# Patient Record
Sex: FEMALE | Race: WHITE | NOT HISPANIC OR LATINO | Employment: FULL TIME | URBAN - METROPOLITAN AREA
[De-identification: names, ages, dates, MRNs, and addresses within clinical notes are randomized per-mention and may not be internally consistent; named-entity substitution may affect disease eponyms.]

---

## 2017-01-04 ENCOUNTER — OFFICE VISIT (OUTPATIENT)
Dept: ENDOCRINOLOGY | Facility: CLINIC | Age: 32
End: 2017-01-04

## 2017-01-04 VITALS
BODY MASS INDEX: 27.74 KG/M2 | SYSTOLIC BLOOD PRESSURE: 114 MMHG | HEART RATE: 78 BPM | HEIGHT: 68 IN | DIASTOLIC BLOOD PRESSURE: 71 MMHG | WEIGHT: 183 LBS

## 2017-01-04 DIAGNOSIS — R79.89 ELEVATED SERUM CREATININE: Primary | ICD-10-CM

## 2017-01-04 DIAGNOSIS — E27.9 HYPOTHALAMIC-ADRENAL DYSFUNCTION (H): ICD-10-CM

## 2017-01-04 DIAGNOSIS — E24.0 PITUITARY CUSHING'S SYNDROME (H): ICD-10-CM

## 2017-01-04 DIAGNOSIS — E23.3 HYPOTHALAMIC-ADRENAL DYSFUNCTION (H): ICD-10-CM

## 2017-01-04 ASSESSMENT — PAIN SCALES - GENERAL: PAINLEVEL: NO PAIN (0)

## 2017-01-04 NOTE — PATIENT INSTRUCTIONS
Labs in 1 month and then prior to next visit  MRI in about 5-6 months.   Continue taking current medications, no changes in dose were made today.

## 2017-01-04 NOTE — NURSING NOTE
Chief Complaint   Patient presents with     RECHECK     F/U DIABETES INSIPIDUS     Aline Block, [facility]  Endocrinology & Diabetes 3G

## 2017-01-04 NOTE — Clinical Note
1/4/2017       RE: Suyapa Hodge  [address] [place] [address]     Dear Colleague,    Thank you for referring your patient, Suyapa Hodge, to the [facility] ENDOCRINOLOGY at [facility]. Please see a copy of my visit note below.    Endocrinology Progress Note    Suyapa Hodge is a 30 year old woman last seen about 5 mo ago who is in clinic for follow-up s/p pituitary surgery x 3 and bilateral adrenalectomy (July 2014).   She was diagnosed with a pituitary microadenoma several yrs ago which was the suspected cause of her Cushing's disease. She had initial operation several yrs ago and labs normalized and symptoms resolved for about 2.5-3 yrs but then recurred and she was seen in spring of 2013.  She then underwent surgery summer 2013 and again in January 2014. She developed a PE after that surgery, which was trated and has now resolved. Her Cushing symptoms/elevated urine cortisol exc still persisted after her pituitary surgeries; hence, she finally underwent bilateral adrenalectomy on July 14, 2014 (after a couple of months of temporizing therapy with Signifor).    She is currently taking hydrocortisone BID (10 mg a, 10mg p), L-thyroxine 75 mcg tablets full tablet 6 days and desmopressin 0.1 mg b.i.d.  She is wt stable now though she would have a goal of losing more if possible (current BMI is around 27--still with centralized fat distribution , BP and edema are better (wt is down about 50# from the max, stable compared to last visit).     She is now on HRT--she has been followed by heme-onc on this ad her coag profile has actually improved some at last check compared to earlier (before starting the HRT).  Has the history of PE christiano-op with last neuro sx--She was started on daily prometrium and vivelle patch and had the labs done in the interim which did not show wrosening profile again on treatment.  Pt is otherwise tolerating this regimen.  In summer  2015 AVN was an issue again after many hours of standing and she was seen by ortho.  She had PT and that improved.  Has never had a DXA study.    ROS--gen/HEENT/MSK/endo reviewed with her    Current Outpatient Prescriptions   Medication     desmopressin (DDAVP) 0.1 MG tablet     hydrocortisone (CORTEF) 10 MG tablet     fludrocortisone (FLORINEF) 0.1 MG tablet     progesterone (PROMETRIUM) 100 MG capsule     estradiol (VIVELLE-DOT) 0.05 MG/24HR patch     levothyroxine (SYNTHROID, LEVOTHROID) 75 MCG tablet     Multiple Vitamin (MULTI-VITAMIN DAILY PO)     No current facility-administered medications for this visit.       She does not have heat or cold intol. She has good energy level--discussed most recent TFTs    She does not have polyuria or polydipsia.  She is not having issues with increased freqnency/polyuria during the day, or increased thirst or edema.  She continues to take desmopressin 0.1 mg b.i.d.    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    Past Medical History   Past Medical History   Diagnosis Date     Colon polyp      Adrenal disorder (H)      Medulloadrenal hyperfunction (H)      Cushing syndrome (H)      pituitary microadenoma     Diabetes insipidus (H)      Avascular necrosis of femur head, 2010     Hypercalcaemia      Pulmonary emboli (H) 01/2014     Hypertension      Thyroid disease      Pulmonary embolism (H) 2014       Past Surgical History   Past Surgical History   Procedure Laterality Date     Removal of pituitary microademona       [place] teeth extration       Optical tracking system endoscopic resection tumor cranial  7/1/2013     Procedure: OPTICAL TRACKING SYSTEM ENDOSCOPIC RESECTION TUMOR CRANIAL;  Stealth Assisted Endoscopic Hypophysectomy ;  Surgeon: Soham Aquino MD;  Location:  OR     Optical tracking system endoscopic resection tumor cranial  1/20/2014     Procedure: OPTICAL TRACKING SYSTEM ENDOSCOPIC RESECTION TUMOR  "CRANIAL;  Stealth Assisted Endoscopic Transnasal Excision Of Pituitary Adenoma , Placement Of Lumbar Drain with c-arm;  Surgeon: Soham Aquino MD;  Location: [facility] OR     Insert drain lumbar  1/20/2014     Procedure: INSERT DRAIN LUMBAR;;  Surgeon: Soham Aquino MD;  Location: [facility] OR     Colonoscopy       Laparoscopic adrenalectomy  7/14/2014     Procedure: LAPAROSCOPIC ADRENALECTOMY;  Surgeon: Roni Nunn MD;  Location: [facility] OR     Social History  History     Social History     Marital Status: Single     Spouse Name: N/A     Number of Children: N/A     Years of Education: N/A     Social History Main Topics     Smoking status: Former Smoker -- 3 years     Types: Cigarettes     Quit date: 02/05/2008     Smokeless tobacco: None     Alcohol Use: Yes      Comment: 2/week     Drug Use: No     Sexual Activity: No     Review of Systems   See HPI for pertinents--gen/HEENT/endo/heme/neuro discussed with pertinents as above      Physical Exam  Vital signs:   /71 mmHg  Pulse 78  Ht 1.727 m (5' 8\")  Wt 83.008 kg (183 lb)  BMI 27.83 kg/m2  General Appearance: she is well developed, well nourished and in no distress     Eyes:  conjutivae and extra-ocular motions are normal.                                    no lid lag, no stare    HEENT:   NCAT, eomi  Gastrointestinal:  abdomen nondistended  Musculoskeletal:  normal tone and bulk throughout  Psychological:           affect and judgment normal  Skin:  no lesions on exposed skin  Neurological:  nl gait, no resting tremor, nonfocal otherwise, fully A and O    Lab Results  Orders Only on 07/22/2016   Component Date Value Ref Range Status     T4 Free 07/22/2016 1.11  0.76 - 1.46 ng/dL Final     Sodium 07/22/2016 138  133 - 144 mmol/L Final     Potassium 07/22/2016 3.8  3.4 - 5.3 mmol/L Final     Chloride 07/22/2016 107  94 - 109 mmol/L Final     Carbon Dioxide 07/22/2016 25  20 - 32 mmol/L Final     Anion Gap 07/22/2016 6  3 - 14 mmol/L Final     Glucose " 07/22/2016 75  70 - 99 mg/dL Final     Urea Nitrogen 07/22/2016 22  7 - 30 mg/dL Final     Creatinine 07/22/2016 1.08* 0.52 - 1.04 mg/dL Final     GFR Estimate 07/22/2016 59* >60 mL/min/1.7m2 Final    Non  GFR Calc     GFR Estimate If Black 07/22/2016 71  >60 mL/min/1.7m2 Final    African American GFR Calc     Calcium 07/22/2016 8.9  8.5 - 10.1 mg/dL Final     Prolactin 07/22/2016 6  3 - 27 ug/L Final    Reference ranges apply to non-pregnant females only.     ENDO THYROID LABS-[facility] Latest Ref Rng 12/30/2016 7/22/2016 5/3/2016   TSH 0.40 - 4.00 mU/L      T4 FREE 0.76 - 1.46 ng/dL 0.91 1.11 0.96   TRIIODOTHYRONINE(T3) 60 - 181 ng/dL        Sodium 139, potassium 3.8, insulin like GF 1 70, glucose 76, prolactin seven  Assessment   Ms Suyapa Hodge is a 30 year old woman with a history of Cushing's disease S/P pituitary surgery x 3, primary adrenal insufficiency due to bilateral adrenalectomy for recurrent pituitary Cushing's--microadenoma.  Now with hypopituitaritarism  1. Panhypopit  2. Low bone density  3. Elevated Creatinine level : Retest in 1 month   Plan  We can continue with current doses of hormone replacement--  continue Vivelle dot patch for estrogen replacement along with nightly prometrium  continue hydrocortisone dose to 10 mg AM and 10 mg PM; we can reduce the l-thyroxine 75 mcg for 6 days out of 7.  Return in about 6 months with labs shortly before return    We discussed calcium and vitamin D--  For vitamin D3 she can aim for getting about 800-2000 IU daily (total of what is in supplements)  For elemental calcium can aim to get around 1200 mg to 1500 mg daily (total of diet plus supplements)  Rule of thumb--1 dairy serving = about 1 c milk or yogurt or 1.5 oz hard cheese is about 300-350 mg elemental calcium    Re-test BMD in May-June  Labs 6 month  MRI in May-June       Celestine Medina MD  [id number]  Endocrinology Service

## 2017-01-04 NOTE — PROGRESS NOTES
Endocrinology Progress Note    Suyapa Hodge is a 30 year old woman last seen about 5 mo ago who is in clinic for follow-up s/p pituitary surgery x 3 and bilateral adrenalectomy (July 2014).   She was diagnosed with a pituitary microadenoma several yrs ago which was the suspected cause of her Cushing's disease. She had initial operation several yrs ago and labs normalized and symptoms resolved for about 2.5-3 yrs but then recurred and she was seen in spring of 2013.  She then underwent surgery summer 2013 and again in January 2014. She developed a PE after that surgery, which was trated and has now resolved. Her Cushing symptoms/elevated urine cortisol exc still persisted after her pituitary surgeries; hence, she finally underwent bilateral adrenalectomy on July 14, 2014 (after a couple of months of temporizing therapy with Signifor).    She is currently taking hydrocortisone BID (10 mg a, 10mg p), L-thyroxine 75 mcg tablets full tablet 6 days and desmopressin 0.1 mg b.i.d.  She is wt stable now though she would have a goal of losing more if possible (current BMI is around 27--still with centralized fat distribution , BP and edema are better (wt is down about 50# from the max, stable compared to last visit).     She is now on HRT--she has been followed by heme-onc on this ad her coag profile has actually improved some at last check compared to earlier (before starting the HRT).  Has the history of PE christiano-op with last neuro sx--She was started on daily prometrium and vivelle patch and had the labs done in the interim which did not show wrosening profile again on treatment.  Pt is otherwise tolerating this regimen.  In summer 2015 AVN was an issue again after many hours of standing and she was seen by ortho.  She had PT and that improved.  Has never had a DXA study.    ROS--gen/HEENT/MSK/endo reviewed with her    Current Outpatient Prescriptions   Medication     desmopressin (DDAVP) 0.1 MG tablet      hydrocortisone (CORTEF) 10 MG tablet     fludrocortisone (FLORINEF) 0.1 MG tablet     progesterone (PROMETRIUM) 100 MG capsule     estradiol (VIVELLE-DOT) 0.05 MG/24HR patch     levothyroxine (SYNTHROID, LEVOTHROID) 75 MCG tablet     Multiple Vitamin (MULTI-VITAMIN DAILY PO)     No current facility-administered medications for this visit.       She does not have heat or cold intol. She has good energy level--discussed most recent TFTs    She does not have polyuria or polydipsia.  She is not having issues with increased freqnency/polyuria during the day, or increased thirst or edema.  She continues to take desmopressin 0.1 mg b.i.d.    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    Past Medical History   Past Medical History   Diagnosis Date     Colon polyp      Adrenal disorder (H)      Medulloadrenal hyperfunction (H)      Cushing syndrome (H)      pituitary microadenoma     Diabetes insipidus (H)      Avascular necrosis of femur head, 2010     Hypercalcaemia      Pulmonary emboli (H) 01/2014     Hypertension      Thyroid disease      Pulmonary embolism (H) 2014       Past Surgical History   Past Surgical History   Procedure Laterality Date     Removal of pituitary microademona       [place] teeth extration       Optical tracking system endoscopic resection tumor cranial  7/1/2013     Procedure: OPTICAL TRACKING SYSTEM ENDOSCOPIC RESECTION TUMOR CRANIAL;  Stealth Assisted Endoscopic Hypophysectomy ;  Surgeon: Soham Aquino MD;  Location: [facility] OR     Optical tracking system endoscopic resection tumor cranial  1/20/2014     Procedure: OPTICAL TRACKING SYSTEM ENDOSCOPIC RESECTION TUMOR CRANIAL;  Stealth Assisted Endoscopic Transnasal Excision Of Pituitary Adenoma , Placement Of Lumbar Drain with c-arm;  Surgeon: Soham Aquino MD;  Location: [facility] OR     Insert drain lumbar  1/20/2014     Procedure: INSERT DRAIN LUMBAR;;  Surgeon: Soham Aquino MD;   "Location: [facility] OR     Colonoscopy       Laparoscopic adrenalectomy  7/14/2014     Procedure: LAPAROSCOPIC ADRENALECTOMY;  Surgeon: Roni Nunn MD;  Location: [facility] OR     Social History  History     Social History     Marital Status: Single     Spouse Name: N/A     Number of Children: N/A     Years of Education: N/A     Social History Main Topics     Smoking status: Former Smoker -- 3 years     Types: Cigarettes     Quit date: 02/05/2008     Smokeless tobacco: None     Alcohol Use: Yes      Comment: 2/week     Drug Use: No     Sexual Activity: No     Review of Systems   See HPI for pertinents--gen/HEENT/endo/heme/neuro discussed with pertinents as above      Physical Exam  Vital signs:   /71 mmHg  Pulse 78  Ht 1.727 m (5' 8\")  Wt 83.008 kg (183 lb)  BMI 27.83 kg/m2  General Appearance: she is well developed, well nourished and in no distress     Eyes:  conjutivae and extra-ocular motions are normal.                                    no lid lag, no stare    HEENT:   NCAT, eomi  Gastrointestinal:  abdomen nondistended  Musculoskeletal:  normal tone and bulk throughout  Psychological:           affect and judgment normal  Skin:  no lesions on exposed skin  Neurological:  nl gait, no resting tremor, nonfocal otherwise, fully A and O    Lab Results  Orders Only on 07/22/2016   Component Date Value Ref Range Status     T4 Free 07/22/2016 1.11  0.76 - 1.46 ng/dL Final     Sodium 07/22/2016 138  133 - 144 mmol/L Final     Potassium 07/22/2016 3.8  3.4 - 5.3 mmol/L Final     Chloride 07/22/2016 107  94 - 109 mmol/L Final     Carbon Dioxide 07/22/2016 25  20 - 32 mmol/L Final     Anion Gap 07/22/2016 6  3 - 14 mmol/L Final     Glucose 07/22/2016 75  70 - 99 mg/dL Final     Urea Nitrogen 07/22/2016 22  7 - 30 mg/dL Final     Creatinine 07/22/2016 1.08* 0.52 - 1.04 mg/dL Final     GFR Estimate 07/22/2016 59* >60 mL/min/1.7m2 Final    Non  GFR Calc     GFR Estimate If Black 07/22/2016 71  >60 " mL/min/1.7m2 Final     GFR Calc     Calcium 07/22/2016 8.9  8.5 - 10.1 mg/dL Final     Prolactin 07/22/2016 6  3 - 27 ug/L Final    Reference ranges apply to non-pregnant females only.     ENDO THYROID LABS-[facility] Latest Ref Rng 12/30/2016 7/22/2016 5/3/2016   TSH 0.40 - 4.00 mU/L      T4 FREE 0.76 - 1.46 ng/dL 0.91 1.11 0.96   TRIIODOTHYRONINE(T3) 60 - 181 ng/dL        Sodium 139, potassium 3.8, insulin like GF 1 70, glucose 76, prolactin seven  Assessment   Ms Suyapa Hodge is a 30 year old woman with a history of Cushing's disease S/P pituitary surgery x 3, primary adrenal insufficiency due to bilateral adrenalectomy for recurrent pituitary Cushing's--microadenoma.  Now with hypopituitaritarism  1. Panhypopit  2. Low bone density  3. Elevated Creatinine level : Retest in 1 month   Plan  We can continue with current doses of hormone replacement--  continue Vivelle dot patch for estrogen replacement along with nightly prometrium  continue hydrocortisone dose to 10 mg AM and 10 mg PM; we can reduce the l-thyroxine 75 mcg for 6 days out of 7.  Return in about 6 months with labs shortly before return    We discussed calcium and vitamin D--  For vitamin D3 she can aim for getting about 800-2000 IU daily (total of what is in supplements)  For elemental calcium can aim to get around 1200 mg to 1500 mg daily (total of diet plus supplements)  Rule of thumb--1 dairy serving = about 1 c milk or yogurt or 1.5 oz hard cheese is about 300-350 mg elemental calcium    Re-test BMD in May-June  Labs 6 month  MRI in May-June       Celestine Medina MD  [id number]  Endocrinology Service              Answers for HPI/ROS submitted by the patient on 12/21/2016   General Symptoms: No  Skin Symptoms: No  HENT Symptoms: No  EYE SYMPTOMS: No  HEART SYMPTOMS: No  LUNG SYMPTOMS: No  INTESTINAL SYMPTOMS: No  URINARY SYMPTOMS: No  GYNECOLOGIC SYMPTOMS: No  BREAST SYMPTOMS: No  SKELETAL SYMPTOMS: No  BLOOD SYMPTOMS: No  NERVOUS SYSTEM  SYMPTOMS: No  MENTAL HEALTH SYMPTOMS: No

## 2017-02-06 DIAGNOSIS — R79.89 ELEVATED SERUM CREATININE: ICD-10-CM

## 2017-02-06 LAB — POTASSIUM SERPL-SCNC: 4.2 MMOL/L (ref 3.4–5.3)

## 2017-02-15 NOTE — PROGRESS NOTES
Dear Suyapa,     The potassium levels are normal . Continue Margaritainef.     Celestine Medina MD  [id number]  Endocrinology Service

## 2017-03-17 DIAGNOSIS — E27.40 ADRENAL INSUFFICIENCY (H): ICD-10-CM

## 2017-03-17 RX ORDER — HYDROCORTISONE 10 MG/1
TABLET ORAL
Qty: 200 TABLET | Refills: 1 | Status: SHIPPED | OUTPATIENT
Start: 2017-03-17 | End: 2017-12-06

## 2017-05-11 DIAGNOSIS — E03.8 OTHER SPECIFIED HYPOTHYROIDISM: ICD-10-CM

## 2017-05-11 NOTE — TELEPHONE ENCOUNTER
Pharmacy calling requesting refill for Levothyroxine 75 mcg 1 tab 6 days per week and none on the 7th day.  Patient seen by Dr. Medina on 01/04/17.  Anamika Jonas RN, BSN

## 2017-05-12 RX ORDER — LEVOTHYROXINE SODIUM 75 UG/1
TABLET ORAL
Qty: 90 TABLET | Refills: 3 | Status: SHIPPED | OUTPATIENT
Start: 2017-05-12 | End: 2017-05-13

## 2017-05-13 DIAGNOSIS — E03.8 OTHER SPECIFIED HYPOTHYROIDISM: ICD-10-CM

## 2017-05-13 RX ORDER — LEVOTHYROXINE SODIUM 75 UG/1
TABLET ORAL
Qty: 90 TABLET | Refills: 3 | Status: SHIPPED | OUTPATIENT
Start: 2017-05-13 | End: 2018-07-16

## 2017-07-07 DIAGNOSIS — E27.9 HYPOTHALAMIC-ADRENAL DYSFUNCTION (H): ICD-10-CM

## 2017-07-07 DIAGNOSIS — E23.3 HYPOTHALAMIC-ADRENAL DYSFUNCTION (H): ICD-10-CM

## 2017-07-07 DIAGNOSIS — E24.0 PITUITARY CUSHING'S SYNDROME (H): ICD-10-CM

## 2017-07-07 DIAGNOSIS — R79.89 ELEVATED SERUM CREATININE: ICD-10-CM

## 2017-07-07 LAB
ALT SERPL W P-5'-P-CCNC: 22 U/L (ref 0–50)
ANION GAP SERPL CALCULATED.3IONS-SCNC: 6 MMOL/L (ref 3–14)
BUN SERPL-MCNC: 15 MG/DL (ref 7–30)
CALCIUM SERPL-MCNC: 8.7 MG/DL (ref 8.5–10.1)
CHLORIDE SERPL-SCNC: 102 MMOL/L (ref 94–109)
CO2 SERPL-SCNC: 29 MMOL/L (ref 20–32)
CREAT SERPL-MCNC: 0.85 MG/DL (ref 0.52–1.04)
GFR SERPL CREATININE-BSD FRML MDRD: 77 ML/MIN/1.7M2
GLUCOSE SERPL-MCNC: 77 MG/DL (ref 70–99)
POTASSIUM SERPL-SCNC: 4.1 MMOL/L (ref 3.4–5.3)
SODIUM SERPL-SCNC: 138 MMOL/L (ref 133–144)
T4 FREE SERPL-MCNC: 0.98 NG/DL (ref 0.76–1.46)

## 2017-07-10 LAB — ACTH PLAS-MCNC: 271 PG/ML

## 2017-07-12 ENCOUNTER — OFFICE VISIT (OUTPATIENT)
Dept: ENDOCRINOLOGY | Facility: CLINIC | Age: 32
End: 2017-07-12

## 2017-07-12 VITALS — HEIGHT: 68 IN | WEIGHT: 195.1 LBS | BODY MASS INDEX: 29.57 KG/M2

## 2017-07-12 DIAGNOSIS — E89.3 HYPOPITUITARISM AFTER ADENOMA RESECTION (H): Primary | ICD-10-CM

## 2017-07-12 ASSESSMENT — PAIN SCALES - GENERAL: PAINLEVEL: NO PAIN (0)

## 2017-07-12 NOTE — LETTER
7/12/2017      RE: Suyapa Hodge  [id number] [facility] [id number]       Endocrinology Progress Note    Suyapa Hodge is a 30 year old woman last seen about 5 mo ago who is in clinic for follow-up s/p pituitary surgery x 3 and bilateral adrenalectomy (July 2014).   She was diagnosed with a pituitary microadenoma several yrs ago which was the suspected cause of her Cushing's disease. She had pituitary surgery X3 in 2013-14 and finally underwent bilateral adrenalectomy on July 14, 2014 (after a couple of months of temporizing therapy with Signifor).    She is currently taking hydrocortisone BID (10 mg a, 10mg p), L-thyroxine 75 mcg tablets full tablet 6 days and desmopressin 0.1 mg b.i.d.   She lost about 50 pounds after surgery and has felt well since.     She is now on HRT-- on daily prometrium and vivelle patch.  Pt is otherwise tolerating this regimen.  In summer 2015 AVN was an issue again after many hours of standing and she was seen by ortho.  She had PT and that improved.  Has never had a DXA study.    ROS--gen/HEENT/MSK/endo reviewed with her  she has good energy levels, no changes with change in hydrocortisone dose  No heat or cold intolerance  Normal bowel movements  No new symptoms of headache or vision changes  She has chronic right-sided frontal headaches    Current Outpatient Prescriptions   Medication     levothyroxine (SYNTHROID/LEVOTHROID) 75 MCG tablet     hydrocortisone (CORTEF) 10 MG tablet     desmopressin (DDAVP) 0.1 MG tablet     fludrocortisone (FLORINEF) 0.1 MG tablet     progesterone (PROMETRIUM) 100 MG capsule     estradiol (VIVELLE-DOT) 0.05 MG/24HR patch     Multiple Vitamin (MULTI-VITAMIN DAILY PO)     No current facility-administered medications for this visit.        She does not have heat or cold intol. She has good energy level--discussed most recent TFTs    She does not have polyuria or polydipsia.  She is not having issues with increased freqnency/polyuria during the  day, or increased thirst or edema.  She continues to take desmopressin 0.1 mg b.i.d.    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    Past Medical History   Past Medical History:   Diagnosis Date     Adrenal disorder (H)      Avascular necrosis of femur head, 2010     Colon polyp      Cushing syndrome (H)     pituitary microadenoma     Diabetes insipidus (H)      Hypercalcaemia      Hypertension      Medulloadrenal hyperfunction (H)      Pulmonary emboli (H) 01/2014     Pulmonary embolism (H) 2014     Thyroid disease        Past Surgical History   Past Surgical History:   Procedure Laterality Date     COLONOSCOPY       INSERT DRAIN LUMBAR  1/20/2014    Procedure: INSERT DRAIN LUMBAR;;  Surgeon: Soham Aquino MD;  Location: [facility] OR     LAPAROSCOPIC ADRENALECTOMY  7/14/2014    Procedure: LAPAROSCOPIC ADRENALECTOMY;  Surgeon: Roni Nunn MD;  Location: [facility] OR     OPTICAL TRACKING SYSTEM ENDOSCOPIC RESECTION TUMOR CRANIAL  7/1/2013    Procedure: OPTICAL TRACKING SYSTEM ENDOSCOPIC RESECTION TUMOR CRANIAL;  Stealth Assisted Endoscopic Hypophysectomy ;  Surgeon: Soham Aquino MD;  Location: [facility] OR     OPTICAL TRACKING SYSTEM ENDOSCOPIC RESECTION TUMOR CRANIAL  1/20/2014    Procedure: OPTICAL TRACKING SYSTEM ENDOSCOPIC RESECTION TUMOR CRANIAL;  Stealth Assisted Endoscopic Transnasal Excision Of Pituitary Adenoma , Placement Of Lumbar Drain with c-arm;  Surgeon: Soham Aquino MD;  Location: [facility] OR     removal of pituitary microademona       wisdom teeth extration       Social History  History     Social History     Marital Status: Single     Spouse Name: N/A     Number of Children: N/A     Years of Education: N/A     Social History Main Topics     Smoking status: Former Smoker -- 3 years     Types: Cigarettes     Quit date: 02/05/2008     Smokeless tobacco: None     Alcohol Use: Yes      Comment: 2/week     Drug Use: No     Sexual Activity: No     Review of  "Systems   See HPI for pertinents--gen/HEENT/endo/heme/neuro discussed with pertinents as above      Physical Exam  Vital signs:   BP (P) 120/81  Pulse (P) 75  Ht 1.727 m (5' 8\")  Wt 88.5 kg (195 lb 1.6 oz)  BMI 29.66 kg/m2  General Appearance: she is well developed, well nourished and in no distress     Eyes:  conjutivae and extra-ocular motions are normal.                                    no lid lag, no stare    HEENT:   NCAT, eomi  Gastrointestinal:  abdomen nondistended  Musculoskeletal:  normal tone and bulk throughout  Psychological:           affect and judgment normal  Skin:  no lesions on exposed skin  Neurological:  nl gait, no resting tremor, nonfocal otherwise, fully A and O    Lab Results  Orders Only on 07/22/2016   Component Date Value Ref Range Status     T4 Free 07/22/2016 1.11  0.76 - 1.46 ng/dL Final     Sodium 07/22/2016 138  133 - 144 mmol/L Final     Potassium 07/22/2016 3.8  3.4 - 5.3 mmol/L Final     Chloride 07/22/2016 107  94 - 109 mmol/L Final     Carbon Dioxide 07/22/2016 25  20 - 32 mmol/L Final     Anion Gap 07/22/2016 6  3 - 14 mmol/L Final     Glucose 07/22/2016 75  70 - 99 mg/dL Final     Urea Nitrogen 07/22/2016 22  7 - 30 mg/dL Final     Creatinine 07/22/2016 1.08* 0.52 - 1.04 mg/dL Final     GFR Estimate 07/22/2016 59* >60 mL/min/1.7m2 Final    Non  GFR Calc     GFR Estimate If Black 07/22/2016 71  >60 mL/min/1.7m2 Final    African American GFR Calc     Calcium 07/22/2016 8.9  8.5 - 10.1 mg/dL Final     Prolactin 07/22/2016 6  3 - 27 ug/L Final    Reference ranges apply to non-pregnant females only.     ENDO THYROID LABS-[facility] Latest Ref Rng 12/30/2016 7/22/2016 5/3/2016   TSH 0.40 - 4.00 mU/L      T4 FREE 0.76 - 1.46 ng/dL 0.91 1.11 0.96   TRIIODOTHYRONINE(T3) 60 - 181 ng/dL        MR BRAIN W/O CONTRAST 7/7/2017 1:10 PM     Provided History: Other specified abnormal findings of blood  chemistry, Disorder of adrenal gland, unspecified, " Pituitary-dependent  Cushing's disease     Comparison:  6/3/2015, 4/23/2014      Technique: Multiplanar multisequence imaging of the brain without  intravenous contrast. Contrast enhanced portions were aborted due to  inability to obtain a creatinine level.     Findings: Postoperative changes from transsphenoidal pituitary  microadenoma resection. No evidence of recurrent disease on these  noncontrast images. These images reveal no intracranial mass lesion,  mass effect, midline shift or abnormal extraaxial fluid collection.  The ventricles and sulci are normal for age. Diffusion-weighted images  demonstrate no restricted diffusion.  Normal intravascular flow voids  are identified. Scattered paranasal sinus mucosal thickening.         Impression: No evidence of residual mass within the sella on these  noncontrast images. Contrast portions of the examination were aborted  due to inability to obtain a creatinine level and given patient's  chronic kidney disease.     Assessment   Ms Suyapa Hodge is a 30 year old woman with a history of Cushing's disease S/P pituitary surgery x 3, primary adrenal insufficiency due to bilateral adrenalectomy for recurrent pituitary Cushing's--microadenoma.  Now with hypopituitaritarism and rising ACTH levels.   1. Panhypopit  2. Low bone density for age.   3. Elevated Creatinine level : now normalized.   4. REcent weight gain  5. High ACTH levels.   Plan  REcheck ACTH in 3 months.   We can continue with current doses of hormone replacement--  continue Vivelle dot patch for estrogen replacement along with nightly prometrium. She denies having withdrawal bleed on this.     continue hydrocortisone dose to 10 mg AM and 10 mg PM (can reduce to 10 mg and 5 mg) ; we can reduce the l-thyroxine 75 mcg for 6 days out of 7.  Return in about 6 months with labs shortly before return    We discussed calcium and vitamin D--  For vitamin D3 she can aim for getting about 800-2000 IU daily (total of what  is in supplements)  For elemental calcium can aim to get around 1200 mg to 1500 mg daily (total of diet plus supplements)  Rule of thumb--1 dairy serving = about 1 c milk or yogurt or 1.5 oz hard cheese is about 300-350 mg elemental calcium    Re-test BMD in 2018 after Ca and Vit D supplementation. If not improved now that Cushings is treated, plan for treatment.   Labs 6 month       Celestine Medina MD  [id number]  Endocrinology Service          Celestine Medina MD

## 2017-07-12 NOTE — PROGRESS NOTES
Endocrinology Progress Note    Suyapa Hodge is a 30 year old woman last seen about 5 mo ago who is in clinic for follow-up s/p pituitary surgery x 3 and bilateral adrenalectomy (July 2014).   She was diagnosed with a pituitary microadenoma several yrs ago which was the suspected cause of her Cushing's disease. She had pituitary surgery X3 in 2013-14 and finally underwent bilateral adrenalectomy on July 14, 2014 (after a couple of months of temporizing therapy with Signifor).    She is currently taking hydrocortisone BID (10 mg a, 10mg p), L-thyroxine 75 mcg tablets full tablet 6 days and desmopressin 0.1 mg b.i.d.   She lost about 50 pounds after surgery and has felt well since.     She is now on HRT-- on daily prometrium and vivelle patch.  Pt is otherwise tolerating this regimen.  In summer 2015 AVN was an issue again after many hours of standing and she was seen by ortho.  She had PT and that improved.  Has never had a DXA study.    ROS--gen/HEENT/MSK/endo reviewed with her  she has good energy levels, no changes with change in hydrocortisone dose  No heat or cold intolerance  Normal bowel movements  No new symptoms of headache or vision changes  She has chronic right-sided frontal headaches    Current Outpatient Prescriptions   Medication     levothyroxine (SYNTHROID/LEVOTHROID) 75 MCG tablet     hydrocortisone (CORTEF) 10 MG tablet     desmopressin (DDAVP) 0.1 MG tablet     fludrocortisone (FLORINEF) 0.1 MG tablet     progesterone (PROMETRIUM) 100 MG capsule     estradiol (VIVELLE-DOT) 0.05 MG/24HR patch     Multiple Vitamin (MULTI-VITAMIN DAILY PO)     No current facility-administered medications for this visit.        She does not have heat or cold intol. She has good energy level--discussed most recent TFTs    She does not have polyuria or polydipsia.  She is not having issues with increased freqnency/polyuria during the day, or increased thirst or edema.  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    Past Medical History   Past Medical History:   Diagnosis Date     Adrenal disorder (H)      Avascular necrosis of femur head, 2010     Colon polyp      Cushing syndrome (H)     pituitary microadenoma     Diabetes insipidus (H)      Hypercalcaemia      Hypertension      Medulloadrenal hyperfunction (H)      Pulmonary emboli (H) 01/2014     Pulmonary embolism (H) 2014     Thyroid disease        Past Surgical History   Past Surgical History:   Procedure Laterality Date     COLONOSCOPY       INSERT DRAIN LUMBAR  1/20/2014    Procedure: INSERT DRAIN LUMBAR;;  Surgeon: Soham Aquino MD;  Location: [facility] OR     LAPAROSCOPIC ADRENALECTOMY  7/14/2014    Procedure: LAPAROSCOPIC ADRENALECTOMY;  Surgeon: Roni Nunn MD;  Location: [facility] OR     OPTICAL TRACKING SYSTEM ENDOSCOPIC RESECTION TUMOR CRANIAL  7/1/2013    Procedure: OPTICAL TRACKING SYSTEM ENDOSCOPIC RESECTION TUMOR CRANIAL;  Stealth Assisted Endoscopic Hypophysectomy ;  Surgeon: Soham Aquino MD;  Location: [facility] OR     OPTICAL TRACKING SYSTEM ENDOSCOPIC RESECTION TUMOR CRANIAL  1/20/2014    Procedure: OPTICAL TRACKING SYSTEM ENDOSCOPIC RESECTION TUMOR CRANIAL;  Stealth Assisted Endoscopic Transnasal Excision Of Pituitary Adenoma , Placement Of Lumbar Drain with c-arm;  Surgeon: Soham Aquino MD;  Location: [facility] OR     removal of pituitary microademona       wisdom teeth extration       Social History  History     Social History     Marital Status: Single     Spouse Name: N/A     Number of Children: N/A     Years of Education: N/A     Social History Main Topics     Smoking status: Former Smoker -- 3 years     Types: Cigarettes     Quit date: 02/05/2008     Smokeless tobacco: None     Alcohol Use: Yes      Comment: 2/week     Drug Use: No     Sexual Activity: No     Review of Systems   See HPI for pertinents--gen/HEENT/endo/heme/neuro discussed with  "pertinents as above      Physical Exam  Vital signs:   BP (P) 120/81  Pulse (P) 75  Ht 1.727 m (5' 8\")  Wt 88.5 kg (195 lb 1.6 oz)  BMI 29.66 kg/m2  General Appearance: she is well developed, well nourished and in no distress     Eyes:  conjutivae and extra-ocular motions are normal.                                    no lid lag, no stare    HEENT:   NCAT, eomi  Gastrointestinal:  abdomen nondistended  Musculoskeletal:  normal tone and bulk throughout  Psychological:           affect and judgment normal  Skin:  no lesions on exposed skin  Neurological:  nl gait, no resting tremor, nonfocal otherwise, fully A and O    Lab Results  Orders Only on 07/22/2016   Component Date Value Ref Range Status     T4 Free 07/22/2016 1.11  0.76 - 1.46 ng/dL Final     Sodium 07/22/2016 138  133 - 144 mmol/L Final     Potassium 07/22/2016 3.8  3.4 - 5.3 mmol/L Final     Chloride 07/22/2016 107  94 - 109 mmol/L Final     Carbon Dioxide 07/22/2016 25  20 - 32 mmol/L Final     Anion Gap 07/22/2016 6  3 - 14 mmol/L Final     Glucose 07/22/2016 75  70 - 99 mg/dL Final     Urea Nitrogen 07/22/2016 22  7 - 30 mg/dL Final     Creatinine 07/22/2016 1.08* 0.52 - 1.04 mg/dL Final     GFR Estimate 07/22/2016 59* >60 mL/min/1.7m2 Final    Non  GFR Calc     GFR Estimate If Black 07/22/2016 71  >60 mL/min/1.7m2 Final    African American GFR Calc     Calcium 07/22/2016 8.9  8.5 - 10.1 mg/dL Final     Prolactin 07/22/2016 6  3 - 27 ug/L Final    Reference ranges apply to non-pregnant females only.     ENDO THYROID LABS-[facility] Latest Ref Rng 12/30/2016 7/22/2016 5/3/2016   TSH 0.40 - 4.00 mU/L      T4 FREE 0.76 - 1.46 ng/dL 0.91 1.11 0.96   TRIIODOTHYRONINE(T3) 60 - 181 ng/dL        MR BRAIN W/O CONTRAST 7/7/2017 1:10 PM     Provided History: Other specified abnormal findings of blood  chemistry, Disorder of adrenal gland, unspecified, Pituitary-dependent  Cushing's disease     Comparison:  6/3/2015, 4/23/2014      Technique: " Multiplanar multisequence imaging of the brain without  intravenous contrast. Contrast enhanced portions were aborted due to  inability to obtain a creatinine level.     Findings: Postoperative changes from transsphenoidal pituitary  microadenoma resection. No evidence of recurrent disease on these  noncontrast images. These images reveal no intracranial mass lesion,  mass effect, midline shift or abnormal extraaxial fluid collection.  The ventricles and sulci are normal for age. Diffusion-weighted images  demonstrate no restricted diffusion.  Normal intravascular flow voids  are identified. Scattered paranasal sinus mucosal thickening.         Impression: No evidence of residual mass within the sella on these  noncontrast images. Contrast portions of the examination were aborted  due to inability to obtain a creatinine level and given patient's  chronic kidney disease.     Assessment   Ms Suyapa Hodge is a 30 year old woman with a history of Cushing's disease S/P pituitary surgery x 3, primary adrenal insufficiency due to bilateral adrenalectomy for recurrent pituitary Cushing's--microadenoma.  Now with hypopituitaritarism and rising ACTH levels.   1. Panhypopit  2. Low bone density for age.   3. Elevated Creatinine level : now normalized.   4. REcent weight gain  5. High ACTH levels.   Plan  REcheck ACTH in 3 months.   We can continue with current doses of hormone replacement--  continue Vivelle dot patch for estrogen replacement along with nightly prometrium. She denies having withdrawal bleed on this.     continue hydrocortisone dose to 10 mg AM and 10 mg PM (can reduce to 10 mg and 5 mg) ; we can reduce the l-thyroxine 75 mcg for 6 days out of 7.  Return in about 6 months with labs shortly before return    We discussed calcium and vitamin D--  For vitamin D3 she can aim for getting about 800-2000 IU daily (total of what is in supplements)  For elemental calcium can aim to get around 1200 mg to 1500 mg daily  (total of diet plus supplements)  Rule of thumb--1 dairy serving = about 1 c milk or yogurt or 1.5 oz hard cheese is about 300-350 mg elemental calcium    Re-test BMD in 2018 after Ca and Vit D supplementation. If not improved now that Cushings is treated, plan for treatment.   Labs 6 month       Celestine Medina MD  [id number]  Endocrinology Service

## 2017-07-12 NOTE — PATIENT INSTRUCTIONS
Reduce Hydrocortisone to 10 mg in the morning and 5 mg in the evening.     Labs in about 3 months.

## 2017-07-12 NOTE — LETTER
7/12/2017       RE: Suyapa Hodge  [id number]  [facility] [address]     Dear Colleague,    Thank you for referring your patient, Suyapa Hodge, to the [facility] ENDOCRINOLOGY at [facility]. Please see a copy of my visit note below.    Endocrinology Progress Note    Suyapa Hodge is a 30 year old woman last seen about 5 mo ago who is in clinic for follow-up s/p pituitary surgery x 3 and bilateral adrenalectomy (July 2014).   She was diagnosed with a pituitary microadenoma several yrs ago which was the suspected cause of her Cushing's disease. She had pituitary surgery X3 in 2013-14 and finally underwent bilateral adrenalectomy on July 14, 2014 (after a couple of months of temporizing therapy with Signifor).    She is currently taking hydrocortisone BID (10 mg a, 10mg p), L-thyroxine 75 mcg tablets full tablet 6 days and desmopressin 0.1 mg b.i.d.   She lost about 50 pounds after surgery and has felt well since.     She is now on HRT-- on daily prometrium and vivelle patch.  Pt is otherwise tolerating this regimen.  In summer 2015 AVN was an issue again after many hours of standing and she was seen by ortho.  She had PT and that improved.  Has never had a DXA study.    ROS--gen/HEENT/MSK/endo reviewed with her  she has good energy levels, no changes with change in hydrocortisone dose  No heat or cold intolerance  Normal bowel movements  No new symptoms of headache or vision changes  She has chronic right-sided frontal headaches    Current Outpatient Prescriptions   Medication     levothyroxine (SYNTHROID/LEVOTHROID) 75 MCG tablet     hydrocortisone (CORTEF) 10 MG tablet     desmopressin (DDAVP) 0.1 MG tablet     fludrocortisone (FLORINEF) 0.1 MG tablet     progesterone (PROMETRIUM) 100 MG capsule     estradiol (VIVELLE-DOT) 0.05 MG/24HR patch     Multiple Vitamin (MULTI-VITAMIN DAILY PO)     No current facility-administered medications for this visit.        She  does not have heat or cold intol. She has good energy level--discussed most recent TFTs    She does not have polyuria or polydipsia.  She is not having issues with increased freqnency/polyuria during the day, or increased thirst or edema.  She continues to take desmopressin 0.1 mg b.i.d.    RN at 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    Past Medical History   Past Medical History:   Diagnosis Date     Adrenal disorder (H)      Avascular necrosis of femur head, 2010     Colon polyp      Cushing syndrome (H)     pituitary microadenoma     Diabetes insipidus (H)      Hypercalcaemia      Hypertension      Medulloadrenal hyperfunction (H)      Pulmonary emboli (H) 01/2014     Pulmonary embolism (H) 2014     Thyroid disease        Past Surgical History   Past Surgical History:   Procedure Laterality Date     COLONOSCOPY       INSERT DRAIN LUMBAR  1/20/2014    Procedure: INSERT DRAIN LUMBAR;;  Surgeon: Soham Aquino MD;  Location: [facility] OR     LAPAROSCOPIC ADRENALECTOMY  7/14/2014    Procedure: LAPAROSCOPIC ADRENALECTOMY;  Surgeon: Roni Nunn MD;  Location: [facility] OR     OPTICAL TRACKING SYSTEM ENDOSCOPIC RESECTION TUMOR CRANIAL  7/1/2013    Procedure: OPTICAL TRACKING SYSTEM ENDOSCOPIC RESECTION TUMOR CRANIAL;  Stealth Assisted Endoscopic Hypophysectomy ;  Surgeon: Soham Aquino MD;  Location: [facility] OR     OPTICAL TRACKING SYSTEM ENDOSCOPIC RESECTION TUMOR CRANIAL  1/20/2014    Procedure: OPTICAL TRACKING SYSTEM ENDOSCOPIC RESECTION TUMOR CRANIAL;  Stealth Assisted Endoscopic Transnasal Excision Of Pituitary Adenoma , Placement Of Lumbar Drain with c-arm;  Surgeon: Soham Aquino MD;  Location: [facility] OR     removal of pituitary microademona       wisdom teeth extration       Social History  History     Social History     Marital Status: Single     Spouse Name: N/A     Number of Children: N/A     Years of Education: N/A     Social History Main Topics     Smoking  "status: Former Smoker -- 3 years     Types: Cigarettes     Quit date: 02/05/2008     Smokeless tobacco: None     Alcohol Use: Yes      Comment: 2/week     Drug Use: No     Sexual Activity: No     Review of Systems   See HPI for pertinents--gen/HEENT/endo/heme/neuro discussed with pertinents as above      Physical Exam  Vital signs:   BP (P) 120/81  Pulse (P) 75  Ht 1.727 m (5' 8\")  Wt 88.5 kg (195 lb 1.6 oz)  BMI 29.66 kg/m2  General Appearance: she is well developed, well nourished and in no distress     Eyes:  conjutivae and extra-ocular motions are normal.                                    no lid lag, no stare    HEENT:   NCAT, eomi  Gastrointestinal:  abdomen nondistended  Musculoskeletal:  normal tone and bulk throughout  Psychological:           affect and judgment normal  Skin:  no lesions on exposed skin  Neurological:  nl gait, no resting tremor, nonfocal otherwise, fully A and O    Lab Results  Orders Only on 07/22/2016   Component Date Value Ref Range Status     T4 Free 07/22/2016 1.11  0.76 - 1.46 ng/dL Final     Sodium 07/22/2016 138  133 - 144 mmol/L Final     Potassium 07/22/2016 3.8  3.4 - 5.3 mmol/L Final     Chloride 07/22/2016 107  94 - 109 mmol/L Final     Carbon Dioxide 07/22/2016 25  20 - 32 mmol/L Final     Anion Gap 07/22/2016 6  3 - 14 mmol/L Final     Glucose 07/22/2016 75  70 - 99 mg/dL Final     Urea Nitrogen 07/22/2016 22  7 - 30 mg/dL Final     Creatinine 07/22/2016 1.08* 0.52 - 1.04 mg/dL Final     GFR Estimate 07/22/2016 59* >60 mL/min/1.7m2 Final    Non  GFR Calc     GFR Estimate If Black 07/22/2016 71  >60 mL/min/1.7m2 Final    African American GFR Calc     Calcium 07/22/2016 8.9  8.5 - 10.1 mg/dL Final     Prolactin 07/22/2016 6  3 - 27 ug/L Final    Reference ranges apply to non-pregnant females only.     ENDO THYROID LABS-UM Latest Ref Rng 12/30/2016 7/22/2016 5/3/2016   TSH 0.40 - 4.00 mU/L      T4 FREE 0.76 - 1.46 ng/dL 0.91 1.11 0.96 "   TRIIODOTHYRONINE(T3) 60 - 181 ng/dL        MR BRAIN W/O CONTRAST 7/7/2017 1:10 PM     Provided History: Other specified abnormal findings of blood  chemistry, Disorder of adrenal gland, unspecified, Pituitary-dependent  Cushing's disease     Comparison:  6/3/2015, 4/23/2014      Technique: Multiplanar multisequence imaging of the brain without  intravenous contrast. Contrast enhanced portions were aborted due to  inability to obtain a creatinine level.     Findings: Postoperative changes from transsphenoidal pituitary  microadenoma resection. No evidence of recurrent disease on these  noncontrast images. These images reveal no intracranial mass lesion,  mass effect, midline shift or abnormal extraaxial fluid collection.  The ventricles and sulci are normal for age. Diffusion-weighted images  demonstrate no restricted diffusion.  Normal intravascular flow voids  are identified. Scattered paranasal sinus mucosal thickening.         Impression: No evidence of residual mass within the sella on these  noncontrast images. Contrast portions of the examination were aborted  due to inability to obtain a creatinine level and given patient's  chronic kidney disease.     Assessment   Ms Suyapa Hodge is a 30 year old woman with a history of Cushing's disease S/P pituitary surgery x 3, primary adrenal insufficiency due to bilateral adrenalectomy for recurrent pituitary Cushing's--microadenoma.  Now with hypopituitaritarism and rising ACTH levels.   1. Panhypopit  2. Low bone density for age.   3. Elevated Creatinine level : now normalized.   4. REcent weight gain  5. High ACTH levels.   Plan  REcheck ACTH in 3 months.   We can continue with current doses of hormone replacement--  continue Vivelle dot patch for estrogen replacement along with nightly prometrium. She denies having withdrawal bleed on this.     continue hydrocortisone dose to 10 mg AM and 10 mg PM (can reduce to 10 mg and 5 mg) ; we can reduce the l-thyroxine 75  mcg for 6 days out of 7.  Return in about 6 months with labs shortly before return    We discussed calcium and vitamin D--  For vitamin D3 she can aim for getting about 800-2000 IU daily (total of what is in supplements)  For elemental calcium can aim to get around 1200 mg to 1500 mg daily (total of diet plus supplements)  Rule of thumb--1 dairy serving = about 1 c milk or yogurt or 1.5 oz hard cheese is about 300-350 mg elemental calcium    Re-test BMD in 2018 after Ca and Vit D supplementation. If not improved now that Cushings is treated, plan for treatment.   Labs 6 month       Cleestine Medina MD  [id number]  Endocrinology Service          Again, thank you for allowing me to participate in the care of your patient.      Sincerely,    Celestine Medina MD

## 2017-07-12 NOTE — MR AVS SNAPSHOT
After Visit Summary   7/12/2017    Suyapa Hodge    MRN: [id number]           Patient Information     Date Of Birth          1985        Visit Information        Provider Department      7/12/2017 10:45 AM Celestine Medina MD [facility] Endocrinology        Today's Diagnoses     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)    -  1      Care Instructions    Reduce Hydrocortisone to 10 mg in the morning and 5 mg in the evening.     Labs in about 3 months.           Follow-ups after your visit        Follow-up notes from your care team     Return in about 6 months (around 1/12/2018).      Your next 10 appointments already scheduled     Oct 02, 2017  6:00 PM CDT   LAB with  LAB   [facility] ([facility])    [address] [place] [id number] [phone]           Patient must bring picture ID.  Patient should be prepared to give a urine specimen  Please do not eat 10-12 hours before your appointment if you are coming in fasting for labs on lipids, cholesterol, or glucose (sugar).  Pregnant women should follow their Care Team instructions. Water with medications is okay. Do not drink coffee or other fluids.   If you have concerns about taking  your medications, please ask at office or if scheduling via Twilio, send a message by clicking on Secure Messaging, Message Your Care Team.            Jan 17, 2018  3:30 PM CST   (Arrive by 3:15 PM)   RETURN ENDOCRINE with Celestine Medina MD   [facility] Endocrinology ([facility])    [address] [place] [id number] [phone]              Future tests that were ordered for you today     Open Future Orders        Priority Expected Expires Ordered    Adrenal corticotropin Routine 9/12/2017 7/12/2018 7/12/2017            Who to contact     Please call your clinic at [phone] to:    Ask questions about your health    Make or  "cancel appointments    Discuss your medicines    Learn about your test results    Speak to your doctor   If you have compliments or concerns about an experience at your clinic, or if you wish to file a complaint, please contact [facility] Patient Relations at [phone] or email us at [email or web address]         Additional Information About Your Visit        [employer] Information     [employer] gives you secure access to your electronic health record. If you see a primary care provider, you can also send messages to your care team and make appointments. If you have questions, please call your primary care clinic.  If you do not have a primary care provider, please call [phone] and they will assist you.      [employer] is an electronic gateway that provides easy, online access to your medical records. With [employer], you can request a clinic appointment, read your test results, renew a prescription or communicate with your care team.     To access your existing account, please contact your [facility] or call [phone] for assistance.        Care EveryWhere ID     This is your Care EveryWhere ID. This could be used by other organizations to access your [place] medical records  [id number]        Your Vitals Were     Height BMI (Body Mass Index)                1.727 m (5' 8\") 29.66 kg/m2           Blood Pressure from Last 3 Encounters:   07/12/17 (P) 120/81   01/04/17 114/71   07/27/16 118/80    Weight from Last 3 Encounters:   07/12/17 88.5 kg (195 lb 1.6 oz)   01/04/17 83 kg (183 lb)   07/27/16 83.2 kg (183 lb 6.4 oz)               Primary Care Provider Office Phone # Fax #    [facility] [place] [phone] [address] [facility] [address]        Equal Access to Services     [place] : Yo Morrison, utlio carmona, qacrys kaalmaverenice molina. So waalli " [phone].    ATENCIÓN: Si eladia seymour, tiene a ortiz disposición servicios gratuitos de asistencia lingüística. Kp mojica [phone].    We comply with applicable federal civil rights laws and [place] laws. We do not discriminate on the basis of race, color, national origin, age, disability sex, sexual orientation or gender identity.            Thank you!     Thank you for choosing [facility] ENDOCRINOLOGY  for your care. Our goal is always to provide you with excellent care. Hearing back from our patients is one way we can continue to improve our services. Please take a few minutes to complete the written survey that you may receive in the mail after your visit with us. Thank you!             Your Updated Medication List - Protect others around you: Learn how to safely use, store and throw away your medicines at www.disposemymeds.org.          This list is accurate as of: 7/12/17 11:10 AM.  Always use your most recent med list.                   Brand Name Dispense Instructions for use Diagnosis    desmopressin 0.1 MG tablet    DDAVP    180 tablet    Take 1 tablet (100 mcg) by mouth 2 times daily Take one tablet twice daily--in the morning and at bedtime    Pituitary adenoma (H)       estradiol 0.05 MG/24HR BIW patch    VIVELLE-DOT    27 patch    Place 1 patch onto the skin twice a week    Estrogen deficiency       fludrocortisone 0.1 MG tablet    FLORINEF    45 tablet    Take 0.5 tablets (0.05 mg) by mouth daily    Adrenal insufficiency, primary, iatrogenic (H)       hydrocortisone 10 MG tablet    CORTEF    200 tablet    Take 1 tab AM and 1 tab PM plus sick day supply as directed    Adrenal insufficiency (H)       levothyroxine 75 MCG tablet    SYNTHROID/LEVOTHROID    90 tablet    One tablet day 6 days per wk and none on the 7th day each wk    Other specified hypothyroidism       MULTI-VITAMIN DAILY PO           progesterone 100 MG capsule    PROMETRIUM    90 capsule    Take 1 tablet daily    Estrogen  deficiency, Premature menopause on hormone replacement therapy

## 2017-07-12 NOTE — NURSING NOTE
Chief Complaint   Patient presents with     RECHECK     F/U DIABETES INDIPIDUS     Aline Block, [facility]  Endocrinology & Diabetes 3G

## 2017-08-03 NOTE — PROGRESS NOTES
Labs discussed with patient and also discussed with other providers (Darlyn Wilkinson and Nathanael) Plan to repeat the labs in about 3 months and if increasing ACTH levels, consider radiation. Also reassess if there is any possibility of ACTH secreting peripheral tumors.     Plan to follow up in October (prepone Appt a week after labs)     Celestine Medina MD  [id number]  Endocrinology Service

## 2017-09-14 DIAGNOSIS — E28.39 ESTROGEN DEFICIENCY: ICD-10-CM

## 2017-09-14 RX ORDER — ESTRADIOL 0.05 MG/D
1 PATCH, EXTENDED RELEASE TRANSDERMAL
Qty: 27 PATCH | Refills: 3 | Status: SHIPPED | OUTPATIENT
Start: 2017-09-14 | End: 2018-08-21

## 2017-09-14 NOTE — TELEPHONE ENCOUNTER
Kevin melendrez  Last Written Prescription Date:  9/15/16  Last Fill Quantity: 27,   # refills: 3  Last Office Visit : 7/12/17  Future Office visit:  10/11/17    Routing refill request to provider for review/approval because:  Last refilled by Dr. Howard

## 2017-10-04 DIAGNOSIS — E89.3 HYPOPITUITARISM AFTER ADENOMA RESECTION (H): ICD-10-CM

## 2017-10-04 LAB
ALBUMIN SERPL-MCNC: 3.7 G/DL (ref 3.4–5)
ANION GAP SERPL CALCULATED.3IONS-SCNC: 8 MMOL/L (ref 3–14)
BUN SERPL-MCNC: 12 MG/DL (ref 7–30)
CALCIUM SERPL-MCNC: 8.8 MG/DL (ref 8.5–10.1)
CHLORIDE SERPL-SCNC: 104 MMOL/L (ref 94–109)
CO2 SERPL-SCNC: 24 MMOL/L (ref 20–32)
CREAT SERPL-MCNC: 0.93 MG/DL (ref 0.52–1.04)
GFR SERPL CREATININE-BSD FRML MDRD: 70 ML/MIN/1.7M2
GLUCOSE SERPL-MCNC: 87 MG/DL (ref 70–99)
PHOSPHATE SERPL-MCNC: 3 MG/DL (ref 2.5–4.5)
POTASSIUM SERPL-SCNC: 3.9 MMOL/L (ref 3.4–5.3)
SODIUM SERPL-SCNC: 136 MMOL/L (ref 133–144)
T4 FREE SERPL-MCNC: 0.9 NG/DL (ref 0.76–1.46)

## 2017-10-05 LAB
ACTH PLAS-MCNC: 267 PG/ML
CORTIS SERPL-MCNC: 0.8 UG/DL (ref 4–22)
PROLACTIN SERPL-MCNC: 6 UG/L (ref 3–27)

## 2017-10-08 NOTE — PROGRESS NOTES
ACTH levels have remained stable. This is reassuring but we will continue to follow this serially to make sure this does not rise up - I will discuss about further options during our up coming visit.     Celestine Medina MD  0020  Endocrinology Service

## 2017-10-11 ENCOUNTER — OFFICE VISIT (OUTPATIENT)
Dept: ENDOCRINOLOGY | Facility: CLINIC | Age: 32
End: 2017-10-11

## 2017-10-11 VITALS
WEIGHT: 185 LBS | HEART RATE: 90 BPM | SYSTOLIC BLOOD PRESSURE: 102 MMHG | DIASTOLIC BLOOD PRESSURE: 73 MMHG | BODY MASS INDEX: 28.04 KG/M2 | HEIGHT: 68 IN

## 2017-10-11 DIAGNOSIS — E89.3 HYPOPITUITARISM AFTER ADENOMA RESECTION (H): ICD-10-CM

## 2017-10-11 DIAGNOSIS — E27.40 ADRENAL INSUFFICIENCY (H): Primary | ICD-10-CM

## 2017-10-11 ASSESSMENT — PAIN SCALES - GENERAL: PAINLEVEL: NO PAIN (0)

## 2017-10-11 NOTE — LETTER
10/11/2017       RE: Suyapa Hodge  [address] [facility] [address]     Dear Colleague,    Thank you for referring your patient, Suyapa Hodge, to the [facility] ENDOCRINOLOGY at [facility]. Please see a copy of my visit note below.    Endocrinology Progress Note    Suyapa Hodge is a 32 year old woman last seen about 3 mo ago who is in clinic for follow-up s/p pituitary surgery x 3 and bilateral adrenalectomy (July 2014).   She was diagnosed with a pituitary microadenoma several yrs ago which was the suspected cause of her Cushing's disease. She had pituitary surgery X3 in 2013-14 and finally underwent bilateral adrenalectomy on July 14, 2014 (after a couple of months of temporizing therapy with Signifor).    She is currently taking hydrocortisone BID (10 mg a, 5mg p), L-thyroxine 75 mcg tablets full tablet 6 days and desmopressin 0.1 mg b.i.d.   She lost about 50 pounds after surgery and has felt well since.     She is now on HRT-- on daily prometrium and vivelle patch.  Pt is otherwise tolerating this regimen.  In summer 2015 AVN was an issue again after many hours of standing and she was seen by ortho.  She had PT and that improved. DXA showed low BMD for age.     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   No heat or cold intolerance  Normal bowel movements  No new symptoms of headache or vision changes  She has chronic right-sided frontal headaches    Current Outpatient Prescriptions   Medication     estradiol (VIVELLE-DOT) 0.05 MG/24HR BIW patch     levothyroxine (SYNTHROID/LEVOTHROID) 75 MCG tablet     hydrocortisone (CORTEF) 10 MG tablet     desmopressin (DDAVP) 0.1 MG tablet     fludrocortisone (FLORINEF) 0.1 MG tablet     progesterone (PROMETRIUM) 100 MG capsule     Multiple Vitamin (MULTI-VITAMIN DAILY PO)     No current facility-administered  medications for this visit.        She does not have heat or cold intol. She has good energy level--discussed most recent TFTs    She does not have polyuria or polydipsia.  She is not having issues with increased freqnency/polyuria during the day, or increased thirst or edema.  She continues to take desmopressin 0.1 mg b.i.d.    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    Past Medical History   Past Medical History:   Diagnosis Date     Adrenal disorder (H)      Avascular necrosis of femur head, 2010     Colon polyp      Cushing syndrome (H)     pituitary microadenoma     Diabetes insipidus (H)      Hypercalcaemia      Hypertension      Medulloadrenal hyperfunction (H)      Pulmonary emboli (H) 01/2014     Pulmonary embolism (H) 2014     Thyroid disease        Past Surgical History   Past Surgical History:   Procedure Laterality Date     COLONOSCOPY       INSERT DRAIN LUMBAR  1/20/2014    Procedure: INSERT DRAIN LUMBAR;;  Surgeon: Soham Aquino MD;  Location: [facility] OR     LAPAROSCOPIC ADRENALECTOMY  7/14/2014    Procedure: LAPAROSCOPIC ADRENALECTOMY;  Surgeon: Roni Nunn MD;  Location: [facility] OR     OPTICAL TRACKING SYSTEM ENDOSCOPIC RESECTION TUMOR CRANIAL  7/1/2013    Procedure: OPTICAL TRACKING SYSTEM ENDOSCOPIC RESECTION TUMOR CRANIAL;  Stealth Assisted Endoscopic Hypophysectomy ;  Surgeon: Soham Aquino MD;  Location: [facility] OR     OPTICAL TRACKING SYSTEM ENDOSCOPIC RESECTION TUMOR CRANIAL  1/20/2014    Procedure: OPTICAL TRACKING SYSTEM ENDOSCOPIC RESECTION TUMOR CRANIAL;  Stealth Assisted Endoscopic Transnasal Excision Of Pituitary Adenoma , Placement Of Lumbar Drain with c-arm;  Surgeon: Soham Aquino MD;  Location: [facility] OR     removal of pituitary microademona       wisdom teeth extration       Social History  History     Social History     Marital Status: Single     Spouse Name: N/A     Number of Children: N/A     Years of Education: N/A  "    Social History Main Topics     Smoking status: Former Smoker -- 3 years     Types: Cigarettes     Quit date: 02/05/2008     Smokeless tobacco: None     Alcohol Use: Yes      Comment: 2/week     Drug Use: No     Sexual Activity: No     Review of Systems   See HPI for pertinents--gen/HEENT/endo/heme/neuro discussed with pertinents as above      Physical Exam  Vital signs:   /73  Pulse 90  Ht 1.727 m (5' 8\")  Wt 83.9 kg (185 lb)  BMI 28.13 kg/m2  General Appearance: she is well developed, well nourished and in no distress     Eyes:  conjutivae and extra-ocular motions are normal.                                    no lid lag, no stare    HEENT:   NCAT, eomi  Gastrointestinal:  abdomen nondistended  Musculoskeletal:  normal tone and bulk throughout  Psychological:           affect and judgment normal  Skin:  no lesions on exposed skin  Neurological:  nl gait, no resting tremor, nonfocal otherwise, fully A and O    Lab Results  Orders Only on 07/22/2016   Component Date Value Ref Range Status     T4 Free 07/22/2016 1.11  0.76 - 1.46 ng/dL Final     Sodium 07/22/2016 138  133 - 144 mmol/L Final     Potassium 07/22/2016 3.8  3.4 - 5.3 mmol/L Final     Chloride 07/22/2016 107  94 - 109 mmol/L Final     Carbon Dioxide 07/22/2016 25  20 - 32 mmol/L Final     Anion Gap 07/22/2016 6  3 - 14 mmol/L Final     Glucose 07/22/2016 75  70 - 99 mg/dL Final     Urea Nitrogen 07/22/2016 22  7 - 30 mg/dL Final     Creatinine 07/22/2016 1.08* 0.52 - 1.04 mg/dL Final     GFR Estimate 07/22/2016 59* >60 mL/min/1.7m2 Final    Non  GFR Calc     GFR Estimate If Black 07/22/2016 71  >60 mL/min/1.7m2 Final    African American GFR Calc     Calcium 07/22/2016 8.9  8.5 - 10.1 mg/dL Final     Prolactin 07/22/2016 6  3 - 27 ug/L Final    Reference ranges apply to non-pregnant females only.     ENDO THYROID LABS-[facility] Latest Ref Rng 12/30/2016 7/22/2016 5/3/2016   TSH 0.40 - 4.00 mU/L      T4 FREE 0.76 - 1.46 ng/dL 0.91 " 1.11 0.96   TRIIODOTHYRONINE(T3) 60 - 181 ng/dL        ENDO PITUITARY LABS-[facility] Latest Ref Rng & Units 10/4/2017 7/7/2017   CORTISOL, SERUM 4 - 22 ug/dL 0.8 (L)    ADRENAL CORTICOTROPIN <47 pg/mL 267 (H) 271 (H)     MR BRAIN W/O CONTRAST 7/7/2017 1:10 PM     Provided History: Other specified abnormal findings of blood  chemistry, Disorder of adrenal gland, unspecified, Pituitary-dependent  Cushing's disease     Comparison:  6/3/2015, 4/23/2014      Technique: Multiplanar multisequence imaging of the brain without  intravenous contrast. Contrast enhanced portions were aborted due to  inability to obtain a creatinine level.     Findings: Postoperative changes from transsphenoidal pituitary  microadenoma resection. No evidence of recurrent disease on these  noncontrast images. These images reveal no intracranial mass lesion,  mass effect, midline shift or abnormal extraaxial fluid collection.  The ventricles and sulci are normal for age. Diffusion-weighted images  demonstrate no restricted diffusion.  Normal intravascular flow voids  are identified. Scattered paranasal sinus mucosal thickening.         Impression: No evidence of residual mass within the sella on these  noncontrast images. Contrast portions of the examination were aborted  due to inability to obtain a creatinine level and given patient's  chronic kidney disease.     Assessment   Ms Suyapa Hodge is a 30 year old woman with a history of Cushing's disease S/P pituitary surgery x 3, primary adrenal insufficiency due to bilateral adrenalectomy for recurrent pituitary Cushing's--microadenoma.  Now with hypopituitaritarism and rising ACTH levels - remained stable in past 3 months.   1. Panhypopit  2. Low bone density for age.   3. Elevated Creatinine level : now normalized.   4. Recent weight gain  5. High ACTH levels.   Plan  REcheck ACTH in 3 months. I have discussed this with various members of our group. ACTH serial measurement is the best option. I  discussed case with Dr Huffman who suggested radiation oncology follow up for possible radiation treatment if levels rise.   If rising levels are detected plan for reMRI ( no contrast last time it was done in 7/17)   We can continue with current doses of hormone replacement--  continue Vivelle dot patch for estrogen replacement along with nightly prometrium. She denies having withdrawal bleed on this.     continue hydrocortisone dose to 10 mg AM and 5 mg PM. No changes noted on this reduced dose.    thyroxine 75 mcg for 6 days out of 7.  Return in about 3 months with labs shortly before return    Low bone density for age  Re-test BMD in 2018 after Ca and Vit D supplementation. If not improved now that Cushings is treated, plan for treatment.   Labs 3 month     Celestine Medina MD  [id number]  Endocrinology Service    Patient Instructions   Labs prior to next visit in about 3 months.     Orders Placed This Encounter   Procedures     TSH     T4 free     Follicle stimulating hormone     Prolactin     Adrenal corticotropin     Cortisol     Renal panel       Again, thank you for allowing me to participate in the care of your patient.      Sincerely,    Celestine Medina MD

## 2017-10-11 NOTE — MR AVS SNAPSHOT
After Visit Summary   10/11/2017    Suyapa Hodge    MRN: [id number]           Patient Information     Date Of Birth          1985        Visit Information        Provider Department      10/11/2017 3:30 PM Celestine Medina MD [facility] Endocr[facility]        Today's Diagnoses     Adrenal insufficiency (H)    -  1    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)          Care Instructions    Labs prior to next visit in about 3 months.           Follow-ups after your visit        Follow-up notes from your care team     Return in about 4 months (around 2/11/2018).      Your next 10 appointments already scheduled     Jan 17, 2018  3:30 PM CST   (Arrive by 3:15 PM)   RETURN ENDOCRINE with Celestine Medina MD   [facility] Endocrinology ([facility])    [address] [place] [id number] [phone]              Future tests that were ordered for you today     Open Future Orders        Priority Expected Expires Ordered    TSH Routine 1/9/2018 10/11/2018 10/11/2017    T4 free Routine  10/6/2018 10/11/2017    Follicle stimulating hormone Routine  10/6/2018 10/11/2017    Prolactin Routine  10/6/2018 10/11/2017    Adrenal corticotropin Routine  10/11/2018 10/11/2017    Cortisol Routine  10/11/2018 10/11/2017    Renal panel Routine  10/6/2018 10/11/2017            Who to contact     Please call your clinic at [phone] to:    Ask questions about your health    Make or cancel appointments    Discuss your medicines    Learn about your test results    Speak to your doctor   If you have compliments or concerns about an experience at your clinic, or if you wish to file a complaint, please contact [facility] Patient Relations at [phone] or email us at [email or web address]         Additional Information About Your Visit        MyChart Information     [employer] gives you secure access to your  "electronic health record. If you see a primary care provider, you can also send messages to your care team and make appointments. If you have questions, please call your primary care clinic.  If you do not have a primary care provider, please call [phone] and they will assist you.      [employer] is an electronic gateway that provides easy, online access to your medical records. With [employer], you can request a clinic appointment, read your test results, renew a prescription or communicate with your care team.     To access your existing account, please contact your [facility] or call [phone] for assistance.        Care EveryWhere ID     This is your Care EveryWhere ID. This could be used by other organizations to access your [facility] medical records  [id number]        Your Vitals Were     Pulse Height BMI (Body Mass Index)             90 1.727 m (5' 8\") 28.13 kg/m2          Blood Pressure from Last 3 Encounters:   10/11/17 102/73   07/12/17 (P) 120/81   01/04/17 114/71    Weight from Last 3 Encounters:   10/11/17 83.9 kg (185 lb)   07/12/17 88.5 kg (195 lb 1.6 oz)   01/04/17 83 kg (183 lb)                 Today's Medication Changes          These changes are accurate as of: 10/11/17  7:24 PM.  If you have any questions, ask your nurse or doctor.               These medicines have changed or have updated prescriptions.        Dose/Directions    hydrocortisone 10 MG tablet   Commonly known as:  CORTEF   This may have changed:  additional instructions   Used for:  Adrenal insufficiency (H)        Take 1 tab AM and 1 tab PM plus sick day supply as directed   Quantity:  200 tablet   Refills:  1                Primary Care Provider Office Phone # Fax #    [facility] [phone] [address] [address]        Equal Access to Services     [place] AH: tulio Stokes, verenice reid " nirmal morrismakayla small'aan ah. So [facility] [phone].    ATENCIÓN: Si eladia seymour, tiene a ortiz disposición servicios gratuitos de asistencia lingüística. Kp al [phone].    We comply with applicable federal civil rights laws and [place] laws. We do not discriminate on the basis of race, color, national origin, age, disability, sex, sexual orientation, or gender identity.            Thank you!     Thank you for choosing [facility] ENDOCRINOLOGY  for your care. Our goal is always to provide you with excellent care. Hearing back from our patients is one way we can continue to improve our services. Please take a few minutes to complete the written survey that you may receive in the mail after your visit with us. Thank you!             Your Updated Medication List - Protect others around you: Learn how to safely use, store and throw away your medicines at www.disposemymeds.org.          This list is accurate as of: 10/11/17  7:24 PM.  Always use your most recent med list.                   Brand Name Dispense Instructions for use Diagnosis    desmopressin 0.1 MG tablet    DDAVP    180 tablet    Take 1 tablet (100 mcg) by mouth 2 times daily Take one tablet twice daily--in the morning and at bedtime    Pituitary adenoma (H)       estradiol 0.05 MG/24HR BIW patch    VIVELLE-DOT    27 patch    Place 1 patch onto the skin twice a week    Estrogen deficiency       fludrocortisone 0.1 MG tablet    FLORINEF    45 tablet    Take 0.5 tablets (0.05 mg) by mouth daily    Adrenal insufficiency, primary, iatrogenic (H)       hydrocortisone 10 MG tablet    CORTEF    200 tablet    Take 1 tab AM and 1 tab PM plus sick day supply as directed    Adrenal insufficiency (H)       levothyroxine 75 MCG tablet    SYNTHROID/LEVOTHROID    90 tablet    One tablet day 6 days per wk and none on the 7th day each wk    Other specified hypothyroidism       MULTI-VITAMIN DAILY PO           progesterone 100 MG capsule    PROMETRIUM    90  capsule    Take 1 tablet daily    Estrogen deficiency, Premature menopause on hormone replacement therapy

## 2017-10-11 NOTE — NURSING NOTE
Chief Complaint   Patient presents with     RECHECK     F/U DIABETES INSIPIDUS     Aline Block, [facility]  Endocrinology & Diabetes 3G

## 2017-10-12 NOTE — PROGRESS NOTES
Endocrinology Progress Note    Suyapa Hodge is a 32 year old woman last seen about 3 mo ago who is in clinic for follow-up s/p pituitary surgery x 3 and bilateral adrenalectomy (July 2014).   She was diagnosed with a pituitary microadenoma several yrs ago which was the suspected cause of her Cushing's disease. She had pituitary surgery X3 in 2013-14 and finally underwent bilateral adrenalectomy on July 14, 2014 (after a couple of months of temporizing therapy with Signifor).    She is currently taking hydrocortisone BID (10 mg a, 5mg p), L-thyroxine 75 mcg tablets full tablet 6 days and desmopressin 0.1 mg b.i.d.   She lost about 50 pounds after surgery and has felt well since.     She is now on HRT-- on daily prometrium and vivelle patch.  Pt is otherwise tolerating this regimen.  In summer 2015 AVN was an issue again after many hours of standing and she was seen by ortho.  She had PT and that improved. DXA showed low BMD for age.     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   No heat or cold intolerance  Normal bowel movements  No new symptoms of headache or vision changes  She has chronic right-sided frontal headaches    Current Outpatient Prescriptions   Medication     estradiol (VIVELLE-DOT) 0.05 MG/24HR BIW patch     levothyroxine (SYNTHROID/LEVOTHROID) 75 MCG tablet     hydrocortisone (CORTEF) 10 MG tablet     desmopressin (DDAVP) 0.1 MG tablet     fludrocortisone (FLORINEF) 0.1 MG tablet     progesterone (PROMETRIUM) 100 MG capsule     Multiple Vitamin (MULTI-VITAMIN DAILY PO)     No current facility-administered medications for this visit.        She does not have heat or cold intol. She has good energy level--discussed most recent TFTs    She does not have polyuria or polydipsia.  She is not having issues with increased freqnency/polyuria during the day, or increased thirst or edema.  She  continues to take desmopressin 0.1 mg b.i.d.    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    Past Medical History   Past Medical History:   Diagnosis Date     Adrenal disorder (H)      Avascular necrosis of femur head, 2010     Colon polyp      Cushing syndrome (H)     pituitary microadenoma     Diabetes insipidus (H)      Hypercalcaemia      Hypertension      Medulloadrenal hyperfunction (H)      Pulmonary emboli (H) 01/2014     Pulmonary embolism (H) 2014     Thyroid disease        Past Surgical History   Past Surgical History:   Procedure Laterality Date     COLONOSCOPY       INSERT DRAIN LUMBAR  1/20/2014    Procedure: INSERT DRAIN LUMBAR;;  Surgeon: Soham Aquino MD;  Location: [facility] OR     LAPAROSCOPIC ADRENALECTOMY  7/14/2014    Procedure: LAPAROSCOPIC ADRENALECTOMY;  Surgeon: Roni Nunn MD;  Location: [facility] OR     OPTICAL TRACKING SYSTEM ENDOSCOPIC RESECTION TUMOR CRANIAL  7/1/2013    Procedure: OPTICAL TRACKING SYSTEM ENDOSCOPIC RESECTION TUMOR CRANIAL;  Stealth Assisted Endoscopic Hypophysectomy ;  Surgeon: Soham Aquino MD;  Location: [facility] OR     OPTICAL TRACKING SYSTEM ENDOSCOPIC RESECTION TUMOR CRANIAL  1/20/2014    Procedure: OPTICAL TRACKING SYSTEM ENDOSCOPIC RESECTION TUMOR CRANIAL;  Stealth Assisted Endoscopic Transnasal Excision Of Pituitary Adenoma , Placement Of Lumbar Drain with c-arm;  Surgeon: Soham Aquino MD;  Location: [facility] OR     removal of pituitary microademona       wisdom teeth extration       Social History  History     Social History     Marital Status: Single     Spouse Name: N/A     Number of Children: N/A     Years of Education: N/A     Social History Main Topics     Smoking status: Former Smoker -- 3 years     Types: Cigarettes     Quit date: 02/05/2008     Smokeless tobacco: None     Alcohol Use: Yes      Comment: 2/week     Drug Use: No     Sexual Activity: No     Review of Systems   See HPI for  "pertinents--gen/HEENT/endo/heme/neuro discussed with pertinents as above      Physical Exam  Vital signs:   /73  Pulse 90  Ht 1.727 m (5' 8\")  Wt 83.9 kg (185 lb)  BMI 28.13 kg/m2  General Appearance: she is well developed, well nourished and in no distress     Eyes:  conjutivae and extra-ocular motions are normal.                                    no lid lag, no stare    HEENT:   NCAT, eomi  Gastrointestinal:  abdomen nondistended  Musculoskeletal:  normal tone and bulk throughout  Psychological:           affect and judgment normal  Skin:  no lesions on exposed skin  Neurological:  nl gait, no resting tremor, nonfocal otherwise, fully A and O    Lab Results  Orders Only on 07/22/2016   Component Date Value Ref Range Status     T4 Free 07/22/2016 1.11  0.76 - 1.46 ng/dL Final     Sodium 07/22/2016 138  133 - 144 mmol/L Final     Potassium 07/22/2016 3.8  3.4 - 5.3 mmol/L Final     Chloride 07/22/2016 107  94 - 109 mmol/L Final     Carbon Dioxide 07/22/2016 25  20 - 32 mmol/L Final     Anion Gap 07/22/2016 6  3 - 14 mmol/L Final     Glucose 07/22/2016 75  70 - 99 mg/dL Final     Urea Nitrogen 07/22/2016 22  7 - 30 mg/dL Final     Creatinine 07/22/2016 1.08* 0.52 - 1.04 mg/dL Final     GFR Estimate 07/22/2016 59* >60 mL/min/1.7m2 Final    Non  GFR Calc     GFR Estimate If Black 07/22/2016 71  >60 mL/min/1.7m2 Final    African American GFR Calc     Calcium 07/22/2016 8.9  8.5 - 10.1 mg/dL Final     Prolactin 07/22/2016 6  3 - 27 ug/L Final    Reference ranges apply to non-pregnant females only.     ENDO THYROID LABS-[facility] Latest Ref Rng 12/30/2016 7/22/2016 5/3/2016   TSH 0.40 - 4.00 mU/L      T4 FREE 0.76 - 1.46 ng/dL 0.91 1.11 0.96   TRIIODOTHYRONINE(T3) 60 - 181 ng/dL        ENDO PITUITARY LABS-[facility] Latest Ref Rng & Units 10/4/2017 7/7/2017   CORTISOL, SERUM 4 - 22 ug/dL 0.8 (L)    ADRENAL CORTICOTROPIN <47 pg/mL 267 (H) 271 (H)     MR BRAIN W/O CONTRAST 7/7/2017 1:10 PM     Provided " History: Other specified abnormal findings of blood  chemistry, Disorder of adrenal gland, unspecified, Pituitary-dependent  Cushing's disease     Comparison:  6/3/2015, 4/23/2014      Technique: Multiplanar multisequence imaging of the brain without  intravenous contrast. Contrast enhanced portions were aborted due to  inability to obtain a creatinine level.     Findings: Postoperative changes from transsphenoidal pituitary  microadenoma resection. No evidence of recurrent disease on these  noncontrast images. These images reveal no intracranial mass lesion,  mass effect, midline shift or abnormal extraaxial fluid collection.  The ventricles and sulci are normal for age. Diffusion-weighted images  demonstrate no restricted diffusion.  Normal intravascular flow voids  are identified. Scattered paranasal sinus mucosal thickening.         Impression: No evidence of residual mass within the sella on these  noncontrast images. Contrast portions of the examination were aborted  due to inability to obtain a creatinine level and given patient's  chronic kidney disease.     Assessment   Ms Suyapa Hodge is a 30 year old woman with a history of Cushing's disease S/P pituitary surgery x 3, primary adrenal insufficiency due to bilateral adrenalectomy for recurrent pituitary Cushing's--microadenoma.  Now with hypopituitaritarism and rising ACTH levels - remained stable in past 3 months.   1. Panhypopit  2. Low bone density for age.   3. Elevated Creatinine level : now normalized.   4. Recent weight gain  5. High ACTH levels.   Plan  REcheck ACTH in 3 months. I have discussed this with various members of our group. ACTH serial measurement is the best option. I discussed case with Dr Huffman who suggested radiation oncology follow up for possible radiation treatment if levels rise.   If rising levels are detected plan for reMRI ( no contrast last time it was done in 7/17)   We can continue with current doses of hormone  replacement--  continue Vivelle dot patch for estrogen replacement along with nightly prometrium. She denies having withdrawal bleed on this.     continue hydrocortisone dose to 10 mg AM and 5 mg PM. No changes noted on this reduced dose.    thyroxine 75 mcg for 6 days out of 7.  Return in about 3 months with labs shortly before return    Low bone density for age  Re-test BMD in 2018 after Ca and Vit D supplementation. If not improved now that Cushings is treated, plan for treatment.   Labs 3 month     Celestine Medina MD  [id number]  Endocrinology Service    Patient Instructions   Labs prior to next visit in about 3 months.     Orders Placed This Encounter   Procedures     TSH     T4 free     Follicle stimulating hormone     Prolactin     Adrenal corticotropin     Cortisol     Renal panel

## 2017-10-17 DIAGNOSIS — E28.319 PREMATURE MENOPAUSE ON HORMONE REPLACEMENT THERAPY: ICD-10-CM

## 2017-10-17 DIAGNOSIS — Z79.890 PREMATURE MENOPAUSE ON HORMONE REPLACEMENT THERAPY: ICD-10-CM

## 2017-10-17 DIAGNOSIS — E28.39 ESTROGEN DEFICIENCY: ICD-10-CM

## 2017-10-17 RX ORDER — FLUDROCORTISONE ACETATE 0.1 MG/1
0.05 TABLET ORAL DAILY
Qty: 45 TABLET | Refills: 3 | Status: SHIPPED | OUTPATIENT
Start: 2017-10-17 | End: 2018-10-08

## 2017-10-22 ENCOUNTER — HEALTH MAINTENANCE LETTER (OUTPATIENT)
Age: 32
End: 2017-10-22

## 2017-11-24 DIAGNOSIS — D35.2 PITUITARY ADENOMA (H): ICD-10-CM

## 2017-11-28 NOTE — TELEPHONE ENCOUNTER
Last Written Prescription Date:  12/4/16  Last Fill Quantity: 180,   # refills: 3  Last Office Visit : 10/11/17  Future Office visit:  1/17/18  Routing refill request to provider for review/approval because:  Drug not on refill protocol

## 2017-11-29 RX ORDER — DESMOPRESSIN ACETATE 0.1 MG/1
0.1 TABLET ORAL 2 TIMES DAILY
Qty: 180 TABLET | Refills: 3 | Status: SHIPPED | OUTPATIENT
Start: 2017-11-29 | End: 2018-11-28

## 2017-12-06 DIAGNOSIS — E27.40 ADRENAL INSUFFICIENCY (H): ICD-10-CM

## 2017-12-06 RX ORDER — HYDROCORTISONE 10 MG/1
TABLET ORAL
Qty: 150 TABLET | Refills: 3 | Status: SHIPPED | OUTPATIENT
Start: 2017-12-06 | End: 2018-11-28

## 2017-12-06 NOTE — TELEPHONE ENCOUNTER
cortef      Last Written Prescription Date:  3/17/17  Last Fill Quantity: 200,   # refills: 1  Last Office Visit : 10/11/17  Future Office visit:  1/17/18    Routing refill request to provider for review/approval because:  Drug not on the [facility], [facility] or [facility] refill protocol or controlled substance

## 2017-12-15 ENCOUNTER — OFFICE VISIT (OUTPATIENT)
Dept: FAMILY MEDICINE | Facility: CLINIC | Age: 32
End: 2017-12-15
Payer: COMMERCIAL

## 2017-12-15 VITALS
HEIGHT: 68 IN | HEART RATE: 94 BPM | OXYGEN SATURATION: 98 % | WEIGHT: 182 LBS | TEMPERATURE: 97.5 F | SYSTOLIC BLOOD PRESSURE: 98 MMHG | DIASTOLIC BLOOD PRESSURE: 60 MMHG | RESPIRATION RATE: 16 BRPM | BODY MASS INDEX: 27.58 KG/M2

## 2017-12-15 DIAGNOSIS — M87.059 AVASCULAR NECROSIS OF FEMORAL HEAD, UNSPECIFIED LATERALITY (H): ICD-10-CM

## 2017-12-15 DIAGNOSIS — Z86.0100 HISTORY OF COLONIC POLYPS: ICD-10-CM

## 2017-12-15 DIAGNOSIS — Z80.0 FAMILY HISTORY OF COLON CANCER: ICD-10-CM

## 2017-12-15 DIAGNOSIS — Z00.00 ROUTINE GENERAL MEDICAL EXAMINATION AT A HEALTH CARE FACILITY: Primary | ICD-10-CM

## 2017-12-15 DIAGNOSIS — Z13.6 CARDIOVASCULAR SCREENING; LDL GOAL LESS THAN 160: ICD-10-CM

## 2017-12-15 DIAGNOSIS — N62 BREAST HYPERTROPHY IN FEMALE: ICD-10-CM

## 2017-12-15 PROCEDURE — 99385 PREV VISIT NEW AGE 18-39: CPT | Performed by: NURSE PRACTITIONER

## 2017-12-15 ASSESSMENT — PAIN SCALES - GENERAL: PAINLEVEL: NO PAIN (0)

## 2017-12-15 NOTE — NURSING NOTE
"Chief Complaint   Patient presents with     Physical       Initial BP 98/60  Pulse 94  Temp 97.5  F (36.4  C) (Oral)  Resp 16  Ht 5' 8\" (1.727 m)  Wt 182 lb (82.6 kg)  SpO2 98%  Breastfeeding? No  BMI 27.67 kg/m2 Estimated body mass index is 27.67 kg/(m^2) as calculated from the following:    Height as of this encounter: 5' 8\" (1.727 m).    Weight as of this encounter: 182 lb (82.6 kg).  Medication Reconciliation: complete   Suyapa Matute CMA (AAMA)      "

## 2017-12-15 NOTE — Clinical Note
Please abstract the following data from this visit with this patient into the appropriate field in Epic:  Pap smear done on this date: May 2016 (approximately), by this group: [facility], results were Normal.

## 2017-12-15 NOTE — MR AVS SNAPSHOT
After Visit Summary   12/15/2017    Suyapa Hodge    MRN: [id number]           Patient Information     Date Of Birth          1985        Visit Information        Provider Department      12/15/2017 8:00 AM Clarisa Palafox APRN CNP [facility]        Today's Diagnoses     Routine general medical examination at a health care facility    -  1    Screening for malignant neoplasm of cervix        Family history of colon cancer        CARDIOVASCULAR SCREENING; LDL GOAL LESS THAN 160          Care Instructions    [facility]    If you have any questions regarding to your visit please contact your care team:       Team Red:   Clinic Hours Telephone Number   Dr. Vilma Palafox, NP   7am-7pm  Monday - Thursday   7am-5pm  Fridays  [phone]  (Appointment scheduling available 24/7)    Questions about your visit?   Team Line  [phone]   Urgent Care - [place] - 11am-9pm Monday-Friday Saturday-Sunday- 9am-5pm   [place] - 5pm-9pm Monday-Friday Saturday-Sunday- 9am-5pm  [phone] - [facility]  [phone] - [place]       What options do I have for visits at the clinic other than the traditional office visit?  To expand how we care for you, many of our providers are utilizing electronic visits (e-visits) and telephone visits, when medically appropriate, for interactions with their patients rather than a visit in the clinic.   We also offer nurse visits for many medical concerns. Just like any other service, we will bill your insurance company for this type of visit based on time spent on the phone with your provider. Not all insurance companies cover these visits. Please check with your medical insurance if this type of visit is covered. You will be responsible for any charges that are not paid by your insurance.      E-visits via hovelstay:  generally incur a $35.00 fee.  Telephone  visits:  Time spent on the phone: *charged based on time that is spent on the phone in increments of 10 minutes. Estimated cost:   5-10 mins $30.00   11-20 mins. $59.00   21-30 mins. $85.00     Use DCMobilityt (secure email communication and access to your chart) to send your primary care provider a message or make an appointment. Ask someone on your Team how to sign up for Composeright.  For a Price Quote for your services, please call our Shoebox Line at [phone].      As always, Thank you for trusting us with your health care needs!  Discharged by GOVIND Montes    Preventive Health Recommendations  Female Ages 26 - 39  Yearly exam:   See your health care provider every year in order to    Review health changes.     Discuss preventive care.      Review your medicines if you your doctor has prescribed any.    Until age 30: Get a Pap test every three years (more often if you have had an abnormal result).    After age 30: Talk to your doctor about whether you should have a Pap test every 3 years or have a Pap test with HPV screening every 5 years.   You do not need a Pap test if your uterus was removed (hysterectomy) and you have not had cancer.  You should be tested each year for STDs (sexually transmitted diseases), if you're at risk.   Talk to your provider about how often to have your cholesterol checked.  If you are at risk for diabetes, you should have a diabetes test (fasting glucose).  Shots: Get a flu shot each year. Get a tetanus shot every 10 years.   Nutrition:     Eat at least 5 servings of fruits and vegetables each day.    Eat whole-grain bread, whole-wheat pasta and brown rice instead of white grains and rice.    Talk to your provider about Calcium and Vitamin D.     Lifestyle    Exercise at least 150 minutes a week (30 minutes a day, 5 days of the week). This will help you control your weight and prevent disease.    Limit alcohol to one drink per day.    No smoking.     Wear sunscreen to prevent skin  cancer.    See your dentist every six months for an exam and cleaning.            Follow-ups after your visit        Additional Services     GASTROENTEROLOGY ADULT REF PROCEDURE ONLY       Last Lab Result: Creatinine (mg/dL)       Date                     Value                 10/04/2017               0.93             ----------  Body mass index is 27.67 kg/(m^2).      Patient will be contacted to schedule procedure.     Please be aware that coverage of these services is subject to the terms and limitations of your health insurance plan.  Call member services at your health plan with any benefit or coverage questions.  Any procedures must be performed at a [facility] facility OR coordinated by your clinic's referral office.    Please bring the following with you to your appointment:    (1) Any X-Rays, CTs or MRIs which have been performed.  Contact the facility where they were done to arrange for  prior to your scheduled appointment.    (2) List of current medications   (3) This referral request   (4) Any documents/labs given to you for this referral                  Your next 10 appointments already scheduled     Jan 17, 2018  3:30 PM CST   (Arrive by 3:15 PM)   RETURN ENDOCRINE with Celestine Medina MD   [facility] ([facility])    [address] [place] [id number] [phone]              Future tests that were ordered for you today     Open Future Orders        Priority Expected Expires Ordered    Lipid panel reflex to direct LDL Fasting Routine 12/22/2017 12/15/2018 12/15/2017            Who to contact     If you have questions or need follow up information about today's clinic visit or your schedule please contact [facility] [place] directly at [phone].  Normal or non-critical lab and imaging results will be communicated to you by MyChart, letter or phone within 4 business days after the clinic has received the results.  "If you do not hear from us within 7 days, please contact the clinic through Doppelganger or phone. If you have a critical or abnormal lab result, we will notify you by phone as soon as possible.  Submit refill requests through Doppelganger or call your pharmacy and they will forward the refill request to us. Please allow 3 business days for your refill to be completed.          Additional Information About Your Visit        [employer] Information     Doppelganger gives you secure access to your electronic health record. If you see a primary care provider, you can also send messages to your care team and make appointments. If you have questions, please call your primary care clinic.  If you do not have a primary care provider, please call [phone] and they will assist you.        Care EveryWhere ID     This is your Care EveryWhere ID. This could be used by other organizations to access your [facility] medical records  [id number]        Your Vitals Were     Pulse Temperature Respirations Height Pulse Oximetry Breastfeeding?    94 97.5  F (36.4  C) (Oral) 16 5' 8\" (1.727 m) 98% No    BMI (Body Mass Index)                   27.67 kg/m2            Blood Pressure from Last 3 Encounters:   12/15/17 98/60   10/11/17 102/73   07/12/17 (P) 120/81    Weight from Last 3 Encounters:   12/15/17 182 lb (82.6 kg)   10/11/17 185 lb (83.9 kg)   07/12/17 195 lb 1.6 oz (88.5 kg)              We Performed the Following     GASTROENTEROLOGY ADULT REF PROCEDURE ONLY        Primary Care Provider Office Phone # Fax #    Clarisavic Palafox, SOHEILA [facility] [phone]       [address]  [place] [address]        Equal Access to Services     [facility] HospitalFERNA[facility]O : Hadii joann munson Sopreet, waaxda luqadaha, qaybta kaalmada ademakaylayaananda, verenice back . So [facility] [phone].    ATENCIÓN: Si habla español, tiene a ortiz disposición servicios gratuitos de asistencia lingüística. Llame al [phone].    We comply with " applicable federal civil rights laws and [place] laws. We do not discriminate on the basis of race, color, national origin, age, disability, sex, sexual orientation, or gender identity.            Thank you!     Thank you for choosing [facility]  for your care. Our goal is always to provide you with excellent care. Hearing back from our patients is one way we can continue to improve our services. Please take a few minutes to complete the written survey that you may receive in the mail after your visit with us. Thank you!             Your Updated Medication List - Protect others around you: Learn how to safely use, store and throw away your medicines at www.disposemymeds.org.          This list is accurate as of: 12/15/17  8:40 AM.  Always use your most recent med list.                   Brand Name Dispense Instructions for use Diagnosis    Calcium carb-Vitamin D 500 mg [place]-200 units 500-200 MG-UNIT per tablet    OSCAL with D;Oyster Shell Calcium     Take 1 tablet by mouth 2 times daily (with meals)        desmopressin 0.1 MG tablet    DDAVP    180 tablet    Take 1 tablet (100 mcg) by mouth 2 times daily (1 TAB) MORNING AND AT BEDTIME    Pituitary adenoma (H)       estradiol 0.05 MG/24HR BIW patch    VIVELLE-DOT    27 patch    Place 1 patch onto the skin twice a week    Estrogen deficiency       fludrocortisone 0.1 MG tablet    FLORINEF    45 tablet    Take 0.5 tablets (0.05 mg) by mouth daily    Adrenal insufficiency, primary, iatrogenic (H)       hydrocortisone 10 MG tablet    CORTEF    150 tablet    1 tab (10 mg) am 1/2 tab (5 mg) pm plus sick day supply as directed    Adrenal insufficiency (H)       levothyroxine 75 MCG tablet    SYNTHROID/LEVOTHROID    90 tablet    One tablet day 6 days per wk and none on the 7th day each wk    Other specified hypothyroidism       MULTI-VITAMIN DAILY PO           progesterone 100 MG capsule    PROMETRIUM    90 capsule    Take 1 tablet daily    Estrogen  deficiency, Premature menopause on hormone replacement therapy

## 2017-12-15 NOTE — PATIENT INSTRUCTIONS
[facility]    If you have any questions regarding to your visit please contact your care team:       Team Red:   Clinic Hours Telephone Number   Dr. Vilma Palafox, NP   7am-7pm  Monday - Thursday   7am-5pm  Fridays  [phone]  (Appointment scheduling available 24/7)    Questions about your visit?   Team Line  [phone]   [facility] [place] [facility]pm Monday-Friday Saturday-Sunday- 9am-5pm   [place] - 5pm-9pm Monday-Friday Saturday-Sunday- 9am-5pm  [phone] - [place]   [phone] - [place]       What options do I have for visits at the clinic other than the traditional office visit?  To expand how we care for you, many of our providers are utilizing electronic visits (e-visits) and telephone visits, when medically appropriate, for interactions with their patients rather than a visit in the clinic.   We also offer nurse visits for many medical concerns. Just like any other service, we will bill your insurance company for this type of visit based on time spent on the phone with your provider. Not all insurance companies cover these visits. Please check with your medical insurance if this type of visit is covered. You will be responsible for any charges that are not paid by your insurance.      E-visits via Immure Records:  generally incur a $35.00 fee.  Telephone visits:  Time spent on the phone: *charged based on time that is spent on the phone in increments of 10 minutes. Estimated cost:   5-10 mins $30.00   11-20 mins. $59.00   21-30 mins. $85.00     Use Tr[employer] Inct (secure email communication and access to your chart) to send your primary care provider a message or make an appointment. Ask someone on your Team how to sign up for Immure Records.  For a Price Quote for your services, please call our Consumer Price Line at [phone].      As always, Thank you for trusting us with your health care needs!  Discharged  by GOVIND Montes    Preventive Health Recommendations  Female Ages 26 - 39  Yearly exam:   See your health care provider every year in order to    Review health changes.     Discuss preventive care.      Review your medicines if you your doctor has prescribed any.    Until age 30: Get a Pap test every three years (more often if you have had an abnormal result).    After age 30: Talk to your doctor about whether you should have a Pap test every 3 years or have a Pap test with HPV screening every 5 years.   You do not need a Pap test if your uterus was removed (hysterectomy) and you have not had cancer.  You should be tested each year for STDs (sexually transmitted diseases), if you're at risk.   Talk to your provider about how often to have your cholesterol checked.  If you are at risk for diabetes, you should have a diabetes test (fasting glucose).  Shots: Get a flu shot each year. Get a tetanus shot every 10 years.   Nutrition:     Eat at least 5 servings of fruits and vegetables each day.    Eat whole-grain bread, whole-wheat pasta and brown rice instead of white grains and rice.    Talk to your provider about Calcium and Vitamin D.     Lifestyle    Exercise at least 150 minutes a week (30 minutes a day, 5 days of the week). This will help you control your weight and prevent disease.    Limit alcohol to one drink per day.    No smoking.     Wear sunscreen to prevent skin cancer.    See your dentist every six months for an exam and cleaning.

## 2017-12-15 NOTE — PROGRESS NOTES
SUBJECTIVE:   CC: Suyapa Hodge is an 32 year old woman who presents for preventive health visit.     Physical   Annual:     Getting at least 3 servings of Calcium per day::  NO    Bi-annual eye exam::  Yes    Dental care twice a year::  Yes    Sleep apnea or symptoms of sleep apnea::  Daytime drowsiness    Diet::  Regular (no restrictions)    Frequency of exercise::  4-5 days/week    Duration of exercise::  45-60 minutes    Taking medications regularly::  Yes    Medication side effects::  Not applicable    Additional concerns today::  YES            Patient with history of pituitary adenoma s/p pituitary surgery x3 and bilateral adrenalectomy presents to establish care. She does follows with Endo every 3 months. Also notes a strong family history of colon cancer and personal history of colonic polyps and is due for a colonoscopy. Also notes hip pain related to avascular necrosis of femoral head, diagnosed in 2011. Does have breast hypertrophy and abdominal pannus for which she has seen plastic surgery.    Today's PHQ-2 Score: PHQ-2 ( 1999 Pfizer) 12/15/2017   Q1: Little interest or pleasure in doing things 1   Q2: Feeling down, depressed or hopeless 0   PHQ-2 Score 1   Q1: Little interest or pleasure in doing things Several days   Q2: Feeling down, depressed or hopeless Not at all   PHQ-2 Score 1       Abuse: Current or Past(Physical, Sexual or Emotional)- No  Do you feel safe in your environment - Yes    Social History   Substance Use Topics     Smoking status: Former Smoker     Years: 3.00     Types: Cigarettes     Quit date: 2/5/2008     Smokeless tobacco: Not on file     Alcohol use Yes      Comment: 2/week     Alcohol Use 12/15/2017   If you drink alcohol, do you typically have greater than 3 drinks per day OR greater than 7 drinks per week?   No   No flowsheet data found.      Reviewed orders with patient.  Reviewed health maintenance and updated orders accordingly - Yes  Labs reviewed in  EPIC    Mammogram not appropriate for this patient based on age.    Pertinent mammograms are reviewed under the imaging tab.  History of abnormal Pap smear: YES - updated in Problem List and Health Maintenance accordingly    Reviewed and updated as needed this visit by clinical staff         Reviewed and updated as needed this visit by Provider          Review of Systems  CONSTITUTIONAL:POSITIVE  for chronic fatigue  I: NEGATIVE for worrisome rashes, moles or lesions  E: NEGATIVE for vision changes or irritation  ENT: NEGATIVE for ear, mouth and throat problems  R: NEGATIVE for significant cough or SOB  B: NEGATIVE for masses, tenderness or discharge  CV: NEGATIVE for chest pain, palpitations or peripheral edema  GI: NEGATIVE for nausea, abdominal pain, heartburn, or change in bowel habits  : NEGATIVE for unusual urinary or vaginal symptoms. Periods are regular.  MUSCULOSKELETAL: POSITIVE for bilateral hip pain  N: NEGATIVE for weakness, dizziness or paresthesias  P: NEGATIVE for changes in mood or affect     OBJECTIVE:   There were no vitals taken for this visit.  Physical Exam  GENERAL: healthy, alert and no distress  EYES: Eyes grossly normal to inspection, PERRL and conjunctivae and sclerae normal  HENT: ear canals and TM's normal, nose and mouth without ulcers or lesions  NECK: no adenopathy, no asymmetry, masses, or scars and thyroid normal to palpation  RESP: lungs clear to auscultation - no rales, rhonchi or wheezes  BREAST: excessive breast tissue without masses, tenderness or nipple discharge and no palpable axillary masses or adenopathy. Mild erythema and scaling below bilateral breasts  CV: regular rate and rhythm, normal S1 S2, no S3 or S4, no murmur, click or rub, no peripheral edema and peripheral pulses strong  ABDOMEN: abdominal pannus with striae. soft, nontender, no hepatosplenomegaly, no masses and bowel sounds normal  MS: no gross musculoskeletal defects noted, no edema  SKIN: no suspicious  "lesions or rashes  NEURO: Normal strength and tone, mentation intact and speech normal  PSYCH: mentation appears normal, affect normal/bright    ASSESSMENT/PLAN:   1. Routine general medical examination at a health care facility      2. Avascular necrosis of femoral head, unspecified laterality (H)  Stable per patient- has done Physical Therapy previously with some relief and continues low impact exercise at home.    3. History of colonic polyps  Due for colonoscopy    4. Family history of colon cancer    - GASTROENTEROLOGY ADULT REF PROCEDURE ONLY    5. Breast hypertrophy in female  She will consider consult with plastics in the future    6. CARDIOVASCULAR SCREENING; LDL GOAL LESS THAN 160    - Lipid panel reflex to direct LDL Fasting; Future    COUNSELING:  Reviewed preventive health counseling, as reflected in patient instructions       Regular exercise       Healthy diet/nutrition       Immunizations    Vaccinated for: Influenza           Osteoporosis Prevention/Bone Health           reports that she quit smoking about 9 years ago. Her smoking use included Cigarettes. She quit after 3.00 years of use. She does not have any smokeless tobacco history on file.    Estimated body mass index is 28.13 kg/(m^2) as calculated from the following:    Height as of 10/11/17: 5' 8\" (1.727 m).    Weight as of 10/11/17: 185 lb (83.9 kg).   Weight management plan: Discussed healthy diet and exercise guidelines and patient will follow up in 12 months in clinic to re-evaluate.    Counseling Resources:  ATP IV Guidelines  Pooled Cohorts Equation Calculator  Breast Cancer Risk Calculator  FRAX Risk Assessment  ICSI Preventive Guidelines  Dietary Guidelines for Americans, 2010  USDA's MyPlate  ASA Prophylaxis  Lung CA Screening    SOHEILA De Le[facility] [place]  Answers for HPI/ROS submitted by the patient on 12/15/2017   PHQ-2 Score: 1    "

## 2017-12-26 ENCOUNTER — TELEPHONE (OUTPATIENT)
Dept: ENDOCRINOLOGY | Facility: CLINIC | Age: 32
End: 2017-12-26

## 2017-12-26 NOTE — TELEPHONE ENCOUNTER
Pt called stating that she had vomited this morning and was concerned if she should go to the ER for IV hydrocortisone, as suggested by Dr Howard. Informed PT to go to ER if she is unable to digest her oral medication, which she stated she took last night. Pt stated she understood the instructions.

## 2018-01-12 DIAGNOSIS — Z13.6 CARDIOVASCULAR SCREENING; LDL GOAL LESS THAN 160: ICD-10-CM

## 2018-01-12 DIAGNOSIS — E89.3 HYPOPITUITARISM AFTER ADENOMA RESECTION (H): ICD-10-CM

## 2018-01-12 DIAGNOSIS — E27.40 ADRENAL INSUFFICIENCY (H): ICD-10-CM

## 2018-01-12 LAB
ALBUMIN SERPL-MCNC: 3.6 G/DL (ref 3.4–5)
ANION GAP SERPL CALCULATED.3IONS-SCNC: 5 MMOL/L (ref 3–14)
BUN SERPL-MCNC: 19 MG/DL (ref 7–30)
CALCIUM SERPL-MCNC: 9.2 MG/DL (ref 8.5–10.1)
CHLORIDE SERPL-SCNC: 105 MMOL/L (ref 94–109)
CHOLEST SERPL-MCNC: 173 MG/DL
CO2 SERPL-SCNC: 27 MMOL/L (ref 20–32)
CORTIS SERPL-MCNC: 36.5 UG/DL (ref 4–22)
CREAT SERPL-MCNC: 0.93 MG/DL (ref 0.52–1.04)
FSH SERPL-ACNC: <0.2 IU/L
GFR SERPL CREATININE-BSD FRML MDRD: 69 ML/MIN/1.7M2
GLUCOSE SERPL-MCNC: 78 MG/DL (ref 70–99)
HDLC SERPL-MCNC: 47 MG/DL
LDLC SERPL CALC-MCNC: 101 MG/DL
NONHDLC SERPL-MCNC: 126 MG/DL
PHOSPHATE SERPL-MCNC: 3.6 MG/DL (ref 2.5–4.5)
POTASSIUM SERPL-SCNC: 4.3 MMOL/L (ref 3.4–5.3)
PROLACTIN SERPL-MCNC: 8 UG/L (ref 3–27)
SODIUM SERPL-SCNC: 137 MMOL/L (ref 133–144)
T4 FREE SERPL-MCNC: 0.9 NG/DL (ref 0.76–1.46)
TRIGL SERPL-MCNC: 125 MG/DL
TSH SERPL DL<=0.005 MIU/L-ACNC: <0.01 MU/L (ref 0.4–4)

## 2018-01-12 PROCEDURE — 36415 COLL VENOUS BLD VENIPUNCTURE: CPT | Performed by: INTERNAL MEDICINE

## 2018-01-12 PROCEDURE — 82024 ASSAY OF ACTH: CPT | Performed by: INTERNAL MEDICINE

## 2018-01-12 PROCEDURE — 84439 ASSAY OF FREE THYROXINE: CPT | Performed by: INTERNAL MEDICINE

## 2018-01-12 PROCEDURE — 84443 ASSAY THYROID STIM HORMONE: CPT | Performed by: INTERNAL MEDICINE

## 2018-01-12 PROCEDURE — 82533 TOTAL CORTISOL: CPT | Performed by: INTERNAL MEDICINE

## 2018-01-12 PROCEDURE — 80061 LIPID PANEL: CPT | Performed by: INTERNAL MEDICINE

## 2018-01-12 PROCEDURE — 80069 RENAL FUNCTION PANEL: CPT | Performed by: INTERNAL MEDICINE

## 2018-01-12 PROCEDURE — 83001 ASSAY OF GONADOTROPIN (FSH): CPT | Performed by: INTERNAL MEDICINE

## 2018-01-12 PROCEDURE — 84146 ASSAY OF PROLACTIN: CPT | Performed by: INTERNAL MEDICINE

## 2018-01-15 LAB — ACTH PLAS-MCNC: 549 PG/ML

## 2018-01-17 ENCOUNTER — OFFICE VISIT (OUTPATIENT)
Dept: ENDOCRINOLOGY | Facility: CLINIC | Age: 33
End: 2018-01-17
Payer: COMMERCIAL

## 2018-01-17 VITALS
WEIGHT: 181.2 LBS | HEIGHT: 68 IN | HEART RATE: 80 BPM | DIASTOLIC BLOOD PRESSURE: 73 MMHG | SYSTOLIC BLOOD PRESSURE: 113 MMHG | BODY MASS INDEX: 27.46 KG/M2

## 2018-01-17 DIAGNOSIS — E27.40 ADRENAL INSUFFICIENCY (H): Primary | ICD-10-CM

## 2018-01-17 DIAGNOSIS — E24.0 PITUITARY CUSHING'S SYNDROME (H): ICD-10-CM

## 2018-01-17 NOTE — NURSING NOTE
"Chief Complaint   Patient presents with     RECHECK     DIABETES INSIPIDUS F/U        Initial /73 (BP Location: Right arm, Patient Position: Sitting, Cuff Size: Adult Regular)  Pulse 80  Ht 1.727 m (5' 8\")  Wt 82.2 kg (181 lb 3.2 oz)  BMI 27.55 kg/m2 Estimated body mass index is 27.55 kg/(m^2) as calculated from the following:    Height as of this encounter: 1.727 m (5' 8\").    Weight as of this encounter: 82.2 kg (181 lb 3.2 oz).  Medication Reconciliation: complete    "

## 2018-01-17 NOTE — LETTER
1/17/2018       RE: Suyapa Hodge  [address] [facility] [address]     Dear Colleague,    Thank you for referring your patient, Suyapa Hodge, to the [facility] ENDOCRINOLOGY at [facility]. Please see a copy of my visit note below.    Endocrinology Progress Note    Interval history  Azalea feels well in general.  Since last visit, no new symptoms have occurred.  However ACTH was followed and within the 3 months, the levels have nearly doubled. he has no eye symptoms or headaches    Suyapa Hodge is a 32 year old woman last seen about 3 mo ago who is in clinic for follow-up s/p pituitary surgery x 3 and bilateral adrenalectomy (July 2014).   She was diagnosed with a pituitary microadenoma several yrs ago which was the suspected cause of her Cushing's disease. She had pituitary surgery X3 in 2013-14 and finally underwent bilateral adrenalectomy on July 14, 2014 (after a couple of months of temporizing therapy with Signifor).    She is currently taking hydrocortisone BID (10 mg a, 5mg p), L-thyroxine 75 mcg tablets full tablet 6 days and desmopressin 0.1 mg b.i.d.   She lost about 50 pounds after surgery and has felt well since.     She is now on HRT-- on daily prometrium and vivelle patch.  Pt is otherwise tolerating this regimen.  In summer 2015 AVN was an issue again after many hours of standing and she was seen by ortho.  She had PT and that improved. DXA showed low BMD for age.     ROS--gen/HEENT/MSK/endo reviewed with her  she feels tired after work but no new changes  No skin hyperpigmentation.    No heat or cold intolerance  Normal bowel movements  No new symptoms of headache or vision changes  She has chronic right-sided frontal headaches    Current Outpatient Prescriptions   Medication     Calcium carb-Vitamin D 500 mg [place]-200 units (OSCAL WITH D;OYSTER SHELL CALCIUM) 500-200 MG-UNIT per tablet     hydrocortisone (CORTEF) 10 MG tablet     desmopressin (DDAVP)  0.1 MG tablet     progesterone (PROMETRIUM) 100 MG capsule     fludrocortisone (FLORINEF) 0.1 MG tablet     estradiol (VIVELLE-DOT) 0.05 MG/24HR BIW patch     levothyroxine (SYNTHROID/LEVOTHROID) 75 MCG tablet     Multiple Vitamin (MULTI-VITAMIN DAILY PO)     No current facility-administered medications for this visit.        She does not have heat or cold intol. She has good energy level--discussed most recent TFTs    She does not have polyuria or polydipsia.  She is not having issues with increased freqnency/polyuria during the day, or increased thirst or edema.  She continues to take desmopressin 0.1 mg b.i.d.    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    Past Medical History   Past Medical History:   Diagnosis Date     Adrenal disorder (H)      Avascular necrosis of femur head, 2010     Colon polyp      Cushing syndrome (H)     pituitary microadenoma     Diabetes insipidus (H)      Hypercalcaemia      Hypertension      Medulloadrenal hyperfunction (H)      Pulmonary emboli (H) 01/2014     Pulmonary embolism (H) 2014     Thyroid disease        Past Surgical History   Past Surgical History:   Procedure Laterality Date     COLONOSCOPY       COLPOSCOPY VULVA, BIOPSY, COMBINED  10/28/2015    BEVERLEY 1, 6 month follow-up pap was normal     INSERT DRAIN LUMBAR  1/20/2014    Procedure: INSERT DRAIN LUMBAR;;  Surgeon: Soham Aquino MD;  Location: [facility] OR     LAPAROSCOPIC ADRENALECTOMY  7/14/2014    Procedure: LAPAROSCOPIC ADRENALECTOMY;  Surgeon: Roni Nunn MD;  Location: [facility] OR     OPTICAL TRACKING SYSTEM ENDOSCOPIC RESECTION TUMOR CRANIAL  7/1/2013    Procedure: OPTICAL TRACKING SYSTEM ENDOSCOPIC RESECTION TUMOR CRANIAL;  Stealth Assisted Endoscopic Hypophysectomy ;  Surgeon: Soham Aquino MD;  Location: [facility] OR     OPTICAL TRACKING SYSTEM ENDOSCOPIC RESECTION TUMOR CRANIAL  1/20/2014    Procedure: OPTICAL TRACKING SYSTEM ENDOSCOPIC RESECTION TUMOR CRANIAL;   "Stealth Assisted Endoscopic Transnasal Excision Of Pituitary Adenoma , Placement Of Lumbar Drain with c-arm;  Surgeon: Soham Aquino MD;  Location: [facility] OR     removal of pituitary microademona       wisdom teeth extration       Social History  History     Social History     Marital Status: Single     Spouse Name: N/A     Number of Children: N/A     Years of Education: N/A     Social History Main Topics     Smoking status: Former Smoker -- 3 years     Types: Cigarettes     Quit date: 02/05/2008     Smokeless tobacco: None     Alcohol Use: Yes      Comment: 2/week     Drug Use: No     Sexual Activity: No     Review of Systems   See HPI for pertinents--gen/HEENT/endo/heme/neuro discussed with pertinents as above      Physical Exam  Vital signs:   /73 (BP Location: Right arm, Patient Position: Sitting, Cuff Size: Adult Regular)  Pulse 80  Ht 1.727 m (5' 8\")  Wt 82.2 kg (181 lb 3.2 oz)  BMI 27.55 kg/m2  General Appearance: she is well developed, well nourished and in no distress     Eyes:  conjutivae and extra-ocular motions are normal.                                    no lid lag, no stare    HEENT:   NCAT, eomi  Gastrointestinal:  abdomen nondistended  Musculoskeletal:  normal tone and bulk throughout  Psychological:           affect and judgment normal  Skin:  no lesions on exposed skin  Neurological:  nl gait, no resting tremor, nonfocal otherwise, fully A and O    Lab Results  Orders Only on 07/22/2016   Component Date Value Ref Range Status     T4 Free 07/22/2016 1.11  0.76 - 1.46 ng/dL Final     Sodium 07/22/2016 138  133 - 144 mmol/L Final     Potassium 07/22/2016 3.8  3.4 - 5.3 mmol/L Final     Chloride 07/22/2016 107  94 - 109 mmol/L Final     Carbon Dioxide 07/22/2016 25  20 - 32 mmol/L Final     Anion Gap 07/22/2016 6  3 - 14 mmol/L Final     Glucose 07/22/2016 75  70 - 99 mg/dL Final     Urea Nitrogen 07/22/2016 22  7 - 30 mg/dL Final     Creatinine 07/22/2016 1.08* 0.52 - 1.04 mg/dL Final "     GFR Estimate 07/22/2016 59* >60 mL/min/1.7m2 Final    Non  GFR Calc     GFR Estimate If Black 07/22/2016 71  >60 mL/min/1.7m2 Final    African American GFR Calc     Calcium 07/22/2016 8.9  8.5 - 10.1 mg/dL Final     Prolactin 07/22/2016 6  3 - 27 ug/L Final    Reference ranges apply to non-pregnant females only.     ENDO THYROID LABS-[facility] Latest Ref Rng 12/30/2016 7/22/2016 5/3/2016   TSH 0.40 - 4.00 mU/L      T4 FREE 0.76 - 1.46 ng/dL 0.91 1.11 0.96   TRIIODOTHYRONINE(T3) 60 - 181 ng/dL        ENDO PITUITARY LABS-[facility] Latest Ref Rng & Units 10/4/2017 7/7/2017   CORTISOL, SERUM 4 - 22 ug/dL 0.8 (L)    ADRENAL CORTICOTROPIN <47 pg/mL 267 (H) 271 (H)     MR BRAIN W/O CONTRAST 7/7/2017 1:10 PM     Provided History: Other specified abnormal findings of blood  chemistry, Disorder of adrenal gland, unspecified, Pituitary-dependent  Cushing's disease     Comparison:  6/3/2015, 4/23/2014      Technique: Multiplanar multisequence imaging of the brain without  intravenous contrast. Contrast enhanced portions were aborted due to  inability to obtain a creatinine level.     Findings: Postoperative changes from transsphenoidal pituitary  microadenoma resection. No evidence of recurrent disease on these  noncontrast images. These images reveal no intracranial mass lesion,  mass effect, midline shift or abnormal extraaxial fluid collection.  The ventricles and sulci are normal for age. Diffusion-weighted images  demonstrate no restricted diffusion.  Normal intravascular flow voids  are identified. Scattered paranasal sinus mucosal thickening.         Impression: No evidence of residual mass within the sella on these  noncontrast images. Contrast portions of the examination were aborted  due to inability to obtain a creatinine level and given patient's  chronic kidney disease.     Assessment   Ms Suyapa Hodge is a 30 year old woman with a history of Cushing's disease S/P pituitary surgery x 3, primary adrenal  insufficiency due to bilateral adrenalectomy for recurrent pituitary Cushing's--microadenoma.  Now with hypopituitaritarism and rising ACTH levels.   1. Panhypopit  2. Low bone density for age.   3. Elevated Creatinine level : now normalized.   4. Recent weight gain  5. High ACTH levels.   Plan  REcheck ACTH in 3 months. I have discussed this with various members of our group. ACTH serial measurement is the best option. I discussed case with Dr Huffman who suggested radiation oncology follow up for possible radiation treatment if levels rise.   We will plan to obtain CT chest abdomen and pelvis with contrast.    We can continue with current doses of hormone replacement--  continue Vivelle dot patch for estrogen replacement along with nightly prometrium. She denies having withdrawal bleed on this.     continue hydrocortisone dose to 10 mg AM and 5 mg PM. No changes noted on this reduced dose.    thyroxine 75 mcg for 6 days out of 7.  Return in about 3 months with labs shortly before return    Low bone density for age  Re-test BMD in 2018 after Ca and Vit D supplementation. If not improved now that Cushings is treated, plan for treatment.   Labs 3 month     Celestine Medina MD  [phone]  Endocrinology Service    Patient Instructions   Plan for CT chest abdomen and pelvis. We will contact you with plan for this.         Again, thank you for allowing me to participate in the care of your patient.      Sincerely,    Celestine Medina MD

## 2018-01-17 NOTE — MR AVS SNAPSHOT
After Visit Summary   1/17/2018    Suyapa Hodge    MRN: [id number]           Patient Information     Date Of Birth          1985        Visit Information        Provider Department      1/17/2018 3:30 PM Celestine Medina MD [facility] Endocrinology        Today's Diagnoses     Adrenal insufficiency (H)    -  1    Pituitary Cushing's syndrome (H)          Care Instructions    Plan for CT chest abdomen and pelvis. We will contact you with plan for this.               Follow-ups after your visit        Follow-up notes from your care team     Return in about 6 months (around 7/17/2018).      Future tests that were ordered for you today     Open Future Orders        Priority Expected Expires Ordered    CT Abdomen Pelvis w/o & w Contrast Routine  1/17/2019 1/17/2018    CT Chest Abdomen w/o & w Contrast Routine  1/17/2019 1/17/2018            Who to contact     Please call your clinic at [phone] to:    Ask questions about your health    Make or cancel appointments    Discuss your medicines    Learn about your test results    Speak to your doctor   If you have compliments or concerns about an experience at your clinic, or if you wish to file a complaint, please contact [facility] Patient Relations at [phone] or email us at [email or web address]         Additional Information About Your Visit        MyChart Information     [employer] gives you secure access to your electronic health record. If you see a primary care provider, you can also send messages to your care team and make appointments. If you have questions, please call your primary care clinic.  If you do not have a primary care provider, please call [phone] and they will assist you.      [employer]u is an electronic gateway that provides easy, online access to your medical records. With [employer], you can request a clinic appointment, read your test results, renew a prescription or  "communicate with your care team.     To access your existing account, please contact your [facility] or call [phone] for assistance.        Care EveryWhere ID     This is your Care EveryWhere ID. This could be used by other organizations to access your [place] medical records  [id number]        Your Vitals Were     Pulse Height BMI (Body Mass Index)             80 1.727 m (5' 8\") 27.55 kg/m2          Blood Pressure from Last 3 Encounters:   01/17/18 113/73   12/15/17 98/60   10/11/17 102/73    Weight from Last 3 Encounters:   01/17/18 82.2 kg (181 lb 3.2 oz)   12/15/17 82.6 kg (182 lb)   10/11/17 83.9 kg (185 lb)               Primary Care Provider Office Phone # Fax #    Clarisa SOHEILA [facility]ermudez [facility] [phone]       [address] [facility] [address]        Equal Access to Services     HELIO [facility] HospitalFERNANDO : Hadii aad ku hadasho Soomaali, waaxda luqadaha, qaybta kaalmada adeegyada, waxay idiin hayaan adeeg kharash la'aan . So [place] -[phone].    ATENCIÓN: Si habla español, tiene a ortiz disposición servicios gratuitos de asistencia lingüística. Llame al [phone].    We comply with applicable federal civil rights laws and [place] laws. We do not discriminate on the basis of race, color, national origin, age, disability, sex, sexual orientation, or gender identity.            Thank you!     Thank you for choosing [facility] ENDOCRINOLOGY  for your care. Our goal is always to provide you with excellent care. Hearing back from our patients is one way we can continue to improve our services. Please take a few minutes to complete the written survey that you may receive in the mail after your visit with us. Thank you!             Your Updated Medication List - Protect others around you: Learn how to safely use, store and throw away your medicines at www.disposemymeds.org.          This list is accurate as of: 1/17/18  6:20 PM.  Always use your most recent med " list.                   Brand Name Dispense Instructions for use Diagnosis    Calcium carb-Vitamin D 500 mg [place]-200 units 500-200 MG-UNIT per tablet    OSCAL with D;Oyster Shell Calcium     Take 1 tablet by mouth 2 times daily (with meals)        desmopressin 0.1 MG tablet    DDAVP    180 tablet    Take 1 tablet (100 mcg) by mouth 2 times daily (1 TAB) MORNING AND AT BEDTIME    Pituitary adenoma (H)       estradiol 0.05 MG/24HR BIW patch    VIVELLE-DOT    27 patch    Place 1 patch onto the skin twice a week    Estrogen deficiency       fludrocortisone 0.1 MG tablet    FLORINEF    45 tablet    Take 0.5 tablets (0.05 mg) by mouth daily    Adrenal insufficiency, primary, iatrogenic (H)       hydrocortisone 10 MG tablet    CORTEF    150 tablet    1 tab (10 mg) am 1/2 tab (5 mg) pm plus sick day supply as directed    Adrenal insufficiency (H)       levothyroxine 75 MCG tablet    SYNTHROID/LEVOTHROID    90 tablet    One tablet day 6 days per wk and none on the 7th day each wk    Other specified hypothyroidism       MULTI-VITAMIN DAILY PO           progesterone 100 MG capsule    PROMETRIUM    90 capsule    Take 1 tablet daily    Estrogen deficiency, Premature menopause on hormone replacement therapy

## 2018-01-18 NOTE — PROGRESS NOTES
Endocrinology Progress Note    Interval history  Azalea feels well in general.  Since last visit, no new symptoms have occurred.  However ACTH was followed and within the 3 months, the levels have nearly doubled. he has no eye symptoms or headaches    Suyapa Hodge is a 32 year old woman last seen about 3 mo ago who is in clinic for follow-up s/p pituitary surgery x 3 and bilateral adrenalectomy (July 2014).   She was diagnosed with a pituitary microadenoma several yrs ago which was the suspected cause of her Cushing's disease. She had pituitary surgery X3 in 2013-14 and finally underwent bilateral adrenalectomy on July 14, 2014 (after a couple of months of temporizing therapy with Signifor).    She is currently taking hydrocortisone BID (10 mg a, 5mg p), L-thyroxine 75 mcg tablets full tablet 6 days and desmopressin 0.1 mg b.i.d.   She lost about 50 pounds after surgery and has felt well since.     She is now on HRT-- on daily prometrium and vivelle patch.  Pt is otherwise tolerating this regimen.  In summer 2015 AVN was an issue again after many hours of standing and she was seen by ortho.  She had PT and that improved. DXA showed low BMD for age.     ROS--gen/HEENT/MSK/endo reviewed with her  she feels tired after work but no new changes  No skin hyperpigmentation.    No heat or cold intolerance  Normal bowel movements  No new symptoms of headache or vision changes  She has chronic right-sided frontal headaches    Current Outpatient Prescriptions   Medication     Calcium carb-Vitamin D 500 mg [place]-200 units (OSCAL WITH D;OYSTER SHELL CALCIUM) 500-200 MG-UNIT per tablet     hydrocortisone (CORTEF) 10 MG tablet     desmopressin (DDAVP) 0.1 MG tablet     progesterone (PROMETRIUM) 100 MG capsule     fludrocortisone (FLORINEF) 0.1 MG tablet     estradiol (VIVELLE-DOT) 0.05 MG/24HR BIW patch     levothyroxine (SYNTHROID/LEVOTHROID) 75 MCG tablet     Multiple Vitamin (MULTI-VITAMIN DAILY PO)     No current  facility-administered medications for this visit.        She does not have heat or cold intol. She has good energy level--discussed most recent TFTs    She does not have polyuria or polydipsia.  She is not having issues with increased freqnency/polyuria during the day, or increased thirst or edema.  She continues to take desmopressin 0.1 mg b.i.d.    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    Past Medical History   Past Medical History:   Diagnosis Date     Adrenal disorder (H)      Avascular necrosis of femur head, 2010     Colon polyp      Cushing syndrome (H)     pituitary microadenoma     Diabetes insipidus (H)      Hypercalcaemia      Hypertension      Medulloadrenal hyperfunction (H)      Pulmonary emboli (H) 01/2014     Pulmonary embolism (H) 2014     Thyroid disease        Past Surgical History   Past Surgical History:   Procedure Laterality Date     COLONOSCOPY       COLPOSCOPY VULVA, BIOPSY, COMBINED  10/28/2015    BEVERLEY 1, 6 month follow-up pap was normal     INSERT DRAIN LUMBAR  1/20/2014    Procedure: INSERT DRAIN LUMBAR;;  Surgeon: Soham Aquino MD;  [facility] OR     LAPAROSCOPIC ADRENALECTOMY  7/14/2014    Procedure: LAPAROSCOPIC ADRENALECTOMY;  Surgeon: Roni Nunn MD;  [facility]: [facility] OR     OPTICAL TRACKING SYSTEM ENDOSCOPIC RESECTION TUMOR CRANIAL  7/1/2013    Procedure: OPTICAL TRACKING SYSTEM ENDOSCOPIC RESECTION TUMOR CRANIAL;  Stealth Assisted Endoscopic Hypophysectomy ;  Surgeon: Soham Aquino MD;  Location: [facility] OR     OPTICAL TRACKING SYSTEM ENDOSCOPIC RESECTION TUMOR CRANIAL  1/20/2014    Procedure: OPTICAL TRACKING SYSTEM ENDOSCOPIC RESECTION TUMOR CRANIAL;  Stealth Assisted Endoscopic Transnasal Excision Of Pituitary Adenoma , Placement Of Lumbar Drain with c-arm;  Surgeon: Soham Aquino MD;  Location: [facility] OR     removal of pituitary microademona       wisdom teeth extration       Social History  History     Social  "History     Marital Status: Single     Spouse Name: N/A     Number of Children: N/A     Years of Education: N/A     Social History Main Topics     Smoking status: Former Smoker -- 3 years     Types: Cigarettes     Quit date: 02/05/2008     Smokeless tobacco: None     Alcohol Use: Yes      Comment: 2/week     Drug Use: No     Sexual Activity: No     Review of Systems   See HPI for pertinents--gen/HEENT/endo/heme/neuro discussed with pertinents as above      Physical Exam  Vital signs:   /73 (BP Location: Right arm, Patient Position: Sitting, Cuff Size: Adult Regular)  Pulse 80  Ht 1.727 m (5' 8\")  Wt 82.2 kg (181 lb 3.2 oz)  BMI 27.55 kg/m2  General Appearance: she is well developed, well nourished and in no distress     Eyes:  conjutivae and extra-ocular motions are normal.                                    no lid lag, no stare    HEENT:   NCAT, eomi  Gastrointestinal:  abdomen nondistended  Musculoskeletal:  normal tone and bulk throughout  Psychological:           affect and judgment normal  Skin:  no lesions on exposed skin  Neurological:  nl gait, no resting tremor, nonfocal otherwise, fully A and O    Lab Results  Orders Only on 07/22/2016   Component Date Value Ref Range Status     T4 Free 07/22/2016 1.11  0.76 - 1.46 ng/dL Final     Sodium 07/22/2016 138  133 - 144 mmol/L Final     Potassium 07/22/2016 3.8  3.4 - 5.3 mmol/L Final     Chloride 07/22/2016 107  94 - 109 mmol/L Final     Carbon Dioxide 07/22/2016 25  20 - 32 mmol/L Final     Anion Gap 07/22/2016 6  3 - 14 mmol/L Final     Glucose 07/22/2016 75  70 - 99 mg/dL Final     Urea Nitrogen 07/22/2016 22  7 - 30 mg/dL Final     Creatinine 07/22/2016 1.08* 0.52 - 1.04 mg/dL Final     GFR Estimate 07/22/2016 59* >60 mL/min/1.7m2 Final    Non  GFR Calc     GFR Estimate If Black 07/22/2016 71  >60 mL/min/1.7m2 Final    African American GFR Calc     Calcium 07/22/2016 8.9  8.5 - 10.1 mg/dL Final     Prolactin 07/22/2016 6  3 - 27 " ug/L Final    Reference ranges apply to non-pregnant females only.     ENDO THYROID LABS-[facility] Latest Ref Rng 12/30/2016 7/22/2016 5/3/2016   TSH 0.40 - 4.00 mU/L      T4 FREE 0.76 - 1.46 ng/dL 0.91 1.11 0.96   TRIIODOTHYRONINE(T3) 60 - 181 ng/dL        ENDO PITUITARY LABS-[facility] Latest Ref Rng & Units 10/4/2017 7/7/2017   CORTISOL, SERUM 4 - 22 ug/dL 0.8 (L)    ADRENAL CORTICOTROPIN <47 pg/mL 267 (H) 271 (H)     MR BRAIN W/O CONTRAST 7/7/2017 1:10 PM     Provided History: Other specified abnormal findings of blood  chemistry, Disorder of adrenal gland, unspecified, Pituitary-dependent  Cushing's disease     Comparison:  6/3/2015, 4/23/2014      Technique: Multiplanar multisequence imaging of the brain without  intravenous contrast. Contrast enhanced portions were aborted due to  inability to obtain a creatinine level.     Findings: Postoperative changes from transsphenoidal pituitary  microadenoma resection. No evidence of recurrent disease on these  noncontrast images. These images reveal no intracranial mass lesion,  mass effect, midline shift or abnormal extraaxial fluid collection.  The ventricles and sulci are normal for age. Diffusion-weighted images  demonstrate no restricted diffusion.  Normal intravascular flow voids  are identified. Scattered paranasal sinus mucosal thickening.         Impression: No evidence of residual mass within the sella on these  noncontrast images. Contrast portions of the examination were aborted  due to inability to obtain a creatinine level and given patient's  chronic kidney disease.     Assessment   Ms Suyapa Hodge is a 30 year old woman with a history of Cushing's disease S/P pituitary surgery x 3, primary adrenal insufficiency due to bilateral adrenalectomy for recurrent pituitary Cushing's--microadenoma.  Now with hypopituitaritarism and rising ACTH levels.   1. Panhypopit  2. Low bone density for age.   3. Elevated Creatinine level : now normalized.   4. Recent weight gain  5.  High ACTH levels.   Plan  REcheck ACTH in 3 months. I have discussed this with various members of our group. ACTH serial measurement is the best option. I discussed case with Dr Huffman who suggested radiation oncology follow up for possible radiation treatment if levels rise.   We will plan to obtain CT chest abdomen and pelvis with contrast.    We can continue with current doses of hormone replacement--  continue Vivelle dot patch for estrogen replacement along with nightly prometrium. She denies having withdrawal bleed on this.     continue hydrocortisone dose to 10 mg AM and 5 mg PM. No changes noted on this reduced dose.    thyroxine 75 mcg for 6 days out of 7.  Return in about 3 months with labs shortly before return    Low bone density for age  Re-test BMD in 2018 after Ca and Vit D supplementation. If not improved now that Cushings is treated, plan for treatment.   Labs 3 month     Celestine Medina MD  [phone]  Endocrinology Service    Patient Instructions   Plan for CT chest abdomen and pelvis. We will contact you with plan for this.

## 2018-02-05 ENCOUNTER — RADIANT APPOINTMENT (OUTPATIENT)
Dept: CT IMAGING | Facility: CLINIC | Age: 33
End: 2018-02-05
Attending: INTERNAL MEDICINE
Payer: COMMERCIAL

## 2018-02-05 DIAGNOSIS — E24.0 PITUITARY CUSHING'S SYNDROME (H): ICD-10-CM

## 2018-02-05 RX ORDER — IOPAMIDOL 755 MG/ML
111 INJECTION, SOLUTION INTRAVASCULAR ONCE
Status: COMPLETED | OUTPATIENT
Start: 2018-02-05 | End: 2018-02-05

## 2018-02-05 RX ADMIN — IOPAMIDOL 111 ML: 755 INJECTION, SOLUTION INTRAVASCULAR at 18:35

## 2018-02-06 NOTE — DISCHARGE INSTRUCTIONS

## 2018-02-10 NOTE — PROGRESS NOTES
Dear Azalea,    CT scan reports did not show any evidence of ectopic source of ACTH. I will contact you with further plans.     Best Regards,  Celestine Medina MD  [phone]  Endocrinology Service

## 2018-02-12 ENCOUNTER — TRANSFERRED RECORDS (OUTPATIENT)
Dept: HEALTH INFORMATION MANAGEMENT | Facility: CLINIC | Age: 33
End: 2018-02-12

## 2018-04-09 ENCOUNTER — E-VISIT (OUTPATIENT)
Dept: FAMILY MEDICINE | Facility: CLINIC | Age: 33
End: 2018-04-09
Payer: COMMERCIAL

## 2018-04-09 DIAGNOSIS — B97.89 VIRAL SINUSITIS: Primary | ICD-10-CM

## 2018-04-09 DIAGNOSIS — J32.9 VIRAL SINUSITIS: Primary | ICD-10-CM

## 2018-04-09 PROCEDURE — 99444 ZZC PHYSICIAN ONLINE EVALUATION & MANAGEMENT SERVICE: CPT | Performed by: NURSE PRACTITIONER

## 2018-04-29 ENCOUNTER — MYC MEDICAL ADVICE (OUTPATIENT)
Dept: FAMILY MEDICINE | Facility: CLINIC | Age: 33
End: 2018-04-29

## 2018-05-03 NOTE — TELEPHONE ENCOUNTER
Called [facility] [phone] to get the fax number for the order [phone].    Confirmed fax of order to the fax number.     Sent a UpSpring[employer] message for patient to know this has been completed.     Keyanna Kaufman,

## 2018-05-14 ENCOUNTER — TELEPHONE (OUTPATIENT)
Dept: SURGERY | Facility: CLINIC | Age: 33
End: 2018-05-14

## 2018-05-15 ENCOUNTER — OFFICE VISIT (OUTPATIENT)
Dept: SURGERY | Facility: CLINIC | Age: 33
End: 2018-05-15
Payer: COMMERCIAL

## 2018-05-15 VITALS — WEIGHT: 170.3 LBS | BODY MASS INDEX: 25.81 KG/M2 | HEIGHT: 68 IN

## 2018-05-15 DIAGNOSIS — N62 BREAST HYPERTROPHY IN FEMALE: Primary | ICD-10-CM

## 2018-05-15 PROCEDURE — 99214 OFFICE O/P EST MOD 30 MIN: CPT | Performed by: PLASTIC SURGERY

## 2018-05-15 ASSESSMENT — PAIN SCALES - GENERAL: PAINLEVEL: NO PAIN (0)

## 2018-05-15 NOTE — NURSING NOTE
LPN mailed quote for:    Abdominoplasty   $3,750  ASC charge (120min)   $2,434    Mastopexy Tier 3   $5,000  ASC charge (120min)   2,434    Total   $13,618.00    Quote scanned under Media Tab    Charisma Olvera LPN

## 2018-05-15 NOTE — MR AVS SNAPSHOT
After Visit Summary   5/15/2018    Suyapa Hodge    MRN: [id number]           Patient Information     Date Of Birth          1985        Visit Information        Provider Department      5/15/2018 2:30 PM JESSICA Butcher MD [facility]        Today's Diagnoses     Breast hypertrophy in female    -  1       Follow-ups after your visit        Follow-up notes from your care team     Return if symptoms worsen or fail to improve.      Your next 10 appointments already scheduled     Aug 01, 2018  2:00 PM CDT   (Arrive by 1:45 PM)   RETURN ENDOCRINE with Celestine Medina MD   [facility] Endocrinology ([facility])    [address]  [address] [place] [facility] [address] [phone]              Who to contact     If you have questions or need follow up information about today's clinic visit or your schedule please contact [facility] directly at [phone].  Normal or non-critical lab and imaging results will be communicated to you by First To Filehart, letter or phone within 4 business days after the clinic has received the results. If you do not hear from us within 7 days, please contact the clinic through HealthSourcet or phone. If you have a critical or abnormal lab result, we will notify you by phone as soon as possible.  Submit refill requests through Xiimo or call your pharmacy and they will forward the refill request to us. Please allow 3 business days for your refill to be completed.          Additional Information About Your Visit        First To Filehart Information     Xiimo gives you secure access to your electronic health record. If you see a primary care provider, you can also send messages to your care team and make appointments. If you have questions, please call your primary care clinic.  If you do not have a primary care provider, please call [phone] and they will assist you.      Xiimo is an electronic gateway  "that provides easy, online access to your medical records. With [employer], you can request a clinic appointment, read your test results, renew a prescription or communicate with your care team.     To access your existing account, please contact your [facility] or call [phone] for assistance.        Care EveryWhere ID     This is your Care EveryWhere ID. This could be used by other organizations to access your [facility] medical records  [id number]        Your Vitals Were     Height BMI (Body Mass Index)                5' 8\" 25.89 kg/m2           Blood Pressure from Last 3 Encounters:   01/17/18 113/73   12/15/17 98/60   10/11/17 102/73    Weight from Last 3 Encounters:   05/15/18 170 lb 4.8 oz   01/17/18 181 lb 3.2 oz   12/15/17 182 lb              Today, you had the following     No orders found for display       Primary Care Provider Office Phone # Fax #    Clarisa Tanja Palafox, APRN [facility] [phone]       [address] [address]        Equal Access to Services     [facility]: Hadii aad ku hadasho Soomaali, waaxda luqadaha, qaybta kaalmada adeegyada, waxliss back . So [facility] [phone].    ATENCIÓN: Si habla español, tiene a ortiz disposición servicios gratuitos de asistencia lingüística. [facility] [phone].    We comply with applicable federal civil rights laws and [place] laws. We do not discriminate on the basis of race, color, national origin, age, disability, sex, sexual orientation, or gender identity.            Thank you!     Thank you for choosing [facility]  for your care. Our goal is always to provide you with excellent care. Hearing back from our patients is one way we can continue to improve our services. Please take a few minutes to complete the written survey that you may receive in the mail after your visit with us. Thank you!             Your Updated Medication List - Protect others " around you: Learn how to safely use, store and throw away your medicines at www.disposemymeds.org.          This list is accurate as of 5/15/18 11:59 PM.  Always use your most recent med list.                   Brand Name Dispense Instructions for use Diagnosis    Calcium carb-Vitamin D 500 mg [place]-200 units 500-200 MG-UNIT per tablet    OSCAL with D;Oyster Shell Calcium     Take 1 tablet by mouth 2 times daily (with meals)        desmopressin 0.1 MG tablet    DDAVP    180 tablet    Take 1 tablet (100 mcg) by mouth 2 times daily (1 TAB) MORNING AND AT BEDTIME    Pituitary adenoma (H)       estradiol 0.05 MG/24HR BIW patch    VIVELLE-DOT    27 patch    Place 1 patch onto the skin twice a week    Estrogen deficiency       fludrocortisone 0.1 MG tablet    FLORINEF    45 tablet    Take 0.5 tablets (0.05 mg) by mouth daily    Adrenal insufficiency, primary, iatrogenic (H)       hydrocortisone 10 MG tablet    CORTEF    150 tablet    1 tab (10 mg) am 1/2 tab (5 mg) pm plus sick day supply as directed    Adrenal insufficiency (H)       levothyroxine 75 MCG tablet    SYNTHROID/LEVOTHROID    90 tablet    One tablet day 6 days per wk and none on the 7th day each wk    Other specified hypothyroidism       MULTI-VITAMIN DAILY PO           progesterone 100 MG capsule    PROMETRIUM    90 capsule    Take 1 tablet daily    Estrogen deficiency, Premature menopause on hormone replacement therapy

## 2018-05-15 NOTE — PROGRESS NOTES
REFERRING PHYSICIAN:  SOHEILA Mckeon, CNP      PRESENTING COMPLAINT:  Consultation for body contouring surgery.      HISTORY OF PRESENTING COMPLAINT:  Ms. Hodge is 32 years old.  Her history goes back to about 9-10 years ago when she was diagnosed with Cushing's disease and went from 140 pounds to 260 pounds. Underwent a pituitary adenoma resection and following that her weight has been down to 160 pounds.  A year later she had a recurrence and she went back up to 230 pounds and then she had a number of surgeries done, both of the pituitary gland as well as the adrenal gland and ultimately now she is 170 pounds and has been stable for about 6 months.  Because of her massive weight loss, she has developed a lot of loose skin in her abdomen and she has also had a lot of droopiness of her breasts.  She wears a G-cup bra.  She would like to be around a D cup.  She gets a lot of upper back, neck, shoulder pain and shoulder grooving from the bra straps.  No family or personal history of breast cancer, never had a mammogram.  She does not get rashes in her abdomen.  She does not like the look of the excess skin in her abdomen.      PAST MEDICAL HISTORY:  Pituitary microadenoma and history of a PE.      PAST SURGICAL HISTORY:  Pituitary resection and laparoscopic adrenalectomy.      MEDICATIONS:  Desmopressin, Florinef, Cortef and levothyroxine.      ALLERGIES:  Nil.      SOCIAL HISTORY:  Does not smoke, socially drinks.  Lives in [place].  Is a nurse.      REVIEW OF SYSTEMS:  Denies chest pain, shortness of breath, MI, CVA, question of a DVT.  Has had a PE.  She was worked up and placed on Coumadin and has since been off of anticoagulation.  It was a provoked PE.      PHYSICAL EXAMINATION:  On exam vital signs stable.  She is afebrile in no obvious distress.  She is 5 feet 8 inches, 170 pounds, BMI of 25.9 kg/m2 and a body surface area of 1.92 m2.  On examination of her breasts, she has grade 3 ptosis, very long  droopy breasts, upper pole fullness is absent.  She is bottom heavy.  Sternal notch to nipple distance is about 40 cm.  On examination of her abdomen, she has a supraumbilical and infraumbilical fold.  The panniculus does not hang below the pubic tubercle.  She has a lot of stretch marks.  She has a skin pinch thickness about 6-8 cm.  Minimally lax abdominal wall.  No obvious hernias.      ASSESSMENT AND PLAN:  Based upon above findings, a diagnosis of massive weight loss with breast hypertrophy and excess skin of the abdomen was made.  I had a long discussion with the patient about her findings.  I think she would benefit from an abdominoplasty.  I explained to her the reality of insurance companies and the fact that she does not meet criteria.  I explained to her how the surgery will be done, showed her where the scars would be.  We will send her quotes for the procedure.  With regards to the breast, based on the Schnur scale 550 grams needs to be removed from each breast to be covered by insurance.  I do not think that we can leave her with a C or D cup bra if we took off that much amount.  It would be probably 95% smaller.  Therefore, she would be disproportionately small.  Therefore, even her breast reduction would be small reduction of about 200 grams and more of a lift than a reduction and will be private pay.  We would send her quotes for these as well.  I had a stephanie conversation about the fact that she has had a PE in the past.  That puts her at high risk for having another PE despite the fact that it was unprovoked and second of all the other issue is the fact that the patient obviously has a lot of medical concerns when it comes to the lack of adrenal glands and we need her medical doctors to be involved perioperatively if she undergoes any elective surgery with regards to these medications.  The concept of each of the procedures were explained.  The risks, benefits and alternatives were explained.  Our  plan is to now give her quotes and if she wants to proceed, we will see her back and go over things in more detail.  All questions were answered.  All exam and discussion done in the presence of my nurse.  She was happy with the visit.        Total time spent with the patient 30 minutes, more than half was counseling.      cc:   SOHEILA [place] [address]

## 2018-05-15 NOTE — NURSING NOTE
Suyapa Hodge's goals for this visit include: consult abdominoplasty and breast reduction  She requests these members of her care team be copied on today's visit information:     PCP: Clarisa Palafox    Referring Provider:  Referred Self, MD  No address on file    There were no vitals taken for this visit.    Do you need any medication refills at today's visit? Haylie Waters LPN

## 2018-07-13 DIAGNOSIS — E03.8 OTHER SPECIFIED HYPOTHYROIDISM: ICD-10-CM

## 2018-07-13 NOTE — TELEPHONE ENCOUNTER
[facility] Call Center    Phone Message    May a detailed message be left on voicemail: yes    Reason for Call: Medication Refill Request    Has the patient contacted the pharmacy for the refill? Yes   Name of medication being requested: Levothyroxine  Provider who prescribed the medication: Dr. Medina  Pharmacy: [facility]  Date medication is needed: Today         Action Taken: Message routed to:  [facility] ([facility] med refills

## 2018-07-14 RX ORDER — LEVOTHYROXINE SODIUM 75 UG/1
TABLET ORAL
Qty: 90 TABLET | Refills: 3 | Status: CANCELLED | OUTPATIENT
Start: 2018-07-14

## 2018-07-16 DIAGNOSIS — E03.8 OTHER SPECIFIED HYPOTHYROIDISM: ICD-10-CM

## 2018-07-16 RX ORDER — LEVOTHYROXINE SODIUM 75 UG/1
TABLET ORAL
Qty: 90 TABLET | Refills: 0 | Status: SHIPPED | OUTPATIENT
Start: 2018-07-16 | End: 2019-01-09

## 2018-07-16 NOTE — TELEPHONE ENCOUNTER
Component Value Flag Ref Range Units Status Collected Lab   T4 Free 0.90  0.76 - 1.46 ng/dL Final 01/12/2018  7:32 AM      Last visit 1/17/18

## 2018-07-27 DIAGNOSIS — E27.40 ADRENAL INSUFFICIENCY (H): ICD-10-CM

## 2018-07-27 PROCEDURE — 36415 COLL VENOUS BLD VENIPUNCTURE: CPT | Performed by: INTERNAL MEDICINE

## 2018-07-27 PROCEDURE — 82024 ASSAY OF ACTH: CPT | Performed by: INTERNAL MEDICINE

## 2018-07-30 LAB — ACTH PLAS-MCNC: 118 PG/ML

## 2018-08-01 ENCOUNTER — OFFICE VISIT (OUTPATIENT)
Dept: ENDOCRINOLOGY | Facility: CLINIC | Age: 33
End: 2018-08-01
Payer: COMMERCIAL

## 2018-08-01 VITALS
BODY MASS INDEX: 25.49 KG/M2 | DIASTOLIC BLOOD PRESSURE: 74 MMHG | SYSTOLIC BLOOD PRESSURE: 98 MMHG | HEART RATE: 57 BPM | WEIGHT: 162.4 LBS | HEIGHT: 67 IN

## 2018-08-01 DIAGNOSIS — E89.3 HYPOPITUITARISM AFTER ADENOMA RESECTION (H): Primary | ICD-10-CM

## 2018-08-01 ASSESSMENT — PAIN SCALES - GENERAL: PAINLEVEL: NO PAIN (0)

## 2018-08-01 NOTE — LETTER
8/1/2018       RE: Suyapa Hodge  [address] [facility] [address]     Dear Colleague,    Thank you for referring your patient, Suyapa Hodge, to the [facility] ENDOCRINOLOGY at [facility]. Please see a copy of my visit note below.    Endocrinology Progress Note    Interval history  Azalea feels well in general.  Since last visit, no new symptoms have occurred.   She denies having headaches, visual changes.  Good energy levels.  No problems with thirst or excessive urination.  At times she takes only 1 tablet of desmopressin at the end seems to get by without any problems.  She also takes hydrocortisone once a day and is not having any issues.  Occasionally she takes it twice a day as needed.  No problems taking levothyroxine.  Occasional skin itchiness with estrogen patch and moves them frequently.  Doing her BSN degree.     History of present illness  Azalea is a pleasant 32 year old woman last seen about 3 mo ago who is in clinic for follow-up s/p pituitary surgery x 3 and bilateral adrenalectomy (July 2014).   She was diagnosed with a pituitary microadenoma several yrs ago which was the suspected cause of her Cushing's disease. She had pituitary surgery X3 in 2013-14 and finally underwent bilateral adrenalectomy on ACTH levels were normal in 2014.  In July 2017 ACTH levels were tested and was 271, which increased to 549 raising concern for recurrence vs missed diagnosis.  Chest abdomen and pelvis CT was normal. 8 mg dex suppression reduced levels to 118.   She is currently taking hydrocortisone BID (10 mg a, 5mg p, Misses evening doses frequently), L-thyroxine 75 mcg tablets full tablet 6 days and desmopressin 0.1 mg b.i.d.   She lost about 50 pounds after surgery and has felt well since.     She is now on HRT-- on daily prometrium and vivelle patch.  Pt is otherwise tolerating this regimen.  In summer 2015 AVN was an issue again after many hours of standing and she was  seen by ortho.  She had PT and that improved. DXA showed low BMD for age.     ROS--gen/HEENT/MSK/endo reviewed with her  she feels tired after work but no new changes  No skin hyperpigmentation.    No heat or cold intolerance  Normal bowel movements  No new symptoms of headache or vision changes  She has chronic right-sided frontal headaches    Current Outpatient Prescriptions   Medication     Calcium carb-Vitamin D 500 mg [place]-200 units (OSCAL WITH D;OYSTER SHELL CALCIUM) 500-200 MG-UNIT per tablet     desmopressin (DDAVP) 0.1 MG tablet     estradiol (VIVELLE-DOT) 0.05 MG/24HR BIW patch     fludrocortisone (FLORINEF) 0.1 MG tablet     hydrocortisone (CORTEF) 10 MG tablet     levothyroxine (SYNTHROID/LEVOTHROID) 75 MCG tablet     Multiple Vitamin (MULTI-VITAMIN DAILY PO)     progesterone (PROMETRIUM) 100 MG capsule     No current facility-administered medications for this visit.        She does not have heat or cold intol. She has good energy level--discussed most recent TFTs    She does not have polyuria or polydipsia.  She is not having issues with increased freqnency/polyuria during the day, or increased thirst or edema.  She continues to take desmopressin 0.1 mg b.i.d.    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    Past Medical History   Past Medical History:   Diagnosis Date     Adrenal disorder (H)      Avascular necrosis of femur head, 2010     Colon polyp      Cushing syndrome (H)     pituitary microadenoma     Diabetes insipidus (H)      Hypercalcaemia      Hypertension      Medulloadrenal hyperfunction (H)      Pulmonary emboli (H) 01/2014     Pulmonary embolism (H) 2014     Thyroid disease        Past Surgical History   Past Surgical History:   Procedure Laterality Date     COLONOSCOPY       COLPOSCOPY VULVA, BIOPSY, COMBINED  10/28/2015    BEVERLEY 1, 6 month follow-up pap was normal     INSERT DRAIN LUMBAR  1/20/2014    Procedure: INSERT DRAIN LUMBAR;;  Surgeon:  "Soham Aquino MD;  Location: [facility] OR     LAPAROSCOPIC ADRENALECTOMY  7/14/2014    Procedure: LAPAROSCOPIC ADRENALECTOMY;  Surgeon: Roni Nunn MD;  Location: [facility] OR     OPTICAL TRACKING SYSTEM ENDOSCOPIC RESECTION TUMOR CRANIAL  7/1/2013    Procedure: OPTICAL TRACKING SYSTEM ENDOSCOPIC RESECTION TUMOR CRANIAL;  Stealth Assisted Endoscopic Hypophysectomy ;  Surgeon: Soham Aquino MD;  Location: [facility] OR     OPTICAL TRACKING SYSTEM ENDOSCOPIC RESECTION TUMOR CRANIAL  1/20/2014    Procedure: OPTICAL TRACKING SYSTEM ENDOSCOPIC RESECTION TUMOR CRANIAL;  Stealth Assisted Endoscopic Transnasal Excision Of Pituitary Adenoma , Placement Of Lumbar Drain with c-arm;  Surgeon: Soham Aquino MD;  Location: [facility] OR     removal of pituitary microademona       wisdom teeth extration       Social History  History     Social History     Marital Status: Single     Spouse Name: N/A     Number of Children: N/A     Years of Education: N/A     Social History Main Topics     Smoking status: Former Smoker -- 3 years     Types: Cigarettes     Quit date: 02/05/2008     Smokeless tobacco: None     Alcohol Use: Yes      Comment: 2/week     Drug Use: No     Sexual Activity: No     Review of Systems   See HPI for pertinents--gen/HEENT/endo/heme/neuro discussed with pertinents as above      Physical Exam  Vital signs:   BP 98/74  Pulse 57  Ht 1.712 m (5' 7.4\")  Wt 73.7 kg (162 lb 6.4 oz)  BMI 25.13 kg/m2  General Appearance: she is well developed, well nourished and in no distress     Eyes:  conjutivae and extra-ocular motions are normal.                                    no lid lag, no stare    HEENT:   NCAT, eomi  Gastrointestinal:  abdomen nondistended  Musculoskeletal:  normal tone and bulk throughout  Psychological:           affect and judgment normal  Skin:  no lesions on exposed skin  Neurological:  nl gait, no resting tremor, nonfocal otherwise, fully A and O    Lab Results  Orders Only on 07/22/2016 "   Component Date Value Ref Range Status     T4 Free 07/22/2016 1.11  0.76 - 1.46 ng/dL Final     Sodium 07/22/2016 138  133 - 144 mmol/L Final     Potassium 07/22/2016 3.8  3.4 - 5.3 mmol/L Final     Chloride 07/22/2016 107  94 - 109 mmol/L Final     Carbon Dioxide 07/22/2016 25  20 - 32 mmol/L Final     Anion Gap 07/22/2016 6  3 - 14 mmol/L Final     Glucose 07/22/2016 75  70 - 99 mg/dL Final     Urea Nitrogen 07/22/2016 22  7 - 30 mg/dL Final     Creatinine 07/22/2016 1.08* 0.52 - 1.04 mg/dL Final     GFR Estimate 07/22/2016 59* >60 mL/min/1.7m2 Final    Non  GFR Calc     GFR Estimate If Black 07/22/2016 71  >60 mL/min/1.7m2 Final    African American GFR Calc     Calcium 07/22/2016 8.9  8.5 - 10.1 mg/dL Final     Prolactin 07/22/2016 6  3 - 27 ug/L Final    Reference ranges apply to non-pregnant females only.     ENDO THYROID LABS-[facility] Latest Ref Rng 12/30/2016 7/22/2016 5/3/2016   TSH 0.40 - 4.00 mU/L      T4 FREE 0.76 - 1.46 ng/dL 0.91 1.11 0.96   TRIIODOTHYRONINE(T3) 60 - 181 ng/dL        ENDO PITUITARY LABS-[facility] Latest Ref Rng & Units 10/4/2017 7/7/2017   CORTISOL, SERUM 4 - 22 ug/dL 0.8 (L)    ADRENAL CORTICOTROPIN <47 pg/mL 267 (H) 271 (H)     MR BRAIN W/O CONTRAST 7/7/2017 1:10 PM     Provided History: Other specified abnormal findings of blood  chemistry, Disorder of adrenal gland, unspecified, Pituitary-dependent  Cushing's disease     Comparison:  6/3/2015, 4/23/2014      Technique: Multiplanar multisequence imaging of the brain without  intravenous contrast. Contrast enhanced portions were aborted due to  inability to obtain a creatinine level.     Findings: Postoperative changes from transsphenoidal pituitary  microadenoma resection. No evidence of recurrent disease on these  noncontrast images. These images reveal no intracranial mass lesion,  mass effect, midline shift or abnormal extraaxial fluid collection.  The ventricles and sulci are normal for age. Diffusion-weighted  images  demonstrate no restricted diffusion.  Normal intravascular flow voids  are identified. Scattered paranasal sinus mucosal thickening.         Impression: No evidence of residual mass within the sella on these  noncontrast images. Contrast portions of the examination were aborted  due to inability to obtain a creatinine level and given patient's  chronic kidney disease.     Assessment   32 year old woman with a history of Cushing's disease S/P pituitary surgery x 3, primary adrenal insufficiency due to bilateral adrenalectomy for recurrent pituitary Cushing's--microadenoma.  Now with hypopituitaritarism and rising ACTH levels.  Partial suppression with 8 mg of dexamethasone.  1. Panhypopit  2. Low bone density for age.   3. Elevated Creatinine level : now normalized.   4. High ACTH levels.   Plan  Hydrocortisone 10 mg in the morning and 5 mg in the afternoon.  She would like to try being off afternoon dose - she has already missed several doses without any issue.  We will check electrolytes today.   Continue fludrocortisone.   Reduce desmopressin to 100 mg daily in the morning and use second tablet as needed.   Continue levothyroxine 75 mcg 6 days a week  We can continue with current doses of hormone replacement--  continue Vivelle dot patch for estrogen replacement along with nightly prometrium. She denies having withdrawal bleed on this.   MRI brain was ordered today    Low bone density for age  Re-test BMD in 2018 after Ca and Vit D supplementation. If not improved now that Cushings is treated, plan for treatment.   Labs 3 month     Celestine Medina MD  [address]  Endocrinology Service    Patient Instructions   Hydrocortisone 10 mg in the morning, PM dose as needed.   Continue Florinef.   Desmopressin 1 tab daily, use second tab as needed.   Levothyroxine 75 mcg daily  Labs and MRI within a month or so.     [facility]: [phone]    To expedite your medication refill(s), please contact your pharmacy and  "have them fax a refill request to: [phone].  *Please allow 3 business days for routine medication refills.  *Please allow 5 business days for controlled substance medication refills.  --------------------  To schedule an appointment (including lab work) you can reach our clinic schedulers at [phone], or you can schedule any follow up appointment directly through RevTrax by clicking on the \"Visits\" tab and selecting \"Schedule an Appointment.\"    To ask a question to your Endocrine care team, please send them a RevTrax message, or reach them by phone at [phone] and press option #3.    For after-hours urgent Endocrine issues, that do not require 911, please dial [phone], and ask to speak with the Endocrinologist On-Call.    For questions related to your bill, please contact:  [place]: [phone]  [facility]: [phone]    If you do not have enough, or have no insurance for your care, or have questions about possible costs and coverage, please reach out to our [facility] Financial Counselors to discuss with them any options you may have. To reach them, please call [phone].    --------------------  Please Note: If you are active on RevTrax, all future test results will be sent by RevTrax message only and will no longer be sent by mail. You may also receive communication directly from your physician.        Again, thank you for allowing me to participate in the care of your patient.      Sincerely,    Celestine Medina MD      "

## 2018-08-01 NOTE — PROGRESS NOTES
Endocrinology Progress Note    Interval history  Azalea feels well in general.  Since last visit, no new symptoms have occurred.   She denies having headaches, visual changes.  Good energy levels.  No problems with thirst or excessive urination.  At times she takes only 1 tablet of desmopressin at the end seems to get by without any problems.  She also takes hydrocortisone once a day and is not having any issues.  Occasionally she takes it twice a day as needed.  No problems taking levothyroxine.  Occasional skin itchiness with estrogen patch and moves them frequently.  Doing her BSN degree.     History of present illness  Azalea is a pleasant 32 year old woman last seen about 3 mo ago who is in clinic for follow-up s/p pituitary surgery x 3 and bilateral adrenalectomy (July 2014).   She was diagnosed with a pituitary microadenoma several yrs ago which was the suspected cause of her Cushing's disease. She had pituitary surgery X3 in 2013-14 and finally underwent bilateral adrenalectomy on ACTH levels were normal in 2014.  In July 2017 ACTH levels were tested and was 271, which increased to 549 raising concern for recurrence vs missed diagnosis.  Chest abdomen and pelvis CT was normal. 8 mg dex suppression reduced levels to 118.   She is currently taking hydrocortisone BID (10 mg a, 5mg p, Misses evening doses frequently), L-thyroxine 75 mcg tablets full tablet 6 days and desmopressin 0.1 mg b.i.d.   She lost about 50 pounds after surgery and has felt well since.     She is now on HRT-- on daily prometrium and vivelle patch.  Pt is otherwise tolerating this regimen.  In summer 2015 AVN was an issue again after many hours of standing and she was seen by ortho.  She had PT and that improved. DXA showed low BMD for age.     ROS--gen/HEENT/MSK/endo reviewed with her  she feels tired after work but no new changes  No skin hyperpigmentation.    No heat or cold intolerance  Normal bowel movements  No new symptoms of headache  or vision changes  She has chronic right-sided frontal headaches    Current Outpatient Prescriptions   Medication     Calcium carb-Vitamin D 500 mg [place]-200 units (OSCAL WITH D;OYSTER SHELL CALCIUM) 500-200 MG-UNIT per tablet     desmopressin (DDAVP) 0.1 MG tablet     estradiol (VIVELLE-DOT) 0.05 MG/24HR BIW patch     fludrocortisone (FLORINEF) 0.1 MG tablet     hydrocortisone (CORTEF) 10 MG tablet     levothyroxine (SYNTHROID/LEVOTHROID) 75 MCG tablet     Multiple Vitamin (MULTI-VITAMIN DAILY PO)     progesterone (PROMETRIUM) 100 MG capsule     No current facility-administered medications for this visit.        She does not have heat or cold intol. She has good energy level--discussed most recent TFTs    She does not have polyuria or polydipsia.  She is not having issues with increased freqnency/polyuria during the day, or increased thirst or edema.  She continues to take desmopressin 0.1 mg b.i.d.    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    Past Medical History   Past Medical History:   Diagnosis Date     Adrenal disorder (H)      Avascular necrosis of femur head, 2010     Colon polyp      Cushing syndrome (H)     pituitary microadenoma     Diabetes insipidus (H)      Hypercalcaemia      Hypertension      Medulloadrenal hyperfunction (H)      Pulmonary emboli (H) 01/2014     Pulmonary embolism (H) 2014     Thyroid disease        Past Surgical History   Past Surgical History:   Procedure Laterality Date     COLONOSCOPY       COLPOSCOPY VULVA, BIOPSY, COMBINED  10/28/2015    BEVERLEY 1, 6 month follow-up pap was normal     INSERT DRAIN LUMBAR  1/20/2014    Procedure: INSERT DRAIN LUMBAR;;  Surgeon: Soham Aquino MD;  Location: [facility] OR     LAPAROSCOPIC ADRENALECTOMY  7/14/2014    Procedure: LAPAROSCOPIC ADRENALECTOMY;  Surgeon: Roni Nunn MD;  Location: [facility] OR     OPTICAL TRACKING SYSTEM ENDOSCOPIC RESECTION TUMOR CRANIAL  7/1/2013    Procedure: OPTICAL TRACKING  "SYSTEM ENDOSCOPIC RESECTION TUMOR CRANIAL;  Stealth Assisted Endoscopic Hypophysectomy ;  Surgeon: Soham Aquino MD;  Location: [facility] OR     OPTICAL TRACKING SYSTEM ENDOSCOPIC RESECTION TUMOR CRANIAL  1/20/2014    Procedure: OPTICAL TRACKING SYSTEM ENDOSCOPIC RESECTION TUMOR CRANIAL;  Stealth Assisted Endoscopic Transnasal Excision Of Pituitary Adenoma , Placement Of Lumbar Drain with c-arm;  Surgeon: Soham Aquino MD;  Location: [facility] OR     removal of pituitary microademona       wisdom teeth extration       Social History  History     Social History     Marital Status: Single     Spouse Name: N/A     Number of Children: N/A     Years of Education: N/A     Social History Main Topics     Smoking status: Former Smoker -- 3 years     Types: Cigarettes     Quit date: 02/05/2008     Smokeless tobacco: None     Alcohol Use: Yes      Comment: 2/week     Drug Use: No     Sexual Activity: No     Review of Systems   See HPI for pertinents--gen/HEENT/endo/heme/neuro discussed with pertinents as above      Physical Exam  Vital signs:   BP 98/74  Pulse 57  Ht 1.712 m (5' 7.4\")  Wt 73.7 kg (162 lb 6.4 oz)  BMI 25.13 kg/m2  General Appearance: she is well developed, well nourished and in no distress     Eyes:  conjutivae and extra-ocular motions are normal.                                    no lid lag, no stare    HEENT:   NCAT, eomi  Gastrointestinal:  abdomen nondistended  Musculoskeletal:  normal tone and bulk throughout  Psychological:           affect and judgment normal  Skin:  no lesions on exposed skin  Neurological:  nl gait, no resting tremor, nonfocal otherwise, fully A and O    Lab Results  Orders Only on 07/22/2016   Component Date Value Ref Range Status     T4 Free 07/22/2016 1.11  0.76 - 1.46 ng/dL Final     Sodium 07/22/2016 138  133 - 144 mmol/L Final     Potassium 07/22/2016 3.8  3.4 - 5.3 mmol/L Final     Chloride 07/22/2016 107  94 - 109 mmol/L Final     Carbon Dioxide 07/22/2016 25  20 - 32 " mmol/L Final     Anion Gap 07/22/2016 6  3 - 14 mmol/L Final     Glucose 07/22/2016 75  70 - 99 mg/dL Final     Urea Nitrogen 07/22/2016 22  7 - 30 mg/dL Final     Creatinine 07/22/2016 1.08* 0.52 - 1.04 mg/dL Final     GFR Estimate 07/22/2016 59* >60 mL/min/1.7m2 Final    Non  GFR Calc     GFR Estimate If Black 07/22/2016 71  >60 mL/min/1.7m2 Final    African American GFR Calc     Calcium 07/22/2016 8.9  8.5 - 10.1 mg/dL Final     Prolactin 07/22/2016 6  3 - 27 ug/L Final    Reference ranges apply to non-pregnant females only.     ENDO THYROID LABS-[facility] Latest Ref Rng 12/30/2016 7/22/2016 5/3/2016   TSH 0.40 - 4.00 mU/L      T4 FREE 0.76 - 1.46 ng/dL 0.91 1.11 0.96   TRIIODOTHYRONINE(T3) 60 - 181 ng/dL        ENDO PITUITARY LABS-[facility] Latest Ref Rng & Units 10/4/2017 7/7/2017   CORTISOL, SERUM 4 - 22 ug/dL 0.8 (L)    ADRENAL CORTICOTROPIN <47 pg/mL 267 (H) 271 (H)     MR BRAIN W/O CONTRAST 7/7/2017 1:10 PM     Provided History: Other specified abnormal findings of blood  chemistry, Disorder of adrenal gland, unspecified, Pituitary-dependent  Cushing's disease     Comparison:  6/3/2015, 4/23/2014      Technique: Multiplanar multisequence imaging of the brain without  intravenous contrast. Contrast enhanced portions were aborted due to  inability to obtain a creatinine level.     Findings: Postoperative changes from transsphenoidal pituitary  microadenoma resection. No evidence of recurrent disease on these  noncontrast images. These images reveal no intracranial mass lesion,  mass effect, midline shift or abnormal extraaxial fluid collection.  The ventricles and sulci are normal for age. Diffusion-weighted images  demonstrate no restricted diffusion.  Normal intravascular flow voids  are identified. Scattered paranasal sinus mucosal thickening.         Impression: No evidence of residual mass within the sella on these  noncontrast images. Contrast portions of the examination were aborted  due to  inability to obtain a creatinine level and given patient's  chronic kidney disease.     Assessment   32 year old woman with a history of Cushing's disease S/P pituitary surgery x 3, primary adrenal insufficiency due to bilateral adrenalectomy for recurrent pituitary Cushing's--microadenoma.  Now with hypopituitaritarism and rising ACTH levels.  Partial suppression with 8 mg of dexamethasone.  1. Panhypopit  2. Low bone density for age.   3. Elevated Creatinine level : now normalized.   4. High ACTH levels.   Plan  Hydrocortisone 10 mg in the morning and 5 mg in the afternoon.  She would like to try being off afternoon dose - she has already missed several doses without any issue.  We will check electrolytes today.   Continue fludrocortisone.   Reduce desmopressin to 100 mg daily in the morning and use second tablet as needed.   Continue levothyroxine 75 mcg 6 days a week  We can continue with current doses of hormone replacement--  continue Vivelle dot patch for estrogen replacement along with nightly prometrium. She denies having withdrawal bleed on this.   MRI brain was ordered today    Low bone density for age  Re-test BMD in 2018 after Ca and Vit D supplementation. If not improved now that Cushings is treated, plan for treatment.   Labs 3 month     Celestine Medina MD  [id number]  Endocrinology Service    Patient Instructions   Hydrocortisone 10 mg in the morning, PM dose as needed.   Continue Florinef.   Desmopressin 1 tab daily, use second tab as needed.   Levothyroxine 75 mcg daily  Labs and MRI within a month or so.     [facility]: [phone]    To expedite your medication refill(s), please contact your pharmacy and have them fax a refill request to: [phone].  *Please allow 3 business days for routine medication refills.  *Please allow 5 business days for controlled substance medication refills.  --------------------  To schedule an appointment (including lab work) you can reach our clinic schedulers  "at [phone], or you can schedule any follow up appointment directly through Shogether by clicking on the \"Visits\" tab and selecting \"Schedule an Appointment.\"    To ask a question to your Endocrine care team, please send them a Shogether message, or reach them by phone at [phone] and press option #3.    For after-hours urgent Endocrine issues, that do not require 911, please dial [phone], and ask to speak with the Endocrinologist On-Call.    For questions related to your bill, please contact:  [place]: [phone]  [facility]: [phone]    If you do not have enough, or have no insurance for your care, or have questions about possible costs and coverage, please reach out to our [employer] Counselors to discuss with them any options you may have. To reach them, please call [phone].    --------------------  Please Note: If you are active on Shogether, all future test results will be sent by Shogether message only and will no longer be sent by mail. You may also receive communication directly from your physician.      "

## 2018-08-01 NOTE — PATIENT INSTRUCTIONS
"Hydrocortisone 10 mg in the morning, PM dose as needed.   Continue Florinef.   Desmopressin 1 tab daily, use second tab as needed.   Levothyroxine 75 mcg daily  Labs and MRI within a month or so.     [facility]: [phone]    To expedite your medication refill(s), please contact your pharmacy and have them fax a refill request to: [phone].  *Please allow 3 business days for routine medication refills.  *Please allow 5 business days for controlled substance medication refills.  --------------------  To schedule an appointment (including lab work) you can reach our clinic schedulers at [phone], or you can schedule any follow up appointment directly through [employer] by clicking on the \"Visits\" tab and selecting \"Schedule an Appointment.\"    To ask a question to your Endocrine care team, please send them a Bitmenu message, or reach them by phone at [phone] and press option #3.    For after-hours urgent Endocrine issues, that do not require 911, please dial [phone], and ask to speak with the Endocrinologist On-Call.    For questions related to your bill, please contact:  [place]: [phone]  [facility]: [phone]    If you do not have enough, or have no insurance for your care, or have questions about possible costs and coverage, please reach out to our [facility] Financial Counselors to discuss with them any options you may have. To reach them, please call [phone].    --------------------  Please Note: If you are active on Bitmenu, all future test results will be sent by Bitmenu message only and will no longer be sent by mail. You may also receive communication directly from your physician.    "

## 2018-08-01 NOTE — MR AVS SNAPSHOT
"              After Visit Summary   8/1/2018    Suyapa Hodge    MRN: [id number]           Patient Information     Date Of Birth          1985        Visit Information        Provider Department      8/1/2018 2:00 PM Celestine Medina MD [facility] Endocrinology        Today's Diagnoses     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)    -  1      Care Instructions    Hydrocortisone 10 mg in the morning, PM dose as needed.   Continue Florinef.   Desmopressin 1 tab daily, use second tab as needed.   Levothyroxine 75 mcg daily  Labs and MRI within a month or so.     [facility]: [phone]    To expedite your medication refill(s), please contact your pharmacy and have them fax a refill request to: [phone].  *Please allow 3 business days for routine medication refills.  *Please allow 5 business days for controlled substance medication refills.  --------------------  To schedule an appointment (including lab work) you can reach our clinic schedulers at [phone], or you can schedule any follow up appointment directly through [employer] by clicking on the \"Visits\" tab and selecting \"Schedule an Appointment.\"    To ask a question to your Endocrine care team, please send them a [employer] message, or reach them by phone at [phone] and press option #3.    For after-hours urgent Endocrine issues, that do not require 911, please dial [phone], and ask to speak with the Endocrinologist On-Call.    For questions related to your bill, please contact:  [place]: [phone]  [facility]: [phone]    If you do not have enough, or have no insurance for your care, or have questions about possible costs and coverage, please reach out to our [facility] Financial Counselors to discuss with them any options you may have. To reach them, please call [phone].    --------------------  Please Note: If you are active on [employer], all future test results will be sent by [employer] " "message only and will no longer be sent by mail. You may also receive communication directly from your physician.            Follow-ups after your visit        Follow-up notes from your care team     Return in about 1 year (around 8/1/2019).      Future tests that were ordered for you today     Open Future Orders        Priority Expected Expires Ordered    T4 free Routine 8/1/2018 8/1/2019 8/1/2018    MRI Brain w & w/o contrast Routine  2/27/2019 8/1/2018    Basic metabolic panel Routine 8/1/2018 8/1/2019 8/1/2018    Specific gravity urine Routine  8/2/2019 8/1/2018    Adrenal corticotropin Routine  8/1/2019 8/1/2018            Who to contact     Please call your clinic at [phone] to:    Ask questions about your health    Make or cancel appointments    Discuss your medicines    Learn about your test results    Speak to your doctor            Additional Information About Your Visit        [employer] Information     [employer] gives you secure access to your electronic health record. If you see a primary care provider, you can also send messages to your care team and make appointments. If you have questions, please call your primary care clinic.  If you do not have a primary care provider, please call [phone] and they will assist you.      [employer] is an electronic gateway that provides easy, online access to your medical records. With [employer], you can request a clinic appointment, read your test results, renew a prescription or communicate with your care team.     To access your existing account, please contact your [facility] or call [phone] for assistance.        Care EveryWhere ID     This is your Care EveryWhere ID. This could be used by other organizations to access your [facility] medical records  [id number]        Your Vitals Were     Pulse Height BMI (Body Mass Index)             57 1.712 m (5' 7.4\") 25.13 kg/m2          Blood Pressure from Last 3 Encounters:   08/01/18 " 98/74   01/17/18 113/73   12/15/17 98/60    Weight from Last 3 Encounters:   08/01/18 73.7 kg (162 lb 6.4 oz)   05/15/18 77.2 kg (170 lb 4.8 oz)   01/17/18 82.2 kg (181 lb 3.2 oz)                 Today's Medication Changes          These changes are accurate as of 8/1/18  2:40 PM.  If you have any questions, ask your nurse or doctor.               Stop taking these medicines if you haven't already. Please contact your care team if you have questions.     dexamethasone 4 MG tablet   Commonly known as:  DECADRON                    Primary Care Provider Office Phone # Fax #    Clarisa SOHEILA Bermudez [phone]       [address] [facility] [address]        Equal Access to Services     [place] : Yo Morrison, waleonid carmona, qacrys kaalmaananda cotter, verenice back . So [facility] [phone].    ATENCIÓN: Si habla español, tiene a ortiz disposición servicios gratuitos de asistencia lingüística. Kp al [phone].    We comply with applicable federal civil rights laws and [place] laws. We do not discriminate on the basis of race, color, national origin, age, disability, sex, sexual orientation, or gender identity.            Thank you!     Thank you for choosing [facility] ENDOCRINOLOGY  for your care. Our goal is always to provide you with excellent care. Hearing back from our patients is one way we can continue to improve our services. Please take a few minutes to complete the written survey that you may receive in the mail after your visit with us. Thank you!             Your Updated Medication List - Protect others around you: Learn how to safely use, store and throw away your medicines at www.disposemymeds.org.          This list is accurate as of 8/1/18  2:40 PM.  Always use your most recent med list.                   Brand Name Dispense Instructions for use Diagnosis    Calcium carb-Vitamin D 500 mg [place]-200 units 500-200 MG-UNIT per  tablet    OSCAL with D;Oyster Shell Calcium     Take 1 tablet by mouth 2 times daily (with meals)        desmopressin 0.1 MG tablet    DDAVP    180 tablet    Take 1 tablet (100 mcg) by mouth 2 times daily (1 TAB) MORNING AND AT BEDTIME    Pituitary adenoma (H)       estradiol 0.05 MG/24HR BIW patch    VIVELLE-DOT    27 patch    Place 1 patch onto the skin twice a week    Estrogen deficiency       fludrocortisone 0.1 MG tablet    FLORINEF    45 tablet    Take 0.5 tablets (0.05 mg) by mouth daily    Adrenal insufficiency, primary, iatrogenic (H)       hydrocortisone 10 MG tablet    CORTEF    150 tablet    1 tab (10 mg) am 1/2 tab (5 mg) pm plus sick day supply as directed    Adrenal insufficiency (H)       levothyroxine 75 MCG tablet    SYNTHROID/LEVOTHROID    90 tablet    One tablet day 6 days per wk and none on the 7th day each wk    Other specified hypothyroidism       MULTI-VITAMIN DAILY PO           progesterone 100 MG capsule    PROMETRIUM    90 capsule    Take 1 tablet daily    Estrogen deficiency, Premature menopause on hormone replacement therapy

## 2018-08-02 ENCOUNTER — TELEPHONE (OUTPATIENT)
Dept: ENDOCRINOLOGY | Facility: CLINIC | Age: 33
End: 2018-08-02

## 2018-08-21 DIAGNOSIS — E28.39 ESTROGEN DEFICIENCY: ICD-10-CM

## 2018-08-21 RX ORDER — ESTRADIOL 0.05 MG/D
1 PATCH, EXTENDED RELEASE TRANSDERMAL
Qty: 27 PATCH | Refills: 3 | Status: SHIPPED | OUTPATIENT
Start: 2018-08-23 | End: 2019-10-21

## 2018-10-08 ENCOUNTER — HOSPITAL ENCOUNTER (OUTPATIENT)
Dept: MRI IMAGING | Facility: CLINIC | Age: 33
Discharge: HOME | End: 2018-10-08
Attending: INTERNAL MEDICINE | Admitting: INTERNAL MEDICINE
Payer: COMMERCIAL

## 2018-10-08 DIAGNOSIS — E89.3 HYPOPITUITARISM AFTER ADENOMA RESECTION (H): ICD-10-CM

## 2018-10-08 DIAGNOSIS — E27.1 ADRENAL INSUFFICIENCY, PRIMARY (H): Primary | ICD-10-CM

## 2018-10-08 DIAGNOSIS — Z79.890 PREMATURE MENOPAUSE ON HORMONE REPLACEMENT THERAPY: ICD-10-CM

## 2018-10-08 DIAGNOSIS — E28.319 PREMATURE MENOPAUSE ON HORMONE REPLACEMENT THERAPY: ICD-10-CM

## 2018-10-08 DIAGNOSIS — E28.39 ESTROGEN DEFICIENCY: ICD-10-CM

## 2018-10-08 LAB
ANION GAP SERPL CALCULATED.3IONS-SCNC: 4 MMOL/L (ref 3–14)
BUN SERPL-MCNC: 14 MG/DL (ref 7–30)
CALCIUM SERPL-MCNC: 9.3 MG/DL (ref 8.5–10.1)
CHLORIDE SERPL-SCNC: 105 MMOL/L (ref 94–109)
CO2 SERPL-SCNC: 32 MMOL/L (ref 20–32)
CREAT SERPL-MCNC: 1.1 MG/DL (ref 0.52–1.04)
GFR SERPL CREATININE-BSD FRML MDRD: 57 ML/MIN/1.7M2
GLUCOSE SERPL-MCNC: 87 MG/DL (ref 70–99)
POTASSIUM SERPL-SCNC: 4.1 MMOL/L (ref 3.4–5.3)
SODIUM SERPL-SCNC: 141 MMOL/L (ref 133–144)
SP GR UR STRIP: 1.02 (ref 1–1.03)
T4 FREE SERPL-MCNC: 0.53 NG/DL (ref 0.76–1.46)

## 2018-10-08 PROCEDURE — 25500064 ZZH RX 255 OP 636: Performed by: INTERNAL MEDICINE

## 2018-10-08 PROCEDURE — 40000141 ZZH STATISTIC PERIPHERAL IV START W/O US GUIDANCE

## 2018-10-08 PROCEDURE — 82024 ASSAY OF ACTH: CPT | Performed by: INTERNAL MEDICINE

## 2018-10-08 PROCEDURE — 36415 COLL VENOUS BLD VENIPUNCTURE: CPT | Performed by: INTERNAL MEDICINE

## 2018-10-08 PROCEDURE — 80048 BASIC METABOLIC PNL TOTAL CA: CPT | Performed by: INTERNAL MEDICINE

## 2018-10-08 PROCEDURE — 81003 URINALYSIS AUTO W/O SCOPE: CPT | Performed by: INTERNAL MEDICINE

## 2018-10-08 PROCEDURE — 84439 ASSAY OF FREE THYROXINE: CPT | Performed by: INTERNAL MEDICINE

## 2018-10-08 PROCEDURE — 25000128 H RX IP 250 OP 636: Performed by: INTERNAL MEDICINE

## 2018-10-08 PROCEDURE — 70553 MRI BRAIN STEM W/O & W/DYE: CPT

## 2018-10-08 PROCEDURE — A9585 GADOBUTROL INJECTION: HCPCS | Performed by: INTERNAL MEDICINE

## 2018-10-08 RX ORDER — FLUDROCORTISONE ACETATE 0.1 MG/1
0.05 TABLET ORAL DAILY
Qty: 45 TABLET | Refills: 1 | Status: SHIPPED | OUTPATIENT
Start: 2018-10-08 | End: 2019-07-30

## 2018-10-08 RX ORDER — GADOBUTROL 604.72 MG/ML
7.5 INJECTION INTRAVENOUS ONCE
Status: COMPLETED | OUTPATIENT
Start: 2018-10-08 | End: 2018-10-08

## 2018-10-08 RX ADMIN — SODIUM CHLORIDE 100 ML: 9 INJECTION, SOLUTION INTRAVENOUS at 18:28

## 2018-10-08 RX ADMIN — GADOBUTROL 7.5 ML: 604.72 INJECTION INTRAVENOUS at 18:28

## 2018-10-09 LAB — ACTH PLAS-MCNC: 583 PG/ML

## 2018-10-16 NOTE — PROGRESS NOTES
ACTH levels are similar to the ones 9 months ago. Thyroid values are lower. Since your dose has been very consistent, and labs normal in past, lab error is also a possibility. I do not plan to change dose right now but lets retest this in future, certainly if you are having any new symptoms do let me know.   Celestine Medina MD  [id number]  Endocrinology Service

## 2018-11-28 DIAGNOSIS — D35.2 PITUITARY ADENOMA (H): ICD-10-CM

## 2018-11-28 DIAGNOSIS — E27.40 ADRENAL INSUFFICIENCY (H): ICD-10-CM

## 2018-11-29 RX ORDER — DESMOPRESSIN ACETATE 0.1 MG/1
0.1 TABLET ORAL 2 TIMES DAILY
Qty: 180 TABLET | Refills: 2 | Status: SHIPPED | OUTPATIENT
Start: 2018-11-29 | End: 2019-10-21

## 2018-11-29 NOTE — TELEPHONE ENCOUNTER
"hydrocortisone (CORTEF) 10 MG tablet  Last Written Prescription Date:  12/6/17  Last Fill Quantity: 150,   # refills: 3  Last Office Visit : 8/1/18  Future Office visit:  None    8/1/18  \" She is currently taking hydrocortisone BID (10 mg a, 5mg p, Misses evening doses frequently\"     desmopressin (DDAVP) 0.1 MG tablet  Last Written Prescription Date:  11/29/17  Last Fill Quantity: 180,   # refills: 3    8/1/18  \" She continues to take desmopressin 0.1 mg b.i.d.\"    Sodium 141  133 - 144 mmol/L Final 10/08/2018  8:02 AM MG       routed because: cortef:  Med list has 1 tab am,  .5  tab 5 pm. + sick day coverage. Note has 10 mg bid,  5 mg pm. Which do you want?  # qty?  "

## 2018-11-30 RX ORDER — HYDROCORTISONE 10 MG/1
TABLET ORAL
Qty: 150 TABLET | Refills: 2 | Status: SHIPPED | OUTPATIENT
Start: 2018-11-30 | End: 2019-10-21

## 2019-01-08 DIAGNOSIS — E03.8 OTHER SPECIFIED HYPOTHYROIDISM: ICD-10-CM

## 2019-01-08 NOTE — TELEPHONE ENCOUNTER
[facility] Call Center    Phone Message    May a detailed message be left on voicemail: yes    Reason for Call: Medication Refill Request    Has the patient contacted the pharmacy for the refill? Yes   Name of medication being requested: levothyroxine (SYNTHROID/LEVOTHROID) 75 MCG tablet  Provider who prescribed the medication: Carol  Pharmacy: [facility] [id number] [address]    Date medication is needed: ASAP - Pharmacy calling         Action Taken: Message routed to:  Other: Med Refills Team

## 2019-01-09 RX ORDER — LEVOTHYROXINE SODIUM 75 UG/1
TABLET ORAL
Qty: 90 TABLET | Refills: 1 | Status: SHIPPED | OUTPATIENT
Start: 2019-01-09 | End: 2019-06-04

## 2019-01-09 NOTE — TELEPHONE ENCOUNTER
Last Clinic Visit: 8/1/2018  [facility] Endocrinology    TSH level noted at that time with plan to continue current dose.

## 2019-03-14 ENCOUNTER — OFFICE VISIT (OUTPATIENT)
Dept: FAMILY MEDICINE | Facility: CLINIC | Age: 34
End: 2019-03-14
Payer: COMMERCIAL

## 2019-03-14 VITALS
HEART RATE: 97 BPM | SYSTOLIC BLOOD PRESSURE: 100 MMHG | OXYGEN SATURATION: 98 % | TEMPERATURE: 97.5 F | BODY MASS INDEX: 24.17 KG/M2 | HEIGHT: 67 IN | WEIGHT: 154 LBS | DIASTOLIC BLOOD PRESSURE: 58 MMHG

## 2019-03-14 DIAGNOSIS — M87.059 AVASCULAR NECROSIS OF FEMORAL HEAD, UNSPECIFIED LATERALITY (H): ICD-10-CM

## 2019-03-14 DIAGNOSIS — L20.82 FLEXURAL ECZEMA: ICD-10-CM

## 2019-03-14 DIAGNOSIS — Z00.00 ROUTINE HISTORY AND PHYSICAL EXAMINATION OF ADULT: Primary | ICD-10-CM

## 2019-03-14 DIAGNOSIS — Z12.4 SCREENING FOR CERVICAL CANCER: ICD-10-CM

## 2019-03-14 PROCEDURE — 87624 HPV HI-RISK TYP POOLED RSLT: CPT | Performed by: NURSE PRACTITIONER

## 2019-03-14 PROCEDURE — 99395 PREV VISIT EST AGE 18-39: CPT | Performed by: NURSE PRACTITIONER

## 2019-03-14 PROCEDURE — G0145 SCR C/V CYTO,THINLAYER,RESCR: HCPCS | Performed by: NURSE PRACTITIONER

## 2019-03-14 RX ORDER — TRIAMCINOLONE ACETONIDE 1 MG/G
CREAM TOPICAL 2 TIMES DAILY
Qty: 80 G | Refills: 1 | Status: SHIPPED | OUTPATIENT
Start: 2019-03-14 | End: 2023-05-11

## 2019-03-14 ASSESSMENT — ENCOUNTER SYMPTOMS
JOINT SWELLING: 0
DIZZINESS: 0
FREQUENCY: 0
WEAKNESS: 0
NAUSEA: 0
CONSTIPATION: 0
DYSURIA: 0
PALPITATIONS: 0
DIARRHEA: 0
SORE THROAT: 0
HEARTBURN: 0
FEVER: 0
NERVOUS/ANXIOUS: 0
ABDOMINAL PAIN: 0
CHILLS: 0
BREAST MASS: 0
HEMATOCHEZIA: 0
HEADACHES: 1
HEMATURIA: 0

## 2019-03-14 ASSESSMENT — MIFFLIN-ST. JEOR: SCORE: 1431.17

## 2019-03-14 ASSESSMENT — PAIN SCALES - GENERAL: PAINLEVEL: MILD PAIN (2)

## 2019-03-14 NOTE — PROGRESS NOTES
SUBJECTIVE:   CC: Suyapa Hodge is an 33 year old woman who presents for preventive health visit.     Physical   Annual:     Getting at least 3 servings of Calcium per day:  NO    Bi-annual eye exam:  Yes    Dental care twice a year:  Yes    Sleep apnea or symptoms of sleep apnea:  Daytime drowsiness    Diet:  Regular (no restrictions)    Frequency of exercise:  None    Taking medications regularly:  Yes    Medication side effects:  None    Additional concerns today:  No    PHQ-2 Total Score: 1      Patient with known history of bilateral avascular necrosis of hips, right severe and left mild, reports worsening right hip pain in the last 6-12 months with associated locking/catching. Was previously told it would be many years before surgery would be indicated. Had CT of chest/abdomen/pelvis one year ago again confirming right hip findings.    Today's PHQ-2 Score:   PHQ-2 (  [employer]) 3/14/2019   Q1: Little interest or pleasure in doing things 1   Q2: Feeling down, depressed or hopeless 0   PHQ-2 Score 1   Q1: Little interest or pleasure in doing things Several days   Q2: Feeling down, depressed or hopeless Not at all   PHQ-2 Score 1       Abuse: Current or Past(Physical, Sexual or Emotional)- No  Do you feel safe in your environment? Yes    Social History     Tobacco Use     Smoking status: Former Smoker     Years: 3.00     Types: Cigarettes     Last attempt to quit: 2008     Years since quittin.1     Smokeless tobacco: Never Used   Substance Use Topics     Alcohol use: Yes     Comment: 2/week     Alcohol Use 3/14/2019   If you drink alcohol do you typically have greater than 3 drinks per day OR greater than 7 drinks per week? No       Reviewed orders with patient.  Reviewed health maintenance and updated orders accordingly - Yes  Labs reviewed in EPIC    Mammo discussed, not appropriate for or declined by this patient.    Pertinent mammograms are reviewed under the imaging tab.  History of  "abnormal Pap smear: YES - updated in Problem List and Health Maintenance accordingly     Reviewed and updated as needed this visit by clinical staff  Tobacco  Allergies  Meds         Reviewed and updated as needed this visit by Provider            Review of Systems   Constitutional: Negative for chills and fever.   HENT: Negative for congestion, ear pain, hearing loss and sore throat.    Eyes: Negative for visual disturbance.   Cardiovascular: Negative for palpitations and peripheral edema.   Gastrointestinal: Negative for abdominal pain, constipation, diarrhea, heartburn, hematochezia and nausea.   Breasts:  Negative for tenderness, breast mass and discharge.   Genitourinary: Negative for dysuria, frequency, hematuria, pelvic pain, urgency, vaginal bleeding and vaginal discharge.   Musculoskeletal: Positive for arthralgias. Negative for joint swelling.   Skin: Positive for rash.   Neurological: Positive for headaches. Negative for dizziness and weakness.   Psychiatric/Behavioral: Negative for mood changes. The patient is not nervous/anxious.         OBJECTIVE:   /58 (BP Location: Right arm, Patient Position: Chair, Cuff Size: Adult Regular)   Pulse 97   Temp 97.5  F (36.4  C) (Oral)   Ht 1.702 m (5' 7\")   Wt 69.9 kg (154 lb)   SpO2 98%   BMI 24.12 kg/m    Physical Exam  GENERAL: healthy, alert and no distress  EYES: Eyes grossly normal to inspection, PERRL and conjunctivae and sclerae normal  HENT: ear canals and TM's normal, nose and mouth without ulcers or lesions  NECK: no adenopathy, no asymmetry, masses, or scars and thyroid normal to palpation  RESP: lungs clear to auscultation - no rales, rhonchi or wheezes  CV: regular rate and rhythm, normal S1 S2, no S3 or S4, no murmur, click or rub, no peripheral edema and peripheral pulses strong  ABDOMEN: soft, nontender, no hepatosplenomegaly, no masses and bowel sounds normal  MS: no gross musculoskeletal defects noted, no edema  SKIN: erythematous " "papules over erythematous base left dorsal hand  NEURO: Normal strength and tone, mentation intact and speech normal  PSYCH: mentation appears normal, affect normal/bright    Diagnostic Test Results:  No results found for this or any previous visit (from the past 24 hour(s)).    ASSESSMENT/PLAN:   1. Routine history and physical examination of adult      2. Avascular necrosis of femoral head, unspecified laterality (H)  Follow-up with ortho due to worsening symptoms  - ORTHO  REFERRAL    3. Screening for cervical cancer    - Pap imaged thin layer screen with HPV - recommended age 30 - 65 years (select HPV order below)  - HPV High Risk Types DNA Cervical    4. Flexural eczema  Use topical steroid for flares, maintain with emollient two times daily   - triamcinolone (KENALOG) 0.1 % external cream; Apply topically 2 times daily As needed for flares  Dispense: 80 g; Refill: 1    COUNSELING:  Reviewed preventive health counseling, as reflected in patient instructions       Regular exercise       Healthy diet/nutrition    BP Readings from Last 1 Encounters:   03/14/19 100/58     Estimated body mass index is 24.12 kg/m  as calculated from the following:    Height as of this encounter: 1.702 m (5' 7\").    Weight as of this encounter: 69.9 kg (154 lb).           reports that she quit smoking about 11 years ago. Her smoking use included cigarettes. She quit after 3.00 years of use. she has never used smokeless tobacco.      Counseling Resources:  ATP IV Guidelines  Pooled Cohorts Equation Calculator  Breast Cancer Risk Calculator  FRAX Risk Assessment  ICSI Preventive Guidelines  Dietary Guidelines for Americans, 2010  USDA's MyPlate  ASA Prophylaxis  Lung CA Screening    SOHEILA De León CNP  [facility]"

## 2019-03-15 ASSESSMENT — ENCOUNTER SYMPTOMS: ARTHRALGIAS: 1

## 2019-03-19 ENCOUNTER — OFFICE VISIT (OUTPATIENT)
Dept: ORTHOPEDICS | Facility: CLINIC | Age: 34
End: 2019-03-19
Payer: COMMERCIAL

## 2019-03-19 ENCOUNTER — ANCILLARY PROCEDURE (OUTPATIENT)
Dept: GENERAL RADIOLOGY | Facility: CLINIC | Age: 34
End: 2019-03-19
Attending: ORTHOPAEDIC SURGERY
Payer: COMMERCIAL

## 2019-03-19 VITALS — OXYGEN SATURATION: 95 % | SYSTOLIC BLOOD PRESSURE: 99 MMHG | HEART RATE: 90 BPM | DIASTOLIC BLOOD PRESSURE: 70 MMHG

## 2019-03-19 DIAGNOSIS — M87.059 AVN OF FEMUR (H): ICD-10-CM

## 2019-03-19 DIAGNOSIS — M87.051 AVASCULAR NECROSIS OF RIGHT FEMUR (H): Primary | ICD-10-CM

## 2019-03-19 PROCEDURE — 73522 X-RAY EXAM HIPS BI 3-4 VIEWS: CPT

## 2019-03-19 PROCEDURE — 99243 OFF/OP CNSLTJ NEW/EST LOW 30: CPT | Performed by: ORTHOPAEDIC SURGERY

## 2019-03-19 ASSESSMENT — PAIN SCALES - GENERAL: PAINLEVEL: SEVERE PAIN (6)

## 2019-03-19 NOTE — LETTER
3/19/2019         RE: Suyapa Hodge  [address] [facility] [address]        Dear Colleague,    Thank you for referring your patient, Suyapa Hodge, to the [facility]. Please see a copy of my visit note below.    SUBJECTIVE:  Suyapa Hodge is a 33 year old female who is seen in consultation at the request of Dr. Palafox for  right hip pain that started  10+ years ago.     Symptoms: diffuse pain in the hip, catching, constant pain, pain with walking, night time pain  Aggravating Activities: walking, being on her feet.   Symptoms have been gradually worsening.    Prior history of related problems: Hip pain originally started around 1265-2312 caused by Avascular necrosis, was diagnosed with Cushing's disease in 2009. Pain originally improved after her first pituitary surgery in 2010, which improved the hip pain. In 2013 the hip started to hurt again, the Cushing's reoccurred in 2014. Nothing was done about her hip after that.     Treatment up to this point: She tried water therapy and strengthening, but nothing helped it. Managing with Advil for pain up until a few months ago. Very painful past 3-4 months. She still takes the Advil still. Resting when she gets home from work.     PMH: bilateral Adrenalectomy, complete pituitary resection, currently on chronic supplemental steroid doses.    Review of Systems:  Constitutional:  NEGATIVE for fever, chills, change in weight  Integumentary/Skin:  NEGATIVE for worrisome rashes, moles or lesions  Eyes:  NEGATIVE for vision changes or irritation  ENT/Mouth:  NEGATIVE for ear, mouth and throat problems  Resp:  NEGATIVE for significant cough or SOB  Breast:  NEGATIVE for masses, tenderness or discharge  CV:  NEGATIVE for chest pain, palpitations or peripheral edema  GI:  NEGATIVE for nausea, abdominal pain, heartburn, or change in bowel habits  :  Negative   Musculoskeletal:  See HPI above  Neuro:  NEGATIVE for weakness, dizziness or  paresthesias  Endocrine:  NEGATIVE for temperature intolerance, skin/hair changes  Heme/allergy/immune:  NEGATIVE for bleeding problems  Psychiatric:  NEGATIVE for changes in mood or affect    Past Medical History:   Past Medical History:   Diagnosis Date     Adrenal disorder (H)      Avascular necrosis of femur head, 2010     AVN (avascular necrosis of bone) (H)     R hip     Colon polyp      Cushing syndrome (H)     pituitary microadenoma     Diabetes insipidus (H)      Hypercalcaemia      Hypertension      Medulloadrenal hyperfunction (H)      Pulmonary emboli (H) 01/2014     Pulmonary embolism (H) 2014     Thyroid disease      Past Surgical History:   Past Surgical History:   Procedure Laterality Date     COLONOSCOPY       COLPOSCOPY VULVA, BIOPSY, COMBINED  10/28/2015    BEVERLEY 1, 6 month follow-up pap was normal     INSERT DRAIN LUMBAR  1/20/2014    Procedure: INSERT DRAIN LUMBAR;;  Surgeon: Soham Aquino MD;  Location: [facility] OR     LAPAROSCOPIC ADRENALECTOMY  7/14/2014    Procedure: LAPAROSCOPIC ADRENALECTOMY;  Surgeon: Roni Nunn MD;  Location: [facility] OR     OPTICAL TRACKING SYSTEM ENDOSCOPIC RESECTION TUMOR CRANIAL  7/1/2013    Procedure: OPTICAL TRACKING SYSTEM ENDOSCOPIC RESECTION TUMOR CRANIAL;  Stealth Assisted Endoscopic Hypophysectomy ;  Surgeon: Soham Aquino MD;  Location: [facility] OR     OPTICAL TRACKING SYSTEM ENDOSCOPIC RESECTION TUMOR CRANIAL  1/20/2014    Procedure: OPTICAL TRACKING SYSTEM ENDOSCOPIC RESECTION TUMOR CRANIAL;  Stealth Assisted Endoscopic Transnasal Excision Of Pituitary Adenoma , Placement Of Lumbar Drain with c-arm;  Surgeon: Soham Aquino MD;  Location: [facility] OR     removal of pituitary microademona       wisdom teeth extration       Family History:   Family History   Problem Relation Age of Onset     Asthma Mother      Cancer Maternal Grandmother 45        colon cancer     Cancer Maternal Grandfather         lung cancer-smoker     Cancer Paternal Grandmother          lung cancer-smoker     Cancer Paternal Grandfather         lung cancer     Allergies Sister      Social History:   Social History     Tobacco Use     Smoking status: Former Smoker     Years: 3.00     Types: Cigarettes     Last attempt to quit: 2008     Years since quittin.1     Smokeless tobacco: Never Used   Substance Use Topics     Alcohol use: Yes     Comment: 2/week     OBJECTIVE:  Physical Exam:  BP 99/70 (BP Location: Left arm, Patient Position: Sitting, Cuff Size: Adult Regular)   Pulse 90   SpO2 95%   General Appearance: healthy, alert and no distress   Skin: no suspicious lesions or rashes  Neuro: Normal strength and tone, mentation intact and speech normal  Vascular: good pulses, and capillary refill   Lymph: no lymphadenopathy   Psych:  mentation appears normal and affect normal/bright  Resp: no increased work of breathing    Right Hip Exam:  Seated SLR: negative  Gait: antalgic, favoring the right side  Tender: diffuse  Hip range of motion:    Flexion: 92 degrees, with slight flexion contracture   Internal Rotation: 10* degrees   External Rotation: 20* degrees   Abduction: 20* degrees  Leg lengths: Leg lengths roughly equal.   Special tests: Negative trendelenberg      X-RAY:  Obtained today of the Right Hip: 1-view, reviewed in the office with the patient by myself today and show significant deformity of the femoral head, larger lateral osteophyte than 2015. More wide spread cystic changes in the femoral head are noted than in 2015. More collapsed than previous films. Possible loose body based on comparison.     ASSESSMENT/PLAN:  1. Avascular necrosis of the right hip. Stage 4    Plan: Total Hip Arthroplasty:   We talked about the patient's condition and diagnosis.  Because of the complete collapse of the femoral head, I have suggested right total hip arthroplasty.  I've talked in depth about the procedure and the risks.    Other special considerations: Possible risk of infection due  to chronic steroid use, wound healing problems. She would need stress doses of steroids. I suggested that it may be better to have the procedure at the [facility] so that she can be more closely monitored by her endocrinologist post-operatively, and deal with her medical issues..       DALI Laguerre MD  Dept. Orthopedic Surgery  [facility]    This document serves as a record of the services and decisions personally performed and made by Dr. DALI Laguerre MD. It was created on his behalf by Yola Sloan, a trained medical scribe. The creation of this record is based on the provider's personal observations and the statements of the patient. This document has been checked and approved by the attending provider.   Yola Sloan March 19, 2019 10:05 AM        Again, thank you for allowing me to participate in the care of your patient.        Sincerely,        Godwin Laguerre MD

## 2019-03-19 NOTE — PROGRESS NOTES
SUBJECTIVE:  Suyapa Hodge is a 33 year old female who is seen in consultation at the request of Dr. Palafox for  right hip pain that started  10+ years ago.     Symptoms: diffuse pain in the hip, catching, constant pain, pain with walking, night time pain  Aggravating Activities: walking, being on her feet.   Symptoms have been gradually worsening.    Prior history of related problems: Hip pain originally started around [id number] caused by Avascular necrosis, was diagnosed with Cushing's disease in 2009. Pain originally improved after her first pituitary surgery in 2010, which improved the hip pain. In 2013 the hip started to hurt again, the Cushing's reoccurred in 2014. Nothing was done about her hip after that.     Treatment up to this point: She tried water therapy and strengthening, but nothing helped it. Managing with Advil for pain up until a few months ago. Very painful past 3-4 months. She still takes the Advil still. Resting when she gets home from work.     PMH: bilateral Adrenalectomy, complete pituitary resection, currently on chronic supplemental steroid doses.    Review of Systems:  Constitutional:  NEGATIVE for fever, chills, change in weight  Integumentary/Skin:  NEGATIVE for worrisome rashes, moles or lesions  Eyes:  NEGATIVE for vision changes or irritation  ENT/Mouth:  NEGATIVE for ear, mouth and throat problems  Resp:  NEGATIVE for significant cough or SOB  Breast:  NEGATIVE for masses, tenderness or discharge  CV:  NEGATIVE for chest pain, palpitations or peripheral edema  GI:  NEGATIVE for nausea, abdominal pain, heartburn, or change in bowel habits  :  Negative   Musculoskeletal:  See HPI above  Neuro:  NEGATIVE for weakness, dizziness or paresthesias  Endocrine:  NEGATIVE for temperature intolerance, skin/hair changes  Heme/allergy/immune:  NEGATIVE for bleeding problems  Psychiatric:  NEGATIVE for changes in mood or affect    Past Medical History:   Past Medical History:   Diagnosis  Date     Adrenal disorder (H)      Avascular necrosis of femur head, 2010     AVN (avascular necrosis of bone) (H)     R hip     Colon polyp      Cushing syndrome (H)     pituitary microadenoma     Diabetes insipidus (H)      Hypercalcaemia      Hypertension      Medulloadrenal hyperfunction (H)      Pulmonary emboli (H) 01/2014     Pulmonary embolism (H) 2014     Thyroid disease      Past Surgical History:   Past Surgical History:   Procedure Laterality Date     COLONOSCOPY       COLPOSCOPY VULVA, BIOPSY, COMBINED  10/28/2015    BEVERLEY 1, 6 month follow-up pap was normal     INSERT DRAIN LUMBAR  1/20/2014    Procedure: INSERT DRAIN LUMBAR;;  Surgeon: Soham Aquino MD;  Location: [facility] OR     LAPAROSCOPIC ADRENALECTOMY  7/14/2014    Procedure: LAPAROSCOPIC ADRENALECTOMY;  Surgeon: Roni Nunn MD;  Location: [facility] OR     OPTICAL TRACKING SYSTEM ENDOSCOPIC RESECTION TUMOR CRANIAL  7/1/2013    Procedure: OPTICAL TRACKING SYSTEM ENDOSCOPIC RESECTION TUMOR CRANIAL;  Stealth Assisted Endoscopic Hypophysectomy ;  Surgeon: Soham Aquino MD;  Location: [facility] OR     OPTICAL TRACKING SYSTEM ENDOSCOPIC RESECTION TUMOR CRANIAL  1/20/2014    Procedure: OPTICAL TRACKING SYSTEM ENDOSCOPIC RESECTION TUMOR CRANIAL;  Stealth Assisted Endoscopic Transnasal Excision Of Pituitary Adenoma , Placement Of Lumbar Drain with c-arm;  Surgeon: Soham Aquino MD;  Location: [facility] OR     removal of pituitary microademona       wisdom teeth extration       Family History:   Family History   Problem Relation Age of Onset     Asthma Mother      Cancer Maternal Grandmother 45        colon cancer     Cancer Maternal Grandfather         lung cancer-smoker     Cancer Paternal Grandmother         lung cancer-smoker     Cancer Paternal Grandfather         lung cancer     Allergies Sister      Social History:   Social History     Tobacco Use     Smoking status: Former Smoker     Years: 3.00     Types: Cigarettes     Last attempt to quit:  2008     Years since quittin.1     Smokeless tobacco: Never Used   Substance Use Topics     Alcohol use: Yes     Comment: 2/week     OBJECTIVE:  Physical Exam:  BP 99/70 (BP Location: Left arm, Patient Position: Sitting, Cuff Size: Adult Regular)   Pulse 90   SpO2 95%   General Appearance: healthy, alert and no distress   Skin: no suspicious lesions or rashes  Neuro: Normal strength and tone, mentation intact and speech normal  Vascular: good pulses, and capillary refill   Lymph: no lymphadenopathy   Psych:  mentation appears normal and affect normal/bright  Resp: no increased work of breathing    Right Hip Exam:  Seated SLR: negative  Gait: antalgic, favoring the right side  Tender: diffuse  Hip range of motion:    Flexion: 92 degrees, with slight flexion contracture   Internal Rotation: 10* degrees   External Rotation: 20* degrees   Abduction: 20* degrees  Leg lengths: Leg lengths roughly equal.   Special tests: Negative trendelenberg      X-RAY:  Obtained today of the Right Hip: 1-view, reviewed in the office with the patient by myself today and show significant deformity of the femoral head, larger lateral osteophyte than 2015. More wide spread cystic changes in the femoral head are noted than in 2015. More collapsed than previous films. Possible loose body based on comparison.     ASSESSMENT/PLAN:  1. Avascular necrosis of the right hip. Stage 4    Plan: Total Hip Arthroplasty:   We talked about the patient's condition and diagnosis.  Because of the complete collapse of the femoral head, I have suggested right total hip arthroplasty.  I've talked in depth about the procedure and the risks.    Other special considerations: Possible risk of infection due to chronic steroid use, wound healing problems. She would need stress doses of steroids. I suggested that it may be better to have the procedure at the [facility] so that she can be more closely monitored by her endocrinologist post-operatively, and deal with  her medical issues..       DALI Laguerre MD  Dept. Orthopedic Surgery  [facility]    This document serves as a record of the services and decisions personally performed and made by Dr. DALI Laguerre MD. It was created on his behalf by Yola Sloan, a trained medical scribe. The creation of this record is based on the provider's personal observations and the statements of the patient. This document has been checked and approved by the attending provider.   Yola Sloan March 19, 2019 10:05 AM

## 2019-03-19 NOTE — PATIENT INSTRUCTIONS
You have been referred to Dr Ravindra Pereira. A  representative should be contacting you to coordinate your care . Thank you.

## 2019-03-20 LAB
COPATH REPORT: NORMAL
PAP: NORMAL

## 2019-03-21 ENCOUNTER — TELEPHONE (OUTPATIENT)
Dept: ORTHOPEDICS | Facility: CLINIC | Age: 34
End: 2019-03-21

## 2019-03-21 LAB
FINAL DIAGNOSIS: ABNORMAL
HPV HR 12 DNA CVX QL NAA+PROBE: POSITIVE
HPV16 DNA SPEC QL NAA+PROBE: NEGATIVE
HPV18 DNA SPEC QL NAA+PROBE: NEGATIVE
SPECIMEN DESCRIPTION: ABNORMAL
SPECIMEN SOURCE CVX/VAG CYTO: ABNORMAL

## 2019-03-21 NOTE — TELEPHONE ENCOUNTER
[facility] Call Center    Phone Message    May a detailed message be left on voicemail: yes    Reason for Call: Other: Pt is being referred by Dr Godwin Laguerre from [place] to Dr Ravindra Pereira within 1-3 days for avascular necrosis of femoral head. Please call pt to discuss, thanks     Action Taken: Message routed to:  [facility] ([facility] Orthopedics

## 2019-03-22 ENCOUNTER — RESULT FOLLOW UP (OUTPATIENT)
Dept: FAMILY MEDICINE | Facility: CLINIC | Age: 34
End: 2019-03-22

## 2019-03-22 DIAGNOSIS — R87.810 CERVICAL HIGH RISK HPV (HUMAN PAPILLOMAVIRUS) TEST POSITIVE: Primary | ICD-10-CM

## 2019-03-22 DIAGNOSIS — R87.612 PAPANICOLAOU SMEAR OF CERVIX WITH LOW GRADE SQUAMOUS INTRAEPITHELIAL LESION (LGSIL): ICD-10-CM

## 2019-03-22 ASSESSMENT — ENCOUNTER SYMPTOMS
STIFFNESS: 0
EYE WATERING: 0
MYALGIAS: 0
DOUBLE VISION: 0
EYE REDNESS: 0
MUSCLE WEAKNESS: 0
ARTHRALGIAS: 1
MUSCLE CRAMPS: 0
EYE PAIN: 0
EYE IRRITATION: 1
JOINT SWELLING: 0
NECK PAIN: 0
BACK PAIN: 0

## 2019-03-22 NOTE — TELEPHONE ENCOUNTER
RECORDS RECEIVED FROM: Avascular necrosis of femoral head, Right X rays on 3/19/19.    DATE RECEIVED: 03/22/19    NOTES STATUS DETAILS   OFFICE NOTE from referring provider Internal Dr. Laguerre 3/19/19   OFFICE NOTE from other specialist N/A    DISCHARGE SUMMARY from hospital N/A    DISCHARGE REPORT from the ER N/A    OPERATIVE REPORT N/A    MEDICATION LIST Internal    IMPLANT RECORD/STICKER N/A    LABS     CBC/DIFF N/A    CULTURES N/A    INJECTIONS DONE IN RADIOLOGY N/A    MRI N/A    CT SCAN N/A    XRAYS (IMAGES & REPORTS) Internal 3/19/19   TUMOR     PATHOLOGY  Slides & report N/A

## 2019-03-22 NOTE — PROGRESS NOTES
9/11/2015 Pap: LSIL, +HR HPV (not 16/18)  10/2015 [facility]: BEVERLEY 1  5/13/2016 Pap only: NIL   >> all above results found in Care Everywhere  3/14/2019 Pap: NIL, +HR HPV (not 16/18). Plan [facility] . (MRA)  3/25/19 Pt notified. (jodi)  4/2/19 colp. bx and ecc WNL. Plan: cotest 1 year (nyla)  4/3/20 Due to COVID restrictions - Routed to RN to review recommendations (rlm)  4/9/20 COVID 19 interim plan updated to: Plan unchanged. (jodi) CCT Tracking.  7/8/20 appt

## 2019-03-25 ENCOUNTER — TELEPHONE (OUTPATIENT)
Dept: OBGYN | Facility: CLINIC | Age: 34
End: 2019-03-25

## 2019-03-25 NOTE — TELEPHONE ENCOUNTER
[facility] Call Center    Phone Message    May a detailed message be left on voicemail: yes    Reason for Call: [facility]  She will wait for a call back.  Thank you.     Action Taken: [id number]

## 2019-03-25 NOTE — TELEPHONE ENCOUNTER
Per Pap results, pt was called to schedule a colposcopy.  Will flag for MA or  to call pt back to assist her in scheduling.    Torie Jc RN

## 2019-03-27 ENCOUNTER — PRE VISIT (OUTPATIENT)
Dept: ORTHOPEDICS | Facility: CLINIC | Age: 34
End: 2019-03-27

## 2019-03-27 ENCOUNTER — OFFICE VISIT (OUTPATIENT)
Dept: ORTHOPEDICS | Facility: CLINIC | Age: 34
End: 2019-03-27
Payer: COMMERCIAL

## 2019-03-27 VITALS — HEIGHT: 67 IN | BODY MASS INDEX: 24.17 KG/M2 | WEIGHT: 154 LBS

## 2019-03-27 DIAGNOSIS — M87.051 AVASCULAR NECROSIS OF RIGHT FEMUR (H): Primary | ICD-10-CM

## 2019-03-27 ASSESSMENT — MIFFLIN-ST. JEOR: SCORE: 1431.17

## 2019-03-27 NOTE — LETTER
3/27/2019       RE: Suyapa Hodge  [address] [facility] [address]     Dear Colleague,    Thank you for referring your patient, Suyapa Hodge, to the [facility]. Please see a copy of my visit note below.    [facility], Orthopaedic Surgery, Arthritis, Hip and Knee Replacement    Suyapa Hodge MRN# [id number]   Age: 33 year old YOB: 1985     Requesting physician: Godwin Laguerre              History of Present Illness:   Suyapa Hodge is a 33 year old year old female who presents today for evaluation and management of right hip pain.  Milla is a very pleasant 33-year-old woman with a history of adrenal gland removal and pituitary Cushing syndrome.  She is currently on 5 mg of hydrocortisone daily but has previously been on higher doses.  She has a long-standing history of right hip pain.  Over the past few months the pain is gotten progressively worse.  She notes that she currently has 1-2 days of severe hip pain per week.  Otherwise her symptoms are generally well tolerated.  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  She takes anti-inflammatory medications for pain.  She has not had any prior injections.  She has not done any physical therapy.  In general she has very high pain tolerance and deals with her symptoms quite well despite the severity of her osteonecrosis.               Past Medical History:     Patient Active Problem List   Diagnosis     Pituitary Cushing's syndrome (H)     Amenorrhea     Hypothalamic-adrenal dysfunction (H)     Fatigue     Thirst     Pituitary adenoma (H)     History of benign pituitary tumor     Pituitary microadenoma (H)     Cushing syndrome (H)     Cushing's disease (H)     Diabetes insipidus (H)     Tachycardia     History of Cushing disease     Hx of total adrenalectomy (H)     Hypothyroidism     Hypoadrenalism (H)     Pituitary hypogonadism (H)      Hematologic abnormality     Estrogen deficiency     Abnormal coagulation profile     Premature menopause on hormone replacement therapy     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)     Avascular necrosis of femoral head, unspecified laterality (H)     History of colonic polyps     Family history of colon cancer     Breast hypertrophy in female     LSIL on pap with +HR HPV     Past Medical History:   Diagnosis Date     Adrenal disorder (H)      Avascular necrosis of femur head, 2010     AVN (avascular necrosis of bone) (H)     R hip     Cervical high risk HPV (human papillomavirus) test positive 10/2015 & 3/2019    +HR HPV (NOT 16/18)     Colon polyp      Cushing syndrome (H)     pituitary microadenoma     Diabetes insipidus (H)      History of colposcopy 10/2015    BEVERLEY 1     Hypercalcaemia      Hypertension      Medulloadrenal hyperfunction (H)      Pulmonary emboli (H) 01/2014     Pulmonary embolism (H) 2014     Thyroid disease      Prior history of blood clot: yes, PE during 3rd pituitary surgery, no chronic AC  Prior history of bleeding problems: none  Prior history of anesthetic complications: none  Currently on opioids:  none  History of Diabetes (if so, recent A1c): no           Past Surgical History:     Past Surgical History:   Procedure Laterality Date     COLONOSCOPY       COLPOSCOPY VULVA, BIOPSY, COMBINED  10/28/2015    BEVERLEY 1, 6 month follow-up pap was normal     INSERT DRAIN LUMBAR  1/20/2014    Procedure: INSERT DRAIN LUMBAR;;  Surgeon: Soham Aquino MD;  [facility]     LAPAROSCOPIC ADRENALECTOMY  7/14/2014    Procedure: LAPAROSCOPIC ADRENALECTOMY;  Surgeon: Roni Nunn MD;  Location:  OR     OPTICAL TRACKING SYSTEM ENDOSCOPIC RESECTION TUMOR CRANIAL  7/1/2013    Procedure: OPTICAL TRACKING SYSTEM ENDOSCOPIC RESECTION TUMOR CRANIAL;  Stealth Assisted Endoscopic Hypophysectomy ;  Surgeon: Soham Aquino MD;  [facility]     OPTICAL TRACKING SYSTEM ENDOSCOPIC  RESECTION TUMOR CRANIAL  2014    Procedure: OPTICAL TRACKING SYSTEM ENDOSCOPIC RESECTION TUMOR CRANIAL;  Stealth Assisted Endoscopic Transnasal Excision Of Pituitary Adenoma , Placement Of Lumbar Drain with c-arm;  Surgeon: Soham Aquino MD;  [facility]     removal of pituitary microademona       wisdom teeth extration              Social History:     Social History     Socioeconomic History     Marital status: Single     Spouse name: Not on file     Number of children: Not on file     Years of education: Not on file     Highest education level: Not on file   Occupational History     Not on file   Social Needs     Financial resource strain: Not on file     Food insecurity:     Worry: Not on file     Inability: Not on file     Transportation needs:     Medical: Not on file     Non-medical: Not on file   Tobacco Use     Smoking status: Former Smoker     Years: 3.00     Types: Cigarettes     Last attempt to quit: 2008     Years since quittin.1     Smokeless tobacco: Never Used   Substance and Sexual Activity     Alcohol use: Yes     Comment: 2/week     Drug use: No     Sexual activity: No   Lifestyle     Physical activity:     Days per week: Not on file     Minutes per session: Not on file     Stress: Not on file   Relationships     Social connections:     Talks on phone: Not on file     Gets together: Not on file     Attends Druze service: Not on file     Active member of club or organization: Not on file     Attends meetings of clubs or organizations: Not on file     Relationship status: Not on file     Intimate partner violence:     Fear of current or ex partner: Not on file     Emotionally abused: Not on file     Physically abused: Not on file     Forced sexual activity: Not on file   Other Topics Concern     Parent/sibling w/ CABG, MI or angioplasty before 65F 55M? No   Social History Narrative     Not on file       Smoking: Non smoker         Family History:       Family History    Problem Relation Age of Onset     Asthma Mother      Cancer Maternal Grandmother 45        colon cancer     Cancer Maternal Grandfather         lung cancer-smoker     Cancer Paternal Grandmother         lung cancer-smoker     Cancer Paternal Grandfather         lung cancer     Allergies Sister        Family history of blood clot: none         Medications:     Current Outpatient Medications   Medication Sig     Calcium carb-Vitamin D 500 mg [place]-200 units (OSCAL WITH D;OYSTER SHELL CALCIUM) 500-200 MG-UNIT per tablet Take 1 tablet by mouth 2 times daily (with meals)     desmopressin (DDAVP) 0.1 MG tablet Take 1 tablet (100 mcg) by mouth 2 times daily (1 TAB) MORNING AND AT BEDTIME     estradiol (VIVELLE-DOT) 0.05 MG/24HR BIW patch Place 1 patch onto the skin twice a week     fludrocortisone (FLORINEF) 0.1 MG tablet Take 0.5 tablets (0.05 mg) by mouth daily     hydrocortisone (CORTEF) 10 MG tablet 1 tab (10 mg) am 1/2 tab (5 mg) pm plus sick day supply as directed     levothyroxine (SYNTHROID/LEVOTHROID) 75 MCG tablet One tablet day 6 days per wk and none on the 7th day each wk     Multiple Vitamin (MULTI-VITAMIN DAILY PO)      progesterone (PROMETRIUM) 100 MG capsule Take 1 tablet daily     triamcinolone (KENALOG) 0.1 % external cream Apply topically 2 times daily As needed for flares     No current facility-administered medications for this visit.             Review of Systems:   A comprehensive 10 point review of systems (constitutional, ENT, cardiac, peripheral vascular, lymphatic, respiratory, GI, , Musculoskeletal, skin, Neurological) was performed and found to be negative except as described in this note.     Also see intake form completed by patient.             Physical Exam:     EXAMINATION pertinent findings:   VITAL SIGNS: not currently breastfeeding.  There is no height or weight on file to calculate BMI.  GEN: AOx3, cooperative, no distress  RESP: non labored breathing   ABD: benign   SKIN: grossly  normal   LYMPHATIC: grossly normal   NEURO: grossly normal   VASCULAR: satisfactory perfusion of all extremities  MUSCULOSKELETAL:   She walks with an antalgic gait.  Examination of her left hip demonstrates range of motion from 0-100 with 20 degrees of internal rotation and 40 degrees of external rotation.  Examination of her right hip demonstrates range of motion from 0-80 with 5 degrees of internal rotation and 20 degrees of external rotation.  She has 5 out of 5 abductor strength.  She has no lateral sided tenderness palpation.  She has pain with flexion and internal rotation of her hip.  Bilateral ankle plantarflexion dorsiflexion is intact.  Normal sensation throughout her foot.  2+ DP pulse.             Data:   Imaging:   Plain x-rays of her pelvis and right hip demonstrate right femoral head osteonecrosis with joint space collapse.           Assessment and Plan:   Assessment:  Azalea is a very pleasant 33-year-old woman who is on steroids for history of adrenal gland removal and pituitary Cushing's.  She has a long-standing history of known right hip osteonecrosis.  We discussed the natural history of her condition.  We reviewed total hip replacement including the surgery, long-term outcomes, the risks and benefits of surgery.  We discussed that given her relatively young age she would likely need a revision at some point.  We discussed revision rates ranging in 1-2% risk of revision each year.  She is somewhat younger and more active so this may be slightly higher.  At this point she wishes to hold off on surgery as she manages her symptoms quite well.  We discussed that if this were to change her she had worsening pain or more severe pain that severely limited her normal activities or job functions then she could consider a total hip replacement despite the risks.  She will let us know if she has any questions or concerns in the future and return to clinic on a as needed basis.      Again, thank you for  allowing me to participate in the care of your patient.      Sincerely,    Ravindra Pereira MD

## 2019-03-27 NOTE — PROGRESS NOTES
[facility], Orthopaedic Surgery, Arthritis, Hip and Knee Replacement    Suyapa Hodge MRN# [id number]   Age: 33 year old YOB: 1985     Requesting physician: Godwin Laguerre              History of Present Illness:   Suyapa Hodge is a 33 year old year old female who presents today for evaluation and management of right hip pain.  Milla is a very pleasant 33-year-old woman with a history of adrenal gland removal and pituitary Cushing syndrome.  She is currently on 5 mg of hydrocortisone daily but has previously been on higher doses.  She has a long-standing history of right hip pain.  Over the past few months the pain is gotten progressively worse.  She notes that she currently has 1-2 days of severe hip pain per week.  Otherwise her symptoms are generally well tolerated.  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  She takes anti-inflammatory medications for pain.  She has not had any prior injections.  She has not done any physical therapy.  In general she has very high pain tolerance and deals with her symptoms quite well despite the severity of her osteonecrosis.               Past Medical History:     Patient Active Problem List   Diagnosis     Pituitary Cushing's syndrome (H)     Amenorrhea     Hypothalamic-adrenal dysfunction (H)     Fatigue     Thirst     Pituitary adenoma (H)     History of benign pituitary tumor     Pituitary microadenoma (H)     Cushing syndrome (H)     Cushing's disease (H)     Diabetes insipidus (H)     Tachycardia     History of Cushing disease     Hx of total adrenalectomy (H)     Hypothyroidism     Hypoadrenalism (H)     Pituitary hypogonadism (H)     Hematologic abnormality     Estrogen deficiency     Abnormal coagulation profile     Premature menopause on hormone replacement therapy     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)     Avascular necrosis of femoral head, unspecified laterality (H)     History of colonic polyps      Family history of colon cancer     Breast hypertrophy in female     LSIL on pap with +HR HPV     Past Medical History:   Diagnosis Date     Adrenal disorder (H)      Avascular necrosis of femur head, 2010     AVN (avascular necrosis of bone) (H)     R hip     Cervical high risk HPV (human papillomavirus) test positive 10/2015 & 3/2019    +HR HPV (NOT 16/18)     Colon polyp      Cushing syndrome (H)     pituitary microadenoma     Diabetes insipidus (H)      History of colposcopy 10/2015    BEVERLEY 1     Hypercalcaemia      Hypertension      Medulloadrenal hyperfunction (H)      Pulmonary emboli (H) 01/2014     Pulmonary embolism (H) 2014     Thyroid disease      Prior history of blood clot: yes, PE during 3rd pituitary surgery, no chronic AC  Prior history of bleeding problems: none  Prior history of anesthetic complications: none  Currently on opioids:  none  History of Diabetes (if so, recent A1c): no           Past Surgical History:     Past Surgical History:   Procedure Laterality Date     COLONOSCOPY       COLPOSCOPY VULVA, BIOPSY, COMBINED  10/28/2015    BEVERLEY 1, 6 month follow-up pap was normal     INSERT DRAIN LUMBAR  1/20/2014    Procedure: INSERT DRAIN LUMBAR;;  Surgeon: Soham Aquino MD;  [facility]     LAPAROSCOPIC ADRENALECTOMY  7/14/2014    Procedure: LAPAROSCOPIC ADRENALECTOMY;  Surgeon: Roni Nunn MD;  [facility]     OPTICAL TRACKING SYSTEM ENDOSCOPIC RESECTION TUMOR CRANIAL  7/1/2013    Procedure: OPTICAL TRACKING SYSTEM ENDOSCOPIC RESECTION TUMOR CRANIAL;  Stealth Assisted Endoscopic Hypophysectomy ;  Surgeon: Soham Aquino MD;  [facility]     OPTICAL TRACKING SYSTEM ENDOSCOPIC RESECTION TUMOR CRANIAL  1/20/2014    Procedure: OPTICAL TRACKING SYSTEM ENDOSCOPIC RESECTION TUMOR CRANIAL;  Stealth Assisted Endoscopic Transnasal Excision Of Pituitary Adenoma , Placement Of Lumbar Drain with c-arm;  Surgeon: Soham Aquino MD;  [facility]     removal of  pituitary microademona       wisdom teeth extration              Social History:     Social History     Socioeconomic History     Marital status: Single     Spouse name: Not on file     Number of children: Not on file     Years of education: Not on file     Highest education level: Not on file   Occupational History     Not on file   Social Needs     Financial resource strain: Not on file     Food insecurity:     Worry: Not on file     Inability: Not on file     Transportation needs:     Medical: Not on file     Non-medical: Not on file   Tobacco Use     Smoking status: Former Smoker     Years: 3.00     Types: Cigarettes     Last attempt to quit: 2008     Years since quittin.1     Smokeless tobacco: Never Used   Substance and Sexual Activity     Alcohol use: Yes     Comment: 2/week     Drug use: No     Sexual activity: No   Lifestyle     Physical activity:     Days per week: Not on file     Minutes per session: Not on file     Stress: Not on file   Relationships     Social connections:     Talks on phone: Not on file     Gets together: Not on file     Attends Islam service: Not on file     Active member of club or organization: Not on file     Attends meetings of clubs or organizations: Not on file     Relationship status: Not on file     Intimate partner violence:     Fear of current or ex partner: Not on file     Emotionally abused: Not on file     Physically abused: Not on file     Forced sexual activity: Not on file   Other Topics Concern     Parent/sibling w/ CABG, MI or angioplasty before 65F 55M? No   Social History Narrative     Not on file       Smoking: Non smoker         Family History:       Family History   Problem Relation Age of Onset     Asthma Mother      Cancer Maternal Grandmother 45        colon cancer     Cancer Maternal Grandfather         lung cancer-smoker     Cancer Paternal Grandmother         lung cancer-smoker     Cancer Paternal Grandfather         lung cancer     Allergies  Sister        Family history of blood clot: none         Medications:     Current Outpatient Medications   Medication Sig     Calcium carb-Vitamin D 500 mg [place]-200 units (OSCAL WITH D;OYSTER SHELL CALCIUM) 500-200 MG-UNIT per tablet Take 1 tablet by mouth 2 times daily (with meals)     desmopressin (DDAVP) 0.1 MG tablet Take 1 tablet (100 mcg) by mouth 2 times daily (1 TAB) MORNING AND AT BEDTIME     estradiol (VIVELLE-DOT) 0.05 MG/24HR BIW patch Place 1 patch onto the skin twice a week     fludrocortisone (FLORINEF) 0.1 MG tablet Take 0.5 tablets (0.05 mg) by mouth daily     hydrocortisone (CORTEF) 10 MG tablet 1 tab (10 mg) am 1/2 tab (5 mg) pm plus sick day supply as directed     levothyroxine (SYNTHROID/LEVOTHROID) 75 MCG tablet One tablet day 6 days per wk and none on the 7th day each wk     Multiple Vitamin (MULTI-VITAMIN DAILY PO)      progesterone (PROMETRIUM) 100 MG capsule Take 1 tablet daily     triamcinolone (KENALOG) 0.1 % external cream Apply topically 2 times daily As needed for flares     No current facility-administered medications for this visit.             Review of Systems:   A comprehensive 10 point review of systems (constitutional, ENT, cardiac, peripheral vascular, lymphatic, respiratory, GI, , Musculoskeletal, skin, Neurological) was performed and found to be negative except as described in this note.     Also see intake form completed by patient.             Physical Exam:     EXAMINATION pertinent findings:   VITAL SIGNS: not currently breastfeeding.  There is no height or weight on file to calculate BMI.  GEN: AOx3, cooperative, no distress  RESP: non labored breathing   ABD: benign   SKIN: grossly normal   LYMPHATIC: grossly normal   NEURO: grossly normal   VASCULAR: satisfactory perfusion of all extremities  MUSCULOSKELETAL:   She walks with an antalgic gait.  Examination of her left hip demonstrates range of motion from 0-100 with 20 degrees of internal rotation and 40 degrees of  external rotation.  Examination of her right hip demonstrates range of motion from 0-80 with 5 degrees of internal rotation and 20 degrees of external rotation.  She has 5 out of 5 abductor strength.  She has no lateral sided tenderness palpation.  She has pain with flexion and internal rotation of her hip.  Bilateral ankle plantarflexion dorsiflexion is intact.  Normal sensation throughout her foot.  2+ DP pulse.             Data:   Imaging:   Plain x-rays of her pelvis and right hip demonstrate right femoral head osteonecrosis with joint space collapse.           Assessment and Plan:   Assessment:  Azalea is a very pleasant 33-year-old woman who is on steroids for history of adrenal gland removal and pituitary Cushing's.  She has a long-standing history of known right hip osteonecrosis.  We discussed the natural history of her condition.  We reviewed total hip replacement including the surgery, long-term outcomes, the risks and benefits of surgery.  We discussed that given her relatively young age she would likely need a revision at some point.  We discussed revision rates ranging in 1-2% risk of revision each year.  She is somewhat younger and more active so this may be slightly higher.  At this point she wishes to hold off on surgery as she manages her symptoms quite well.  We discussed that if this were to change her she had worsening pain or more severe pain that severely limited her normal activities or job functions then she could consider a total hip replacement despite the risks.  She will let us know if she has any questions or concerns in the future and return to clinic on a as needed basis.      Ravindra Pereira M.D.     Arthritis and Joint Replacement  Department of Orthopaedic Surgery, [facility]  [email or web address]  [phone] (pager)           Review of Systems:       Answers for HPI/ROS submitted by the patient on 3/22/2019   General Symptoms: No  Skin Symptoms:  No  HENT Symptoms: No  EYE SYMPTOMS: Yes  HEART SYMPTOMS: No  LUNG SYMPTOMS: No  INTESTINAL SYMPTOMS: No  URINARY SYMPTOMS: No  GYNECOLOGIC SYMPTOMS: No  BREAST SYMPTOMS: No  SKELETAL SYMPTOMS: Yes  BLOOD SYMPTOMS: No  NERVOUS SYSTEM SYMPTOMS: No  MENTAL HEALTH SYMPTOMS: No  Eye pain: No  Vision loss: No  Dry eyes: Yes  Watery eyes: No  Eye bulging: No  Double vision: No  Flashing of lights: No  Spots: No  Floaters: No  Redness: No  Crossed eyes: No  Tunnel Vision: No  Yellowing of eyes: No  Eye irritation: Yes  Back pain: No  Muscle aches: No  Neck pain: No  Swollen joints: No  Joint pain: Yes  Bone pain: Yes  Muscle cramps: No  Muscle weakness: No  Joint stiffness: No  Bone fracture: No

## 2019-03-27 NOTE — NURSING NOTE
"Reason For Visit:   Chief Complaint   Patient presents with     Consult     AVN of right femoral head.        Primary MD: Clarisa Palafox    Date of surgery: None    Smoker: No      Ht 1.702 m (5' 7\")   Wt 69.9 kg (154 lb)   BMI 24.12 kg/m      Pain Assessment  Patient Currently in Pain: Yes  0-10 Pain Scale: 3  Primary Pain Location: Hip  Pain Descriptors: Aching, Discomfort  Aggravating Factors: Other (comment)(Rest )    Current Outpatient Medications   Medication     Calcium carb-Vitamin D 500 mg [place]-200 units (OSCAL WITH D;OYSTER SHELL CALCIUM) 500-200 MG-UNIT per tablet     desmopressin (DDAVP) 0.1 MG tablet     estradiol (VIVELLE-DOT) 0.05 MG/24HR BIW patch     fludrocortisone (FLORINEF) 0.1 MG tablet     hydrocortisone (CORTEF) 10 MG tablet     levothyroxine (SYNTHROID/LEVOTHROID) 75 MCG tablet     Multiple Vitamin (MULTI-VITAMIN DAILY PO)     progesterone (PROMETRIUM) 100 MG capsule     triamcinolone (KENALOG) 0.1 % external cream     No current facility-administered medications for this visit.         No Known Allergies        Ashley Altamirano LPN    "

## 2019-04-02 ENCOUNTER — OFFICE VISIT (OUTPATIENT)
Dept: OBGYN | Facility: CLINIC | Age: 34
End: 2019-04-02
Payer: COMMERCIAL

## 2019-04-02 VITALS
HEART RATE: 71 BPM | WEIGHT: 155.6 LBS | BODY MASS INDEX: 24.37 KG/M2 | DIASTOLIC BLOOD PRESSURE: 68 MMHG | SYSTOLIC BLOOD PRESSURE: 108 MMHG

## 2019-04-02 DIAGNOSIS — N87.0 CIN I (CERVICAL INTRAEPITHELIAL NEOPLASIA I): ICD-10-CM

## 2019-04-02 DIAGNOSIS — R87.810 PAP SMEAR OF CERVIX SHOWS HIGH RISK HPV PRESENT: Primary | ICD-10-CM

## 2019-04-02 PROCEDURE — 88305 TISSUE EXAM BY PATHOLOGIST: CPT | Performed by: OBSTETRICS & GYNECOLOGY

## 2019-04-02 PROCEDURE — 99242 OFF/OP CONSLTJ NEW/EST SF 20: CPT | Mod: 25 | Performed by: OBSTETRICS & GYNECOLOGY

## 2019-04-02 PROCEDURE — 57454 BX/CURETT OF CERVIX W/SCOPE: CPT | Performed by: OBSTETRICS & GYNECOLOGY

## 2019-04-02 NOTE — PROGRESS NOTES
Patient Name: Suyapa Hodge              Date: 4/2/2019   YOB: 1985                         Age: 33 year old   Phone: [phone] (home)   ________________________________________________________________________  I have been asked to see Suyapa in consultation by Clarisa Palafox NP,  to discuss the pap smear, findings and possible further evaluation.  The patient's pap smear history is as noted:  9/11/2015 Pap: LSIL, +HR HPV (not 16/18)  10/2015 [place]: BEVERLEY 1  5/13/2016 Pap only: NIL   >> all above results found in Care Everywhere  3/14/2019 Pap: NIL, +HR HPV (not 16/18) -  I attempted to ensure that the patient was educated regarding the nature of her findings and implications to date.  We reviewed the role of HPV, incidence in the population and the natural history of the infection, and its transmission.  We also reviewed ways to minimize her future risk, the effect of HPV on the cervix and treatment options available, should they be indicated.    The pathophysiology of the cervix, including a discussion of the squamous and columnar cells, metaplasia and dysplasia have been reviewed, drawings, sketches and the pamphlets were reviewed with her.      No LMP recorded. Patient is not currently having periods postmenopausal   Current Birth Control Method: post menopausal status  Age at first sexual intercourse: 17 years old  Number of sexual partners (lifetime): 5  No new partners in past 2 years   History of veneral diseases: HPV, and chlamydia when she was 17  History of genital warts:  No  Visible warts now?:  No  Family History of  Cervical, Uterine or Vaginal Cancer?: No    Past Medical History:   Diagnosis Date     Adrenal disorder (H)      Avascular necrosis of femur head, 2010     AVN (avascular necrosis of bone) (H)     R hip     Cervical high risk HPV (human papillomavirus) test positive 10/2015 & 3/2019    +HR HPV (NOT 16/18)     Colon polyp      Cushing syndrome (H)     pituitary microadenoma      Diabetes insipidus (H)      History of colposcopy 10/2015    BEVERLEY 1     Hypercalcaemia      Hypertension      Medulloadrenal hyperfunction (H)      Pulmonary emboli (H) 01/2014     Pulmonary embolism (H) 2014     Thyroid disease        Past Surgical History:   Procedure Laterality Date     COLONOSCOPY       COLPOSCOPY VULVA, BIOPSY, COMBINED  10/28/2015    BEVERLEY 1, 6 month follow-up pap was normal     INSERT DRAIN LUMBAR  1/20/2014    Procedure: INSERT DRAIN LUMBAR;;  Surgeon: Soham Aquino MD;  Location: [facility]     LAPAROSCOPIC ADRENALECTOMY  7/14/2014    Procedure: LAPAROSCOPIC ADRENALECTOMY;  Surgeon: oRni Nunn MD;  Location: [facility]     OPTICAL TRACKING SYSTEM ENDOSCOPIC RESECTION TUMOR CRANIAL  7/1/2013    Procedure: OPTICAL TRACKING SYSTEM ENDOSCOPIC RESECTION TUMOR CRANIAL;  Stealth Assisted Endoscopic Hypophysectomy ;  Surgeon: Soham Aquino MD;  Location: [facility]     OPTICAL TRACKING SYSTEM ENDOSCOPIC RESECTION TUMOR CRANIAL  1/20/2014    Procedure: OPTICAL TRACKING SYSTEM ENDOSCOPIC RESECTION TUMOR CRANIAL;  Stealth Assisted Endoscopic Transnasal Excision Of Pituitary Adenoma , Placement Of Lumbar Drain with c-arm;  Surgeon: Soham Aquino MD;  Location: [facility] OR     removal of pituitary microademona       wisdom teeth extration          Outpatient Encounter Medications as of 4/2/2019   Medication Sig Dispense Refill     Calcium carb-Vitamin D 500 mg [place]-200 units (OSCAL WITH D;OYSTER SHELL CALCIUM) 500-200 MG-UNIT per tablet Take 1 tablet by mouth 2 times daily (with meals)       desmopressin (DDAVP) 0.1 MG tablet Take 1 tablet (100 mcg) by mouth 2 times daily (1 TAB) MORNING AND AT BEDTIME 180 tablet 2     estradiol (VIVELLE-DOT) 0.05 MG/24HR BIW patch Place 1 patch onto the skin twice a week 27 patch 3     fludrocortisone (FLORINEF) 0.1 MG tablet Take 0.5 tablets (0.05 mg) by mouth daily 45 tablet 1     hydrocortisone (CORTEF) 10 MG tablet 1 tab (10 mg) am 1/2 tab (5 mg) pm plus  sick day supply as directed 150 tablet 2     levothyroxine (SYNTHROID/LEVOTHROID) 75 MCG tablet One tablet day 6 days per wk and none on the 7th day each wk 90 tablet 1     Multiple Vitamin (MULTI-VITAMIN DAILY PO)        progesterone (PROMETRIUM) 100 MG capsule Take 1 tablet daily 90 capsule 1     triamcinolone (KENALOG) 0.1 % external cream Apply topically 2 times daily As needed for flares 80 g 1     No facility-administered encounter medications on file as of 2019.         Allergies as of 2019     (No Known Allergies)       Social History     Socioeconomic History     Marital status: Single     Spouse name: None     Number of children: None     Years of education: None     Highest education level: None   Occupational History     None   Social Needs     Financial resource strain: None     Food insecurity:     Worry: None     Inability: None     Transportation needs:     Medical: None     Non-medical: None   Tobacco Use     Smoking status: Former Smoker     Years: 3.00     Types: Cigarettes     Last attempt to quit: 2008     Years since quittin.1     Smokeless tobacco: Never Used   Substance and Sexual Activity     Alcohol use: Yes     Comment: 2/week     Drug use: No     Sexual activity: No   Lifestyle     Physical activity:     Days per week: None     Minutes per session: None     Stress: None   Relationships     Social connections:     Talks on phone: None     Gets together: None     Attends L[employer] day service: None     Active member of club or organization: None     Attends meetings of clubs or organizations: None     Relationship status: None     Intimate partner violence:     Fear of current or ex partner: None     Emotionally abused: None     Physically abused: None     Forced sexual activity: None   Other Topics Concern     Parent/sibling w/ CABG, MI or angioplasty before 65F 55M? No   Social History Narrative     None        Family History   Problem Relation Age of Onset     Asthma  Mother      Cancer Maternal Grandmother 45        colon cancer     Cancer Maternal Grandfather         lung cancer-smoker     Cancer Paternal Grandmother         lung cancer-smoker     Cancer Paternal Grandfather         lung cancer     Allergies Sister          Review Of Systems  10 point ROS of systems including Constitutional, Eyes, Respiratory, Cardiovascular, Gastroenterology, Genitourinary, Integumentary, Muscularskeletal, Psychiatric were all negative except for pertinent positives noted in my HPI and in the PMH.      Exam:   /68   Pulse 71   Wt 70.6 kg (155 lb 9.6 oz)   Breastfeeding? No   BMI 24.37 kg/m    GENERAL:  WNWD female NAD  HEENT: NC/AT, EOMI  Lungs:  Good respiratory effort   SKIN: normal skin turgor  GAIT: Normal  NECK: Symmetrical, no masses noted   VULVA: Normal Genitalia  BUS: Normal  URETHRA:  No hypermobility noted  URETHRAL MEATUS:  No masses noted  VAGINA: Normal mucosa, no discharge  CERVIX: Closed, mobile, no discharge  PERIANAL:  No masses or lesions seen  EXTREMITIES: no clubbing, cyanosis, or edema    Assessment:  ASCUS pap smear  High risk HPV    Plan:  Recommend to Proceed with Colpo  The details of the colposcopic procedure were reviewed, the risks of missed diagnoses, pain, infection, and bleeding.    TT 30 min, in addition to the time for the procedure  CT greater than 50%, as noted above in the HPI and in the Plan.     Gil Wesley MD        Procedure:  Procedure for colposcopy and biopsy has been explained to the patient and consent obtained.    Before the procedure, it was ensured that the patient was educated regarding the nature of her findings and implications to date.  We reviewed the role of HPV and the natural history of the infection.  We also reviewed ways to minimize her future risk, the effect of HPV on the cervix and treatment options available, should they be indicated.    The pathophysiology of the cervix, including a discussion of the squamous and  columnar cells, metaplasia and dysplasia have been reviewed, drawings, sketches and the pamphlets were reviewed with her.  The details of the colposcopic procedure were reviewed, the risks of missed diagnoses, pain, infection, and bleeding.  Questions seemed to be answered before proceeding and the patient then consented to the procedure.     Speculum placed in vagina and excellent visualization of cervix achieved, cervix swabbed  with acetic acid solution.    biopsies taken (including ECC): 3 (2 of the cervix at 3 and 9 o'clock positions and ECC)  Hemostasis effected with Silver Nitrate.     Findings:    Cervix: no visible lesions and no concerning findings  Vaginal inspection: no visible lesions.  Procedure Summary: Patient tolerated procedure well.      Assessment:   ASCUS pap smear  High risk HPV    Plan:  Specimens labelled and sent to pathology.  Will base further treatment on pathology findings.  Post biopsy instructions given to patient and call to discuss Pathology results.    Gil Wesley MD

## 2019-04-04 LAB — COPATH REPORT: NORMAL

## 2019-04-16 ENCOUNTER — MYC MEDICAL ADVICE (OUTPATIENT)
Dept: FAMILY MEDICINE | Facility: CLINIC | Age: 34
End: 2019-04-16

## 2019-04-16 NOTE — TELEPHONE ENCOUNTER
Next 5 appointments (look out 90 days)    Apr 17, 2019 11:40 AM CDT  Office Visit with SOHEILA De León CNP  [facility] [address]  [place] [address]  [phone]       Keyanna Kaufman,

## 2019-04-17 ENCOUNTER — OFFICE VISIT (OUTPATIENT)
Dept: FAMILY MEDICINE | Facility: CLINIC | Age: 34
End: 2019-04-17
Payer: OTHER MISCELLANEOUS

## 2019-04-17 VITALS
WEIGHT: 153 LBS | TEMPERATURE: 97.5 F | OXYGEN SATURATION: 96 % | BODY MASS INDEX: 24.01 KG/M2 | DIASTOLIC BLOOD PRESSURE: 70 MMHG | SYSTOLIC BLOOD PRESSURE: 92 MMHG | HEART RATE: 133 BPM | HEIGHT: 67 IN | RESPIRATION RATE: 15 BRPM
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
## 2019-04-17 DIAGNOSIS — S13.4XXA WHIPLASH INJURY TO NECK, INITIAL ENCOUNTER: ICD-10-CM

## 2019-04-17 PROCEDURE — 99213 OFFICE O/P EST LOW 20 MIN: CPT | Performed by: NURSE PRACTITIONER

## 2019-04-17 RX ORDER — METHOCARBAMOL 500 MG/1
500-1000 TABLET, FILM COATED ORAL
Qty: 20 TABLET | Refills: 0 | Status: SHIPPED | OUTPATIENT
Start: 2019-04-17 | End: 2020-05-28

## 2019-04-17 ASSESSMENT — MIFFLIN-ST. JEOR: SCORE: 1426.63
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
## 2019-04-19 ENCOUNTER — MYC MEDICAL ADVICE (OUTPATIENT)
Dept: FAMILY MEDICINE | Facility: CLINIC | Age: 34
End: 2019-04-19

## 2019-04-19 NOTE — TELEPHONE ENCOUNTER
"MyChart message sent.   Per 4/17/2019 OV notes: \"As long as pain and ROM improving as expected, may return to next week. Follow-up if not better next week.\"    Kristin Chen RN  "

## 2019-04-22 ENCOUNTER — OFFICE VISIT (OUTPATIENT)
Dept: FAMILY MEDICINE | Facility: CLINIC | Age: 34
End: 2019-04-22
Payer: OTHER MISCELLANEOUS

## 2019-04-22 VITALS
OXYGEN SATURATION: 98 % | RESPIRATION RATE: 16 BRPM | WEIGHT: 153 LBS | DIASTOLIC BLOOD PRESSURE: 62 MMHG | TEMPERATURE: 96.8 F | SYSTOLIC BLOOD PRESSURE: 102 MMHG | BODY MASS INDEX: 24.01 KG/M2 | HEART RATE: 136 BPM | HEIGHT: 67 IN
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
## 2019-04-22 DIAGNOSIS — M54.9 UPPER BACK PAIN: ICD-10-CM

## 2019-04-22 DIAGNOSIS — S16.1XXD STRAIN OF NECK MUSCLE, SUBSEQUENT ENCOUNTER: Primary | ICD-10-CM

## 2019-04-22 PROCEDURE — 99213 OFFICE O/P EST LOW 20 MIN: CPT | Performed by: FAMILY MEDICINE

## 2019-04-22 ASSESSMENT — MIFFLIN-ST. JEOR: SCORE: 1426.63
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
## 2019-04-22 NOTE — LETTER
[address] [address]  [phone]          April 22, 2019    RE:  Suyapa Craft Ayesha                                                                                                                                                       [address] [place] [id number]            To whom it may concern:    Suyapa Luana Hodge is under my professional care.  She can go back to work Full Time without restrictions  Today.  Sincerely,        Daniela Mcpherson MD

## 2019-05-20 ENCOUNTER — TRANSFERRED RECORDS (OUTPATIENT)
Dept: HEALTH INFORMATION MANAGEMENT | Facility: CLINIC | Age: 34
End: 2019-05-20

## 2019-06-04 DIAGNOSIS — E03.8 OTHER SPECIFIED HYPOTHYROIDISM: ICD-10-CM

## 2019-06-05 RX ORDER — LEVOTHYROXINE SODIUM 75 UG/1
TABLET ORAL
Qty: 90 TABLET | Refills: 0 | Status: SHIPPED | OUTPATIENT
Start: 2019-06-05 | End: 2019-10-21

## 2019-06-05 NOTE — TELEPHONE ENCOUNTER
levothyroxine (SYNTHROID/LEVOTHROID) 75 MCG tablet      Last Written Prescription Date:  1-9-19  Last Fill Quantity: 90,   # refills: 1  Last Office Visit : 8-1-18  Future Office visit:  10-21-19    Routing refill request to provider for review/approval because:  TSH overdue and October 2018 T4 was low with recommended follow up labs at some point.      Kathleen M Doege RN

## 2019-06-21 ENCOUNTER — E-VISIT (OUTPATIENT)
Dept: FAMILY MEDICINE | Facility: CLINIC | Age: 34
End: 2019-06-21
Payer: COMMERCIAL

## 2019-06-21 DIAGNOSIS — M54.6 ACUTE THORACIC BACK PAIN, UNSPECIFIED BACK PAIN LATERALITY: Primary | ICD-10-CM

## 2019-06-21 PROCEDURE — 99444 ZZC PHYSICIAN ONLINE EVALUATION & MANAGEMENT SERVICE: CPT | Performed by: NURSE PRACTITIONER

## 2019-06-21 RX ORDER — NAPROXEN 500 MG/1
500 TABLET ORAL 2 TIMES DAILY WITH MEALS
Qty: 60 TABLET | Refills: 1 | Status: SHIPPED | OUTPATIENT
Start: 2019-06-21 | End: 2020-05-28

## 2019-06-21 RX ORDER — CYCLOBENZAPRINE HCL 10 MG
10 TABLET ORAL 3 TIMES DAILY PRN
Qty: 30 TABLET | Refills: 0 | Status: SHIPPED | OUTPATIENT
Start: 2019-06-21 | End: 2020-05-28 | Stop reason: ALTCHOICE

## 2019-06-21 NOTE — PATIENT INSTRUCTIONS
Thank you for choosing us for your care. I have placed an order for a prescription so that you can start treatment. I have also referred you for Physical Therapy. View your full visit summary for details by clicking on the link below. Your pharmacist will able to address any questions you may have about the medication.      If you're not feeling better within 2-3 weeks please schedule an appointment.  You can schedule an appointment right here in [place] [facility], or call [phone]  If the visit is for the same symptoms as your e-visit, we'll refund the cost of your e-visit if seen within seven days.    Caring for Your Back    You are not alone.    Low back pain is very common. Nearly half of all adults will have low back pain in any given year. The good news is that back pain is rarely a danger to your health. Most people can manage their back pain on their own. About half of people start feeling better within two weeks. In 9 out of 10 cases, low back pain goes away or no longer limits daily activity within 6 weeks.     Your outlook is good!     Your symptoms tell us that your low back pain is most likely not a danger to you. Most of the time we will not know the exact cause of low back pain, even if you see a doctor or have an MRI. However, treatment can still work without knowing the cause of the pain. Less than 1 in 100 people need surgery for their back pain.     What can I do about my low back pain?     There are three basic things you can do to ease low back pain and help it go away.     Use heat or cold packs.    Take medicine as directed.    Use positions, movements and exercises.    Using heat or cold packs:    Try cold packs or gentle heat to ease your pain.  Use whichever gives you the most relief. Apply the cold pack or heat for 15 minutes at a time, as often as needed.    Taking medicine:    If taking over-the-counter medicine:    Take ibuprofen (Advil, Motrin) 600 mg three times a day as needed for  pain.  OR    Take Aleve (naproxen) 220 to 440 mg two times a day as needed for pain. If your doctor prescribed a muscle relaxant (cyclobenzaprine 10 mg.):    Take   to 1 tablet at bedtime.    Do not drive when taking this medicine. This drug may make you sleepy.     Using positions, movements and exercises:    Research tells us that moving your joints and muscles can help you recover from back pain. Such activity should be simple and gentle. Use the positions below as well as walking to help relieve your pain. Try taking a short walk every 3 to 4 hours during the day. Walk for a few minutes inside your home or take longer walks outside, on a treadmill or at a mall. Slowly increase the amount of time you walk. Expect discomfort when you begin, but it should lessen as your back starts to heal. When your back feels better, walk daily to keep your back and body healthy.    Finding a position that is comfortable:    When your back pain is new, certain positions will ease your pain. Gently try each of the positions below until you find one that is helpful. Once you find a position of comfort, use it as often as you like when you are resting. You will recover faster if you combine rest with activity.    * Lie on your back with your legs bent. You can do this by placing a pillow under your knees or lie on the floor and rest your lower legs on the seat of a chair.  * Lie on your side with your knees bent and place a pillow between your knees.    Lie on your stomach over pillows.       When should I call my doctor?    Your back pain should improve over the first couple of weeks. As it improves, you should be able to return to your normal activities.  But call your doctor if:      You have a sudden change in your ability to control your bladder or bowels.    You begin to feel tingling in your groin or legs.    The pain spreads down your leg and into your foot.    Your toes, feet or leg muscles begin to feel weak.    You feel  generally unwell or sick.    Your pain gets worse.    If you are deaf or hard of hearing, please let us know. We provide many free services including sign language interpreters, oral interpreters, TTYs, telephone amplifiers, note takers and written materials.    For informational purposes only. Not to replace the advice of your health care provider. Copyright   2013 [facility]. All rights reserved. [employer] [id number] - 04/13.

## 2019-06-28 ENCOUNTER — THERAPY VISIT (OUTPATIENT)
Dept: PHYSICAL THERAPY | Facility: CLINIC | Age: 34
End: 2019-06-28
Payer: COMMERCIAL

## 2019-06-28 DIAGNOSIS — M54.6 LEFT-SIDED THORACIC BACK PAIN: ICD-10-CM

## 2019-06-28 DIAGNOSIS — M54.6 ACUTE THORACIC BACK PAIN, UNSPECIFIED BACK PAIN LATERALITY: ICD-10-CM

## 2019-06-28 PROCEDURE — 97112 NEUROMUSCULAR REEDUCATION: CPT | Mod: GP | Performed by: PHYSICAL THERAPIST

## 2019-06-28 PROCEDURE — 97161 PT EVAL LOW COMPLEX 20 MIN: CPT | Mod: GP | Performed by: PHYSICAL THERAPIST

## 2019-06-28 PROCEDURE — 97110 THERAPEUTIC EXERCISES: CPT | Mod: GP | Performed by: PHYSICAL THERAPIST

## 2019-07-01 PROBLEM — M54.6 LEFT-SIDED THORACIC BACK PAIN: Status: ACTIVE | Noted: 2019-07-01

## 2019-07-01 NOTE — PROGRESS NOTES
[facility] Initial Evaluation  Subjective:  The history is provided by the patient.   Type of problem:  Thoracic spine   Condition occurred with:  Lifting. This is a new condition   Problem details: Pain began on 6/21, lifting a patient at work.   Patient reports pain:  Thoracic left side, mid thoracic and lower thoracic. Radiates to:  Shoulder left. Associated symptoms:  Loss of motion/stiffness. Symptoms are exacerbated by carrying and sitting (lifting) and relieved by ice and rest.    Where condition occurred: at work.  and reported as 8/10 on pain scale. General health as reported by patient is good. Pertinent medical history includes:  Other. Other medical history details: high blood pressure, menopausal, osteoporosis, smoking, overweight, thyroid problems, cushing's disease, AVN of R hip.    Surgeries include:  Other. Other surgery history details: 3 pituitary, 1 abdominal/adrenal.  Current medications:  Hormone replacement therapy, steroids and thyroid medication.   Primary job tasks include:  Prolonged sitting, lifting/carrying and driving.  Pain is described as aching and sharp and is intermittent. Pain timing: no pattern. Since onset symptoms are unchanged.      Patient is RN. 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    Barriers include:  None as reported by patient.  Red flags:  None as reported by patient.                      Objective:  Standing Alignment:    Cervical/Thoracic:  Forward head  Shoulder/UE:  Rounded shoulders              Gait:  d/t AVN of R hip  Gait Type:  Antalgic                    Lumbar/SI Evaluation    Lumbar Myotomes:  normal                  Neural Tension/Mobility:  Lumbar:  Normal  Thoracic:  Normal      Lumbar Palpation:    Tenderness present at Left:    Erector Spinae and Gluteus Medius               Cervical/Thoracic Evaluation  Cervical AROM: normal         Headaches: none          Cervical Palpation:    Tenderness present at  Left:    Rhomboids and Upper Trap        Spinal Segmental Conclusions:    Level:  Hypo at T1, T2, T3, T4, T5 and T6                                     Hip Evaluation    Hip Strength:  : hip ext 4-/5. : hip ext 4-/5.                                         General     ROS    Assessment/Plan:    Patient is a 33 year old female with lumbar complaints.    Patient has the following significant findings with corresponding treatment plan.                Diagnosis 1: L thoracic pain  Pain -  hot/cold therapy, manual therapy, self management, education, directional preference exercise and home program  Decreased ROM/flexibility - manual therapy, therapeutic exercise and home program  Decreased strength - therapeutic exercise, therapeutic activities and home program    Therapy Evaluation Codes:   1) History comprised of:   Personal factors that impact the plan of care:      None.    Comorbidity factors that impact the plan of care are:      AVN of R hip, cushings disease.     Medications impacting care: None.  2) Examination of Body Systems comprised of:   Body structures and functions that impact the plan of care:      Hip, Lumbar spine and Thoracic Spine.   Activity limitations that impact the plan of care are:      Sitting, Squatting/kneeling, Stairs, Standing, Walking, Working, Sleeping and Laying down.  3) Clinical presentation characteristics are:   Stable/Uncomplicated.  4) Decision-Making    Low complexity using standardized patient assessment instrument and/or measureable assessment of functional outcome.  Cumulative Therapy Evaluation is: Low complexity.    Previous and current functional limitations:  (See Goal Flow Sheet for this information)    Short term and Long term goals: (See Goal Flow Sheet for this information)     Communication ability:  Patient appears to be able to clearly communicate and understand verbal and written communication and follow directions correctly.  Treatment Explanation - The following  has been discussed with the patient:   RX ordered/plan of care  Anticipated outcomes  Possible risks and side effects  This patient would benefit from PT intervention to resume normal activities.   Rehab potential is good.    Frequency:  1 X week, once daily  Duration:  for 8 weeks  Discharge Plan:  Achieve all LTG.  Independent in home treatment program.  Reach maximal therapeutic benefit.    Please refer to the daily flowsheet for treatment today, total treatment time and time spent performing 1:1 timed codes.

## 2019-07-12 ENCOUNTER — THERAPY VISIT (OUTPATIENT)
Dept: PHYSICAL THERAPY | Facility: CLINIC | Age: 34
End: 2019-07-12
Payer: COMMERCIAL

## 2019-07-12 DIAGNOSIS — M54.6 LEFT-SIDED THORACIC BACK PAIN: ICD-10-CM

## 2019-07-12 PROCEDURE — 97110 THERAPEUTIC EXERCISES: CPT | Mod: GP | Performed by: PHYSICAL THERAPIST

## 2019-07-12 PROCEDURE — 97112 NEUROMUSCULAR REEDUCATION: CPT | Mod: GP | Performed by: PHYSICAL THERAPIST

## 2019-07-30 DIAGNOSIS — E27.1 ADRENAL INSUFFICIENCY, PRIMARY (H): ICD-10-CM

## 2019-07-30 RX ORDER — FLUDROCORTISONE ACETATE 0.1 MG/1
0.05 TABLET ORAL DAILY
Qty: 45 TABLET | Refills: 0 | Status: SHIPPED | OUTPATIENT
Start: 2019-07-30 | End: 2019-10-21

## 2019-07-30 NOTE — TELEPHONE ENCOUNTER
Last Clinic Visit: 8-1-18 [facility]  Next Clinic Visit:10-21-19  Last clinic note:Continue fludrocortisone.   FYI to provider, next appt outside of RTC timeframe.  90 day RX sent to pharm to cover til next appt.

## 2019-08-13 ENCOUNTER — E-VISIT (OUTPATIENT)
Dept: FAMILY MEDICINE | Facility: CLINIC | Age: 34
End: 2019-08-13
Payer: COMMERCIAL

## 2019-08-13 DIAGNOSIS — N30.01 ACUTE CYSTITIS WITH HEMATURIA: Primary | ICD-10-CM

## 2019-08-13 PROCEDURE — 99444 ZZC PHYSICIAN ONLINE EVALUATION & MANAGEMENT SERVICE: CPT | Performed by: FAMILY MEDICINE

## 2019-08-13 RX ORDER — SULFAMETHOXAZOLE/TRIMETHOPRIM 800-160 MG
1 TABLET ORAL 2 TIMES DAILY
Qty: 6 TABLET | Refills: 0 | Status: SHIPPED | OUTPATIENT
Start: 2019-08-13 | End: 2019-08-16

## 2019-09-30 PROBLEM — M54.6 LEFT-SIDED THORACIC BACK PAIN: Status: RESOLVED | Noted: 2019-07-01 | Resolved: 2019-09-30

## 2019-09-30 NOTE — PROGRESS NOTES
Subjective:  HPI                    Objective:  System    Physical Exam    General     ROS    Assessment/Plan:    DISCHARGE REPORT    Progress reporting period is from 6/28/19 to 7/19.     SUBJECTIVE  Subjective: Things are feeling a lot better. Has not had the pain for a week.    Current Pain level: 0/10   Initial Pain level: 7/10   Changes in function: Yes, see goal flow sheet for change in function   Adverse reactions: None;   ,     Patient has failed to return to therapy so current objective findings are unknown.  The subjective and objective information are from the last SOAP note on this patient.    OBJECTIVE  Objective: Poor slouched sitting posture.     No further objective info as pt did not return.    ASSESSMENT/PLAN  Diagnosis 1: L thoracic pain  Pain -  hot/cold therapy, manual therapy, self management, education, directional preference exercise and home program  Decreased ROM/flexibility - manual therapy, therapeutic exercise and home program  Decreased strength - therapeutic exercise, therapeutic activities and home program  STG/LTGs have been met or progress has been made towards goals:  Yes (See Goal flow sheet completed today.)  Assessment of Progress: The patient has not returned to therapy. Current status is unknown.  Self Management Plans:  Patient has been instructed in a home treatment program.  Patient  has been instructed in self management of symptoms.  Suyapa continues to require the following intervention to meet STG and LTG's: PT intervention is no longer required to meet STG/LTG.  The patient failed to complete scheduled/ordered appointments so current information is unknown.  We will discharge this patient from PT.    Recommendations:  This patient is ready to be discharged from therapy and continue their home treatment program.    Please refer to the daily flowsheet for treatment today, total treatment time and time spent performing 1:1 timed codes.

## 2019-10-21 ENCOUNTER — OFFICE VISIT (OUTPATIENT)
Dept: ENDOCRINOLOGY | Facility: CLINIC | Age: 34
End: 2019-10-21
Payer: COMMERCIAL

## 2019-10-21 VITALS
BODY MASS INDEX: 23.78 KG/M2 | HEART RATE: 103 BPM | WEIGHT: 151.8 LBS | DIASTOLIC BLOOD PRESSURE: 70 MMHG | SYSTOLIC BLOOD PRESSURE: 92 MMHG

## 2019-10-21 DIAGNOSIS — D35.2 PITUITARY ADENOMA (H): ICD-10-CM

## 2019-10-21 DIAGNOSIS — E27.1 ADRENAL INSUFFICIENCY, PRIMARY (H): ICD-10-CM

## 2019-10-21 DIAGNOSIS — E03.8 OTHER SPECIFIED HYPOTHYROIDISM: ICD-10-CM

## 2019-10-21 DIAGNOSIS — E27.40 ADRENAL INSUFFICIENCY (H): ICD-10-CM

## 2019-10-21 DIAGNOSIS — E28.39 ESTROGEN DEFICIENCY: ICD-10-CM

## 2019-10-21 LAB
ANION GAP SERPL CALCULATED.3IONS-SCNC: 6 MMOL/L (ref 3–14)
BUN SERPL-MCNC: 9 MG/DL (ref 7–30)
CALCIUM SERPL-MCNC: 9.5 MG/DL (ref 8.5–10.1)
CHLORIDE SERPL-SCNC: 104 MMOL/L (ref 94–109)
CO2 SERPL-SCNC: 26 MMOL/L (ref 20–32)
CORTIS SERPL-MCNC: 0.7 UG/DL (ref 4–22)
CREAT SERPL-MCNC: 1.14 MG/DL (ref 0.52–1.04)
FSH SERPL-ACNC: 0.3 IU/L
GFR SERPL CREATININE-BSD FRML MDRD: 62 ML/MIN/{1.73_M2}
GLUCOSE SERPL-MCNC: 81 MG/DL (ref 70–99)
LH SERPL-ACNC: <0.2 IU/L
POTASSIUM SERPL-SCNC: 3.6 MMOL/L (ref 3.4–5.3)
PROLACTIN SERPL-MCNC: 5 UG/L (ref 3–27)
SODIUM SERPL-SCNC: 136 MMOL/L (ref 133–144)
T4 FREE SERPL-MCNC: 0.2 NG/DL (ref 0.76–1.46)

## 2019-10-21 RX ORDER — HYDROCORTISONE 10 MG/1
TABLET ORAL
Qty: 150 TABLET | Refills: 5 | Status: SHIPPED | OUTPATIENT
Start: 2019-10-21 | End: 2021-01-10

## 2019-10-21 RX ORDER — FLUDROCORTISONE ACETATE 0.1 MG/1
0.05 TABLET ORAL DAILY
Qty: 45 TABLET | Refills: 5 | Status: SHIPPED | OUTPATIENT
Start: 2019-10-21 | End: 2021-01-13

## 2019-10-21 RX ORDER — ESTRADIOL 0.05 MG/D
1 PATCH, EXTENDED RELEASE TRANSDERMAL
Qty: 27 PATCH | Refills: 5 | Status: SHIPPED | OUTPATIENT
Start: 2019-10-21 | End: 2021-01-13

## 2019-10-21 RX ORDER — DESMOPRESSIN ACETATE 0.1 MG/1
0.1 TABLET ORAL 2 TIMES DAILY
Qty: 180 TABLET | Refills: 5 | Status: SHIPPED | OUTPATIENT
Start: 2019-10-21 | End: 2021-01-13

## 2019-10-21 RX ORDER — LEVOTHYROXINE SODIUM 75 UG/1
TABLET ORAL
Qty: 90 TABLET | Refills: 5 | Status: SHIPPED | OUTPATIENT
Start: 2019-10-21 | End: 2021-01-13

## 2019-10-21 ASSESSMENT — PAIN SCALES - GENERAL: PAINLEVEL: NO PAIN (0)

## 2019-10-21 NOTE — PROGRESS NOTES
[facility]  Endocrinology  Celestine Medina MD  10/21/2019     Assessment:  Suyapa Hodge is a 34 year old female with a history of with a history of pituitary Cushing's syndrome and pituitary microadenoma s/p pituitary surgery x3 in 2013-14 and finally underwent bilateral adrenalectomy in July 2014. Since July 2019 her ACTH levels have continued to elevate. She feels well in general and has not had any new symptoms since I last saw her in August 2018. She is currently taking fludrocortisone 0.05 mg daily, hydrocortisone BID (10 mg misses dose once per month), L-thyroxine 75 mcg tablets full tablet 6 days and desmopressin 0.1 mg in the morning. She is tolerating all these well. There was some skin darkening on exam which was concerning. She denies bed tanning. Would like to have her complete labs today to check hormones (FSH, Lutropin, Prolactin, ISGF, ACTH, cortisol, T4 free). She will also complete BMP and pituitary tumor marker.     Plan:  -Gave her refills on her fludrocortisone, hydrocortisone, levothyroxine, desmopressin and vivelle patch.   -If labs stable then she will continue with normal follow ups.   -If ACTH elevated then we may refer her for imaging.     6 month follow-up with Dr Wilkinson.     Celestine Medina MD  Endocrinology Service      Chief Complaint:   RECHECK     History of Present Illness:   Suyapa Hodge is a 34 year old female with a history of pituitary Cushing's syndrome, hypothalamic adrenal dysfunction and pituitary microadenoma who presents for follow up. She was last seen in clinic 14 months ago. She was diagnosed with pituitary microademoma serveral year ago which was suspected to cause her Cushing's disease. She had pituitary surgery x3 in 2013-14 and finally underwent bilateral adrenalectomy in July 2014. ACTH levels remained normal. In July 2017 ACTH levels were tested and was 271, which increased to 549 raising concern for recurrence vs missed diagnosis.  Chest abdomen  "and pelvis CT was normal. 8 mg dex suppression reduced levels to 118.     She is currently taking hydrocortisone BID (10 mg misses dose once per month), L-thyroxine 75 mcg tablets full tablet 6 days and desmopressin 0.1 mg in the morning. She denies that she gets up in the middle of the night to use the restroom. She reports that she goes to the eye doctor every 3 months to see if the tumor \"has moved to her optic nerve\".      She is on hormone replacement therapy (prometrium and vivelle patch). No history of uterine surgeries. She has a history of irregular periods but has been evaluated with GYN, per patient, and was not told specifics.     She is not taking the calcium supplement. She has a history of avascular necrosis but has been told that she is too young for a hip replacement. DXA showed low BMD for age.     Symptoms Details   Salt craving No    Thirst She is drinking more water (3L) but not from thirst.    Urinary symptoms Urinating more but also increased water intake.    Appetite No change    Weight changes She has lost some weight by cutting down her hydrocortisone to 10 mg. She is down 31 pounds since December 2017.    Change in BM No    Diet Less salt cravings.    Exercise No changes    Energy Levels No changes    Sleep Sometimes she works overnight. Her sleep is inconsistent but her energy levels are normal.    Mental status change No    Skin or hair changes No; she was tan on exam but associates this to summer sun exposure.    Extremity No edema    Menses Irregular periods      Other ROS:   No eye symptoms  No headache, change in vision, loss of peripheral vision  Complete ROS that were obtained were negative.     Active Medications:      cyclobenzaprine (FLEXERIL) 10 MG tablet, Take 1 tablet (10 mg) by mouth 3 times daily as needed for muscle spasms, Disp: 30 tablet, Rfl: 0     desmopressin (DDAVP) 0.1 MG tablet, Take 1 tablet (100 mcg) by mouth 2 times daily (1 TAB) MORNING AND AT BEDTIME, Disp: " 180 tablet, Rfl: 5     estradiol (VIVELLE-DOT) 0.05 MG/24HR bi-weekly patch, Place 1 patch onto the skin twice a week, Disp: 27 patch, Rfl: 5     fludrocortisone (FLORINEF) 0.1 MG tablet, Take 0.5 tablets (0.05 mg) by mouth daily Please keep 10-21-19 clinic appt for further refills, Disp: 45 tablet, Rfl: 5     hydrocortisone (CORTEF) 10 MG tablet, 1 tab (10 mg) am 1/2 tab (5 mg) pm plus sick day supply as directed, Disp: 150 tablet, Rfl: 5     levothyroxine (SYNTHROID/LEVOTHROID) 75 MCG tablet, One tablet day 6 days per wk and none on the 7th day each wk, Disp: 90 tablet, Rfl: 5     Multiple Vitamin (MULTI-VITAMIN DAILY PO), , Disp: , Rfl:      progesterone (PROMETRIUM) 100 MG capsule, Take 1 tablet daily, Disp: 90 capsule, Rfl: 1     triamcinolone (KENALOG) 0.1 % external cream, Apply topically 2 times daily As needed for flares, Disp: 80 g, Rfl: 1     Calcium carb-Vitamin D 500 mg [place]-200 units (OSCAL WITH D;OYSTER SHELL CALCIUM) 500-200 MG-UNIT per tablet, Take 1 tablet by mouth 2 times daily (with meals), Disp: , Rfl:      methocarbamol (ROBAXIN) 500 MG tablet, Take 1-2 tablets (500-1,000 mg) by mouth nightly as needed for muscle spasms (Patient not taking: Reported on 10/21/2019), Disp: 20 tablet, Rfl: 0     naproxen (NAPROSYN) 500 MG tablet, Take 1 tablet (500 mg) by mouth 2 times daily (with meals) (Patient not taking: Reported on 10/21/2019), Disp: 60 tablet, Rfl: 1      Allergies:   Patient has no known allergies.     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 resection   Avascular necrosis of femur head   Cervical high risk HPV   Colon polyp   Diabetes insipidus   Hypercalcemia   Hypertension   Pulmonary emboli   Hypothyroidism      Past Surgical History:  Lumbar drain- 01/2014   Adrenalectomy-07/2014   Tumor resection (hypophysectomy)- 07/2013   [place] teeth extracted     Family  History:   Mother: asthma, osteoporosis, obesity   Father: hyperlipidemia, obesity   Sister: allergies, obesity   Maternal grandmother: colon cancer  Maternal grandfather: lung cancer   Paternal grandmother: lung cancer   Paternal grandfather: lung cancer      Social History:   The patient was alone   Smoking Status: former; 1/2 pack per day for 3 years; 12 years since quitting   Smokeless Tobacco: never    Alcohol Use: yes; 3 beers per month   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 No masses, lesions, tenderness or abnormalities  Neck: Neck supple. No adenopathy. Thyroid symmetric, normal size, Carotids without bruits.  ENT: Eyes anicteric, conjunctiva pink, normal extra-ocular movements. No throat congestion, no neck nodes or sinus tenderness  Cardiovascular: regular rhythm, no tachycardia  Respiratory: Lungs clear  Gastrointestinal: Abdomen soft, non-tender. BS normal. No striae  Musculoskeletal: gait normal and normal muscle tone  Neurologic: Normal speech, reflexes normal.   Psychiatric: mentation appears normal     Data:  TSH   Date Value Ref Range Status   01/12/2018 <0.01 (L) 0.40 - 4.00 mU/L Final   01/08/2016 <0.01 (L) 0.40 - 4.00 mU/L Final   09/25/2015 <0.01 (L) 0.40 - 4.00 mU/L Final   05/22/2015 <0.01 (L) 0.40 - 4.00 mU/L Final   03/13/2015 (L) 0.40 - 4.00 mU/L Final    <0.01  Effective 7/30/2014, the reference range for this assay has changed to reflect   new instrumentation/methodology.       T4 Free   Date Value Ref Range Status   10/08/2018 0.53 (L) 0.76 - 1.46 ng/dL Final   01/12/2018 0.90 0.76 - 1.46 ng/dL Final   10/04/2017 0.90 0.76 - 1.46 ng/dL Final   07/07/2017 0.98 0.76 - 1.46 ng/dL Final   12/30/2016 0.91 0.76 - 1.46 ng/dL Final     CBC RESULTS:   Recent Labs   Lab Test 07/15/14  0806   WBC 8.2   RBC 4.51    HGB 13.3   HCT 39.7   MCV 88   MCH 29.5   MCHC 33.5   RDW 13.7        Recent Labs   Lab Test 10/08/18  0802 01/12/18  0732    137   POTASSIUM 4.1 4.3   CHLORIDE 105 105   CO2 32 27   ANIONGAP 4 5   GLC 87 78   BUN 14 19   CR 1.10* 0.93   ELIEZER 9.3 9.2       Scribe Disclosure:  I, Carina Farias, am serving as a scribe to document services personally performed by Celestine Medina MD at this visit, based upon the provider's statements to me. All documentation has been reviewed by the aforementioned provider prior to being entered into the official medical record.    Scribe Preparation Attestation:  I, Carina Farias, served as a scribe preparing the chart for the clinic encounter through chart review for the provider team.      Portions of this medical record were completed by a scribe. UPON MY REVIEW AND AUTHENTICATION BY ELECTRONIC SIGNATURE, this confirms (a) I performed the applicable clinical services, and (b) the record is accurate.

## 2019-10-21 NOTE — LETTER
10/21/2019       RE: Suyapa Hodge  [address] [facility] [address]     Dear Colleague,    Thank you for referring your patient, Suyapa Hodge, to the [facility] ENDOCRINOLOGY at [facility]. Please see a copy of my visit note below.    [facility]  Endocrinology  Celestine Mednia MD  10/21/2019     Assessment:  Suyapa Hodge is a 34 year old female with a history of with a history of pituitary Cushing's syndrome and pituitary microadenoma s/p pituitary surgery x3 in 2013-14 and finally underwent bilateral adrenalectomy in July 2014. Since July 2019 her ACTH levels have continued to elevate. She feels well in general and has not had any new symptoms since I last saw her in August 2018. She is currently taking fludrocortisone 0.05 mg daily, hydrocortisone BID (10 mg misses dose once per month), L-thyroxine 75 mcg tablets full tablet 6 days and desmopressin 0.1 mg in the morning. She is tolerating all these well. There was some skin darkening on exam which was concerning. She denies bed tanning. Would like to have her complete labs today to check hormones (FSH, Lutropin, Prolactin, ISGF, ACTH, cortisol, T4 free). She will also complete BMP and pituitary tumor marker.     Plan:  -Gave her refills on her fludrocortisone, hydrocortisone, levothyroxine, desmopressin and vivelle patch.   -If labs stable then she will continue with normal follow ups.   -If ACTH elevated then we may refer her for imaging.     6 month follow-up with Dr Wilkinson.     Celestine Medina MD  Endocrinology Service      Chief Complaint:   RECHECK     History of Present Illness:   Suyapa Hodge is a 34 year old female with a history of pituitary Cushing's syndrome, hypothalamic adrenal dysfunction and pituitary microadenoma who presents for follow up. She was last seen in clinic 14 months ago. She was diagnosed with pituitary microademoma serveral year ago which was suspected to cause her  "Cushing's disease. She had pituitary surgery x3 in 2013-14 and finally underwent bilateral adrenalectomy in July 2014. ACTH levels remained normal. In July 2017 ACTH levels were tested and was 271, which increased to 549 raising concern for recurrence vs missed diagnosis.  Chest abdomen and pelvis CT was normal. 8 mg dex suppression reduced levels to 118.     She is currently taking hydrocortisone BID (10 mg misses dose once per month), L-thyroxine 75 mcg tablets full tablet 6 days and desmopressin 0.1 mg in the morning. She denies that she gets up in the middle of the night to use the restroom. She reports that she goes to the eye doctor every 3 months to see if the tumor \"has moved to her optic nerve\".      She is on hormone replacement therapy (prometrium and vivelle patch). No history of uterine surgeries. She has a history of irregular periods but has been evaluated with GYN, per patient, and was not told specifics.     She is not taking the calcium supplement. She has a history of avascular necrosis but has been told that she is too young for a hip replacement. DXA showed low BMD for age.     Symptoms Details   Salt craving No    Thirst She is drinking more water (3L) but not from thirst.    Urinary symptoms Urinating more but also increased water intake.    Appetite No change    Weight changes She has lost some weight by cutting down her hydrocortisone to 10 mg. She is down 31 pounds since December 2017.    Change in BM No    Diet Less salt cravings.    Exercise No changes    Energy Levels No changes    Sleep Sometimes she works overnight. Her sleep is inconsistent but her energy levels are normal.    Mental status change No    Skin or hair changes No; she was tan on exam but associates this to summer sun exposure.    Extremity No edema    Menses Irregular periods      Other ROS:   No eye symptoms  No headache, change in vision, loss of peripheral vision  Complete ROS that were obtained were negative. "     Active Medications:      cyclobenzaprine (FLEXERIL) 10 MG tablet, Take 1 tablet (10 mg) by mouth 3 times daily as needed for muscle spasms, Disp: 30 tablet, Rfl: 0     desmopressin (DDAVP) 0.1 MG tablet, Take 1 tablet (100 mcg) by mouth 2 times daily (1 TAB) MORNING AND AT BEDTIME, Disp: 180 tablet, Rfl: 5     estradiol (VIVELLE-DOT) 0.05 MG/24HR bi-weekly patch, Place 1 patch onto the skin twice a week, Disp: 27 patch, Rfl: 5     fludrocortisone (FLORINEF) 0.1 MG tablet, Take 0.5 tablets (0.05 mg) by mouth daily Please keep 10-21-19 clinic appt for further refills, Disp: 45 tablet, Rfl: 5     hydrocortisone (CORTEF) 10 MG tablet, 1 tab (10 mg) am 1/2 tab (5 mg) pm plus sick day supply as directed, Disp: 150 tablet, Rfl: 5     levothyroxine (SYNTHROID/LEVOTHROID) 75 MCG tablet, One tablet day 6 days per wk and none on the 7th day each wk, Disp: 90 tablet, Rfl: 5     Multiple Vitamin (MULTI-VITAMIN DAILY PO), , Disp: , Rfl:      progesterone (PROMETRIUM) 100 MG capsule, Take 1 tablet daily, Disp: 90 capsule, Rfl: 1     triamcinolone (KENALOG) 0.1 % external cream, Apply topically 2 times daily As needed for flares, Disp: 80 g, Rfl: 1     Calcium carb-Vitamin D 500 mg [place]-200 units (OSCAL WITH D;OYSTER SHELL CALCIUM) 500-200 MG-UNIT per tablet, Take 1 tablet by mouth 2 times daily (with meals), Disp: , Rfl:      methocarbamol (ROBAXIN) 500 MG tablet, Take 1-2 tablets (500-1,000 mg) by mouth nightly as needed for muscle spasms (Patient not taking: Reported on 10/21/2019), Disp: 20 tablet, Rfl: 0     naproxen (NAPROSYN) 500 MG tablet, Take 1 tablet (500 mg) by mouth 2 times daily (with meals) (Patient not taking: Reported on 10/21/2019), Disp: 60 tablet, Rfl: 1      Allergies:   Patient has no known allergies.     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 resection   Avascular necrosis of  femur head   Cervical high risk HPV   Colon polyp   Diabetes insipidus   Hypercalcemia   Hypertension   Pulmonary emboli   Hypothyroidism      Past Surgical History:  Lumbar drain- 01/2014   Adrenalectomy-07/2014   Tumor resection (hypophysectomy)- 07/2013   [place] teeth extracted     Family History:   Mother: asthma, osteoporosis, obesity   Father: hyperlipidemia, obesity   Sister: allergies, obesity   Maternal grandmother: colon cancer  Maternal grandfather: lung cancer   Paternal grandmother: lung cancer   Paternal grandfather: lung cancer      Social History:   The patient was alone   Smoking Status: former; 1/2 pack per day for 3 years; 12 years since quitting   Smokeless Tobacco: never    Alcohol Use: yes; 3 beers per month   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 No masses, lesions, tenderness or abnormalities  Neck: Neck supple. No adenopathy. Thyroid symmetric, normal size, Carotids without bruits.  ENT: Eyes anicteric, conjunctiva pink, normal extra-ocular movements. No throat congestion, no neck nodes or sinus tenderness  Cardiovascular: regular rhythm, no tachycardia  Respiratory: Lungs clear  Gastrointestinal: Abdomen soft, non-tender. BS normal. No striae  Musculoskeletal: gait normal and normal muscle tone  Neurologic: Normal speech, reflexes normal.   Psychiatric: mentation appears normal     Data:  TSH   Date Value Ref Range Status   01/12/2018 <0.01 (L) 0.40 - 4.00 mU/L Final   01/08/2016 <0.01 (L) 0.40 - 4.00 mU/L Final   09/25/2015 <0.01 (L) 0.40 - 4.00 mU/L Final   05/22/2015 <0.01 (L) 0.40 - 4.00 mU/L Final   03/13/2015 (L) 0.40 - 4.00 mU/L Final    <0.01  Effective 7/30/2014, the reference range for this assay has changed to reflect   new instrumentation/methodology.       T4 Free   Date Value Ref Range Status    10/08/2018 0.53 (L) 0.76 - 1.46 ng/dL Final   01/12/2018 0.90 0.76 - 1.46 ng/dL Final   10/04/2017 0.90 0.76 - 1.46 ng/dL Final   07/07/2017 0.98 0.76 - 1.46 ng/dL Final   12/30/2016 0.91 0.76 - 1.46 ng/dL Final     CBC RESULTS:   Recent Labs   Lab Test 07/15/14  0806   WBC 8.2   RBC 4.51   HGB 13.3   HCT 39.7   MCV 88   MCH 29.5   MCHC 33.5   RDW 13.7        Recent Labs   Lab Test 10/08/18  0802 01/12/18  0732    137   POTASSIUM 4.1 4.3   CHLORIDE 105 105   CO2 32 27   ANIONGAP 4 5   GLC 87 78   BUN 14 19   CR 1.10* 0.93   ELIEZER 9.3 9.2       Scribe Disclosure:  ICarina, am serving as a scribe to document services personally performed by Celestine Medina MD at this visit, based upon the provider's statements to me. All documentation has been reviewed by the aforementioned provider prior to being entered into the official medical record.    Scribe Preparation Attestation:  ICarina, served as a scribe preparing the chart for the clinic encounter through chart review for the provider team.      Portions of this medical record were completed by a scribe. UPON MY REVIEW AND AUTHENTICATION BY ELECTRONIC SIGNATURE, this confirms (a) I performed the applicable clinical services, and (b) the record is accurate.          Again, thank you for allowing me to participate in the care of your patient.      Sincerely,  Celestine Medina MD

## 2019-10-22 LAB
ACTH PLAS-MCNC: 968 PG/ML
IGF-I BLD-MCNC: 45 NG/ML (ref 81–278)

## 2019-10-24 DIAGNOSIS — E27.40 ADRENAL INSUFFICIENCY (H): Primary | ICD-10-CM

## 2019-10-24 DIAGNOSIS — E89.3 HYPOPITUITARISM AFTER ADENOMA RESECTION (H): ICD-10-CM

## 2019-10-24 LAB — A-PGH SER-MCNC: 0 NG/ML

## 2019-10-24 NOTE — RESULT ENCOUNTER NOTE
Dear Milla,     The ACTH levels are further elevated, and although you stated you took the hydrocortisone on the morning of the test, the levels were nearly undetectable. You probably did not get afternoon dose.   Your thyroid hormone levels were also very low indicating that you are either not absorbing the thyroid hormone or you are missing doses.  I recommend that you take levothyroxine on a regular basis in the morning with water about an hour before breakfast.    Please repeat these labs in about 6 weeks.    With Best Regards,   Celestine Medina MD  Endocrinology Service.

## 2019-12-17 DIAGNOSIS — E28.319 PREMATURE MENOPAUSE ON HORMONE REPLACEMENT THERAPY: ICD-10-CM

## 2019-12-17 DIAGNOSIS — Z79.890 PREMATURE MENOPAUSE ON HORMONE REPLACEMENT THERAPY: ICD-10-CM

## 2019-12-17 DIAGNOSIS — E28.39 ESTROGEN DEFICIENCY: ICD-10-CM

## 2019-12-19 DIAGNOSIS — E89.3 HYPOPITUITARISM AFTER ADENOMA RESECTION (H): ICD-10-CM

## 2019-12-19 DIAGNOSIS — E27.40 ADRENAL INSUFFICIENCY (H): ICD-10-CM

## 2019-12-19 LAB
CORTIS SERPL-MCNC: 21.1 UG/DL (ref 4–22)
T4 FREE SERPL-MCNC: 0.77 NG/DL (ref 0.76–1.46)

## 2019-12-19 PROCEDURE — 82024 ASSAY OF ACTH: CPT | Performed by: INTERNAL MEDICINE

## 2019-12-19 PROCEDURE — 36415 COLL VENOUS BLD VENIPUNCTURE: CPT | Performed by: INTERNAL MEDICINE

## 2019-12-19 PROCEDURE — 82533 TOTAL CORTISOL: CPT | Performed by: INTERNAL MEDICINE

## 2019-12-19 PROCEDURE — 84439 ASSAY OF FREE THYROXINE: CPT | Performed by: INTERNAL MEDICINE

## 2019-12-20 LAB — ACTH PLAS-MCNC: >1250 PG/ML

## 2020-02-24 ENCOUNTER — TRANSFERRED RECORDS (OUTPATIENT)
Dept: HEALTH INFORMATION MANAGEMENT | Facility: CLINIC | Age: 35
End: 2020-02-24

## 2020-02-24 ENCOUNTER — HEALTH MAINTENANCE LETTER (OUTPATIENT)
Age: 35
End: 2020-02-24

## 2020-02-24 ENCOUNTER — MEDICAL CORRESPONDENCE (OUTPATIENT)
Dept: HEALTH INFORMATION MANAGEMENT | Facility: CLINIC | Age: 35
End: 2020-02-24

## 2020-02-25 DIAGNOSIS — E24.0 PITUITARY DEPENDENT CUSHING DISEASE (H): Primary | ICD-10-CM

## 2020-03-04 DIAGNOSIS — D49.7 PITUITARY TUMOR: Primary | ICD-10-CM

## 2020-03-04 NOTE — PROGRESS NOTES
I received a letter from her ophthalmologist [facility] Dr. Erick Olvera.   In his letter which we will scan today, mentioning concerning very subtle visual field change slightly worse in both eyes compared to the last year.    He also mentioned patient has had a 2 years in a row where these nonspecific changes have worsened.  He recommended to repeat MRI in the near future.     I ordered MRI and the patient has appointment with me April 27, 2020.  MRI prior to visit and we will discuss.    Adriana Wilkinson MD  Staff Physician  Endocrinology and Metabolism  [facility]  License: [id number]  Pager: [phone]

## 2020-03-05 ENCOUNTER — TELEPHONE (OUTPATIENT)
Dept: ENDOCRINOLOGY | Facility: CLINIC | Age: 35
End: 2020-03-05

## 2020-03-05 NOTE — TELEPHONE ENCOUNTER
Left detailed msg on Pts vm regarding MRI request. MyChart msg sent.   Danna Og RN on 3/5/2020 at 9:51 AM

## 2020-03-05 NOTE — TELEPHONE ENCOUNTER
----- Message from Adriana Wilkinson MD sent at 3/4/2020  4:59 PM CST -----  Regarding: MRI  This is Dr. Medina's patient, has appoitment with me in April. She needs MRI pituitary I ordered. Could you tell her?    Adriana

## 2020-03-16 ENCOUNTER — ANCILLARY PROCEDURE (OUTPATIENT)
Dept: MRI IMAGING | Facility: CLINIC | Age: 35
End: 2020-03-16
Attending: INTERNAL MEDICINE
Payer: COMMERCIAL

## 2020-03-16 DIAGNOSIS — D49.7 PITUITARY TUMOR: ICD-10-CM

## 2020-03-16 RX ORDER — GADOBUTROL 604.72 MG/ML
7.5 INJECTION INTRAVENOUS ONCE
Status: COMPLETED | OUTPATIENT
Start: 2020-03-16 | End: 2020-03-16

## 2020-03-16 RX ADMIN — GADOBUTROL 7.5 ML: 604.72 INJECTION INTRAVENOUS at 17:24

## 2020-03-16 NOTE — DISCHARGE INSTRUCTIONS
MRI Contrast Discharge Instructions    The IV contrast you received today will pass out of your body in your  urine. This will happen in the next 24 hours. You will not feel this process.  Your urine will not change color.    Drink at least 4 extra glasses of water or juice today (unless your doctor  has restricted your fluids). This reduces the stress on your kidneys.  You may take your regular medicines.    If you are on dialysis: It is best to have dialysis today.    If you have a reaction: Most reactions happen right away. If you have  any new symptoms after leaving the hospital (such as hives or swelling),  call your hospital at the correct number below. Or call your family doctor.  If you have breathing distress or wheezing, call 911.    Special instructions: ***    I have read and understand the above information.    Signature:______________________________________ Date:___________    Staff:__________________________________________ Date:___________     Time:__________    [facility] [facility] Departments:    ___[place]: [phone]  ___[facility]: [phone]  ___[place]: [phone] ___[facility]: [phone]  ___[facility]: [phone]  ___[facility]: [phone]  ___[phone]  ___[facility]: [phone]  ___[phone]

## 2020-03-18 ENCOUNTER — TELEPHONE (OUTPATIENT)
Dept: ENDOCRINOLOGY | Facility: CLINIC | Age: 35
End: 2020-03-18

## 2020-03-18 ENCOUNTER — DOCUMENTATION ONLY (OUTPATIENT)
Dept: ENDOCRINOLOGY | Facility: CLINIC | Age: 35
End: 2020-03-18

## 2020-03-18 NOTE — PROGRESS NOTES
I reviewed MRI done on 3/16/2020, no obvious signs of tumor recurrence. Optic chiasm seems intact.     Thank you for communicating and updating.     Adriana Wilkinson MD  Staff Physician  Endocrinology and Metabolism  [facility]  License: [place] [id number]  Pager: [phone]

## 2020-03-18 NOTE — TELEPHONE ENCOUNTER
----- Message from Adriana Wilkinson MD sent at 3/18/2020  2:08 PM CDT -----  Dear Suyapa     Here is your results. MRI looks great, no recurrent tumor which is great,   I will contact to your eye doctor who concerns vision and possibility of pituitary tumor.    Could you send my note I will put in the record.     Please call nursing line, if you are [facility] patient at [phone], if you are [place] patient at [phone],  if you have any questions.    Please take care  Adriana Wilkinson MD

## 2020-03-18 NOTE — RESULT ENCOUNTER NOTE
Dear Suyapa     Here is your results. MRI looks great, no recurrent tumor which is great,   I will contact to your eye doctor who concerns vision and possibility of pituitary tumor.    Could you send my note I will put in the record.     Please call nursing line, if you are [facility] patient at [phone], if you are [place] patient at [phone],  if you have any questions.    Please take care  Adriana Wilkinson MD

## 2020-04-24 NOTE — PROGRESS NOTES
"Hypopituitarism  [place]    Suyapa Hodge is a 34 year old female who is being evaluated via a billable video visit.      The patient has been notified of following:     \"This video visit will be conducted via a call between you and your physician/provider. We have found that certain health care needs can be provided without the need for an in-person physical exam.  This service lets us provide the care you need with a video conversation.  If a prescription is necessary we can send it directly to your pharmacy.  If lab work is needed we can place an order for that and you can then stop by our lab to have the test done at a later time.    Video visits are billed at different rates depending on your insurance coverage.  Please reach out to your insurance provider with any questions.    If during the course of the call the physician/provider feels a video visit is not appropriate, you will not be charged for this service.\"    Patient has given verbal consent for Video visit? Yes    How would you like to obtain your AVS? [facility]    Patient would like the video invitation sent by: test    Will anyone else be joining your video visit? No        Video-Visit Details    Type of service:  Video Visit    Start: 04/27/2020 10:24 am   Stop: 04/27/2020 10:45 am     Originating Location (pt. Location): Home    Distant Location (provider location):  [facility] ENDOCRINOLOGY     Mode of Communication:  Video Conference via [facility]  Adriana Wilkinson MD  Staff Physician  Endocrinology and Metabolism  [facility]  License: [place] [address]  Pager: [phone]     Assessment:  Suyapa Hodge is a 34 year old female with a history of with a history of pituitary Cushing's syndrome and pituitary microadenoma s/p pituitary surgery x3 in 2013-14 and finally underwent bilateral adrenalectomy in July 2014. Since July 2019 her ACTH levels have continued to elevate. She feels well in general and has not " had any new symptoms since I last saw her in August 2018. She is currently taking fludrocortisone 0.05 mg daily, hydrocortisone BID (10 mg misses dose once per month), L-thyroxine 75 mcg tablets full tablet 6 days and desmopressin 0.1 mg in the morning. She is tolerating all these well. There was some skin darkening on exam which was concerning. She denies bed tanning. Would like to have her complete labs today to check hormones (FSH, Lutropin, Prolactin, ISGF, ACTH, cortisol, T4 free). She will also complete BMP and pituitary tumor marker.     Plan:    Cushing disease post TSS 3 times (2010, 2013, 2014)    Elevated ACTH gradually increasing, last ACTH>1250    Bilateral adrenalectomy(2016)    Worsening skin darkening 2019-    hypothyrodism stable    Diabetes insipidus stable    MRI 3/2020 could be very small 3-4 mm hypo on the left intrasellar.     - increase Florinef from 0.05 mg daily to 0.05 mg BID  - continue same dose of hydrocortisone 10/5 mg  - recheck ACTH and BMP in 1 month    - MRI in 1 year 4/2021     Adriana Wilkinson MD  Staff Physician  Endocrinology and Metabolism  [facility]  License: [place] [id number]  Pager: [phone]    Chief Complaint:   RECHECK     History of Present Illness:   Suyapa Hodge is a 34 year old female with a history of pituitary Cushing's syndrome, hypothalamic adrenal dysfunction and pituitary microadenoma who presents for follow up. She was last seen in clinic 14 months ago. She was diagnosed with pituitary microademoma serveral year ago which was suspected to cause her Cushing's disease. She had pituitary surgery x3 in 2013-14 and finally underwent bilateral adrenalectomy in July 2014. ACTH levels remained normal. In July 2017 ACTH levels were tested and was 271, which increased to 549 raising concern for recurrence vs missed diagnosis.  Chest abdomen and pelvis CT was normal. 8 mg dex suppression reduced levels to 118.     She is currently taking hydrocortisone  "BID (10 mg misses dose once per month), L-thyroxine 75 mcg tablets full tablet 6 days and desmopressin 0.1 mg in the morning. She denies that she gets up in the middle of the night to use the restroom. She reports that she goes to the eye doctor every 3 months to see if the tumor \"has moved to her optic nerve\".      She is on hormone replacement therapy (prometrium and vivelle patch). No history of uterine surgeries. She has a history of irregular periods but has been evaluated with GYN, per patient, and was not told specifics.     She is not taking the calcium supplement. She has a history of avascular necrosis but has been told that she is too young for a hip replacement. DXA showed low BMD for age.     Symptoms Details   Salt craving No    Thirst She is drinking more water (3L) but not from thirst.    Urinary symptoms Urinating more but also increased water intake.    Appetite No change    Weight changes She has lost some weight by cutting down her hydrocortisone to 10 mg. She is down 31 pounds since December 2017.    Change in BM No    Diet Less salt cravings.    Exercise No changes    Energy Levels No changes    Sleep Sometimes she works overnight. Her sleep is inconsistent but her energy levels are normal.    Mental status change No    Skin or hair changes No; she was tan on exam but associates this to summer sun exposure.    Extremity No edema    Menses Irregular periods      Other ROS:   No eye symptoms  No headache, change in vision, loss of peripheral vision  Complete ROS that were obtained were negative.     Active Medications:      cyclobenzaprine (FLEXERIL) 10 MG tablet, Take 1 tablet (10 mg) by mouth 3 times daily as needed for muscle spasms, Disp: 30 tablet, Rfl: 0     desmopressin (DDAVP) 0.1 MG tablet, Take 1 tablet (100 mcg) by mouth 2 times daily (1 TAB) MORNING AND AT BEDTIME, Disp: 180 tablet, Rfl: 5     estradiol (VIVELLE-DOT) 0.05 MG/24HR bi-weekly patch, Place 1 patch onto the skin twice a " week, Disp: 27 patch, Rfl: 5     fludrocortisone (FLORINEF) 0.1 MG tablet, Take 0.5 tablets (0.05 mg) by mouth daily Please keep 10-21-19 clinic appt for further refills, Disp: 45 tablet, Rfl: 5     hydrocortisone (CORTEF) 10 MG tablet, 1 tab (10 mg) am 1/2 tab (5 mg) pm plus sick day supply as directed, Disp: 150 tablet, Rfl: 5     levothyroxine (SYNTHROID/LEVOTHROID) 75 MCG tablet, One tablet day 6 days per wk and none on the 7th day each wk, Disp: 90 tablet, Rfl: 5     Multiple Vitamin (MULTI-VITAMIN DAILY PO), , Disp: , Rfl:      progesterone (PROMETRIUM) 100 MG capsule, Take 1 tablet daily, Disp: 90 capsule, Rfl: 1     triamcinolone (KENALOG) 0.1 % external cream, Apply topically 2 times daily As needed for flares, Disp: 80 g, Rfl: 1     Calcium carb-Vitamin D 500 mg [place]-200 units (OSCAL WITH D;OYSTER SHELL CALCIUM) 500-200 MG-UNIT per tablet, Take 1 tablet by mouth 2 times daily (with meals), Disp: , Rfl:      methocarbamol (ROBAXIN) 500 MG tablet, Take 1-2 tablets (500-1,000 mg) by mouth nightly as needed for muscle spasms (Patient not taking: Reported on 10/21/2019), Disp: 20 tablet, Rfl: 0     naproxen (NAPROSYN) 500 MG tablet, Take 1 tablet (500 mg) by mouth 2 times daily (with meals) (Patient not taking: Reported on 10/21/2019), Disp: 60 tablet, Rfl: 1      Allergies:   Patient has no known allergies.     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 resection   Avascular necrosis of femur head   Cervical high risk HPV   Colon polyp   Diabetes insipidus   Hypercalcemia   Hypertension   Pulmonary emboli   Hypothyroidism      Past Surgical History:  Lumbar drain- 01/2014   Adrenalectomy-07/2014   Tumor resection (hypophysectomy)- 07/2013   [place] teeth extracted     Family History:   Mother: asthma, osteoporosis, obesity   Father: hyperlipidemia, obesity   Sister: allergies, obesity    Maternal grandmother: colon cancer  Maternal grandfather: lung cancer   Paternal grandmother: lung cancer   Paternal grandfather: lung cancer      Social History:   The patient was alone   Smoking Status: former; 1/2 pack per day for 3 years; 12 years since quitting   Smokeless Tobacco: never    Alcohol Use: yes; 3 beers per month   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 No masses, lesions, tenderness or abnormalities  Resporatory: non acute disctress  Skin: dark pigmentation entire face      Data:  TSH   Date Value Ref Range Status   01/12/2018 <0.01 (L) 0.40 - 4.00 mU/L Final   01/08/2016 <0.01 (L) 0.40 - 4.00 mU/L Final   09/25/2015 <0.01 (L) 0.40 - 4.00 mU/L Final   05/22/2015 <0.01 (L) 0.40 - 4.00 mU/L Final   03/13/2015 (L) 0.40 - 4.00 mU/L Final    <0.01  Effective 7/30/2014, the reference range for this assay has changed to reflect   new instrumentation/methodology.       T4 Free   Date Value Ref Range Status   10/08/2018 0.53 (L) 0.76 - 1.46 ng/dL Final   01/12/2018 0.90 0.76 - 1.46 ng/dL Final   10/04/2017 0.90 0.76 - 1.46 ng/dL Final   07/07/2017 0.98 0.76 - 1.46 ng/dL Final   12/30/2016 0.91 0.76 - 1.46 ng/dL Final     CBC RESULTS:   Recent Labs   Lab Test 07/15/14  0806   WBC 8.2   RBC 4.51   HGB 13.3   HCT 39.7   MCV 88   MCH 29.5   MCHC 33.5   RDW 13.7        Recent Labs   Lab Test 10/08/18  0802 01/12/18  0732    137   POTASSIUM 4.1 4.3   CHLORIDE 105 105   CO2 32 27   ANIONGAP 4 5   GLC 87 78   BUN 14 19   CR 1.10* 0.93   ELIEZER 9.3 9.2     MRI: I also personally reviewed brain MRI and pituitary lesion and interepret and expalined to the patient.     intraseller maybe small hypodense 3-4 mm(?) residual vs scar in my impression

## 2020-04-27 ENCOUNTER — VIRTUAL VISIT (OUTPATIENT)
Dept: ENDOCRINOLOGY | Facility: CLINIC | Age: 35
End: 2020-04-27
Payer: COMMERCIAL

## 2020-04-27 DIAGNOSIS — E24.0 PITUITARY DEPENDENT CUSHING DISEASE (H): Primary | ICD-10-CM

## 2020-05-19 ENCOUNTER — MYC MEDICAL ADVICE (OUTPATIENT)
Dept: FAMILY MEDICINE | Facility: CLINIC | Age: 35
End: 2020-05-19

## 2020-05-27 NOTE — PROGRESS NOTES
"Subjective     Suyapa Hodge is a 34 year old female who presents to clinic today for the following health issues:    HPI   Back Pain       Duration: 1 month        Specific cause: none    Description:   Location of pain: low back left  Character of pain: sharp, dull ache and burning  Pain radiation:none  New numbness or weakness in legs, not attributed to pain:  no     Intensity: moderate    History:   Pain interferes with job: YES, No  History of back problems: no prior back problems  Any previous MRI or X-rays: None  Sees a specialist for back pain:  No  Therapies tried without relief: acetaminophen (Tylenol) and muscle relaxants    Alleviating factors:   Improved by: muscle relaxants      Precipitating factors:  Worsened by: Bending, Sitting and Walking          Accompanying Signs & Symptoms:  Risk of Fracture:  Corticosteroid use  Risk of Cauda Equina:  None  Risk of Infection:  None  Risk of Cancer:  None  Risk of Ankylosing Spondylitis:  Onset at age <35, male, AND morning back stiffness. no     Patient has AVN of hips, walks with limp, and wonders if this is contributing to her back pain.       Reviewed and updated as needed this visit by Provider         Review of Systems   Constitutional, HEENT, cardiovascular, pulmonary, gi and gu systems are negative, except as otherwise noted.      Objective    BP 96/66   Pulse 97   Temp 98.6  F (37  C)   Resp 16   Ht 1.702 m (5' 7\")   Wt 68.5 kg (151 lb)   SpO2 99%   BMI 23.65 kg/m    Body mass index is 23.65 kg/m .  Physical Exam   GENERAL: healthy, alert and no distress  Comprehensive back pain exam:  Tenderness of left paralumbar region, Pain limits the following motions: flexion, lateral bending, rotation, Lower extremity strength functional and equal on both sides and antalgic gait, Lower extremity sensation normal and equal on both sides and Straight leg raise negative bilaterally    Diagnostic Test Results:  Labs reviewed in Epic  Xray - Lumbar- see " epic        Assessment & Plan     1. Acute left-sided low back pain without sciatica  Hip pathology likely contributing to her back pain. Will obtain plain films. Patient prefers to restart home Physical Therapy exercises (has done Physical Therapy for low back previously). If no better with home Physical Therapy, consider lumbar MRI and/or referral to spine specialist  - naproxen (NAPROSYN) 500 MG tablet; Take 1 tablet (500 mg) by mouth 2 times daily (with meals)  Dispense: 60 tablet; Refill: 1  - XR Lumbar Spine 2/3 Views; Future  - methocarbamol (ROBAXIN) 500 MG tablet; Take 1-2 tablets (500-1,000 mg) by mouth nightly as needed for muscle spasms  Dispense: 20 tablet; Refill: 2       See Patient Instructions    Return in about 6 weeks (around 7/9/2020) for if not improving .    SOHEILA De [facility]

## 2020-05-28 ENCOUNTER — OFFICE VISIT (OUTPATIENT)
Dept: FAMILY MEDICINE | Facility: CLINIC | Age: 35
End: 2020-05-28
Payer: COMMERCIAL

## 2020-05-28 ENCOUNTER — ANCILLARY PROCEDURE (OUTPATIENT)
Dept: GENERAL RADIOLOGY | Facility: CLINIC | Age: 35
End: 2020-05-28
Attending: NURSE PRACTITIONER
Payer: COMMERCIAL

## 2020-05-28 VITALS
HEART RATE: 97 BPM | OXYGEN SATURATION: 99 % | WEIGHT: 151 LBS | DIASTOLIC BLOOD PRESSURE: 66 MMHG | RESPIRATION RATE: 16 BRPM | BODY MASS INDEX: 23.7 KG/M2 | SYSTOLIC BLOOD PRESSURE: 96 MMHG | HEIGHT: 67 IN | TEMPERATURE: 98.6 F

## 2020-05-28 DIAGNOSIS — M54.50 ACUTE LEFT-SIDED LOW BACK PAIN WITHOUT SCIATICA: ICD-10-CM

## 2020-05-28 DIAGNOSIS — M54.50 ACUTE LEFT-SIDED LOW BACK PAIN WITHOUT SCIATICA: Primary | ICD-10-CM

## 2020-05-28 PROCEDURE — 99213 OFFICE O/P EST LOW 20 MIN: CPT | Performed by: NURSE PRACTITIONER

## 2020-05-28 PROCEDURE — 72100 X-RAY EXAM L-S SPINE 2/3 VWS: CPT

## 2020-05-28 RX ORDER — NAPROXEN 500 MG/1
500 TABLET ORAL 2 TIMES DAILY WITH MEALS
Qty: 60 TABLET | Refills: 1 | Status: SHIPPED | OUTPATIENT
Start: 2020-05-28 | End: 2021-07-01

## 2020-05-28 RX ORDER — METHOCARBAMOL 500 MG/1
500-1000 TABLET, FILM COATED ORAL
Qty: 20 TABLET | Refills: 0 | Status: SHIPPED | OUTPATIENT
Start: 2020-05-28 | End: 2020-05-28

## 2020-05-28 RX ORDER — METHOCARBAMOL 500 MG/1
500-1000 TABLET, FILM COATED ORAL
Qty: 20 TABLET | Refills: 2 | Status: SHIPPED | OUTPATIENT
Start: 2020-05-28 | End: 2021-07-01

## 2020-05-28 ASSESSMENT — MIFFLIN-ST. JEOR: SCORE: 1412.56

## 2020-05-28 NOTE — PATIENT INSTRUCTIONS
[facility]    If you have any questions regarding to your visit please contact your care team:       Team Red:   Clinic Hours Telephone Number   [address] 7am-7pm  Monday - Thursday   7am-5pm  Fridays  [phone]  (Appointment scheduling available 24/7)    Questions about your recent visit?   Team Line  [phone]   Urgent Care - [place] and [facility] - 11am-9pm Monday-Friday Saturday-Sunday- 9am-5pm   [place] - 5pm-9pm Monday-Friday Saturday-Sunday- 9am-5pm  [phone] - [place]  [phone] - [place]       What options do I have for a visit other than an office visit? We offer electronic visits (e-visits) and telephone visits, when medically appropriate.  Please check with your medical insurance to see if these types of visits are covered, as you will be responsible for any charges that are not paid by your insurance.      You can use [employer] (secure electronic communication) to access to your chart, send your primary care provider a message, or make an appointment. Ask a team member how to get started.     For a price quote for your services, please call our Consumer Price Line at [phone] or our Imaging Cost estimation line at [phone] (for imaging tests).

## 2020-06-22 ENCOUNTER — MYC MEDICAL ADVICE (OUTPATIENT)
Dept: FAMILY MEDICINE | Facility: CLINIC | Age: 35
End: 2020-06-22

## 2020-06-22 DIAGNOSIS — M54.50 ACUTE LEFT-SIDED LOW BACK PAIN WITHOUT SCIATICA: Primary | ICD-10-CM

## 2020-06-22 DIAGNOSIS — M51.369 DDD (DEGENERATIVE DISC DISEASE), LUMBAR: ICD-10-CM

## 2020-06-25 ENCOUNTER — TELEPHONE (OUTPATIENT)
Dept: PALLIATIVE MEDICINE | Facility: CLINIC | Age: 35
End: 2020-06-25

## 2020-06-25 ENCOUNTER — ANCILLARY PROCEDURE (OUTPATIENT)
Dept: MRI IMAGING | Facility: CLINIC | Age: 35
End: 2020-06-25
Attending: NURSE PRACTITIONER
Payer: COMMERCIAL

## 2020-06-25 DIAGNOSIS — M54.50 ACUTE LEFT-SIDED LOW BACK PAIN WITHOUT SCIATICA: ICD-10-CM

## 2020-06-25 NOTE — TELEPHONE ENCOUNTER
"Screening Questions for Radiology Injections:    Injection to be done at which interventional clinic site? [facility] [place]    Instruct patient to arrive as directed prior to the scheduled appointment time:    [place] minutes before      [place]: 30 minutes before; if IV needed 1 hour before     Dr. Iyer-no IV needed for Cervical ITMIAZ; please instruct to arrive 30\" early    Procedure ordered by Vivienne    Procedure ordered? CHARLIEI    As a reminder, receiving steroids can decrease your body's ability to fight infection.   Would you still like to move forward with scheduling the injection?  Yes      Transforaminal Cervical IMTIAZ - no pain provider currently performing    What insurance would patient like us to bill for this procedure? BC      Worker's comp or MVA (motor vehicle accident) -Any injection DO NOT SCHEDULE and route to Amy Antwon.      [employer] insurance - For SI joint injections, DO NOT SCHEDULE and route Amy Antwon.       [employer] - Any injection besides hip/shoulder/knee joint DO NOT SCHEDULE and route to Amy Antwon. She will obtain PA and call pt back to schedule procedure or notify pt of denial.       HP CIGNA-Route to Amy for review      **BCBS- ALL need to be routed to [place] for review if a PA is needed**      IF SCHEDULING IN [place] AND NEEDS A PA, IT IS OKAY TO SCHEDULE. [place] HANDLES THEIR OWN PA'S AFTER THE PATIENT IS SCHEDULED. PLEASE SCHEDULE AT LEAST 1 WEEK OUT SO A PA CAN BE OBTAINED.    Any chance of pregnancy? NO   If YES, do NOT schedule and route to RN pool    Is an  needed? No     Patient has a drive home? (mandatory) YES: ok    Is patient taking any blood thinners (i.e. plavix, coumadin, jantoven, warfarin, heparin, pradaxa or dabigatran, etc)? No   If hold needed, do NOT schedule, route to RN pool     Is patient taking any aspirin products (includes Excedrin and Fiorinal)? No     If more than 325mg/day, OK to schedule; Instruct pt to " decrease to less than 325 mg for 7 days AND route to RN pool    For CERVICAL procedures, hold all aspirin products for 6 days.     Tell pt that if aspirin product is not held for 6 days, the procedure WILL BE cancelled.      Does the patient have a bleeding or clotting disorder? No     If YES, okay to schedule AND route to RN nurse pool    For any patients with platelet count <100, must be forwarded to provider    Is patient diabetic?  No  If YES, instruct them to bring their glucometer.    Does patient have an active infection or treated for one within the past week? No     Is patient currently taking any antibiotics?  No     For patients on chronic, preventative, or prophylactic antibiotics, procedures may be scheduled.     For patients on antibiotics for active or recent infection:antibiotic course must have been completed for 4 days    Is patient currently taking any steroid medications? (i.e. Prednisone, Medrol)  No     For patients on steroid medications, course must have been completed for 4 days    Is patient actively being treated for cancer or immunocompromised? No  If YES, do NOT schedule and route to RN pool     Are you able to get on and off an exam table with minimal or no assistance? Yes  If NO, do NOT schedule and route to RN pool    Are you able to roll over and lay on your stomach with minimal or no assistance? Yes  If NO, do NOT schedule and route to RN pool     Any allergies to contrast dye, iodine, shellfish, or numbing and steroid medications? No  If YES, route to RN pool AND add allergy information to appointment notes    Allergies: Patient has no known allergies.      Has the patient had a flu shot or any other vaccinations within 7 days before or after the procedure.  No     Does patient have an MRI/CT?  YES: MRI  Check Procedure Scheduling Grid to see if required.      Was the MRI done within the last 3 years?  Yes    If yes, where was the [facility] done i[facility]e.[facility], [facility], [facility], [place], [facility] etc? [facility]      If no, do not schedule and route to RN pool    If MRI was not done at [place], [facility] or [facility] do NOT schedule and route to RN pool.      If pt has an imaging disc, the injection MAY be scheduled but pt has to bring disc to appt.     If they show up without the disc the injection cannot be done    Procedure Specific Instructions:      If celiac plexus block, informed patient NPO for 6 hours and that it is okay to take medications with sips of water, especially blood pressure medications  Not Applicable         If this is for a cervical procedure, informed patient that aspirin needs to be held for 6 days.   NO      If IV needed:    Do not schedule procedures requiring IV placement in the first appointment of the day or first appointment after lunch. Do NOT schedule at 0745, 0815 or 1245. ok    Instructed pt to arrive 30 minutes early for IV start if required. (Check Procedure Scheduling Grid)  YES: ok    Reminders:      If you are started on any steroids or antibiotics between now and your appointment, you must contact us because the procedure may need to be cancelled.  Yes      For all procedures except radiofrequency ablations (RFAs) and spinal cord stimulator (SCS) trials, informed patient:    IV sedation is not provided for this procedure.  If you feel that an oral anti-anxiety medication is needed, you can discuss this further with your referring provider or primary care provider.  The Pain Clinic provider will discuss specifics of what the procedure includes at your appointment.  Most procedures last 10-20 minutes.  We use numbing medications to help with any discomfort during the procedure.  Not Applicable      For patients 85 or older we recommend having an adult stay w/ them for the remainder of the day.   ok    Does the patient have any questions?  NO  Kimber Carrasco  [facility]

## 2020-06-26 NOTE — TELEPHONE ENCOUNTER
PA pending additional information - please specify exact level, laterality and approach of injection.          Amy NINO    [facility]

## 2020-06-26 NOTE — TELEPHONE ENCOUNTER
Recommend scheduling a virtual interventional consult.  It is difficult to ascertain whether or not she has radicular pain at all.  If it is central low back pain without radiation I would not recommend a LESI and would instead recommend facet joint injections vs medial branch block workup to RFA.  This needs to be discussed with the patient.     Gladys Weinstein MD

## 2020-07-01 ENCOUNTER — VIRTUAL VISIT (OUTPATIENT)
Dept: PALLIATIVE MEDICINE | Facility: CLINIC | Age: 35
End: 2020-07-01
Payer: COMMERCIAL

## 2020-07-01 DIAGNOSIS — M47.816 SPONDYLOSIS OF LUMBAR REGION WITHOUT MYELOPATHY OR RADICULOPATHY: Primary | ICD-10-CM

## 2020-07-01 PROCEDURE — 99203 OFFICE O/P NEW LOW 30 MIN: CPT | Mod: 95 | Performed by: PAIN MEDICINE

## 2020-07-01 ASSESSMENT — PAIN SCALES - GENERAL: PAINLEVEL: MODERATE PAIN (4)

## 2020-07-01 NOTE — PROGRESS NOTES
"Suyapa Hodge is a 34 year old female who is being evaluated via a billable video visit.      The patient has been notified of following:     \"This video visit will be conducted via a call between you and your physician/provider. We have found that certain health care needs can be provided without the need for an in-person physical exam.  This service lets us provide the care you need with a video conversation.  If a prescription is necessary we can send it directly to your pharmacy.  If lab work is needed we can place an order for that and you can then stop by our lab to have the test done at a later time.    Video visits are billed at different rates depending on your insurance coverage.  Please reach out to your insurance provider with any questions.    If during the course of the call the physician/provider feels a video visit is not appropriate, you will not be charged for this service.\"    Patient has given verbal consent for Video visit? Yes  How would you like to obtain your AVS? MyChart  Patient would like the video invitation sent by: Text to cell phone: [phone]  Will anyone else be joining your video visit? No    Inez Mitchell CMA ([facility])      Video-Visit Details    Type of service:  Video Visit    Video Start Time: 915  Video End Time: 935    Originating Location (pt. Location): Home    Distant Location (provider location):  [facility]     Platform used for Video Visit: Natan Aguilar MD      [facility] Consultation    Date of visit: 7/1/2020    Reason for consultation:    Primary Care Provider is Clarisa Palafox.  Pain medications are being prescribed by n/a.    Please see the [facility] health questionnaire which the patient completed and reviewed with me in detail.    Chief Complaint:    Chief Complaint   Patient presents with     Pain     Video visit due to COVID-19     MME prescribed prior to seeing " patient:  Current MME:    Pain history: Pt had pit resection. Hx of AVN in her hip.     In general patient feels she is a very high pain tolerance    Patient's goal is to be able to work.    Pt is an rn.    Suyapa Hodge is a 34 year old female who first started having problems with pain 2 month ago. The pain has gotten progressive worse. The pt denies any specific inciting event. The pt reports one weekend it got completely intolerable.  Again, she is unsure what happened that weekend.  Currently the pain is essentially constant.  Currently the pain is left-sided axial low back pain.  She reports the pain is slightly inferior to her rib.  The pain will radiate down towards her lower back and lateral leg/hip.  She denies any radiation to her legs or feet.  She denies any numbness tingling or burning.  The pain is worse when driving for extended periods of time.  Of note this is a critical point component of her job.  The pain is worse with extension and rotation.  The pain is worse with twisting.  The pain is slightly worse with lifting.  The pain is slightly better with rest.  The pain is slightly better with heat/ice.  She denies any incontinence.  She denies any overt progressive weakness.  She denies any recent falls.  Patient is very passionate about continuing to work.  Patient is an RN.  Patient is interested in options that may help her continue to do her job.      Pain rating: intensity  Averages 6/10 on a 0-10 scale.    Current treatments include:  Hormone replacement therapy    Previous medication treatments included:  n/a    Other treatments have included:  Suyapa Hodge has not been seen at a pain clinic in the past.      Injections: n    Past Medical History:  Past Medical History:   Diagnosis Date     Adrenal disorder (H)      Avascular necrosis of femur head, 2010     Cervical high risk HPV (human papillomavirus) test positive 10/2015 & 3/2019    +HR HPV (NOT 16/18)     Colon polyp       Cushing syndrome (H)     pituitary microadenoma     Diabetes insipidus (H)      Fatigue      History of colposcopy 10/2015    BEVERLEY 1     Hypercalcaemia      Hypertension      Medulloadrenal hyperfunction (H)      Pulmonary emboli (H) 01/2014     Thyroid disease      Past Surgical History:  Past Surgical History:   Procedure Laterality Date     COLONOSCOPY       COLPOSCOPY VULVA, BIOPSY, COMBINED  10/28/2015    BEVERLEY 1, 6 month follow-up pap was normal     COLPOSCOPY,BX CERVIX/ENDOCERV CURR  04/02/2019     INSERT DRAIN LUMBAR  1/20/2014    Procedure: INSERT DRAIN LUMBAR;;  Surgeon: Soham Aquino MD;  Location: [facility] OR     LAPAROSCOPIC ADRENALECTOMY  7/14/2014    Procedure: LAPAROSCOPIC ADRENALECTOMY;  Surgeon: Roni Nunn MD;  Location: [facility] OR     OPTICAL TRACKING SYSTEM ENDOSCOPIC RESECTION TUMOR CRANIAL  7/1/2013    Procedure: OPTICAL TRACKING SYSTEM ENDOSCOPIC RESECTION TUMOR CRANIAL;  Stealth Assisted Endoscopic Hypophysectomy ;  Surgeon: Soham Aquino MD;  Location: [facility] OR     OPTICAL TRACKING SYSTEM ENDOSCOPIC RESECTION TUMOR CRANIAL  1/20/2014    Procedure: OPTICAL TRACKING SYSTEM ENDOSCOPIC RESECTION TUMOR CRANIAL;  Stealth Assisted Endoscopic Transnasal Excision Of Pituitary Adenoma , Placement Of Lumbar Drain with c-arm;  Surgeon: Soham Aquino MD;  Location: [facility] OR     removal of pituitary microademona       wisdom teeth extration       Medications:  Current Outpatient Medications   Medication Sig Dispense Refill     Calcium carb-Vitamin D 500 mg Quechan-200 units (OSCAL WITH D;OYSTER SHELL CALCIUM) 500-200 MG-UNIT per tablet Take 1 tablet by mouth 2 times daily (with meals)       desmopressin (DDAVP) 0.1 MG tablet Take 1 tablet (100 mcg) by mouth 2 times daily (1 TAB) MORNING AND AT BEDTIME (Patient taking differently: Take 0.1 mg by mouth daily (1 TAB) MORNING AND AT BEDTIME) 180 tablet 5     estradiol (VIVELLE-DOT) 0.05 MG/24HR bi-weekly patch Place 1 patch onto the skin twice a  week 27 patch 5     fludrocortisone (FLORINEF) 0.1 MG tablet Take 0.5 tablets (0.05 mg) by mouth daily Please keep 10-21-19 clinic appt for further refills 45 tablet 5     hydrocortisone (CORTEF) 10 MG tablet 1 tab (10 mg) am 1/2 tab (5 mg) pm plus sick day supply as directed (Patient taking differently: 10 mg daily 1 tab (10 mg) am 1/2 tab (5 mg) pm plus sick day supply as directed) 150 tablet 5     levothyroxine (SYNTHROID/LEVOTHROID) 75 MCG tablet One tablet day 6 days per wk and none on the 7th day each wk 90 tablet 5     methocarbamol (ROBAXIN) 500 MG tablet Take 1-2 tablets (500-1,000 mg) by mouth nightly as needed for muscle spasms 20 tablet 2     Multiple Vitamin (MULTI-VITAMIN DAILY PO)        naproxen (NAPROSYN) 500 MG tablet Take 1 tablet (500 mg) by mouth 2 times daily (with meals) 60 tablet 1     progesterone (PROMETRIUM) 100 MG capsule TAKE 1 CAPSULE BY MOUTH DAILY 90 capsule 3     triamcinolone (KENALOG) 0.1 % external cream Apply topically 2 times daily As needed for flares (Patient not taking: Reported on 7/1/2020) 80 g 1     Allergies:   No Known Allergies  Social History  Occupation/Schooling: rn      Family history:  Family History   Problem Relation Age of Onset     Asthma Mother      Osteoporosis Mother      Obesity Mother      Hyperlipidemia Father      Obesity Father      Cancer Maternal Grandmother 45        colon cancer     Colon Cancer Maternal Grandmother      Cancer Maternal Grandfather         lung cancer-smoker     Other Cancer Maternal Grandfather         Smoker     Cancer Paternal Grandmother         lung cancer-smoker     Other Cancer Paternal Grandmother         Smoker     Cancer Paternal Grandfather         lung cancer     Other Cancer Paternal Grandfather      Allergies Sister      Obesity Sister      Family history of headaches: n    Review of Systems:  Skin: negative  Eyes: negative  Ears/Nose/Throat: negative  Respiratory: No shortness of breath, dyspnea on exertion, cough, or  hemoptysis  Cardiovascular: negative  Gastrointestinal: negative  Genitourinary: negative  Musculoskeletal: negative  Neurologic: negative  Psychiatric: negative  Hematologic/Lymphatic/Immunologic: negative  Endocrine: negative    Physical Exam:  There were no vitals filed for this visit.  Exam:  Constitutional: healthy, alert and no distress  Respiratory: Speaking in full sentences no accessory muscles use     Psychiatric: mentation appears normal and affect normal/bright    Musculoskeletal exam:  Gait/Station/Posture: wnl      Lumbar spine:     ROM: wnl     Positive reproduction of symptoms with extension left greater than right negative slump                 Motor: Able to easily overcome gravity in her lower extremities  Diagnostic tests:  T12-L1: No spinal canal or neural foraminal stenosis.     L1-2: Small circumferential disc bulge. Facet arthropathy bilaterally.  No spinal canal or neural foraminal stenosis.     L2-3: Facet arthropathy bilaterally. No spinal canal or neural  foraminal stenosis.     L3-4: Small circumferential disc bulge. Facet arthropathy bilaterally.  No spinal canal or neural foraminal stenosis.     L4-5: Circumferential disc bulge with a superimposed central disc  protrusion, narrows the lateral recesses and abuts the traversing L5  nerve roots bilaterally. Facet arthropathy bilaterally. Ligamentum  flavum hypertrophy. Mild to moderate neural foraminal stenosis  bilaterally. Mild spinal canal stenosis.     L5-S1: Small circumferential disc bulge with a central disc protrusion  and annular fissure. Facet arthropathy bilaterally. Mild ligamentum  flavum thickening. Mild left neural foraminal stenosis. Right neural  foramen and spinal canal are patent.     Paraspinous tissues are within normal limits.                                                                      Impression: Multilevel lumbar spondylosis, most pronounced at L4-5  with mild to moderate neural foraminal stenosis  bilaterally and mild  spinal canal stenosis. Further degenerative disease as described  above.               Assessment/Plan:  Suyapa Hodge is a 34 year old female who presents with the complaints of left upper axial low back pain.   Suyapa was seen today for pain.    Diagnoses and all orders for this visit:    Spondylosis of lumbar region without myelopathy or radiculopathy  -     PAIN INJECTION EVAL/TREAT/FOLLOW UP         - Further procedures recommended:    -Left L1-2, L2-3 facet joint injection    - Follow up: For procedure          Total time spent was 20 minutes    Jerry Aguilar MD  [facility]  This note was created with voice recognition software, and while reviewed for accuracy, typos may remain.

## 2020-07-07 ASSESSMENT — ENCOUNTER SYMPTOMS
SORE THROAT: 0
SHORTNESS OF BREATH: 0
DIZZINESS: 0
PARESTHESIAS: 0
CONSTIPATION: 0
HEMATOCHEZIA: 0
JOINT SWELLING: 0
HEMATURIA: 0
EYE PAIN: 0
NERVOUS/ANXIOUS: 0
HEADACHES: 0
BREAST MASS: 0
FEVER: 0
ARTHRALGIAS: 1
FREQUENCY: 0
DIARRHEA: 0
WEAKNESS: 0
MYALGIAS: 0
CHILLS: 0
COUGH: 0
NAUSEA: 0
ABDOMINAL PAIN: 0
PALPITATIONS: 0
DYSURIA: 0
HEARTBURN: 0

## 2020-07-07 NOTE — PROGRESS NOTES
SUBJECTIVE:   CC: Suyapa Hodge is an 34 year old woman who presents for preventive health visit.     Healthy Habits:     Getting at least 3 servings of Calcium per day:  Yes    Bi-annual eye exam:  Yes    Dental care twice a year:  Yes    Sleep apnea or symptoms of sleep apnea:  Daytime drowsiness    Diet:  Regular (no restrictions) and Breakfast skipped    Frequency of exercise:  1 day/week    Duration of exercise:  45-60 minutes    Taking medications regularly:  Yes    Medication side effects:  None    PHQ-2 Total Score: 0    Additional concerns today:  No    Patient notes that she is not exercising much due to ongoing hip pain, which is debilitating. Is interested in seeking second opinion about hip replacement- has been told she is too young.    Today's PHQ-2 Score:   PHQ-2 (  [employer]) 2020   Q1: Little interest or pleasure in doing things 0   Q2: Feeling down, depressed or hopeless 0   PHQ-2 Score 0   Q1: Little interest or pleasure in doing things Not at all   Q2: Feeling down, depressed or hopeless Not at all   PHQ-2 Score 0       Abuse: Current or Past(Physical, Sexual or Emotional)- No  Do you feel safe in your environment? Yes        Social History     Tobacco Use     Smoking status: Former Smoker     Packs/day: 0.50     Years: 3.00     Pack years: 1.50     Types: Cigarettes     Start date: 10/10/2005     Last attempt to quit: 2008     Years since quittin.5     Smokeless tobacco: Never Used   Substance Use Topics     Alcohol use: Yes     Comment: 3 beers 1x/month     If you drink alcohol do you typically have >3 drinks per day or >7 drinks per week? No    Alcohol Use 2020   Prescreen: >3 drinks/day or >7 drinks/week? No   Prescreen: >3 drinks/day or >7 drinks/week? -       Reviewed orders with patient.  Reviewed health maintenance and updated orders accordingly - Yes  Labs reviewed in EPIC    Mammogram not appropriate for this patient based on age.    Pertinent mammograms are  "reviewed under the imaging tab.  History of abnormal Pap smear: YES - updated in Problem List and Health Maintenance accordingly  PAP / HPV Latest Ref Rng & Units 3/14/2019   PAP - NIL   HPV 16 DNA NEG:Negative Negative   HPV 18 DNA NEG:Negative Negative   OTHER HR HPV NEG:Negative Positive(A)     Reviewed and updated as needed this visit by clinical staff  Tobacco  Allergies  Meds         Reviewed and updated as needed this visit by Provider            Review of Systems   Constitutional: Negative for chills and fever.   HENT: Negative for congestion, ear pain, hearing loss and sore throat.    Eyes: Negative for pain and visual disturbance.   Respiratory: Negative for cough and shortness of breath.    Cardiovascular: Negative for chest pain, palpitations and peripheral edema.   Gastrointestinal: Negative for abdominal pain, constipation, diarrhea, heartburn, hematochezia and nausea.   Breasts:  Negative for tenderness, breast mass and discharge.   Genitourinary: Negative for dysuria, frequency, genital sores, hematuria, pelvic pain, urgency, vaginal bleeding and vaginal discharge.   Musculoskeletal: Positive for arthralgias. Negative for joint swelling and myalgias.   Skin: Negative for rash.   Neurological: Negative for dizziness, weakness, headaches and paresthesias.   Psychiatric/Behavioral: Negative for mood changes. The patient is not nervous/anxious.           OBJECTIVE:   BP 96/66 (BP Location: Right arm, Patient Position: Chair, Cuff Size: Adult Regular)   Pulse 89   Temp 98.2  F (36.8  C) (Oral)   Ht 1.715 m (5' 7.5\")   Wt 69.9 kg (154 lb)   SpO2 98%   BMI 23.76 kg/m    Physical Exam  GENERAL: healthy, alert and no distress  EYES: Eyes grossly normal to inspection, PERRL and conjunctivae and sclerae normal  HENT: ear canals and TM's normal, nose and mouth without ulcers or lesions  NECK: no adenopathy, no asymmetry, masses, or scars and thyroid normal to palpation  RESP: lungs clear to auscultation " "- no rales, rhonchi or wheezes  CV: regular rate and rhythm, normal S1 S2, no S3 or S4, no murmur, click or rub, no peripheral edema and peripheral pulses strong  ABDOMEN: soft, nontender, no hepatosplenomegaly, no masses and bowel sounds normal  MS: no gross musculoskeletal defects noted, no edema  SKIN: bronzed discoloration of skin  NEURO: Normal strength and tone, sensory exam grossly normal, mentation intact and antalgic gait  PSYCH: mentation appears normal, affect normal/bright    Diagnostic Test Results:  Labs reviewed in Epic  No results found for any visits on 07/08/20.    ASSESSMENT/PLAN:   1. Routine history and physical examination of adult      2. Screening for cervical cancer    - Pap imaged thin layer screen with HPV - recommended age 30 - 65 years (select HPV order below)  - HPV High Risk Types DNA Cervical    3. Avascular necrosis of femoral head, unspecified laterality (H)  Encouraged patient to seek out second opinion regarding treatment options      COUNSELING:  Reviewed preventive health counseling, as reflected in patient instructions       Regular exercise       Healthy diet/nutrition    Estimated body mass index is 23.76 kg/m  as calculated from the following:    Height as of this encounter: 1.715 m (5' 7.5\").    Weight as of this encounter: 69.9 kg (154 lb).         reports that she quit smoking about 12 years ago. Her smoking use included cigarettes. She started smoking about 14 years ago. She has a 1.50 pack-year smoking history. She has never used smokeless tobacco.      Counseling Resources:  ATP IV Guidelines  Pooled Cohorts Equation Calculator  Breast Cancer Risk Calculator  FRAX Risk Assessment  ICSI Preventive Guidelines  Dietary Guidelines for Americans, 2010  USDA's MyPlate  ASA Prophylaxis  Lung CA Screening    SOHEILA De León [facility]  "

## 2020-07-08 ENCOUNTER — OFFICE VISIT (OUTPATIENT)
Dept: FAMILY MEDICINE | Facility: CLINIC | Age: 35
End: 2020-07-08
Payer: COMMERCIAL

## 2020-07-08 VITALS
SYSTOLIC BLOOD PRESSURE: 96 MMHG | OXYGEN SATURATION: 98 % | DIASTOLIC BLOOD PRESSURE: 66 MMHG | HEART RATE: 89 BPM | HEIGHT: 68 IN | BODY MASS INDEX: 23.34 KG/M2 | TEMPERATURE: 98.2 F | WEIGHT: 154 LBS

## 2020-07-08 DIAGNOSIS — Z00.00 ROUTINE HISTORY AND PHYSICAL EXAMINATION OF ADULT: Primary | ICD-10-CM

## 2020-07-08 DIAGNOSIS — M87.059 AVASCULAR NECROSIS OF FEMORAL HEAD, UNSPECIFIED LATERALITY (H): ICD-10-CM

## 2020-07-08 DIAGNOSIS — Z12.4 SCREENING FOR CERVICAL CANCER: ICD-10-CM

## 2020-07-08 PROCEDURE — G0145 SCR C/V CYTO,THINLAYER,RESCR: HCPCS | Performed by: NURSE PRACTITIONER

## 2020-07-08 PROCEDURE — 99395 PREV VISIT EST AGE 18-39: CPT | Performed by: NURSE PRACTITIONER

## 2020-07-08 PROCEDURE — 87624 HPV HI-RISK TYP POOLED RSLT: CPT | Performed by: NURSE PRACTITIONER

## 2020-07-08 ASSESSMENT — ENCOUNTER SYMPTOMS
SORE THROAT: 0
HEMATURIA: 0
HEADACHES: 0
PALPITATIONS: 0
DIARRHEA: 0
ABDOMINAL PAIN: 0
HEMATOCHEZIA: 0
CHILLS: 0
NERVOUS/ANXIOUS: 0
ARTHRALGIAS: 1
DYSURIA: 0
SHORTNESS OF BREATH: 0
CONSTIPATION: 0
JOINT SWELLING: 0
DIZZINESS: 0
WEAKNESS: 0
MYALGIAS: 0
EYE PAIN: 0
HEARTBURN: 0
FEVER: 0
PARESTHESIAS: 0
BREAST MASS: 0
NAUSEA: 0
FREQUENCY: 0
COUGH: 0

## 2020-07-08 ASSESSMENT — MIFFLIN-ST. JEOR: SCORE: 1434.1

## 2020-07-10 LAB
COPATH REPORT: NORMAL
PAP: NORMAL

## 2020-07-15 LAB
FINAL DIAGNOSIS: NORMAL
HPV HR 12 DNA CVX QL NAA+PROBE: NEGATIVE
HPV16 DNA SPEC QL NAA+PROBE: NEGATIVE
HPV18 DNA SPEC QL NAA+PROBE: NEGATIVE
SPECIMEN DESCRIPTION: NORMAL
SPECIMEN SOURCE CVX/VAG CYTO: NORMAL

## 2020-07-20 PROBLEM — R87.810 CERVICAL HIGH RISK HPV (HUMAN PAPILLOMAVIRUS) TEST POSITIVE: Status: ACTIVE | Noted: 2019-03-22

## 2020-07-22 DIAGNOSIS — E24.0 PITUITARY DEPENDENT CUSHING DISEASE (H): ICD-10-CM

## 2020-07-22 LAB
ANION GAP SERPL CALCULATED.3IONS-SCNC: 4 MMOL/L (ref 3–14)
BUN SERPL-MCNC: 23 MG/DL (ref 7–30)
CALCIUM SERPL-MCNC: 9.9 MG/DL (ref 8.5–10.1)
CHLORIDE SERPL-SCNC: 100 MMOL/L (ref 94–109)
CO2 SERPL-SCNC: 30 MMOL/L (ref 20–32)
CREAT SERPL-MCNC: 1.55 MG/DL (ref 0.52–1.04)
GFR SERPL CREATININE-BSD FRML MDRD: 43 ML/MIN/{1.73_M2}
GLUCOSE SERPL-MCNC: 95 MG/DL (ref 70–99)
POTASSIUM SERPL-SCNC: 4.6 MMOL/L (ref 3.4–5.3)
SODIUM SERPL-SCNC: 134 MMOL/L (ref 133–144)

## 2020-07-22 PROCEDURE — 82024 ASSAY OF ACTH: CPT | Performed by: FAMILY MEDICINE

## 2020-07-22 PROCEDURE — 80048 BASIC METABOLIC PNL TOTAL CA: CPT | Performed by: FAMILY MEDICINE

## 2020-07-22 PROCEDURE — 36415 COLL VENOUS BLD VENIPUNCTURE: CPT | Performed by: FAMILY MEDICINE

## 2020-07-23 LAB — ACTH PLAS-MCNC: >1250 PG/ML

## 2020-07-26 ENCOUNTER — DOCUMENTATION ONLY (OUTPATIENT)
Dept: ENDOCRINOLOGY | Facility: CLINIC | Age: 35
End: 2020-07-26

## 2020-07-26 NOTE — PROGRESS NOTES
Reviewed results in 7/2020, ATCH still >1250, we last visit 4/2020 increased florinef, no change.     Also reviewed MRI again, in my personal review, left carotid site hypodense seems bit increased in my impression.     Formal reading 3/16/2020 by radiologist was no recurrence.     I will discuss the case in the pituitary team.     Adriana Wilkinson MD

## 2020-07-29 ENCOUNTER — PATIENT OUTREACH (OUTPATIENT)
Dept: FAMILY MEDICINE | Facility: CLINIC | Age: 35
End: 2020-07-29

## 2020-07-29 NOTE — TELEPHONE ENCOUNTER
9/11/2015 Pap: LSIL, +HR HPV (not 16/18)  10/2015 [place]: BEVERLEY 1  5/13/2016 Pap only: NIL   >> all above results found in Care Everywhere  3/14/2019 Pap: NIL, +HR HPV (not 16/18) - Plan colp.  4/2/19 colp. bx and ecc WNL. Plan: cotest 1 year  4/9/20 COVID 19 interim plan updated to: Plan unchanged. (jodi) CCT Tracking.  7/8/20 NIL Pap, Neg HPV. Plan cotest in 3 years.   7/20/20 MyChart result note sent.

## 2020-09-17 ENCOUNTER — TRANSFERRED RECORDS (OUTPATIENT)
Dept: HEALTH INFORMATION MANAGEMENT | Facility: CLINIC | Age: 35
End: 2020-09-17

## 2020-09-18 ENCOUNTER — E-VISIT (OUTPATIENT)
Dept: FAMILY MEDICINE | Facility: CLINIC | Age: 35
End: 2020-09-18
Payer: COMMERCIAL

## 2020-09-18 ENCOUNTER — MYC MEDICAL ADVICE (OUTPATIENT)
Dept: FAMILY MEDICINE | Facility: CLINIC | Age: 35
End: 2020-09-18

## 2020-09-18 DIAGNOSIS — R05.9 COUGH: Primary | ICD-10-CM

## 2020-09-18 PROCEDURE — 99421 OL DIG E/M SVC 5-10 MIN: CPT | Performed by: NURSE PRACTITIONER

## 2020-09-18 RX ORDER — BENZONATATE 100 MG/1
100 CAPSULE ORAL 3 TIMES DAILY PRN
Qty: 21 CAPSULE | Refills: 0 | Status: SHIPPED | OUTPATIENT
Start: 2020-09-18 | End: 2021-07-01

## 2020-09-18 NOTE — PATIENT INSTRUCTIONS
Thank you for choosing us for your care. Given your symptoms, I would like you to do a lab-only visit to determine what is causing them.  I have placed the orders.  Please schedule an appointment with the lab right here in [facility], or call [phone].  I will let you know when the results are back and next steps to take.  
Cholelithiasis

## 2020-12-13 ENCOUNTER — HEALTH MAINTENANCE LETTER (OUTPATIENT)
Age: 35
End: 2020-12-13

## 2021-01-10 ENCOUNTER — MYC MEDICAL ADVICE (OUTPATIENT)
Dept: ENDOCRINOLOGY | Facility: CLINIC | Age: 36
End: 2021-01-10

## 2021-01-10 DIAGNOSIS — Z79.890 PREMATURE MENOPAUSE ON HORMONE REPLACEMENT THERAPY: ICD-10-CM

## 2021-01-10 DIAGNOSIS — D35.2 PITUITARY ADENOMA (H): ICD-10-CM

## 2021-01-10 DIAGNOSIS — E27.1 ADRENAL INSUFFICIENCY, PRIMARY (H): ICD-10-CM

## 2021-01-10 DIAGNOSIS — E03.8 OTHER SPECIFIED HYPOTHYROIDISM: ICD-10-CM

## 2021-01-10 DIAGNOSIS — E28.319 PREMATURE MENOPAUSE ON HORMONE REPLACEMENT THERAPY: ICD-10-CM

## 2021-01-10 DIAGNOSIS — E28.39 ESTROGEN DEFICIENCY: ICD-10-CM

## 2021-01-10 DIAGNOSIS — E27.40 ADRENAL INSUFFICIENCY (H): ICD-10-CM

## 2021-01-10 RX ORDER — HYDROCORTISONE 10 MG/1
TABLET ORAL
Qty: 150 TABLET | Refills: 5 | Status: SHIPPED | OUTPATIENT
Start: 2021-01-10 | End: 2022-03-24

## 2021-01-13 ENCOUNTER — MYC MEDICAL ADVICE (OUTPATIENT)
Dept: ENDOCRINOLOGY | Facility: CLINIC | Age: 36
End: 2021-01-13

## 2021-01-13 DIAGNOSIS — E24.0 PITUITARY DEPENDENT CUSHING DISEASE (H): Primary | ICD-10-CM

## 2021-01-13 RX ORDER — ESTRADIOL 0.05 MG/D
1 PATCH, EXTENDED RELEASE TRANSDERMAL
Qty: 24 PATCH | Refills: 0 | Status: SHIPPED | OUTPATIENT
Start: 2021-01-14 | End: 2021-01-22

## 2021-01-13 RX ORDER — FLUDROCORTISONE ACETATE 0.1 MG/1
0.1 TABLET ORAL DAILY
Qty: 90 TABLET | Refills: 3 | Status: SHIPPED | OUTPATIENT
Start: 2021-01-13 | End: 2021-01-22

## 2021-01-13 RX ORDER — LEVOTHYROXINE SODIUM 75 UG/1
TABLET ORAL
Qty: 78 TABLET | Refills: 3 | Status: SHIPPED | OUTPATIENT
Start: 2021-01-13 | End: 2021-01-22

## 2021-01-13 RX ORDER — DESMOPRESSIN ACETATE 0.1 MG/1
0.1 TABLET ORAL 2 TIMES DAILY
Qty: 180 TABLET | Refills: 5 | Status: SHIPPED | OUTPATIENT
Start: 2021-01-13 | End: 2021-01-22

## 2021-01-13 NOTE — TELEPHONE ENCOUNTER
LEVOTHYROXINE 0.075MG (75MCG) TABS      Last Written Prescription Date:  10/21/19  Last Fill Quantity: 90,   # refills: 5  Last Office Visit : 4/27/20 recommended 1 year follow up  Future Office visit:  None scheduled    Routing refill request to provider for review/approval because:  Overdue and abnormal TSH  Lab Test 01/12/18  0732   TSH <0.01*     Nothing more recent in care everywhere nor media    FLUDROCORTISONE 0.1MG TABLETS      Last Written Prescription Date:  10/21/19  Last Fill Quantity: 45,   # refills: 5  Last Office Visit : 4/27/20 recommended 1 year follow up  Future Office visit:  None scheduled    Routing refill request to provider for review/approval because:  Discrepancy between last dictation note and current and requested sig    DESMOPRESSIN 0.1MG TABLETS      Last sodium 7/22/20 134

## 2021-01-22 RX ORDER — LEVOTHYROXINE SODIUM 75 UG/1
TABLET ORAL
Qty: 78 TABLET | Refills: 3 | Status: SHIPPED | OUTPATIENT
Start: 2021-01-22 | End: 2022-03-24

## 2021-01-22 RX ORDER — FLUDROCORTISONE ACETATE 0.1 MG/1
0.1 TABLET ORAL DAILY
Qty: 90 TABLET | Refills: 3 | Status: SHIPPED | OUTPATIENT
Start: 2021-01-22 | End: 2022-03-24

## 2021-01-22 RX ORDER — DESMOPRESSIN ACETATE 0.1 MG/1
0.1 TABLET ORAL 2 TIMES DAILY
Qty: 180 TABLET | Refills: 5 | Status: ON HOLD | OUTPATIENT
Start: 2021-01-22 | End: 2021-07-24

## 2021-01-22 RX ORDER — ESTRADIOL 0.05 MG/D
1 PATCH, EXTENDED RELEASE TRANSDERMAL
Qty: 24 PATCH | Refills: 0 | Status: SHIPPED | OUTPATIENT
Start: 2021-01-25 | End: 2021-04-14

## 2021-01-22 NOTE — TELEPHONE ENCOUNTER
Change of pharmacy. Per Pt request. Orders sent. Pt notified.   Danna Og RN on 1/22/2021 at 12:44 PM     Milla Hodge Takako, MD 2 minutes ago (12:39 PM)        Other scripts that were requested apparently went to a mail order pharmacy that is no longer in network.  Can you please resend to [facility] in [place]?

## 2021-02-19 ENCOUNTER — HOSPITAL ENCOUNTER (OUTPATIENT)
Dept: MRI IMAGING | Facility: CLINIC | Age: 36
Discharge: HOME | End: 2021-02-19
Attending: INTERNAL MEDICINE | Admitting: INTERNAL MEDICINE
Payer: COMMERCIAL

## 2021-02-19 DIAGNOSIS — E24.0 PITUITARY DEPENDENT CUSHING DISEASE (H): ICD-10-CM

## 2021-02-19 LAB
CREAT BLD-MCNC: 1.2 MG/DL (ref 0.52–1.04)
GFR SERPL CREATININE-BSD FRML MDRD: 51 ML/MIN/{1.73_M2}

## 2021-02-19 PROCEDURE — 70553 MRI BRAIN STEM W/O & W/DYE: CPT

## 2021-02-19 PROCEDURE — 70553 MRI BRAIN STEM W/O & W/DYE: CPT | Mod: 26 | Performed by: RADIOLOGY

## 2021-02-19 PROCEDURE — A9585 GADOBUTROL INJECTION: HCPCS | Performed by: INTERNAL MEDICINE

## 2021-02-19 PROCEDURE — 82565 ASSAY OF CREATININE: CPT

## 2021-02-19 PROCEDURE — 255N000002 HC RX 255 OP 636: Performed by: INTERNAL MEDICINE

## 2021-02-19 PROCEDURE — 258N000003 HC RX IP 258 OP 636: Performed by: INTERNAL MEDICINE

## 2021-02-19 RX ORDER — GADOBUTROL 604.72 MG/ML
7.5 INJECTION INTRAVENOUS ONCE
Status: COMPLETED | OUTPATIENT
Start: 2021-02-19 | End: 2021-02-19

## 2021-02-19 RX ADMIN — SODIUM CHLORIDE 40 ML: 9 INJECTION, SOLUTION INTRAVENOUS at 11:12

## 2021-02-19 RX ADMIN — GADOBUTROL 7 ML: 604.72 INJECTION INTRAVENOUS at 11:13

## 2021-03-04 NOTE — RESULT ENCOUNTER NOTE
Subhash Loyola,    I hope you are doing well. Tumor slight enlarged by MRI. Make sure you go to blood work before meeting in April.     Please take care    Adriana Wilkinson MD

## 2021-03-15 ENCOUNTER — TRANSFERRED RECORDS (OUTPATIENT)
Dept: HEALTH INFORMATION MANAGEMENT | Facility: CLINIC | Age: 36
End: 2021-03-15

## 2021-03-15 ENCOUNTER — MEDICAL CORRESPONDENCE (OUTPATIENT)
Dept: HEALTH INFORMATION MANAGEMENT | Facility: CLINIC | Age: 36
End: 2021-03-15

## 2021-04-09 DIAGNOSIS — E24.0 PITUITARY DEPENDENT CUSHING DISEASE (H): ICD-10-CM

## 2021-04-09 LAB
ANION GAP SERPL CALCULATED.3IONS-SCNC: 1 MMOL/L (ref 3–14)
BUN SERPL-MCNC: 19 MG/DL (ref 7–30)
CALCIUM SERPL-MCNC: 8.6 MG/DL (ref 8.5–10.1)
CHLORIDE SERPL-SCNC: 105 MMOL/L (ref 94–109)
CO2 SERPL-SCNC: 31 MMOL/L (ref 20–32)
CORTIS SERPL-MCNC: 29.9 UG/DL (ref 4–22)
CREAT SERPL-MCNC: 1.18 MG/DL (ref 0.52–1.04)
ESTRADIOL SERPL-MCNC: 13 PG/ML
GFR SERPL CREATININE-BSD FRML MDRD: 59 ML/MIN/{1.73_M2}
GLUCOSE SERPL-MCNC: 86 MG/DL (ref 70–99)
POTASSIUM SERPL-SCNC: 3.6 MMOL/L (ref 3.4–5.3)
SODIUM SERPL-SCNC: 137 MMOL/L (ref 133–144)

## 2021-04-09 PROCEDURE — 82627 DEHYDROEPIANDROSTERONE: CPT | Performed by: INTERNAL MEDICINE

## 2021-04-09 PROCEDURE — 36415 COLL VENOUS BLD VENIPUNCTURE: CPT | Performed by: INTERNAL MEDICINE

## 2021-04-09 PROCEDURE — 80048 BASIC METABOLIC PNL TOTAL CA: CPT | Performed by: INTERNAL MEDICINE

## 2021-04-09 PROCEDURE — 82533 TOTAL CORTISOL: CPT | Performed by: INTERNAL MEDICINE

## 2021-04-09 PROCEDURE — 82670 ASSAY OF TOTAL ESTRADIOL: CPT | Performed by: INTERNAL MEDICINE

## 2021-04-09 PROCEDURE — 82024 ASSAY OF ACTH: CPT | Performed by: INTERNAL MEDICINE

## 2021-04-12 LAB
ACTH PLAS-MCNC: >1250 PG/ML
DHEA-S SERPL-MCNC: <15 UG/DL (ref 35–430)

## 2021-04-13 NOTE — PROGRESS NOTES
Nell Heath CMA    Milla is a 35 year old who is being evaluated via a billable video visit.      How would you like to obtain your AVS? MyChart  If the video visit is dropped, the invitation should be resent by: Text to cell phone: [phone]  Will anyone else be joining your video visit? No         Video-Visit Details    Type of service:  Video Visit    Start: 04/27/2020 12:00 pm   Stop: 04/27/2020 12:30 pm   Total time spent: 45 minutes    45 minutes spent on the date of the encounter doing chart review, history and exam, documentation, communicating neurosurgery teatm for referral and further activities as noted above.    Originating Location (pt. Location): Home    Distant Location (provider location):  [facility] ENDOCRINOLOGY     Mode of Communication:  Video Conference via [employer]     [employer]  Adriana Wilkinson MD  Staff Physician  Endocrinology and Metabolism  [facility]  License: [place] [id number]  Pager: [phone]     Assessment:  Suyapa Hodge is a 34 year old female with a history of with a history of pituitary Cushing's syndrome and pituitary microadenoma s/p pituitary surgery x3 in 2013-14 and finally underwent bilateral adrenalectomy in July 2014. Since July 2019 her ACTH levels have continued to elevate. She feels well in general and has not had any new symptoms since I last saw her in August 2018. She is currently taking fludrocortisone 0.05 mg daily, hydrocortisone BID (10 mg misses dose once per month), L-thyroxine 75 mcg tablets full tablet 6 days and desmopressin 0.1 mg in the morning. She is tolerating all these well. There was some skin darkening on exam which was concerning. She denies bed tanning. Would like to have her complete labs today to check hormones (FSH, Lutropin, Prolactin, ISGF, ACTH, cortisol, T4 free). She will also complete BMP and pituitary tumor marker.     Plan:  1. Cushing disease post TSS 3 times (2010, 2013, 2014)    2. Elevated ACTH gradually  increasing, over 1250 for more than 1 year.     - Significantly worsening facial pigmentation    3. Increasing the size of the lesion  Repeated MRI in 2/2021 showed increase the size of the left intra sellar lesion that surrounds carotid artery.     4. Surgical option?  Previously the case was briefly went over during the pituitary case conference, we will refer to Neurosurgery officially to review.     - referral to Dr. Chaudhry.     5. hypothyrodism stable    6. Diabetes insipidus stable    7.  Hypogonadism  Has some skin issues with estrogen patch, we will switch to BCP with low dose of estrogen.     Stop vivredot 0.05 mg BIW to BCP (E2 20 mcg)     Adriana Wilkinson MD  Staff Physician  Endocrinology and Metabolism  [facility]  License: [place] [id number]  Pager: [phone]    Interval History as of 4/14/2021 : No HA, medication compliance good, no dizziness, no HA. Pigmentation gettign worse, lesion increasing in size.   History of Present Illness: Suyapa Hodge is a 34 year old female with a history of pituitary Cushing's syndrome, hypothalamic adrenal dysfunction and pituitary microadenoma who presents for follow up. She was last seen in clinic 14 months ago. She was diagnosed with pituitary microademoma serveral year ago which was suspected to cause her Cushing's disease. She had pituitary surgery x3 in 2013-14 and finally underwent bilateral adrenalectomy in July 2014. ACTH levels remained normal. In July 2017 ACTH levels were tested and was 271, which increased to 549 raising concern for recurrence vs missed diagnosis.  Chest abdomen and pelvis CT was normal. 8 mg dex suppression reduced levels to 118.     She is currently taking hydrocortisone BID (10 mg misses dose once per month), L-thyroxine 75 mcg tablets full tablet 6 days and desmopressin 0.1 mg in the morning. She denies that she gets up in the middle of the night to use the restroom. She reports that she goes to the eye doctor every 3  "months to see if the tumor \"has moved to her optic nerve\".      She is on hormone replacement therapy (prometrium and vivelle patch). No history of uterine surgeries. She has a history of irregular periods but has been evaluated with GYN, per patient, and was not told specifics.     She is not taking the calcium supplement. She has a history of avascular necrosis but has been told that she is too young for a hip replacement. DXA showed low BMD for age.     No eye symptoms  No headache, change in vision, loss of peripheral vision  Complete ROS that were obtained were negative.     Active Medications:      cyclobenzaprine (FLEXERIL) 10 MG tablet, Take 1 tablet (10 mg) by mouth 3 times daily as needed for muscle spasms, Disp: 30 tablet, Rfl: 0     desmopressin (DDAVP) 0.1 MG tablet, Take 1 tablet (100 mcg) by mouth 2 times daily (1 TAB) MORNING AND AT BEDTIME, Disp: 180 tablet, Rfl: 5     estradiol (VIVELLE-DOT) 0.05 MG/24HR bi-weekly patch, Place 1 patch onto the skin twice a week, Disp: 27 patch, Rfl: 5     fludrocortisone (FLORINEF) 0.1 MG tablet, Take 0.5 tablets (0.05 mg) by mouth daily Please keep 10-21-19 clinic appt for further refills, Disp: 45 tablet, Rfl: 5     hydrocortisone (CORTEF) 10 MG tablet, 1 tab (10 mg) am 1/2 tab (5 mg) pm plus sick day supply as directed, Disp: 150 tablet, Rfl: 5     levothyroxine (SYNTHROID/LEVOTHROID) 75 MCG tablet, One tablet day 6 days per wk and none on the 7th day each wk, Disp: 90 tablet, Rfl: 5     Multiple Vitamin (MULTI-VITAMIN DAILY PO), , Disp: , Rfl:      progesterone (PROMETRIUM) 100 MG capsule, Take 1 tablet daily, Disp: 90 capsule, Rfl: 1     triamcinolone (KENALOG) 0.1 % external cream, Apply topically 2 times daily As needed for flares, Disp: 80 g, Rfl: 1     Calcium carb-Vitamin D 500 mg [place]-200 units (OSCAL WITH D;OYSTER SHELL CALCIUM) 500-200 MG-UNIT per tablet, Take 1 tablet by mouth 2 times daily (with meals), Disp: , Rfl:      methocarbamol (ROBAXIN) 500 " MG tablet, Take 1-2 tablets (500-1,000 mg) by mouth nightly as needed for muscle spasms (Patient not taking: Reported on 10/21/2019), Disp: 20 tablet, Rfl: 0     naproxen (NAPROSYN) 500 MG tablet, Take 1 tablet (500 mg) by mouth 2 times daily (with meals) (Patient not taking: Reported on 10/21/2019), Disp: 60 tablet, Rfl: 1      Allergies:   Patient has no known allergies.     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 resection   Avascular necrosis of femur head   Cervical high risk HPV   Colon polyp   Diabetes insipidus   Hypercalcemia   Hypertension   Pulmonary emboli   Hypothyroidism      Past Surgical History:  Lumbar drain- 01/2014   Adrenalectomy-07/2014   Tumor resection (hypophysectomy)- 07/2013   [place] teeth extracted     Family History:   Mother: asthma, osteoporosis, obesity   Father: hyperlipidemia, obesity   Sister: allergies, obesity   Maternal grandmother: colon cancer  Maternal grandfather: lung cancer   Paternal grandmother: lung cancer   Paternal grandfather: lung cancer      Social History:   The patient was alone   Smoking Status: former; 1/2 pack per day for 3 years; 12 years since quitting   Smokeless Tobacco: never    Alcohol Use: yes; 3 beers per month   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 No masses, lesions, tenderness or abnormalities  Resporatory: non acute disctress  Skin: dark pigmentation entire face      Data:  TSH   Date Value Ref Range Status   01/12/2018 <0.01 (L) 0.40 - 4.00 mU/L Final   01/08/2016 <0.01 (L) 0.40 - 4.00 mU/L Final   09/25/2015 <0.01 (L) 0.40 - 4.00 mU/L Final   05/22/2015 <0.01 (L) 0.40 - 4.00 mU/L Final   03/13/2015 (L) 0.40 - 4.00 mU/L Final    <0.01  Effective 7/30/2014, the reference range for this assay has  changed to reflect   new instrumentation/methodology.       T4 Free   Date Value Ref Range Status   10/08/2018 0.53 (L) 0.76 - 1.46 ng/dL Final   01/12/2018 0.90 0.76 - 1.46 ng/dL Final   10/04/2017 0.90 0.76 - 1.46 ng/dL Final   07/07/2017 0.98 0.76 - 1.46 ng/dL Final   12/30/2016 0.91 0.76 - 1.46 ng/dL Final     CBC RESULTS:   Recent Labs   Lab Test 07/15/14  0806   WBC 8.2   RBC 4.51   HGB 13.3   HCT 39.7   MCV 88   MCH 29.5   MCHC 33.5   RDW 13.7        Recent Labs   Lab Test 10/08/18  0802 01/12/18  0732    137   POTASSIUM 4.1 4.3   CHLORIDE 105 105   CO2 32 27   ANIONGAP 4 5   GLC 87 78   BUN 14 19   CR 1.10* 0.93   ELIEZER 9.3 9.2     MRI: I also personally reviewed brain MRI and pituitary lesion and interepret and expalined to the patient.     intraseller maybe small hypodense 3-4 mm(?) residual vs scar in my impression           MRI: I reviewed original images of 2/19/2021 ad explained to the patient.   Brain/ Pituitary MRI without and with contrast     History: 33 yo female pituitary Cushing disease follow up; Pituitary  dependent Cushing disease (H).     ICD-10: Pituitary dependent Cushing disease (H)     Comparison:  Multiple pituitary MRI exams since 12/3/2009, most recent  one from 3/16/2020.     Technique: Axial diffusion and FLAIR images of the whole brain  obtained without intravenous contrast. Sagittal T1 and T2-weighted,  coronal T2-weighted, coronal T1-weighted images with focus on the  sella were obtained without intravenous contrast. Post intravenous  contrast using gadolinium coronal and sagittal T1-weighted images were  obtained focused on the sella. Dynamic postcontrast coronal  T1-weighted images were also obtained.     Contrast: 7mL Gadavist IV     Findings:    Postsurgical changes of transsphenoidal approach pituitary  microadenoma resection. Hypoenhancing area within the left side of the  sella, extending to the left cavernous sinus, increased since  10/8/2018, measuring 9 x 7 x  10 mm, previously 6 x 6 x 4 mm.  Associated diffusion restriction. Abutment of the cavernous segment of  left internal carotid artery.     The pituitary stalk appears midline. The optic chiasm appears intact  and not displaced. The major cavernous carotid vascular flow-voids  appear patent.

## 2021-04-14 ENCOUNTER — VIRTUAL VISIT (OUTPATIENT)
Dept: ENDOCRINOLOGY | Facility: CLINIC | Age: 36
End: 2021-04-14
Payer: COMMERCIAL

## 2021-04-14 DIAGNOSIS — E23.0 FEMALE HYPOGONADOTROPIC HYPOGONADISM (H): ICD-10-CM

## 2021-04-14 DIAGNOSIS — E24.0 PITUITARY DEPENDENT CUSHING DISEASE (H): Primary | ICD-10-CM

## 2021-04-14 DIAGNOSIS — E27.40 CHRONIC ADRENAL INSUFFICIENCY (H): ICD-10-CM

## 2021-04-14 DIAGNOSIS — D35.2 PITUITARY ADENOMA (H): ICD-10-CM

## 2021-04-14 PROCEDURE — 99215 OFFICE O/P EST HI 40 MIN: CPT | Mod: 95 | Performed by: INTERNAL MEDICINE

## 2021-04-14 RX ORDER — DROSPIRENONE AND ETHINYL ESTRADIOL 0.02-3(28)
1 KIT ORAL DAILY
Qty: 84 TABLET | Refills: 3 | Status: SHIPPED | OUTPATIENT
Start: 2021-04-14 | End: 2022-10-31

## 2021-04-14 NOTE — LETTER
4/14/2021       RE: Suyapa Hodge  [address] [facility] [address]     Dear Colleague,    Thank you for referring your patient, Suyapa Hodge, to the [facility] [place] at [facility]. Please see a copy of my visit note below.    Nell  Vel HeathGOVIND is a 35 year old who is being evaluated via a billable video visit.      How would you like to obtain your AVS? MyChart  If the video visit is dropped, the invitation should be resent by: Text to cell phone: [phone]  Will anyone else be joining your video visit? No         Video-Visit Details    Type of service:  Video Visit    Start: 04/27/2020 12:00 pm   Stop: 04/27/2020 12:30 pm   Total time spent: 45 minutes  Originating Location (pt. Location): Home    Distant Location (provider location):  [facility] ENDOCRINOLOGY     Mode of Communication:  [facility]  Adriana Wilkinson MD  Staff Physician  Endocrinology and Metabolism  [facility]  License: [place] [id number]  Pager: [phone]     Assessment:  Suyapa Hodge is a 34 year old female with a history of with a history of pituitary Cushing's syndrome and pituitary microadenoma s/p pituitary surgery x3 in 2013-14 and finally underwent bilateral adrenalectomy in July 2014. Since July 2019 her ACTH levels have continued to elevate. She feels well in general and has not had any new symptoms since I last saw her in August 2018. She is currently taking fludrocortisone 0.05 mg daily, hydrocortisone BID (10 mg misses dose once per month), L-thyroxine 75 mcg tablets full tablet 6 days and desmopressin 0.1 mg in the morning. She is tolerating all these well. There was some skin darkening on exam which was concerning. She denies bed tanning. Would like to have her complete labs today to check hormones (FSH, Lutropin, Prolactin, ISGF, ACTH, cortisol, T4 free). She will also complete  BMP and pituitary tumor marker.     Plan:  1. Cushing disease post TSS 3 times (2010, 2013, 2014)    2. Elevated ACTH gradually increasing, over 1250 for more than 1 year.     - Significantly worsening facial pigmentation    3. Increasing the size of the lesion  Repeated MRI in 2/2021 showed increase the size of the left intra sellar lesion that surrounds carotid artery.     4. Surgical option?  Previously the case was briefly went over during the pituitary case conference, we will refer to Neurosurgery officially to review.     - referral to Dr. Chaudhry.     5. hypothyrodism stable    6. Diabetes insipidus stable    7.  Hypogonadism  Has some skin issues with estrogen patch, we will switch to BCP with low dose of estrogen.     Stop vivredot 0.05 mg BIW to BCP (E2 20 mcg)     Adriana Wilkinson MD  Staff Physician  Endocrinology and Metabolism  [facility]  License: [place] [id number]  Pager: [phone]    Interval History as of 4/14/2021 : No HA, medication compliance good, no dizziness, no HA. Pigmentation gettign worse, lesion increasing in size.   History of Present Illness: Suyapa Hodge is a 34 year old female with a history of pituitary Cushing's syndrome, hypothalamic adrenal dysfunction and pituitary microadenoma who presents for follow up. She was last seen in clinic 14 months ago. She was diagnosed with pituitary microademoma serveral year ago which was suspected to cause her Cushing's disease. She had pituitary surgery x3 in 2013-14 and finally underwent bilateral adrenalectomy in July 2014. ACTH levels remained normal. In July 2017 ACTH levels were tested and was 271, which increased to 549 raising concern for recurrence vs missed diagnosis.  Chest abdomen and pelvis CT was normal. 8 mg dex suppression reduced levels to 118.     She is currently taking hydrocortisone BID (10 mg misses dose once per month), L-thyroxine 75 mcg tablets full tablet 6 days and desmopressin 0.1 mg in the morning.  "She denies that she gets up in the middle of the night to use the restroom. She reports that she goes to the eye doctor every 3 months to see if the tumor \"has moved to her optic nerve\".      She is on hormone replacement therapy (prometrium and vivelle patch). No history of uterine surgeries. She has a history of irregular periods but has been evaluated with GYN, per patient, and was not told specifics.     She is not taking the calcium supplement. She has a history of avascular necrosis but has been told that she is too young for a hip replacement. DXA showed low BMD for age.     No eye symptoms  No headache, change in vision, loss of peripheral vision  Complete ROS that were obtained were negative.     Active Medications:      cyclobenzaprine (FLEXERIL) 10 MG tablet, Take 1 tablet (10 mg) by mouth 3 times daily as needed for muscle spasms, Disp: 30 tablet, Rfl: 0     desmopressin (DDAVP) 0.1 MG tablet, Take 1 tablet (100 mcg) by mouth 2 times daily (1 TAB) MORNING AND AT BEDTIME, Disp: 180 tablet, Rfl: 5     estradiol (VIVELLE-DOT) 0.05 MG/24HR bi-weekly patch, Place 1 patch onto the skin twice a week, Disp: 27 patch, Rfl: 5     fludrocortisone (FLORINEF) 0.1 MG tablet, Take 0.5 tablets (0.05 mg) by mouth daily Please keep 10-21-19 clinic appt for further refills, Disp: 45 tablet, Rfl: 5     hydrocortisone (CORTEF) 10 MG tablet, 1 tab (10 mg) am 1/2 tab (5 mg) pm plus sick day supply as directed, Disp: 150 tablet, Rfl: 5     levothyroxine (SYNTHROID/LEVOTHROID) 75 MCG tablet, One tablet day 6 days per wk and none on the 7th day each wk, Disp: 90 tablet, Rfl: 5     Multiple Vitamin (MULTI-VITAMIN DAILY PO), , Disp: , Rfl:      progesterone (PROMETRIUM) 100 MG capsule, Take 1 tablet daily, Disp: 90 capsule, Rfl: 1     triamcinolone (KENALOG) 0.1 % external cream, Apply topically 2 times daily As needed for flares, Disp: 80 g, Rfl: 1     Calcium carb-Vitamin D 500 mg [place]-200 units (OSCAL WITH D;OYSTER SHELL " CALCIUM) 500-200 MG-UNIT per tablet, Take 1 tablet by mouth 2 times daily (with meals), Disp: , Rfl:      methocarbamol (ROBAXIN) 500 MG tablet, Take 1-2 tablets (500-1,000 mg) by mouth nightly as needed for muscle spasms (Patient not taking: Reported on 10/21/2019), Disp: 20 tablet, Rfl: 0     naproxen (NAPROSYN) 500 MG tablet, Take 1 tablet (500 mg) by mouth 2 times daily (with meals) (Patient not taking: Reported on 10/21/2019), Disp: 60 tablet, Rfl: 1      Allergies:   Patient has no known allergies.     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 resection   Avascular necrosis of femur head   Cervical high risk HPV   Colon polyp   Diabetes insipidus   Hypercalcemia   Hypertension   Pulmonary emboli   Hypothyroidism      Past Surgical History:  Lumbar drain- 01/2014   Adrenalectomy-07/2014   Tumor resection (hypophysectomy)- 07/2013   [place] teeth extracted     Family History:   Mother: asthma, osteoporosis, obesity   Father: hyperlipidemia, obesity   Sister: allergies, obesity   Maternal grandmother: colon cancer  Maternal grandfather: lung cancer   Paternal grandmother: lung cancer   Paternal grandfather: lung cancer      Social History:   The patient was alone   Smoking Status: former; 1/2 pack per day for 3 years; 12 years since quitting   Smokeless Tobacco: never    Alcohol Use: yes; 3 beers per month   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 No masses, lesions, tenderness or abnormalities  Resporatory: non acute disctress  Skin: dark pigmentation entire face      Data:  TSH   Date Value Ref Range Status   01/12/2018 <0.01 (L) 0.40 - 4.00 mU/L Final   01/08/2016 <0.01 (L) 0.40 - 4.00 mU/L Final   09/25/2015 <0.01 (L) 0.40 - 4.00 mU/L Final   05/22/2015 <0.01  (L) 0.40 - 4.00 mU/L Final   03/13/2015 (L) 0.40 - 4.00 mU/L Final    <0.01  Effective 7/30/2014, the reference range for this assay has changed to reflect   new instrumentation/methodology.       T4 Free   Date Value Ref Range Status   10/08/2018 0.53 (L) 0.76 - 1.46 ng/dL Final   01/12/2018 0.90 0.76 - 1.46 ng/dL Final   10/04/2017 0.90 0.76 - 1.46 ng/dL Final   07/07/2017 0.98 0.76 - 1.46 ng/dL Final   12/30/2016 0.91 0.76 - 1.46 ng/dL Final     CBC RESULTS:   Recent Labs   Lab Test 07/15/14  0806   WBC 8.2   RBC 4.51   HGB 13.3   HCT 39.7   MCV 88   MCH 29.5   MCHC 33.5   RDW 13.7        Recent Labs   Lab Test 10/08/18  0802 01/12/18  0732    137   POTASSIUM 4.1 4.3   CHLORIDE 105 105   CO2 32 27   ANIONGAP 4 5   GLC 87 78   BUN 14 19   CR 1.10* 0.93   ELIEZER 9.3 9.2     MRI: I also personally reviewed brain MRI and pituitary lesion and interepret and expalined to the patient.     intraseller maybe small hypodense 3-4 mm(?) residual vs scar in my impression           MRI: I reviewed original images of 2/19/2021 ad explained to the patient.   Brain/ Pituitary MRI without and with contrast     History: 35 yo female pituitary Cushing disease follow up; Pituitary  dependent Cushing disease (H).     ICD-10: Pituitary dependent Cushing disease (H)     Comparison:  Multiple pituitary MRI exams since 12/3/2009, most recent  one from 3/16/2020.     Technique: Axial diffusion and FLAIR images of the whole brain  obtained without intravenous contrast. Sagittal T1 and T2-weighted,  coronal T2-weighted, coronal T1-weighted images with focus on the  sella were obtained without intravenous contrast. Post intravenous  contrast using gadolinium coronal and sagittal T1-weighted images were  obtained focused on the sella. Dynamic postcontrast coronal  T1-weighted images were also obtained.     Contrast: 7mL Gadavist IV     Findings:    Postsurgical changes of transsphenoidal approach pituitary  microadenoma resection.  Hypoenhancing area within the left side of the  sella, extending to the left cavernous sinus, increased since  10/8/2018, measuring 9 x 7 x 10 mm, previously 6 x 6 x 4 mm.  Associated diffusion restriction. Abutment of the cavernous segment of  left internal carotid artery.     The pituitary stalk appears midline. The optic chiasm appears intact  and not displaced. The major cavernous carotid vascular flow-voids  appear patent.

## 2021-04-29 ENCOUNTER — TELEPHONE (OUTPATIENT)
Dept: NEUROSURGERY | Facility: CLINIC | Age: 36
End: 2021-04-29

## 2021-04-29 ENCOUNTER — PRE VISIT (OUTPATIENT)
Dept: NEUROSURGERY | Facility: CLINIC | Age: 36
End: 2021-04-29

## 2021-04-29 NOTE — TELEPHONE ENCOUNTER
FUTURE VISIT INFORMATION      FUTURE VISIT INFORMATION:    Date: 4/30/2021    Time: 12pm    Location: [facility]  REFERRAL INFORMATION:    Referring provider:  Dr. Wilkinson    Referring providers clinic:      Reason for visit/diagnosis  Pituitary Tumor     RECORDS REQUESTED FROM:       Clinic name Comments Records Status Imaging Status   INternal Dr. Wilkinson-4/14/2021    MR Brain-2/19/2021, 10/8/2018    MR Lumbar Spine-6/25/2020 Epic PACS

## 2021-04-30 ENCOUNTER — VIRTUAL VISIT (OUTPATIENT)
Dept: NEUROSURGERY | Facility: CLINIC | Age: 36
End: 2021-04-30
Payer: COMMERCIAL

## 2021-04-30 DIAGNOSIS — E24.1 NELSON'S SYNDROME (H): Primary | ICD-10-CM

## 2021-04-30 DIAGNOSIS — D35.2 PITUITARY MACROADENOMA (H): ICD-10-CM

## 2021-04-30 DIAGNOSIS — E27.0: ICD-10-CM

## 2021-04-30 PROCEDURE — 99204 OFFICE O/P NEW MOD 45 MIN: CPT | Mod: 95 | Performed by: NEUROLOGICAL SURGERY

## 2021-04-30 NOTE — PROGRESS NOTES
Milla is a 35 year old who is being evaluated via a billable telephone visit.      What phone number would you like to be contacted at? [phone]  How would you like to obtain your AVS? 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 Lives alone. Single. No changes in energy.   2010, 2013, 2014 surgeries.   Had PE post op 2014.   No headaches.   Needs right hip arthroplasty - walking ok, but repetitive getting up hurts. Appetite is very good. Weight around 145 lbs.   See dictated note.  RETA Huffman MD

## 2021-04-30 NOTE — LETTER
2021      RE: Suyapa Hodge  [address] [facility] [address]       Milla is a 35 year old who is being evaluated via a billable telephone visit.      What phone number would you like to be contacted at? [phone]  How would you like to obtain your AVS? 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 No changes in energy.   , ,  surgeries.   Had PE post op .   No headaches.   Needs right hip arthroplasty - walking ok, but repetitive getting up hurts. Appetite is very good. Weight around 145 lbs.   RETA Huffman MD      Service Date: 2021    Clarisa Palafox, [address]  [address]    RE:  Suyapa Hodge  MRN:  [id number]  :  1985    Dear Ms. Palafox:    We spoke to Ms. Hodge as part of a telephone visit in Pituitary Clinic on 2021.  Please see the detailed notes from our partner in Pituitary Endocrinology, Dr. Wilkinson, regarding details of her complex course.  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  She developed a pulmonary embolism postoperatively in .  With her Cushing disease not being cured, she underwent bilateral adrenalectomy in 2014.  Since 2019, her ACTH levels have progressively risen.  She has panhypopituitarism and is on fludrocortisone, hydrocortisone, levothyroxine, and desmopressin replacement.  She has been feeling well overall with the exception of right hip pain.  She is able to walk reasonably well, but she has pain with repetitive changes in position from sitting to standing.  Her skin has been increasingly darker.  As mentioned, her ACTH has been rising and is now 1250.  She denies any headaches.  Her weight has been stable at about 145 pounds.  She denies any problems with her energy.    She works as a hospice  nurse.  She lives in [place].  She is single and lives alone.  She does not smoke.    Over the phone, she sounded well.  Her speech, language and phonation were normal.      REVIEW OF STUDIES:  We reviewed her operative reports, as well as her recent imaging.  I was involved in her last pituitary surgery in  01/2014.  In this operation, it seems that she has undergone total hypophysectomy between the 3 operations.  It was not clear if she had a CSF leak in the most recent surgery in 2014 but a lumbar drain was placed.    We went over her MRI from 02/2021 and compared it to previous studies.  There is a hypoenhancing mass in the left side of the sella extending into the left parasellar space.  It is about 1 cm in maximum dimension.  The pituitary stalk is still present, but we did not see any obvious pituitary gland.  There are extensive postsurgical changes in the sphenoid sinus.  The optic apparatus remains completely decompressed.  The hypoenhancing pituitary adenoma has grown 2 or 3 mm compared to her last study in 03/2020.    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  She appears to have some clinical manifestations with hyperpigmentation.  Although technically challenging, we recommend reoperation in an attempt to remove this residual tumor.  We thought that she might be anxious about the prospect of another pituitary operation, but she took the news very well and had a very matter of fact approach to the news.  She seemed to have no issues with proceeding with surgery.  She is already familiar with the risks of stroke, carotid artery injury, CSF leak, incomplete resection, need for additional surgery and she agrees to proceed.  We will make arrangements for her to see Dr. Tsai, our ENT Skull Base Surgery partner from ENT to discuss the sinonasal aspects of surgery.  We will arrange for  surgery sometime in the next few weeks.  Please do not hesitate to contact us with questions.    Sincerely,    Mina Huffman MD    D: 2021   T: 2021   MT: ayah    Name:     VADIM ZAMBRANOMima  MRN:      [id number]        Account:      [id number]   :      1985           Service Date: 2021       Document: [id number]

## 2021-04-30 NOTE — LETTER
2021       RE: uSyapa Hodge  [address] [facility] [address]     Dear Colleague,    Thank you for referring your patient, Suyapa Hodge, to the [facility] [place] at [facility]. Please see a copy of my visit note below.    Milla is a 35 year old who is being evaluated via a billable telephone visit.      What phone number would you like to be contacted at? [phone]  How would you like to obtain your AVS? 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 No changes in energy.   , ,  surgeries.   Had PE post op .   No headaches.   Needs right hip arthroplasty - walking ok, but repetitive getting up hurts. Appetite is very good. Weight around 145 lbs.   RETA Huffman MD      Service Date: 2021    Clarisa Palafox, [address]  [address]    RE:  Suyapa Hodge  MRN:  [id number]  :  1985    Dear Ms. Palafox:    We spoke to Ms. Hodge as part of a telephone visit in Pituitary Clinic on 2021.  Please see the detailed notes from our partner in Pituitary Endocrinology, Dr. Wilkinson, regarding details of her complex course.  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  She developed a pulmonary embolism postoperatively in .  With her Cushing disease not being cured, she underwent bilateral adrenalectomy in 2014.  Since 2019, her ACTH levels have progressively risen.  She has panhypopituitarism and is on fludrocortisone, hydrocortisone, levothyroxine, and desmopressin replacement.  She has been feeling well overall with the exception of right hip pain.  She is able to walk reasonably well, but she has pain with repetitive changes in position from sitting to standing.  Her  skin has been increasingly darker.  As mentioned, her ACTH has been rising and is now 1250.  She denies any headaches.  Her weight has been stable at about 145 pounds.  She denies any problems with her energy.    She works as a hospice nurse.  She lives in [place].  She is single and lives alone.  She does not smoke.    Over the phone, she sounded well.  Her speech, language and phonation were normal.      REVIEW OF STUDIES:  We reviewed her operative reports, as well as her recent imaging.  I was involved in her last pituitary surgery in  01/2014.  In this operation, it seems that she has undergone total hypophysectomy between the 3 operations.  It was not clear if she had a CSF leak in the most recent surgery in 2014 but a lumbar drain was placed.    We went over her MRI from 02/2021 and compared it to previous studies.  There is a hypoenhancing mass in the left side of the sella extending into the left parasellar space.  It is about 1 cm in maximum dimension.  The pituitary stalk is still present, but we did not see any obvious pituitary gland.  There are extensive postsurgical changes in the sphenoid sinus.  The optic apparatus remains completely decompressed.  The hypoenhancing pituitary adenoma has grown 2 or 3 mm compared to her last study in 03/2020.    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  She appears to have some clinical manifestations with hyperpigmentation.  Although technically challenging, we recommend reoperation in an attempt to remove this residual tumor.  We thought that she might be anxious about the prospect of another pituitary operation, but she took the news very well and had a very matter of fact approach to the news.  She seemed to have no issues with proceeding with surgery.  She is already familiar with the risks of stroke, carotid artery injury, CSF leak,  incomplete resection, need for additional surgery and she agrees to proceed.  We will make arrangements for her to see Dr. Tsai, our ENT Skull Base Surgery partner from ENT to discuss the sinonasal aspects of surgery.  We will arrange for surgery sometime in the next few weeks.  Please do not hesitate to contact us with questions.    Sincerely,    Mina Huffman MD    D: 2021   T: 2021   MT: ayah    Name:     VADIM ZAMBRANOMima  MRN:      [id number]        Account:      [id number]   :      1985           Service Date: 2021       Document: [id number]

## 2021-05-31 NOTE — PROGRESS NOTES
Service Date: 2021    Clarisa Palafox, [facility]  [address] [place]  [address]    RE:  Suyapa Hodge  MRN:  [id number]  :  1985    Dear Ms. Palafox:    We spoke to Ms. Hodge as part of a telephone visit in Pituitary Clinic on 2021.  Please see the detailed notes from our partner in Pituitary Endocrinology, Dr. Wilkinson, regarding details of her complex course.  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  She developed a pulmonary embolism postoperatively in .  With her Cushing disease not being cured, she underwent bilateral adrenalectomy in 2014.  Since 2019, her ACTH levels have progressively risen.  She has panhypopituitarism and is on fludrocortisone, hydrocortisone, levothyroxine, and desmopressin replacement.  She has been feeling well overall with the exception of right hip pain.  She is able to walk reasonably well, but she has pain with repetitive changes in position from sitting to standing.  Her skin has been increasingly darker.  As mentioned, her ACTH has been rising and is now 1250.  She denies any headaches.  Her weight has been stable at about 145 pounds.  She denies any problems with her energy.    She works as a hospice nurse.  She lives in [place].  She is single and lives alone.  She does not smoke.    Over the phone, she sounded well.  Her speech, language and phonation were normal.      REVIEW OF STUDIES:  We reviewed her operative reports, as well as her recent imaging.  I was involved in her last pituitary surgery in  2014.  In this operation, it seems that she has undergone total hypophysectomy between the 3 operations.  It was not clear if she had a CSF leak in the most recent surgery in  but a lumbar drain was placed.    We went over her MRI from 2021 and compared it to previous studies.   There is a hypoenhancing mass in the left side of the sella extending into the left parasellar space.  It is about 1 cm in maximum dimension.  The pituitary stalk is still present, but we did not see any obvious pituitary gland.  There are extensive postsurgical changes in the sphenoid sinus.  The optic apparatus remains completely decompressed.  The hypoenhancing pituitary adenoma has grown 2 or 3 mm compared to her last study in 2020.    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  She appears to have some clinical manifestations with hyperpigmentation.  Although technically challenging, we recommend reoperation in an attempt to remove this residual tumor.  We thought that she might be anxious about the prospect of another pituitary operation, but she took the news very well and had a very matter of fact approach to the news.  She seemed to have no issues with proceeding with surgery.  She is already familiar with the risks of stroke, carotid artery injury, CSF leak, incomplete resection, need for additional surgery and she agrees to proceed.  We will make arrangements for her to see Dr. Tsai, our ENT Skull Base Surgery partner from ENT to discuss the sinonasal aspects of surgery.  We will arrange for surgery sometime in the next few weeks.  Please do not hesitate to contact us with questions.    Sincerely,    MD Mina Saldivar MD        D: 2021   T: 2021   MT: ayah    Name:     VADIM HODGE  MRN:      [id number]        Account:      [id number]   :      1985           Service Date: 2021       Document: [id number]

## 2021-06-08 ENCOUNTER — MYC MEDICAL ADVICE (OUTPATIENT)
Dept: NEUROSURGERY | Facility: CLINIC | Age: 36
End: 2021-06-08

## 2021-06-15 PROBLEM — E27.0: Status: ACTIVE | Noted: 2021-06-15

## 2021-06-15 PROBLEM — D35.2 PITUITARY MACROADENOMA (H): Status: ACTIVE | Noted: 2021-06-15

## 2021-06-16 ENCOUNTER — TELEPHONE (OUTPATIENT)
Dept: NEUROSURGERY | Facility: CLINIC | Age: 36
End: 2021-06-16

## 2021-06-16 DIAGNOSIS — Z11.59 ENCOUNTER FOR SCREENING FOR OTHER VIRAL DISEASES: ICD-10-CM

## 2021-06-16 NOTE — TELEPHONE ENCOUNTER
Surgery Scheduled    DOS: 7/22/21  Provider: Dr. Huffman; Dr. Tsai assiting  Location: [facility] OR  PAC: 7/8/21 via video  COVID test: 7/20/21  Post Op: 8/4/21 with Katharine William  Patient Called: Called and confirmed upcoming appointments with patient.    Nuvasive: #[id number]    Extra Imaging: Orders are needed for stealth MRI with fiducials and contrast; stealth CT with fiducials and without contrast to be done day of surgery    Additional Notes: N/A

## 2021-06-17 NOTE — TELEPHONE ENCOUNTER
FUTURE VISIT INFORMATION      FUTURE VISIT INFORMATION:    Date: 7/8/2021    Time: 3PM    [place] [facility] [place] INFORMATION:    Referring provider:  Mina Huffman MD    Referring providers clinic:  [facility] [place]     Reason for visit/diagnosis  pituitary macroadenoma, referral from Dr. Huffman, redo resection 7/22    RECORDS REQUESTED FROM:       Clinic name Comments Records Status Imaging Status   [facility] [place]  4/30/2021 note from Mina Huffman MD    7/22/2021 stealth assisted Redo endoscopic, endonasal resection of pituitary tumor   Epic    Imaigng 2/19/2021 MR Ej   3/16/2020 MR Pituitary  Epic PACS   [facility] [place]  2/13/2014 note from Kaylin Fuentes MD    1/18/2010 Endoscopic endonasal approach with excision of pituitary microadenoma.   2.  Reconstruction with nasal septal flap.    EPIC

## 2021-06-17 NOTE — TELEPHONE ENCOUNTER
Rescheduled PAC appt and Dr. Tsai NEW visit same day.  Follow up appt scheduled with Dr. Tsai.       sAhley Hernández on 6/17/2021 at 1:29 PM

## 2021-06-25 ASSESSMENT — ENCOUNTER SYMPTOMS
ARTHRALGIAS: 0
NERVOUS/ANXIOUS: 0
MYALGIAS: 0
DIZZINESS: 0
COUGH: 0
PARESTHESIAS: 0
DIARRHEA: 0
CHILLS: 0
JOINT SWELLING: 0
HEADACHES: 0
NAUSEA: 0
SORE THROAT: 0
CONSTIPATION: 0
WEAKNESS: 0
HEMATOCHEZIA: 0
FREQUENCY: 0
DYSURIA: 0
PALPITATIONS: 0
SHORTNESS OF BREATH: 0
FEVER: 0
ABDOMINAL PAIN: 0
HEMATURIA: 0
BREAST MASS: 0
HEARTBURN: 0
EYE PAIN: 0

## 2021-06-28 NOTE — PROGRESS NOTES
"   SUBJECTIVE:   CC: Suyapa Hodge is an 35 year old woman who presents for preventive health visit.     {Split Bill scripting  The purpose of this visit is to discuss your medical history and prevent health problems before you are sick. You may be responsible for a co-pay, coinsurance, or deductible if your visit today includes services such as checking on a sore throat, having an x-ray or lab test, or treating and evaluating a new or existing condition :184632}  Patient has been advised of split billing requirements and indicates understanding: {Yes and No:910622}  Healthy Habits:    Do you get at least three servings of calcium containing foods daily (dairy, green leafy vegetables, etc.)? { :689528::\"yes\"}    Amount of exercise or daily activities, outside of work: { :521277}    Problems taking medications regularly { :735421::\"No\"}    Medication side effects: { :813469::\"No\"}    Have you had an eye exam in the past two years? { :966890}    Do you see a dentist twice per year? { :404243}    Do you have sleep apnea, excessive snoring or daytime drowsiness?{ :743699}  {Outside tests to abstract? :071175}    {additional problems to add (Optional):588252}    Today's PHQ-2 Score:   PHQ-2 ( 1999 [employer]) 6/25/2021 4/14/2021   Q1: Little interest or pleasure in doing things 0 0   Q2: Feeling down, depressed or hopeless 0 0   PHQ-2 Score 0 0   Q1: Little interest or pleasure in doing things Not at all -   Q2: Feeling down, depressed or hopeless Not at all -   PHQ-2 Score 0 -     {PHQ-2 LOOK IN ASSESSMENTS (Optional) :064832}  Abuse: Current or Past(Physical, Sexual or Emotional)- {YES/NO/NA:747227}  Do you feel safe in your environment? {YES/NO/NA:188018}    Have you ever done Advance Care Planning? (For example, a Health Directive, POLST, or a discussion with a medical provider or your loved ones about your wishes): { :517313}    Social History     Tobacco Use     Smoking status: Former Smoker     Packs/day: 0.50 " "    Years: 3.00     Pack years: 1.50     Types: Cigarettes     Start date: 10/10/2005     Quit date: 2008     Years since quittin.4     Smokeless tobacco: Never Used   Substance Use Topics     Alcohol use: Yes     Comment: 3 beers 1x/month     If you drink alcohol do you typically have >3 drinks per day or >7 drinks per week? {ETOH :658305}                     Reviewed orders with patient.  Reviewed health maintenance and updated orders accordingly - {Yes/No:492229::\"Yes\"}  {Chronicprobdata (Optional):777716}    FSH-7:   Breast CA Risk Assessment (FHS-7) 2021   Did any of your first-degree relatives have breast or ovarian cancer? No   Did any of your relatives have bilateral breast cancer? No   Did any man in your family have breast cancer? No   Did any woman in your family have breast and ovarian cancer? No   Did any woman in your family have breast cancer before age 50 y? No   Do you have 2 or more relatives with breast and/or ovarian cancer? No   Do you have 2 or more relatives with breast and/or bowel cancer? No     {If any of the questions to the BCRA (FHS-7) are answered yes, consider ordering referral for genetic counseling (Optional) :494068::\"click delete button to remove this line now\"}  {AMB Mammogram Decision Support (Optional) :502388}  Pertinent mammograms are reviewed under the imaging tab.    Pertinent mammograms are reviewed under the imaging tab.  History of abnormal Pap smear: {PAP HX:102206}  PAP / HPV Latest Ref Rng & Units 2020 3/14/2019   PAP - NIL NIL   HPV 16 DNA NEG:Negative Negative Negative   HPV 18 DNA NEG:Negative Negative Negative   OTHER HR HPV NEG:Negative Negative Positive(A)     Reviewed and updated as needed this visit by clinical staff                 Reviewed and updated as needed this visit by Provider                {HISTORY OPTIONS (Optional):236107}    ROS:  { :935124}    OBJECTIVE:   There were no vitals taken for this visit.  EXAM:  {Exam " "Choices:608719}    {Diagnostic Test Results (Optional):673811::\"Diagnostic Test Results:\",\"Labs reviewed in Epic\"}    ASSESSMENT/PLAN:   {Diageri Picklist:831297}    Patient has been advised of split billing requirements and indicates understanding: {YES / NO:553962::\"Yes\"}  COUNSELING:   {FEMALE COUNSELING MESSAGES:918086::\"Reviewed preventive health counseling, as reflected in patient instructions\"}    Estimated body mass index is 23.76 kg/m  as calculated from the following:    Height as of 7/8/20: 1.715 m (5' 7.5\").    Weight as of 7/8/20: 69.9 kg (154 lb).    {Weight Management Plan (ACO) Complete if BMI is abnormal-  Ages 18-64  BMI >24.9.  Age 65+ with BMI <23 or >30 (Optional):616415}    She reports that she quit smoking about 13 years ago. Her smoking use included cigarettes. She started smoking about 15 years ago. She has a 1.50 pack-year smoking history. She has never used smokeless tobacco.      Counseling Resources:  ATP IV Guidelines  Pooled Cohorts Equation Calculator  Breast Cancer Risk Calculator  BRCA-Related Cancer Risk Assessment: FHS-7 Tool  FRAX Risk Assessment  ICSI Preventive Guidelines  Dietary Guidelines for Americans, 2010  USDA's MyPlate  ASA Prophylaxis  Lung CA Screening    SOHEILA De León [facility]  "

## 2021-06-29 ENCOUNTER — MYC MEDICAL ADVICE (OUTPATIENT)
Dept: NEUROSURGERY | Facility: CLINIC | Age: 36
End: 2021-06-29

## 2021-06-30 NOTE — TELEPHONE ENCOUNTER
Writer returned call to patient regarding forms.  Patient faxed forms to the incorrect fax.     DOS: 07/22/21 Dr. Huffman     Patient requested an e-mail to send forms. Does not have access to fax at this time.     Writer sent via e-mail address via  Klatcher to patient.    Patient stated she will begin working remote on 08/16/21.     Notes in Epic. Patient is scheduled for colonoscopy 08/13/21.  Patient DOS 07/22/21. Patient will be recovering.     Writer sent Epic message to VICKY Gomes to inform of colonospoly and to contact patient to advise if ok to have of colonoscopy.     Patient will follow up via Klatcher with questions or concerns.

## 2021-07-01 ENCOUNTER — OFFICE VISIT (OUTPATIENT)
Dept: FAMILY MEDICINE | Facility: CLINIC | Age: 36
End: 2021-07-01
Payer: COMMERCIAL

## 2021-07-01 VITALS
DIASTOLIC BLOOD PRESSURE: 58 MMHG | HEART RATE: 97 BPM | OXYGEN SATURATION: 99 % | SYSTOLIC BLOOD PRESSURE: 98 MMHG | TEMPERATURE: 98.1 F | WEIGHT: 150 LBS | BODY MASS INDEX: 22.73 KG/M2 | HEIGHT: 68 IN

## 2021-07-01 DIAGNOSIS — Z80.0 FAMILY HISTORY OF COLON CANCER: ICD-10-CM

## 2021-07-01 DIAGNOSIS — N18.31 STAGE 3A CHRONIC KIDNEY DISEASE (H): ICD-10-CM

## 2021-07-01 DIAGNOSIS — Z00.00 ROUTINE GENERAL MEDICAL EXAMINATION AT A HEALTH CARE FACILITY: Primary | ICD-10-CM

## 2021-07-01 DIAGNOSIS — E24.0 CUSHING'S DISEASE (H): ICD-10-CM

## 2021-07-01 DIAGNOSIS — E27.0: ICD-10-CM

## 2021-07-01 DIAGNOSIS — M87.059 AVASCULAR NECROSIS OF FEMORAL HEAD, UNSPECIFIED LATERALITY (H): ICD-10-CM

## 2021-07-01 PROBLEM — N18.30 CHRONIC KIDNEY DISEASE, STAGE 3 (H): Status: ACTIVE | Noted: 2021-07-01

## 2021-07-01 PROCEDURE — 99395 PREV VISIT EST AGE 18-39: CPT | Performed by: NURSE PRACTITIONER

## 2021-07-01 RX ORDER — LISINOPRIL 2.5 MG/1
2.5 TABLET ORAL DAILY
Qty: 90 TABLET | Refills: 3 | Status: SHIPPED | OUTPATIENT
Start: 2021-07-01 | End: 2022-08-05 | Stop reason: SINTOL

## 2021-07-01 RX ORDER — METHOCARBAMOL 500 MG/1
500-1000 TABLET, FILM COATED ORAL
Qty: 20 TABLET | Refills: 2 | Status: SHIPPED | OUTPATIENT
Start: 2021-07-01 | End: 2023-05-11

## 2021-07-01 ASSESSMENT — ENCOUNTER SYMPTOMS
FEVER: 0
JOINT SWELLING: 0
ABDOMINAL PAIN: 0
CHILLS: 0
DYSURIA: 0
HEMATURIA: 0
NAUSEA: 0
HEADACHES: 0
MYALGIAS: 0
HEMATOCHEZIA: 0
EYE PAIN: 0
SORE THROAT: 0
PALPITATIONS: 0
ARTHRALGIAS: 0
NERVOUS/ANXIOUS: 0
SHORTNESS OF BREATH: 0
DIARRHEA: 0
CONSTIPATION: 0
PARESTHESIAS: 0
FREQUENCY: 0
WEAKNESS: 0
DIZZINESS: 0
COUGH: 0
BREAST MASS: 0
HEARTBURN: 0

## 2021-07-01 ASSESSMENT — MIFFLIN-ST. JEOR: SCORE: 1410.96

## 2021-07-01 NOTE — PROGRESS NOTES
SUBJECTIVE:   CC: Suyapa Hodge is an 35 year old woman who presents for preventive health visit.     Patient has been advised of split billing requirements and indicates understanding: Yes     Healthy Habits:     Getting at least 3 servings of Calcium per day:  NO    Bi-annual eye exam:  Yes    Dental care twice a year:  Yes    Sleep apnea or symptoms of sleep apnea:  Daytime drowsiness    Diet:  Regular (no restrictions)    Frequency of exercise:  None    Taking medications regularly:  Yes    Medication side effects:  None    PHQ-2 Total Score: 0    Additional concerns today:  No    Patient following with Endocrinology for Cushing syndrome and hypopituitarism s/p adenoma resection. Has another pituitary tumor and will have resection in a few weeks.    Chronic hip pain due to avascular necrosis of femoral head- managing ok but taking Ibuprofen approx twice/month.       Today's PHQ-2 Score:   PHQ-2 (  [employer]) 2021   Q1: Little interest or pleasure in doing things 0   Q2: Feeling down, depressed or hopeless 0   PHQ-2 Score 0   Q1: Little interest or pleasure in doing things Not at all   Q2: Feeling down, depressed or hopeless Not at all   PHQ-2 Score 0       Abuse: Current or Past (Physical, Sexual or Emotional) - No  Do you feel safe in your environment? Yes    Have you ever done Advance Care Planning? (For example, a Health Directive, POLST, or a discussion with a medical provider or your loved ones about your wishes): Yes, advance care planning is on file.    Social History     Tobacco Use     Smoking status: Former Smoker     Packs/day: 0.50     Years: 3.00     Pack years: 1.50     Types: Cigarettes     Start date: 10/10/2005     Quit date: 2008     Years since quittin.5     Smokeless tobacco: Never Used   Substance Use Topics     Alcohol use: Yes     Comment: 3 beers 1x/month         Alcohol Use 2021   Prescreen: >3 drinks/day or >7 drinks/week? No   Prescreen: >3 drinks/day or >7  drinks/week? -       Reviewed orders with patient.  Reviewed health maintenance and updated orders accordingly - Yes  Labs reviewed in EPIC    Breast Cancer Screening:    FHS-7:   Breast CA Risk Assessment (FHS-7) 6/25/2021   Did any of your first-degree relatives have breast or ovarian cancer? No   Did any of your relatives have bilateral breast cancer? No   Did any man in your family have breast cancer? No   Did any woman in your family have breast and ovarian cancer? No   Did any woman in your family have breast cancer before age 50 y? No   Do you have 2 or more relatives with breast and/or ovarian cancer? No   Do you have 2 or more relatives with breast and/or bowel cancer? No       Patient under 40 years of age: Routine Mammogram Screening not recommended.   Pertinent mammograms are reviewed under the imaging tab.    History of abnormal Pap smear: YES - updated in Problem List and Health Maintenance accordingly  PAP / HPV Latest Ref Rng & Units 7/8/2020 3/14/2019   PAP - NIL NIL   HPV 16 DNA NEG:Negative Negative Negative   HPV 18 DNA NEG:Negative Negative Negative   OTHER HR HPV NEG:Negative Negative Positive(A)     Reviewed and updated as needed this visit by clinical staff  Tobacco  Allergies  Meds              Reviewed and updated as needed this visit by Provider                    Review of Systems   Constitutional: Negative for chills and fever.   HENT: Negative for congestion, ear pain, hearing loss and sore throat.    Eyes: Negative for pain and visual disturbance.   Respiratory: Negative for cough and shortness of breath.    Cardiovascular: Negative for chest pain, palpitations and peripheral edema.   Gastrointestinal: Negative for abdominal pain, constipation, diarrhea, heartburn, hematochezia and nausea.   Breasts:  Negative for tenderness, breast mass and discharge.   Genitourinary: Negative for dysuria, frequency, genital sores, hematuria, pelvic pain, urgency, vaginal bleeding and vaginal  "discharge.   Musculoskeletal: Negative for arthralgias, joint swelling and myalgias.   Skin: Negative for rash.   Neurological: Negative for dizziness, weakness, headaches and paresthesias.   Psychiatric/Behavioral: Negative for mood changes. The patient is not nervous/anxious.         OBJECTIVE:   BP 98/58 (BP Location: Right arm, Patient Position: Chair, Cuff Size: Adult Regular)   Pulse 97   Temp 98.1  F (36.7  C) (Oral)   Ht 1.715 m (5' 7.5\")   Wt 68 kg (150 lb)   SpO2 99%   BMI 23.15 kg/m    Physical Exam  GENERAL: healthy, alert and no distress  EYES: Eyes grossly normal to inspection, PERRL and conjunctivae and sclerae normal  HENT: ear canals and TM's normal, nose and mouth without ulcers or lesions  NECK: no adenopathy, no asymmetry, masses, or scars and thyroid normal to palpation  RESP: lungs clear to auscultation - no rales, rhonchi or wheezes  BREAST: normal without masses, tenderness or nipple discharge and no palpable axillary masses or adenopathy  CV: regular rate and rhythm, normal S1 S2, no S3 or S4, no murmur, click or rub, no peripheral edema and peripheral pulses strong  ABDOMEN: soft, nontender, no hepatosplenomegaly, no masses and bowel sounds normal  MS: no gross musculoskeletal defects noted, no edema  SKIN: Skin darkening  NEURO: Normal strength and tone, mentation intact and speech normal  PSYCH: mentation appears normal, affect normal/bright    Diagnostic Test Results:  Labs reviewed in Epic  No results found for any visits on 07/01/21.    ASSESSMENT/PLAN:   1. Routine general medical examination at a health care facility      2. Stage 3a chronic kidney disease  Recommend avoiding NSAIDs, start low dose Lisinopril for renal protection. Patient to have labs checked 1-4 weeks after starting medication.  - lisinopril (ZESTRIL) 2.5 MG tablet; Take 1 tablet (2.5 mg) by mouth daily  Dispense: 90 tablet; Refill: 3  - **Basic metabolic panel FUTURE anytime; Future    3. Family history of " "colon cancer  Patient plans to schedule colonoscopy after cleared from surgeon after upcoming surgery    4. Avascular necrosis of femoral head, unspecified laterality (H)  Doing well overall, recommend using Tylenol and robaxin as needed  - methocarbamol (ROBAXIN) 500 MG tablet; Take 1-2 tablets (500-1,000 mg) by mouth nightly as needed for muscle spasms  Dispense: 20 tablet; Refill: 2    5. ACTH hypersecretion (H)  Follows with Endocrinology- upcoming surgery planned    6. Cushing's disease (H)  As above      Patient has been advised of split billing requirements and indicates understanding: No  COUNSELING:  Reviewed preventive health counseling, as reflected in patient instructions       Regular exercise       Healthy diet/nutrition       Contraception       Osteoporosis prevention/bone health    Estimated body mass index is 23.15 kg/m  as calculated from the following:    Height as of this encounter: 1.715 m (5' 7.5\").    Weight as of this encounter: 68 kg (150 lb).        She reports that she quit smoking about 13 years ago. Her smoking use included cigarettes. She started smoking about 15 years ago. She has a 1.50 pack-year smoking history. She has never used smokeless tobacco.      Counseling Resources:  ATP IV Guidelines  Pooled Cohorts Equation Calculator  Breast Cancer Risk Calculator  BRCA-Related Cancer Risk Assessment: FHS-7 Tool  FRAX Risk Assessment  ICSI Preventive Guidelines  Dietary Guidelines for Americans, 2010  USDA's MyPlate  ASA Prophylaxis  Lung CA Screening    SOHEILA De Le[facility]  "

## 2021-07-08 ENCOUNTER — OFFICE VISIT (OUTPATIENT)
Dept: OTOLARYNGOLOGY | Facility: CLINIC | Age: 36
End: 2021-07-08
Payer: COMMERCIAL

## 2021-07-08 ENCOUNTER — PRE VISIT (OUTPATIENT)
Dept: OTOLARYNGOLOGY | Facility: CLINIC | Age: 36
End: 2021-07-08

## 2021-07-08 ENCOUNTER — ANESTHESIA EVENT (OUTPATIENT)
Dept: ANESTHESIOLOGY | Facility: AMBULATORY SURGERY CENTER | Age: 36
End: 2021-07-08

## 2021-07-08 VITALS
HEART RATE: 96 BPM | WEIGHT: 148.37 LBS | BODY MASS INDEX: 22.49 KG/M2 | TEMPERATURE: 97.5 F | OXYGEN SATURATION: 100 % | HEIGHT: 68 IN

## 2021-07-08 DIAGNOSIS — D35.2 PITUITARY MACROADENOMA (H): Primary | ICD-10-CM

## 2021-07-08 PROCEDURE — 99204 OFFICE O/P NEW MOD 45 MIN: CPT | Mod: 25 | Performed by: OTOLARYNGOLOGY

## 2021-07-08 PROCEDURE — 31237 NSL/SINS NDSC SURG BX POLYPC: CPT | Mod: 50 | Performed by: OTOLARYNGOLOGY

## 2021-07-08 ASSESSMENT — MIFFLIN-ST. JEOR: SCORE: 1411.5

## 2021-07-08 NOTE — LETTER
7/8/2021       RE: Suyapa Hodge  [address] [facility] [address]     Dear Colleague,    Thank you for referring your patient, Suyapa Hodge, to the [facility] EAR NOSE AND THROAT [facility]INIC [place] at [facility]. Please see a copy of my visit note below.    Dear Dr. Huffman:    I had the pleasure of meeting Suyapa Hodge in consultation today at the [facility] Ot[facility] at your request.    Dx: Recurrent pituitary adenoma. Cushing's disease     History of Present Illness: Patient with 3 prior pituitary surgeries. She continues to have residual tumor and symptoms of cushing's disease as well as Alexei syndrome. She was evaluated by Dr Wilkinson and Dr Huffman with recommendation for pituitary surgery. Milla continues to experience nasal crusting intermittently.     MEDICATIONS:     Current Outpatient Medications   Medication Sig Dispense Refill     Calcium carb-Vitamin D 500 mg [place]-200 units (OSCAL WITH D;OYSTER SHELL CALCIUM) 500-200 MG-UNIT per tablet Take 1 tablet by mouth 2 times daily (with meals)       desmopressin (DDAVP) 0.1 MG tablet Take 1 tablet (100 mcg) by mouth 2 times daily For additional refills, please schedule a follow-up appointment at [phone] (Patient taking differently: Take 0.1 mg by mouth daily For additional refills, please schedule a follow-up appointment at [phone]) 180 tablet 5     drospirenone-ethinyl estradiol (HECTOR) 3-0.02 MG tablet Take 1 tablet by mouth daily 84 tablet 3     fludrocortisone (FLORINEF) 0.1 MG tablet Take 1 tablet (0.1 mg) by mouth daily For additional refills, please schedule a follow-up appointment at [phone] 90 tablet 3     hydrocortisone (CORTEF) 10 MG tablet 1 tab (10 mg) am 1/2 tab (5 mg) pm plus sick day supply as directed (Patient taking differently: Take 10 mg by mouth daily plus sick day supply as directed) 150 tablet 5     levothyroxine (SYNTHROID/LEVOTHROID)  75 MCG tablet TAKE 1 TABLET BY MOUTH EVERY DAY 6 DAYS A WEEK AND NONE ON THE 7TH DAY EACH WEEK  For additional refills, please schedule a follow-up appointment at [phone] 78 tablet 3     lisinopril (ZESTRIL) 2.5 MG tablet Take 1 tablet (2.5 mg) by mouth daily 90 tablet 3     methocarbamol (ROBAXIN) 500 MG tablet Take 1-2 tablets (500-1,000 mg) by mouth nightly as needed for muscle spasms 20 tablet 2     Multiple Vitamin (MULTI-VITAMIN DAILY PO) Take 1 tablet by mouth daily        triamcinolone (KENALOG) 0.1 % external cream Apply topically 2 times daily As needed for flares 80 g 1       ALLERGIES:    Allergies   Allergen Reactions     Labetalol      Racing heart feeling, panic attack feeling.        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    PAST MEDICAL HISTORY:   Past Medical History:   Diagnosis Date     Adrenal disorder (H)      Avascular necrosis of femur head, 2010     Cervical high risk HPV (human papillomavirus) test positive 10/2015 & 3/2019    +HR HPV (NOT 16/18)     Colon polyp      Cushing syndrome (H)     pituitary microadenoma     Diabetes insipidus (H)      Fatigue      History of colposcopy 10/2015    BEVERLEY 1     Hypercalcaemia      Hypertension      Medulloadrenal hyperfunction (H)      Pulmonary emboli (H) 01/2014     Thyroid disease         FAMILY HISTORY:    Family History   Problem Relation Age of Onset     Asthma Mother      Osteoporosis Mother      Obesity Mother      Hyperlipidemia Father      Obesity Father      Cancer Maternal Grandmother 45        colon cancer     Colon Cancer Maternal Grandmother      Cancer Maternal Grandfather         lung cancer-smoker     Other Cancer Maternal Grandfather         Smoker     Cancer Paternal Grandmother         lung cancer-smoker     Other Cancer Paternal Grandmother         Smoker     Cancer Paternal Grandfather         lung cancer     Other Cancer Paternal Grandfather      Allergies Sister      Obesity Sister        REVIEW OF  SYSTEMS:  12 point ROS was negative other than the symptoms noted above in the HPI.    PHYSICAL EXAMINATION:      Constitutional:  The patient was unaccompanied, well-groomed, and in no acute distress.     Skin: Normal:  warm and pink without rash. Pigmented   Neurologic: Alert and oriented x 3.  CN's III-XII within normal limits.  Voice normal.    Psychiatric: The patient's affect was calm, cooperative, and appropriate.     Communication:  Normal; communicates verbally, normal voice quality.   Respiratory: Breathing comfortably without stridor or exertion of accessory muscles.    Head/Face:  Normocephalic and atraumatic.  No lesions or scars. No sinus tenderness.    Salivary glands -  Normal size, no tenderness, swelling, or palpable masses   Eyes: Pupils were equal and reactive.  Extraocular movement intact.         Nose: Sinuses were non-tender.  Anterior rhinoscopy revealed midline septum and absence of purulence or polyps.     Oral Cavity: Normal tongue, floor of mouth, buccal mucosa, and palate.  No lesions or masses on inspection or palpation.     Oropharynx: Normal mucosa, palate symmetric with normal elevation. No abnormal lymph tissue in the oropharynx.           Neck: Supple with normal laryngeal and tracheal landmarks.  The parotid beds were without masses.  No palpable thyroid.  Normal range of motion   Lymphatic: There is no palpable lymphadenopathy in the neck.              Nasal Endoscopy:  Consent for nasal endoscopy was obtained, and we confirmed correctness of procedure and identity of patient.  Nasal endoscopy was indicated due to pituitary adenoma.  The nose was topically decongested and anesthetized.  The nasal endoscope was passed under endoscopic vision.  The septum is in the midline. I removed abundant nasal crusting. There is a large posterior septectomy. The sphenoid sinus is wide open. Multiple scar bands within the sphenoid sinus.        IMPRESSION AND PLAN: Plan for re-do pituitary  surgery. I answered all her questions.    Thank you very much for the opportunity to participate in the care of your patient.      Kaylin Fuentes MD, M.D.  Otolaryngology- Head & Neck Surgery  [phone]

## 2021-07-08 NOTE — PATIENT INSTRUCTIONS
1. Please follow-up in clinic 8/12/21  2. Continue with nasal irrigations a few times daily.  2. Please call the ENT clinic with any questions,concerns, new or worsening symptoms.    -Clinic number is [phone]   - [facility] direct line (Dr. Tsai's nurse) [phone]

## 2021-07-08 NOTE — PROGRESS NOTES
Dear Dr. Huffman:    I had the pleasure of meeting Suyapa Hodge in consultation today at the [facility] at your request.    Dx: Recurrent pituitary adenoma. Cushing's disease     History of Present Illness: Patient with 3 prior pituitary surgeries. She continues to have residual tumor and symptoms of cushing's disease as well as Alexei syndrome. She was evaluated by Dr Wilkinson and Dr Huffman with recommendation for pituitary surgery. Milla continues to experience nasal crusting intermittently.     MEDICATIONS:     Current Outpatient Medications   Medication Sig Dispense Refill     Calcium carb-Vitamin D 500 mg [place]-200 units (OSCAL WITH D;OYSTER SHELL CALCIUM) 500-200 MG-UNIT per tablet Take 1 tablet by mouth 2 times daily (with meals)       desmopressin (DDAVP) 0.1 MG tablet Take 1 tablet (100 mcg) by mouth 2 times daily For additional refills, please schedule a follow-up appointment at [phone] (Patient taking differently: Take 0.1 mg by mouth daily For additional refills, please schedule a follow-up appointment at [phone]) 180 tablet 5     drospirenone-ethinyl estradiol (HECTOR) 3-0.02 MG tablet Take 1 tablet by mouth daily 84 tablet 3     fludrocortisone (FLORINEF) 0.1 MG tablet Take 1 tablet (0.1 mg) by mouth daily For additional refills, please schedule a follow-up appointment at [phone] 90 tablet 3     hydrocortisone (CORTEF) 10 MG tablet 1 tab (10 mg) am 1/2 tab (5 mg) pm plus sick day supply as directed (Patient taking differently: Take 10 mg by mouth daily plus sick day supply as directed) 150 tablet 5     levothyroxine (SYNTHROID/LEVOTHROID) 75 MCG tablet TAKE 1 TABLET BY MOUTH EVERY DAY 6 DAYS A WEEK AND NONE ON THE 7TH DAY EACH WEEK  For additional refills, please schedule a follow-up appointment at [phone] 78 tablet 3     lisinopril (ZESTRIL) 2.5 MG tablet Take 1 tablet (2.5 mg) by mouth daily 90 tablet 3     methocarbamol (ROBAXIN) 500 MG tablet  Take 1-2 tablets (500-1,000 mg) by mouth nightly as needed for muscle spasms 20 tablet 2     Multiple Vitamin (MULTI-VITAMIN DAILY PO) Take 1 tablet by mouth daily        triamcinolone (KENALOG) 0.1 % external cream Apply topically 2 times daily As needed for flares 80 g 1       ALLERGIES:    Allergies   Allergen Reactions     Labetalol      Racing heart feeling, panic attack feeling.        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    PAST MEDICAL HISTORY:   Past Medical History:   Diagnosis Date     Adrenal disorder (H)      Avascular necrosis of femur head, 2010     Cervical high risk HPV (human papillomavirus) test positive 10/2015 & 3/2019    +HR HPV (NOT 16/18)     Colon polyp      Cushing syndrome (H)     pituitary microadenoma     Diabetes insipidus (H)      Fatigue      History of colposcopy 10/2015    BEVERLEY 1     Hypercalcaemia      Hypertension      Medulloadrenal hyperfunction (H)      Pulmonary emboli (H) 01/2014     Thyroid disease         FAMILY HISTORY:    Family History   Problem Relation Age of Onset     Asthma Mother      Osteoporosis Mother      Obesity Mother      Hyperlipidemia Father      Obesity Father      Cancer Maternal Grandmother 45        colon cancer     Colon Cancer Maternal Grandmother      Cancer Maternal Grandfather         lung cancer-smoker     Other Cancer Maternal Grandfather         Smoker     Cancer Paternal Grandmother         lung cancer-smoker     Other Cancer Paternal Grandmother         Smoker     Cancer Paternal Grandfather         lung cancer     Other Cancer Paternal Grandfather      Allergies Sister      Obesity Sister        REVIEW OF SYSTEMS:  12 point ROS was negative other than the symptoms noted above in the HPI.    PHYSICAL EXAMINATION:      Constitutional:  The patient was unaccompanied, well-groomed, and in no acute distress.     Skin: Normal:  warm and pink without rash. Pigmented   Neurologic: Alert and oriented x 3.  CN's III-XII within  normal limits.  Voice normal.    Psychiatric: The patient's affect was calm, cooperative, and appropriate.     Communication:  Normal; communicates verbally, normal voice quality.   Respiratory: Breathing comfortably without stridor or exertion of accessory muscles.    Head/Face:  Normocephalic and atraumatic.  No lesions or scars. No sinus tenderness.    Salivary glands -  Normal size, no tenderness, swelling, or palpable masses   Eyes: Pupils were equal and reactive.  Extraocular movement intact.         Nose: Sinuses were non-tender.  Anterior rhinoscopy revealed midline septum and absence of purulence or polyps.     Oral Cavity: Normal tongue, floor of mouth, buccal mucosa, and palate.  No lesions or masses on inspection or palpation.     Oropharynx: Normal mucosa, palate symmetric with normal elevation. No abnormal lymph tissue in the oropharynx.           Neck: Supple with normal laryngeal and tracheal landmarks.  The parotid beds were without masses.  No palpable thyroid.  Normal range of motion   Lymphatic: There is no palpable lymphadenopathy in the neck.              Nasal Endoscopy:  Consent for nasal endoscopy was obtained, and we confirmed correctness of procedure and identity of patient.  Nasal endoscopy was indicated due to pituitary adenoma.  The nose was topically decongested and anesthetized.  The nasal endoscope was passed under endoscopic vision.  The septum is in the midline. I removed abundant nasal crusting. There is a large posterior septectomy. The sphenoid sinus is wide open. Multiple scar bands within the sphenoid sinus.  The nasal endoscope was advanced through both nostrils, right and left to visualize the endonasal anatomy.     IMPRESSION AND PLAN: Plan for re-do pituitary surgery. I answered all her questions.    Thank you very much for the opportunity to participate in the care of your patient.      Kaylin Fuentes MD, M.D.  Otolaryngology- Head & Neck  Surgery  [phone]

## 2021-07-09 NOTE — TELEPHONE ENCOUNTER
FUTURE VISIT INFORMATION      SURGERY INFORMATION:    Date: 21    Location: [facility] OR    Surgeon:  Mina Huffman MD Caicedo-Granados, Emiro Ejwin, MD    Anesthesia Type:  General    Procedure: stealth assisted Redo endoscopic, endonasal resection of pituitary tumor INSERTION, DRAIN, SPINE, LUMBAR    Consult: ov 21    RECORDS REQUESTED FROM:       Primary Care Provider: Clarisa Palafox APRN CNP- [facility]    Pertinent Medical History: trachycardia    Most recent EKG+ Tracin14    Most recent ECHO: 14    Most recent Sleep Study:  16

## 2021-07-13 ENCOUNTER — TELEPHONE (OUTPATIENT)
Dept: NEUROSURGERY | Facility: CLINIC | Age: 36
End: 2021-07-13

## 2021-07-13 NOTE — TELEPHONE ENCOUNTER
Writer faxed patient's STD paperwork to [employer] @ [phone]. A copy was also scanned into patient's EMR.     LEE Gutierrez  Neurosurgery clinic navigator.

## 2021-07-13 NOTE — TELEPHONE ENCOUNTER
Writer emailed FMLA to patient per her request @ [email or web address]. A copy was also scanned into the patient's EMR.     LEE Gutierrez  Neurosurgery clinic navigator.

## 2021-07-15 ENCOUNTER — VIRTUAL VISIT (OUTPATIENT)
Dept: SURGERY | Facility: CLINIC | Age: 36
End: 2021-07-15
Payer: COMMERCIAL

## 2021-07-15 ENCOUNTER — PRE VISIT (OUTPATIENT)
Dept: SURGERY | Facility: CLINIC | Age: 36
End: 2021-07-15

## 2021-07-15 DIAGNOSIS — Z01.818 PREOP EXAMINATION: Primary | ICD-10-CM

## 2021-07-15 PROCEDURE — 99203 OFFICE O/P NEW LOW 30 MIN: CPT | Mod: 95 | Performed by: PHYSICIAN ASSISTANT

## 2021-07-15 ASSESSMENT — LIFESTYLE VARIABLES: TOBACCO_USE: 1

## 2021-07-15 ASSESSMENT — ENCOUNTER SYMPTOMS: SEIZURES: 0

## 2021-07-15 ASSESSMENT — PAIN SCALES - GENERAL: PAINLEVEL: NO PAIN (0)

## 2021-07-15 NOTE — H&P
Pre-Operative H & P     CC:  Preoperative exam to assess for increased cardiopulmonary risk while undergoing surgery and anesthesia.    Date of Encounter: 7/15/2021  Primary Care Physician:  Clarisa Palafox  Reason for Visit: ACTH hypersecretion (H); Pituitary macroadenoma (H)      VIDEO-VISIT DETAILS    Type of service:  Video Visit    Patient verbally consented to video service today:  YES    Video Start Time: 1516  Video End Time (time video stopped): 1524    Originating Location (pt. Location): Home    Distant Location (provider location):  [facility]OPERAT[facility]    Mode of Communication:  Video Conference via [employer]    hospitals      Suyapa Hodge is a 36 y/o female who presents for pre-operative H&P in preparation for stealth assisted Redo endoscopic, endonasal resection of pituitary tumor; INSERTION, DRAIN, SPINE, LUMBAR with Mina Huffman MD & Kaylin Fuentes MD on 7/22/21 at [facility] for treatment of ACTH hypersecretion (H); Pituitary macroadenoma (H). Patient is being evaluated for comorbid conditions of former tobacco use, hypothyroidism, avascular necrosis of femoral head.        Ms. Hodge has a history of Cushing disease who has undergone 3 resections of pituitary microadenoma in 2010, 2013, and most recently in 2014.  She developed a pulmonary embolism postoperatively in 2014.  With her Cushing disease not being cured, she underwent bilateral adrenalectomy in 07/2014.  Since 2019, her ACTH levels have progressively risen.  She has panhypopituitarism and is on fludrocortisone, hydrocortisone, levothyroxine, and desmopressin replacement.  She has been feeling well overall with the exception of right hip pain.  She is able to walk reasonably well, but she has pain with repetitive changes in position from sitting to standing.  Her skin has been increasingly darker.  As mentioned, her ACTH has been rising and is now 1250.   She denies any headaches.  Her weight has been stable at about 145 pounds.  She denies any problems with her energy. She now presents for the above procedure.     History was obtained from patient & chart review    Past Medical History  Past Medical History:   Diagnosis Date     Adrenal disorder (H)      Avascular necrosis of femur head, 2010     Cervical high risk HPV (human papillomavirus) test positive 10/2015 & 3/2019    +HR HPV (NOT 16/18)     Colon polyp      Cushing syndrome (H)     pituitary microadenoma     Diabetes insipidus (H)      Fatigue      History of colposcopy 10/2015    BEVERLEY 1     Hypercalcaemia      Hypertension      Medulloadrenal hyperfunction (H)      Pulmonary emboli (H) 01/2014     Thyroid disease        Past Surgical History  Past Surgical History:   Procedure Laterality Date     COLONOSCOPY       COLPOSCOPY VULVA, BIOPSY, COMBINED  10/28/2015    BEVERLEY 1, 6 month follow-up pap was normal     COLPOSCOPY,BX CERVIX/ENDOCERV CURR  04/02/2019     INSERT DRAIN LUMBAR  1/20/2014    Procedure: INSERT DRAIN LUMBAR;;  Surgeon: Soham Aquino MD;  Location: [facility] OR     LAPAROSCOPIC ADRENALECTOMY  7/14/2014    Procedure: LAPAROSCOPIC ADRENALECTOMY;  Surgeon: Roni Nunn MD;  Location: [facility] OR     OPTICAL TRACKING SYSTEM ENDOSCOPIC RESECTION TUMOR CRANIAL  7/1/2013    Procedure: OPTICAL TRACKING SYSTEM ENDOSCOPIC RESECTION TUMOR CRANIAL;  Stealth Assisted Endoscopic Hypophysectomy ;  Surgeon: Soham Aquino MD;  Location: [facility] OR     OPTICAL TRACKING SYSTEM ENDOSCOPIC RESECTION TUMOR CRANIAL  1/20/2014    Procedure: OPTICAL TRACKING SYSTEM ENDOSCOPIC RESECTION TUMOR CRANIAL;  Stealth Assisted Endoscopic Transnasal Excision Of Pituitary Adenoma , Placement Of Lumbar Drain with c-arm;  Surgeon: Soham Aquino MD;  Location: [facility] OR     removal of pituitary microademona       wisdom teeth extration         Hx of Blood transfusions/reactions: denies     Hx of abnormal bleeding or  anti-platelet use: denies    Menstrual history: No LMP recorded. (Menstrual status: Irregular Periods).:      Steroid use in the last year: on daily steroids for Cushing disease    Personal or FH with difficulty with Anesthesia:  denies    Prior to Admission Medications  Current Outpatient Medications   Medication Sig Dispense Refill     Calcium carb-Vitamin D 500 mg [place]-200 units (OSCAL WITH D;OYSTER SHELL CALCIUM) 500-200 MG-UNIT per tablet Take 1 tablet by mouth 2 times daily (with meals)       desmopressin (DDAVP) 0.1 MG tablet Take 1 tablet (100 mcg) by mouth 2 times daily For additional refills, please schedule a follow-up appointment at [phone] (Patient taking differently: Take 0.1 mg by mouth daily For additional refills, please schedule a follow-up appointment at [phone]) 180 tablet 5     drospirenone-ethinyl estradiol (HECTOR) 3-0.02 MG tablet Take 1 tablet by mouth daily 84 tablet 3     fludrocortisone (FLORINEF) 0.1 MG tablet Take 1 tablet (0.1 mg) by mouth daily For additional refills, please schedule a follow-up appointment at [phone] 90 tablet 3     hydrocortisone (CORTEF) 10 MG tablet 1 tab (10 mg) am 1/2 tab (5 mg) pm plus sick day supply as directed (Patient taking differently: Take 10 mg by mouth daily plus sick day supply as directed) 150 tablet 5     levothyroxine (SYNTHROID/LEVOTHROID) 75 MCG tablet TAKE 1 TABLET BY MOUTH EVERY DAY 6 DAYS A WEEK AND NONE ON THE 7TH DAY EACH WEEK  For additional refills, please schedule a follow-up appointment at [phone] 78 tablet 3     lisinopril (ZESTRIL) 2.5 MG tablet Take 1 tablet (2.5 mg) by mouth daily 90 tablet 3     methocarbamol (ROBAXIN) 500 MG tablet Take 1-2 tablets (500-1,000 mg) by mouth nightly as needed for muscle spasms 20 tablet 2     Multiple Vitamin (MULTI-VITAMIN DAILY PO) Take 1 tablet by mouth daily        triamcinolone (KENALOG) 0.1 % external cream Apply topically 2 times daily As needed for flares 80 g 1        Allergies  Allergies   Allergen Reactions     Labetalol      Racing heart feeling, panic attack feeling.        Social History  Social History     Socioeconomic History     Marital status: Single     Spouse name: Not on file     Number of children: Not on file     Years of education: Not on file     Highest education level: Not on file   Occupational History     Not on file   Tobacco Use     Smoking status: Former Smoker     Packs/day: 0.50     Years: 3.00     Pack years: 1.50     Types: Cigarettes     Start date: 10/10/2005     Quit date: 2008     Years since quittin.5     Smokeless tobacco: Never Used   Substance and Sexual Activity     Alcohol use: Yes     Comment: 3 beers 1x/month     Drug use: No     Sexual activity: Not Currently     Partners: Male     Birth control/protection: Condom   Other Topics Concern     Parent/sibling w/ CABG, MI or angioplasty before 65F 55M? No   Social History Narrative     Not on file     Social Determinants of Health     Financial Resource Strain:      Difficulty of Paying Living Expenses:    Food Insecurity:      Worried About Running Out of Food in the Last Year:      Ran Out of Food in the Last Year:    Transportation Needs:      Lack of Transportation (Medical):      Lack of Transportation (Non-Medical):    Physical Activity:      Days of Exercise per Week:      Minutes of Exercise per Session:    Stress:      Feeling of Stress :    Social Connections:      Frequency of Communication with Friends and Family:      Frequency of Social Gatherings with Friends and Family:      Attends [employer] Services:      Active Member of Clubs or Organizations:      Attends Club or Organization Meetings:      Marital Status:    Intimate Partner Violence:      Fear of Current or Ex-Partner:      Emotionally Abused:      Physically Abused:      Sexually Abused:        Family History  Family History   Problem Relation Age of Onset     Hyperlipidemia Father      Obesity Father       Asthma Mother      Osteoporosis Mother      Obesity Mother      Allergies Sister      Obesity Sister      Cancer Maternal Grandmother 45        colon cancer     Colon Cancer Maternal Grandmother      Cancer Maternal Grandfather         lung cancer-smoker     Other Cancer Maternal Grandfather         Smoker     Cancer Paternal Grandmother         lung cancer-smoker     Other Cancer Paternal Grandmother         Smoker     Cancer Paternal Grandfather         lung cancer     Other Cancer Paternal Grandfather      Anesthesia Reaction No family hx of      Cardiovascular No family hx of      Deep Vein Thrombosis (DVT) No family hx of            ROS/MED HX  The complete review of systems is negative other than noted in the HPI or here.  Patient denies recent illness, fever and respiratory infection during past month.    ENT/Pulmonary: Comment: 3 pk yr hx, quit 2008    (+) tobacco use, Past use,  (-) asthma and sleep apnea   Neurologic:  - neg neurologic ROS  (-) no seizures, no CVA and migraines   Cardiovascular: Comment: Taking lisinopril for renal protection. Pt denies diagnosis of HTN.    (+) -----Previous cardiac testing   Echo: Date: 2014 Results:  1. Normal LV size, wall motion, and systolic function with estimated ejection fraction 60%. Normal diastolic function   2. The right ventricle is normal size. Global right ventricular function is normal.   3. No significant valvular abnormalities     Stress Test: Date: Results:    ECG Reviewed: Date: Results:    Cath:  Date: Results:      METS/Exercise Tolerance: 4 - Raking leaves, gardening Comment: Activity limited due to AVN of right hip   Hematologic: Comments: Hx postop PE in 2014    (+) History of blood clots, pt is not anticoagulated,  (-) history of blood transfusion   Musculoskeletal: Comment: AVN of right hip        GI/Hepatic:  - neg GI/hepatic ROS  (-) GERD and liver disease   Renal/Genitourinary:     (+) renal disease, type: CRI, Pt does not require dialysis,      Endo: Comment: Cushing disease. S/P 3 resections of pituitary microadenoma in 2010, 2013, 2014.  S/P B/L adrenalectomy in 07/2014.     Panhypopituitarism and is on fludrocortisone, hydrocortisone, levothyroxine, and desmopressin replacement    (+) thyroid problem, hypothyroidism, Chronic steroid usage for Other.  (-) Type II DM   Psychiatric/Substance Use:    Neg   Infectious Disease:  - neg infectious disease ROS     Malignancy:  - neg malignancy ROS     Other:     Neg                                 0 lbs 0 oz  Data Unavailable   There is no height or weight on file to calculate BMI.       Physical Exam  Constitutional: Awake, alert, cooperative, no apparent distress, and appears stated age.  Neurologic: Awake, alert, oriented to name, place and time.   Neuropsychiatric: Calm, cooperative. Normal affect. Answers questions appropriately.    ** Patient's visit was done virtually today.  A full physical exam was not completed.  Please refer to the physical examination documented by the anesthesiologist in the anesthesia record on the day of surgery. **        PRIOR LABS/DIAGNOSTIC STUDIES:   All labs and imaging personally reviewed     Brain/ Pituitary MRI without and with contrast 2/19/21   Findings:     Postsurgical changes of transsphenoidal approach pituitary   microadenoma resection. Hypoenhancing area within the left side of the   sella, extending to the left cavernous sinus, increased since   10/8/2018, measuring 9 x 7 x 10 mm, previously 6 x 6 x 4 mm.   Associated diffusion restriction. Abutment of the cavernous segment of   left internal carotid artery.       The pituitary stalk appears midline. The optic chiasm appears intact   and not displaced. The major cavernous carotid vascular flow-voids   appear patent.         FLAIR images through the whole brain are unremarkable, and demonstrate   no mass effect, midline shift, or significant enlargement of the   ventricles.       Impression: Hypoenhancing area  within the left side of the sella,   extending to the left cavernous sinus, increased since 10/8/2018,   concerning for recurrent tumor.         ECHOCARDIOGRAM 2014   Interpretation Summary  1. Normal LV size, wall motion, and systolic function with estimated ejection fraction 60%.  Normal diastolic function    2.  The right ventricle is normal size. Global right ventricular function is normal.    3. No significant valvular abnormalities         CBC:   Lab Results   Component Value Date    WBC 8.2 07/15/2014    WBC 8.6 04/24/2014    HGB 13.3 07/15/2014    HGB 14.8 04/24/2014    HCT 39.7 07/15/2014    HCT 43.8 04/24/2014     07/15/2014     07/15/2014     BMP:   Lab Results   Component Value Date     04/09/2021     07/22/2020    POTASSIUM 3.6 04/09/2021    POTASSIUM 4.6 07/22/2020    CHLORIDE 105 04/09/2021    CHLORIDE 100 07/22/2020    CO2 31 04/09/2021    CO2 30 07/22/2020    BUN 19 04/09/2021    BUN 23 07/22/2020    CR 1.18 (H) 04/09/2021    CR 1.55 (H) 07/22/2020    GLC 86 04/09/2021    GLC 95 07/22/2020     COAGS:   Lab Results   Component Value Date    PTT 23 01/20/2014    INR 0.98 07/14/2014    FIBR 448 (H) 07/01/2015     POC:   Lab Results   Component Value Date     (H) 07/14/2014    HCG Negative 01/20/2014    HCGS Negative 07/14/2014     HEPATIC:   Lab Results   Component Value Date    ALBUMIN 3.6 01/12/2018    PROTTOTAL 8.1 05/12/2014    ALT 22 07/07/2017    AST 24 06/25/2014    ALKPHOS 123 05/12/2014    BILITOTAL 0.8 05/12/2014     OTHER:   Lab Results   Component Value Date    PH 7.35 01/20/2014    LACT 1.8 01/20/2014    A1C Duplicate request  SEE A1CPOC   07/23/2014    ELIEZER 8.6 04/09/2021    PHOS 3.6 01/12/2018    MAG 2.1 07/15/2014    TSH <0.01 (L) 01/12/2018    T4 0.77 12/19/2019    T3 72 07/04/2013       Labs ordered for DOS: CBC, BMP, T&S    COVID19 testing scheduled on 7/20/21      ASSESSMENT and PLAN  Suyapa Craft Ayesha is a 35 year old female scheduled to undergo  stealth assisted Redo endoscopic, endonasal resection of pituitary tumor; INSERTION, DRAIN, SPINE, LUMBAR with Mina Huffman MD & Kaylin Fuentes MD on 7/22/21 at [facility] for treatment of ACTH hypersecretion (H); Pituitary macroadenoma (H).     Pt has had prior anesthetic.     No history of anesthetic complications     She has the following specific operative considerations:   # RASHEL 1/8 = low risk  # VTE risk: 0.26%  # Risk of PONV score = 3.  If > 2, anti-emetic intervention recommended.  # Anesthesia considerations:  Refer to PAC assessment in anesthesia records    # Increased risk of postoperative nausea/vomiting: Recommend use of antiemetic agents in the perioperative period.        CARDIAC: METS 4, Activity limited due to AVN of right hip     # RCRI : High risk surgery.  0.9% risk of major adverse cardiac event.     #  Echo 2014:  EF 60%, normal function     #  Taking lisinopril for renal protection. Pt denies diagnosis of HTN.  Will hold DOS.    PULMONARY:     # Former smoker, 3 pk yr hx, quit 2008    # Denies asthma or inhaler use    GI:     # Denies GERD    /RENAL:     # Creatinine 1.18, GFR 59 on 4/9/21    ENDO: BMI 23    # Cushing disease. S/P 3 resections of pituitary microadenoma in 2010, 2013, 2014.  S/P B/L adrenalectomy in 07/2014.     # Panhypopituitarism and is on fludrocortisone, hydrocortisone, levothyroxine, and desmopressin replacement      # No DM    HEME:     # Hx PE postoperatively in 2014. Not anticoagulated.    ORTHO:     # AVN of right hip      Patient is optimized and is acceptable candidate for the proposed procedure. No further diagnostic evaluation is needed.    ** Patient's visit was done virtually today.  A full physical exam was not completed.  Please refer to the physical examination documented by the anesthesiologist in the anesthesia record on the day of surgery. **      Final plan per anesthesiologist on day of  surgery.     Arrival time, NPO, shower and medication instructions provided by nursing staff today.  Preparing For Your Surgery handout given.    32 minutes spent on the date of the encounter doing chart review, history and exam, documentation and further activities as noted below:    Prep time: 14 minutes  Visit time: 8 minutes  Documentation time: 10 minutes      Flaquita Gardner PA-C  Preoperative Assessment Center  [facility]  Phone: [phone]  Fax: [phone]

## 2021-07-15 NOTE — PATIENT INSTRUCTIONS
Preparing for Your Surgery      Name:  Suyapa Hodge   MRN:  [id number]   :  1985   Today's Date:  7/15/2021       Arriving for surgery:  Surgery date:  21  Arrival time:  1PM    Restrictions due to COVID 19:       One visitor is allowed in the Pre Op area. When you go into surgery, one visitor is allowed to wait in the Surgery Waiting Room       (provided there is enough space to social distance).   After surgery- Two visitors are allowed at a time if you have a private room and one visitor is allowed for those in a semi-private room.   Every 4 days the visitor(s) can rotate. During the 4 day period, the visitor(s) must be consistent. No visitors under the age of 18 years old.   Visiting Hours: 8 am - 8:30 pm   No ill visitors.   All visitors must wear face mask.    [employer] parking is available for anyone with mobility limitations or disabilities.  ([place]  24 hours/ 7 days a week; [facility] [place], [place]  7 am- 3:30 pm, Mon- Fri)    Please come to:     [facility] [address]  [address] [place] [address]    When entering the hospital you will be asked COVID screening questions, you will then be directed to Registration.  Registration will direct you to the 3rd floor Surgery waiting room. Please ask if you need an escort or a wheelchair to the Surgery Waiting Room.  [facility]- [phone]    What can I eat or drink?  -  You may eat and drink normally for up to 8 hours before your surgery. (Until 21, 7AM)  -  You may have clear liquids until 2 hours before surgery. (Until 21, 1PM)    Examples of clear liquids:  Water  Clear broth  Juices (apple, white grape, white cranberry  and cider) without pulp  Noncarbonated, powder based beverages  (lemonade and Aaron-Aid)  Sodas (Sprite, 7-Up, ginger ale and seltzer)  Coffee or tea (without milk or cream)  Gatorade    -  No Alcohol for at least 24 hours before surgery     Which medicines can  I take?    Hold Aspirin for 7 days before surgery.   Hold Multivitamins for 7 days before surgery.  Hold Supplements for 7 days before surgery.  Hold Ibuprofen (Advil, Motrin) for 1 day before surgery--unless otherwise directed by surgeon.  Hold Naproxen (Aleve) for 4 days before surgery.    -  DO NOT take these medications the day of surgery:    Calcium plus Vitamin D  Lisinopril    -  PLEASE TAKE these medications the day of surgery:    Desmopressin(DDAVP)  HECTOR    Fludrocortisone(Florinef)  Hydrocortisone(Cortef)    Levothyroxine    How do I prepare myself?  - Please take 2 showers before surgery using Scrubcare or Hibiclens soap.    Use this soap only from the neck to your toes.     Leave the soap on your skin for one minute--then rinse thoroughly.      You may use your own shampoo and conditioner; no other hair products.   - Please remove all jewelry and body piercings.  - No lotions, deodorants or fragrance.  - No makeup or fingernail polish.   - Bring your ID and insurance card.    - All patients are required to have a Covid-19 test within 4 days of surgery/procedure.      -Patients will be contacted by the [facility] scheduling team within 1 week of surgery to make an appointment.      - Patients may call the Scheduling team at [phone] if they have not been scheduled within 4 days of  surgery.          Questions or Concerns:    - For any questions regarding the day of surgery or your hospital stay, please contact the Pre Admission Nursing Office at [phone].       - If you have health changes between today and your surgery please call your surgeon.       For questions after surgery please call your surgeons office.

## 2021-07-15 NOTE — ANESTHESIA PREPROCEDURE EVALUATION
Anesthesia Pre-Procedure Evaluation    Patient: Suyapa Hodge   MRN: [id number] : 1985        Preoperative Diagnosis: * No surgery found *   Procedure :      Past Medical History:   Diagnosis Date     Adrenal disorder (H)      Avascular necrosis of femur head,      Cervical high risk HPV (human papillomavirus) test positive 10/2015 & 3/2019    +HR HPV (NOT 16/18)     Colon polyp      Cushing syndrome (H)     pituitary microadenoma     Diabetes insipidus (H)      Fatigue      History of colposcopy 10/2015    BEVERLEY 1     Hypercalcaemia      Hypertension      Medulloadrenal hyperfunction (H)      Pulmonary emboli (H) 2014     Thyroid disease       Past Surgical History:   Procedure Laterality Date     COLONOSCOPY       COLPOSCOPY VULVA, BIOPSY, COMBINED  10/28/2015    BEVERLEY 1, 6 month follow-up pap was normal     COLPOSCOPY,BX CERVIX/ENDOCERV CURR  2019     INSERT DRAIN LUMBAR  2014    Procedure: INSERT DRAIN LUMBAR;;  Surgeon: Soham Aquino MD;  Location: [facility] OR     LAPAROSCOPIC ADRENALECTOMY  2014    Procedure: LAPAROSCOPIC ADRENALECTOMY;  Surgeon: Roni Nunn MD;  Location: [facility] OR     OPTICAL TRACKING SYSTEM ENDOSCOPIC RESECTION TUMOR CRANIAL  2013    Procedure: OPTICAL TRACKING SYSTEM ENDOSCOPIC RESECTION TUMOR CRANIAL;  Stealth Assisted Endoscopic Hypophysectomy ;  Surgeon: Soham Aquino MD;  Location: [facility] OR     OPTICAL TRACKING SYSTEM ENDOSCOPIC RESECTION TUMOR CRANIAL  2014    Procedure: OPTICAL TRACKING SYSTEM ENDOSCOPIC RESECTION TUMOR CRANIAL;  Stealth Assisted Endoscopic Transnasal Excision Of Pituitary Adenoma , Placement Of Lumbar Drain with c-arm;  Surgeon: Soham Aquino MD;  Location: [facility] OR     removal of pituitary microademona       wisdom teeth extration        Allergies   Allergen Reactions     Labetalol      Racing heart feeling, panic attack feeling.       Social History     Tobacco Use     Smoking status: Former Smoker      Packs/day: 0.50     Years: 3.00     Pack years: 1.50     Types: Cigarettes     Start date: 10/10/2005     Quit date: 2008     Years since quittin.5     Smokeless tobacco: Never Used   Substance Use Topics     Alcohol use: Yes     Comment: 3 beers 1x/month      Wt Readings from Last 1 Encounters:   21 67.3 kg (148 lb 5.9 oz)        Anesthesia Evaluation   Pt has had prior anesthetic.     No history of anesthetic complications       ROS/MED HX  ENT/Pulmonary: Comment: 3 pk yr hx, quit     (+) tobacco use, Past use,  (-) asthma and sleep apnea   Neurologic:  - neg neurologic ROS  (-) no seizures, no CVA and migraines   Cardiovascular: Comment: Taking lisinopril for renal protection. Pt denies diagnosis of HTN.    (+) -----Previous cardiac testing   Echo: Date:  Results:  1. Normal LV size, wall motion, and systolic function with estimated ejection fraction 60%. Normal diastolic function   2. The right ventricle is normal size. Global right ventricular function is normal.   3. No significant valvular abnormalities     Stress Test: Date: Results:    ECG Reviewed: Date: Results:    Cath:  Date: Results:      METS/Exercise Tolerance: 4 - Raking leaves, gardening Comment: Activity limited due to AVN of right hip   Hematologic: Comments: Hx postop PE in     (+) History of blood clots, pt is not anticoagulated,  (-) history of blood transfusion   Musculoskeletal: Comment: AVN of right hip        GI/Hepatic:  - neg GI/hepatic ROS  (-) GERD and liver disease   Renal/Genitourinary:     (+) renal disease, type: CRI, Pt does not require dialysis,     Endo: Comment: Cushing disease. S/P 3 resections of pituitary microadenoma in , , .  S/P B/L adrenalectomy in 2014.     Panhypopituitarism and is on fludrocortisone, hydrocortisone, levothyroxine, and desmopressin replacement    (+) thyroid problem, hypothyroidism, Chronic steroid usage for Other.  (-) Type II DM   Psychiatric/Substance Use:        Infectious Disease:  - neg infectious disease ROS     Malignancy:  - neg malignancy ROS     Other:               OUTSIDE LABS:  CBC:   Lab Results   Component Value Date    WBC 8.2 07/15/2014    WBC 8.6 04/24/2014    HGB 13.3 07/15/2014    HGB 14.8 04/24/2014    HCT 39.7 07/15/2014    HCT 43.8 04/24/2014     07/15/2014     07/15/2014     BMP:   Lab Results   Component Value Date     04/09/2021     07/22/2020    POTASSIUM 3.6 04/09/2021    POTASSIUM 4.6 07/22/2020    CHLORIDE 105 04/09/2021    CHLORIDE 100 07/22/2020    CO2 31 04/09/2021    CO2 30 07/22/2020    BUN 19 04/09/2021    BUN 23 07/22/2020    CR 1.18 (H) 04/09/2021    CR 1.55 (H) 07/22/2020    GLC 86 04/09/2021    GLC 95 07/22/2020     COAGS:   Lab Results   Component Value Date    PTT 23 01/20/2014    INR 0.98 07/14/2014    FIBR 448 (H) 07/01/2015     POC:   Lab Results   Component Value Date     (H) 07/14/2014    HCG Negative 01/20/2014    HCGS Negative 07/14/2014     HEPATIC:   Lab Results   Component Value Date    ALBUMIN 3.6 01/12/2018    PROTTOTAL 8.1 05/12/2014    ALT 22 07/07/2017    AST 24 06/25/2014    ALKPHOS 123 05/12/2014    BILITOTAL 0.8 05/12/2014     OTHER:   Lab Results   Component Value Date    PH 7.35 01/20/2014    LACT 1.8 01/20/2014    A1C Duplicate request  SEE A1CPOC   07/23/2014    ELIEZER 8.6 04/09/2021    PHOS 3.6 01/12/2018    MAG 2.1 07/15/2014    TSH <0.01 (L) 01/12/2018    T4 0.77 12/19/2019    T3 72 07/04/2013             PAC Discussion and Assessment    ASA Classification: 3  Case is suitable for: [place]  Anesthetic techniques and relevant risks discussed: GA  Invasive monitoring and risk discussed: No    Possibility and Risk of blood transfusion discussed: No            PAC Resident/NP Anesthesia Assessment: Suyapa Hodge is a 35 year old female scheduled to undergo stealth assisted Redo endoscopic, endonasal resection of pituitary tumor; INSERTION, DRAIN, SPINE, LUMBAR with  Mina Huffman MD & Kaylin Fuentes MD on 7/22/21 at [facility] for treatment of ACTH hypersecretion (H); Pituitary macroadenoma (H).     Pt has had prior anesthetic.     No history of anesthetic complications     She has the following specific operative considerations:   # RASHEL 1/8 = low risk  # VTE risk: 0.26%  # Risk of PONV score = 3.  If > 2, anti-emetic intervention recommended.  # Anesthesia considerations:  Refer to PAC assessment in anesthesia records    # Increased risk of postoperative nausea/vomiting: Recommend use of antiemetic agents in the perioperative period.        CARDIAC: METS 4, Activity limited due to AVN of right hip     # RCRI : High risk surgery.  0.9% risk of major adverse cardiac event.     #  Echo 2014:  EF 60%, normal function     #  Taking lisinopril for renal protection. Pt denies diagnosis of HTN.  Will hold DOS.    PULMONARY:     # Former smoker, 3 pk yr hx, quit 2008    # Denies asthma or inhaler use    GI:     # Denies GERD    /RENAL:     # Creatinine 1.18, GFR 59 on 4/9/21    ENDO: BMI 23    # Cushing disease. S/P 3 resections of pituitary microadenoma in 2010, 2013, 2014.  S/P B/L adrenalectomy in 07/2014.     # Panhypopituitarism and is on fludrocortisone, hydrocortisone, levothyroxine, and desmopressin replacement      # No DM    HEME:     # Hx PE postoperatively in 2014. Not anticoagulated.    ORTHO:     # AVN of right hip      Patient is optimized and is acceptable candidate for the proposed procedure. No further diagnostic evaluation is needed.    ** Patient's visit was done virtually today.  A full physical exam was not completed.  Please refer to the physical examination documented by the anesthesiologist in the anesthesia record on the day of surgery. **      Final plan per anesthesiologist on day of surgery.     Reviewed and Signed by PAC Mid-Level Provider/Resident  Mid-Level Provider/Resident: Flaquita Gardner  CRESCENCIO  Date: 7/15/21  Time: 1601                               Flaquita Gardner PA-C

## 2021-07-15 NOTE — PROGRESS NOTES
Milla is a 35 year old who is being evaluated via a billable video visit.      How would you like to obtain your AVS? MyChart  If the video visit is dropped, the invitation should be resent by: Text to cell phone: [phone]    HPI        Review of Systems         Physical Exam

## 2021-07-15 NOTE — H&P (VIEW-ONLY)
Pre-Operative H & P     CC:  Preoperative exam to assess for increased cardiopulmonary risk while undergoing surgery and anesthesia.    Date of Encounter: 7/15/2021  Primary Care Physician:  Clarisa Palafox  Reason for Visit: ACTH hypersecretion (H); Pituitary macroadenoma (H)      VIDEO-VISIT DETAILS    Type of service:  Video Visit    Patient verbally consented to video service today:  YES    Video Start Time: 1516  Video End Time (time video stopped): 1524    Originating Location (pt. Location): Home    Distant Location (provider location):  [facility] PREOPERATIVE ASSESSMENT CENTER    Mode of Communication:  Video Conference via [email or web address]    [facility]      Suyapa Hodge is a 36 y/o female who presents for pre-operative H&P in preparation for stealth assisted Redo endoscopic, endonasal resection of pituitary tumor; INSERTION, DRAIN, SPINE, LUMBAR with Mina Huffman MD & Kaylin Fuentes MD on 7/22/21 at [facility] for treatment of ACTH hypersecretion (H); Pituitary macroadenoma (H). Patient is being evaluated for comorbid conditions of former tobacco use, hypothyroidism, avascular necrosis of femoral head.        Ms. Hodge has a history of Cushing disease who has undergone 3 resections of pituitary microadenoma in 2010, 2013, and most recently in 2014.  She developed a pulmonary embolism postoperatively in 2014.  With her Cushing disease not being cured, she underwent bilateral adrenalectomy in 07/2014.  Since 2019, her ACTH levels have progressively risen.  She has panhypopituitarism and is on fludrocortisone, hydrocortisone, levothyroxine, and desmopressin replacement.  She has been feeling well overall with the exception of right hip pain.  She is able to walk reasonably well, but she has pain with repetitive changes in position from sitting to standing.  Her skin has been increasingly darker.  As mentioned, her ACTH has been rising and is now 1250.   She denies any headaches.  Her weight has been stable at about 145 pounds.  She denies any problems with her energy. She now presents for the above procedure.     History was obtained from patient & chart review    Past Medical History  Past Medical History:   Diagnosis Date     Adrenal disorder (H)      Avascular necrosis of femur head, 2010     Cervical high risk HPV (human papillomavirus) test positive 10/2015 & 3/2019    +HR HPV (NOT 16/18)     Colon polyp      Cushing syndrome (H)     pituitary microadenoma     Diabetes insipidus (H)      Fatigue      History of colposcopy 10/2015    BEVERLEY 1     Hypercalcaemia      Hypertension      Medulloadrenal hyperfunction (H)      Pulmonary emboli (H) 01/2014     Thyroid disease        Past Surgical History  Past Surgical History:   Procedure Laterality Date     COLONOSCOPY       COLPOSCOPY VULVA, BIOPSY, COMBINED  10/28/2015    BEVERLEY 1, 6 month follow-up pap was normal     COLPOSCOPY,BX CERVIX/ENDOCERV CURR  04/02/2019     INSERT DRAIN LUMBAR  1/20/2014    Procedure: INSERT DRAIN LUMBAR;;  Surgeon: Soham Aquino MD;  Location: [facility] OR     LAPAROSCOPIC ADRENALECTOMY  7/14/2014    Procedure: LAPAROSCOPIC ADRENALECTOMY;  Surgeon: Roni Nunn MD;  Location: [facility] OR     OPTICAL TRACKING SYSTEM ENDOSCOPIC RESECTION TUMOR CRANIAL  7/1/2013    Procedure: OPTICAL TRACKING SYSTEM ENDOSCOPIC RESECTION TUMOR CRANIAL;  Stealth Assisted Endoscopic Hypophysectomy ;  Surgeon: Soham Aquino MD;  Location: [facility] OR     OPTICAL TRACKING SYSTEM ENDOSCOPIC RESECTION TUMOR CRANIAL  1/20/2014    Procedure: OPTICAL TRACKING SYSTEM ENDOSCOPIC RESECTION TUMOR CRANIAL;  Stealth Assisted Endoscopic Transnasal Excision Of Pituitary Adenoma , Placement Of Lumbar Drain with c-arm;  Surgeon: Soham Aquino MD;  Location: [facility] OR     removal of pituitary microademona       wisdom teeth extration         Hx of Blood transfusions/reactions: denies     Hx of abnormal bleeding or  anti-platelet use: denies    Menstrual history: No LMP recorded. (Menstrual status: Irregular Periods).:      Steroid use in the last year: on daily steroids for Cushing disease    Personal or FH with difficulty with Anesthesia:  denies    Prior to Admission Medications  Current Outpatient Medications   Medication Sig Dispense Refill     Calcium carb-Vitamin D 500 mg [place]-200 units (OSCAL WITH D;OYSTER SHELL CALCIUM) 500-200 MG-UNIT per tablet Take 1 tablet by mouth 2 times daily (with meals)       desmopressin (DDAVP) 0.1 MG tablet Take 1 tablet (100 mcg) by mouth 2 times daily For additional refills, please schedule a follow-up appointment at [phone] (Patient taking differently: Take 0.1 mg by mouth daily For additional refills, please schedule a follow-up appointment at [phone]) 180 tablet 5     drospirenone-ethinyl estradiol (HECTOR) 3-0.02 MG tablet Take 1 tablet by mouth daily 84 tablet 3     fludrocortisone (FLORINEF) 0.1 MG tablet Take 1 tablet (0.1 mg) by mouth daily For additional refills, please schedule a follow-up appointment at [phone] 90 tablet 3     hydrocortisone (CORTEF) 10 MG tablet 1 tab (10 mg) am 1/2 tab (5 mg) pm plus sick day supply as directed (Patient taking differently: Take 10 mg by mouth daily plus sick day supply as directed) 150 tablet 5     levothyroxine (SYNTHROID/LEVOTHROID) 75 MCG tablet TAKE 1 TABLET BY MOUTH EVERY DAY 6 DAYS A WEEK AND NONE ON THE 7TH DAY EACH WEEK  For additional refills, please schedule a follow-up appointment at [phone] 78 tablet 3     lisinopril (ZESTRIL) 2.5 MG tablet Take 1 tablet (2.5 mg) by mouth daily 90 tablet 3     methocarbamol (ROBAXIN) 500 MG tablet Take 1-2 tablets (500-1,000 mg) by mouth nightly as needed for muscle spasms 20 tablet 2     Multiple Vitamin (MULTI-VITAMIN DAILY PO) Take 1 tablet by mouth daily        triamcinolone (KENALOG) 0.1 % external cream Apply topically 2 times daily As needed for flares 80 g 1        Allergies  Allergies   Allergen Reactions     Labetalol      Racing heart feeling, panic attack feeling.        Social History  Social History     Socioeconomic History     Marital status: Single     Spouse name: Not on file     Number of children: Not on file     Years of education: Not on file     Highest education level: Not on file   Occupational History     Not on file   Tobacco Use     Smoking status: Former Smoker     Packs/day: 0.50     Years: 3.00     Pack years: 1.50     Types: Cigarettes     Start date: 10/10/2005     Quit date: 2008     Years since quittin.5     Smokeless tobacco: Never Used   Substance and Sexual Activity     Alcohol use: Yes     Comment: 3 beers 1x/month     Drug use: No     Sexual activity: Not Currently     Partners: Male     Birth control/protection: Condom   Other Topics Concern     Parent/sibling w/ CABG, MI or angioplasty before 65F 55M? No   Social History Narrative     Not on file     Social Determinants of Health     Financial Resource Strain:      Difficulty of Paying Living Expenses:    Food Insecurity:      Worried About Running Out of Food in the Last Year:      Ran Out of Food in the Last Year:    Transportation Needs:      Lack of Transportation (Medical):      Lack of Transportation (Non-Medical):    Physical Activity:      Days of Exercise per Week:      Minutes of Exercise per Session:    Stress:      Feeling of Stress :    Social Connections:      Frequency of Communication with Friends and Family:      Frequency of Social Gatherings with Friends and Family:      Attends Jewish Services:      Active Member of Clubs or Organizations:      Attends Club or Organization Meetings:      Marital Status:    Intimate Partner Violence:      Fear of Current or Ex-Partner:      Emotionally Abused:      Physically Abused:      Sexually Abused:        Family History  Family History   Problem Relation Age of Onset     Hyperlipidemia Father      Obesity Father       Asthma Mother      Osteoporosis Mother      Obesity Mother      Allergies Sister      Obesity Sister      Cancer Maternal Grandmother 45        colon cancer     Colon Cancer Maternal Grandmother      Cancer Maternal Grandfather         lung cancer-smoker     Other Cancer Maternal Grandfather         Smoker     Cancer Paternal Grandmother         lung cancer-smoker     Other Cancer Paternal Grandmother         Smoker     Cancer Paternal Grandfather         lung cancer     Other Cancer Paternal Grandfather      Anesthesia Reaction No family hx of      Cardiovascular No family hx of      Deep Vein Thrombosis (DVT) No family hx of            ROS/MED HX  The complete review of systems is negative other than noted in the HPI or here.  Patient denies recent illness, fever and respiratory infection during past month.    ENT/Pulmonary: Comment: 3 pk yr hx, quit 2008    (+) tobacco use, Past use,  (-) asthma and sleep apnea   Neurologic:  - neg neurologic ROS  (-) no seizures, no CVA and migraines   Cardiovascular: Comment: Taking lisinopril for renal protection. Pt denies diagnosis of HTN.    (+) -----Previous cardiac testing   Echo: Date: 2014 Results:  1. Normal LV size, wall motion, and systolic function with estimated ejection fraction 60%. Normal diastolic function   2. The right ventricle is normal size. Global right ventricular function is normal.   3. No significant valvular abnormalities     Stress Test: Date: Results:    ECG Reviewed: Date: Results:    Cath:  Date: Results:      METS/Exercise Tolerance: 4 - Raking leaves, gardening Comment: Activity limited due to AVN of right hip   Hematologic: Comments: Hx postop PE in 2014    (+) History of blood clots, pt is not anticoagulated,  (-) history of blood transfusion   Musculoskeletal: Comment: AVN of right hip        GI/Hepatic:  - neg GI/hepatic ROS  (-) GERD and liver disease   Renal/Genitourinary:     (+) renal disease, type: CRI, Pt does not require dialysis,      Endo: Comment: Cushing disease. S/P 3 resections of pituitary microadenoma in 2010, 2013, 2014.  S/P B/L adrenalectomy in 07/2014.     Panhypopituitarism and is on fludrocortisone, hydrocortisone, levothyroxine, and desmopressin replacement    (+) thyroid problem, hypothyroidism, Chronic steroid usage for Other.  (-) Type II DM   Psychiatric/Substance Use:    Neg   Infectious Disease:  - neg infectious disease ROS     Malignancy:  - neg malignancy ROS     Other:     Neg                                 0 lbs 0 oz  Data Unavailable   There is no height or weight on file to calculate BMI.       Physical Exam  Constitutional: Awake, alert, cooperative, no apparent distress, and appears stated age.  Neurologic: Awake, alert, oriented to name, place and time.   Neuropsychiatric: Calm, cooperative. Normal affect. Answers questions appropriately.    ** Patient's visit was done virtually today.  A full physical exam was not completed.  Please refer to the physical examination documented by the anesthesiologist in the anesthesia record on the day of surgery. **        PRIOR LABS/DIAGNOSTIC STUDIES:   All labs and imaging personally reviewed     Brain/ Pituitary MRI without and with contrast 2/19/21   Findings:     Postsurgical changes of transsphenoidal approach pituitary   microadenoma resection. Hypoenhancing area within the left side of the   sella, extending to the left cavernous sinus, increased since   10/8/2018, measuring 9 x 7 x 10 mm, previously 6 x 6 x 4 mm.   Associated diffusion restriction. Abutment of the cavernous segment of   left internal carotid artery.       The pituitary stalk appears midline. The optic chiasm appears intact   and not displaced. The major cavernous carotid vascular flow-voids   appear patent.         FLAIR images through the whole brain are unremarkable, and demonstrate   no mass effect, midline shift, or significant enlargement of the   ventricles.       Impression: Hypoenhancing area  within the left side of the sella,   extending to the left cavernous sinus, increased since 10/8/2018,   concerning for recurrent tumor.         ECHOCARDIOGRAM 2014   Interpretation Summary  1. Normal LV size, wall motion, and systolic function with estimated ejection fraction 60%.  Normal diastolic function    2.  The right ventricle is normal size. Global right ventricular function is normal.    3. No significant valvular abnormalities         CBC:   Lab Results   Component Value Date    WBC 8.2 07/15/2014    WBC 8.6 04/24/2014    HGB 13.3 07/15/2014    HGB 14.8 04/24/2014    HCT 39.7 07/15/2014    HCT 43.8 04/24/2014     07/15/2014     07/15/2014     BMP:   Lab Results   Component Value Date     04/09/2021     07/22/2020    POTASSIUM 3.6 04/09/2021    POTASSIUM 4.6 07/22/2020    CHLORIDE 105 04/09/2021    CHLORIDE 100 07/22/2020    CO2 31 04/09/2021    CO2 30 07/22/2020    BUN 19 04/09/2021    BUN 23 07/22/2020    CR 1.18 (H) 04/09/2021    CR 1.55 (H) 07/22/2020    GLC 86 04/09/2021    GLC 95 07/22/2020     COAGS:   Lab Results   Component Value Date    PTT 23 01/20/2014    INR 0.98 07/14/2014    FIBR 448 (H) 07/01/2015     POC:   Lab Results   Component Value Date     (H) 07/14/2014    HCG Negative 01/20/2014    HCGS Negative 07/14/2014     HEPATIC:   Lab Results   Component Value Date    ALBUMIN 3.6 01/12/2018    PROTTOTAL 8.1 05/12/2014    ALT 22 07/07/2017    AST 24 06/25/2014    ALKPHOS 123 05/12/2014    BILITOTAL 0.8 05/12/2014     OTHER:   Lab Results   Component Value Date    PH 7.35 01/20/2014    LACT 1.8 01/20/2014    A1C Duplicate request  SEE A1CPOC   07/23/2014    ELIEZER 8.6 04/09/2021    PHOS 3.6 01/12/2018    MAG 2.1 07/15/2014    TSH <0.01 (L) 01/12/2018    T4 0.77 12/19/2019    T3 72 07/04/2013       Labs ordered for DOS: CBC, BMP, T&S    COVID19 testing scheduled on 7/20/21      ASSESSMENT and PLAN  Suyapa Craft Ayesha is a 35 year old female scheduled to undergo  stealth assisted Redo endoscopic, endonasal resection of pituitary tumor; INSERTION, DRAIN, SPINE, LUMBAR with Mina Huffman MD & Kaylin Fuentes MD on 7/22/21 at [facility] for treatment of ACTH hypersecretion (H); Pituitary macroadenoma (H).     Pt has had prior anesthetic.     No history of anesthetic complications     She has the following specific operative considerations:   # RASHEL 1/8 = low risk  # VTE risk: 0.26%  # Risk of PONV score = 3.  If > 2, anti-emetic intervention recommended.  # Anesthesia considerations:  Refer to PAC assessment in anesthesia records    # Increased risk of postoperative nausea/vomiting: Recommend use of antiemetic agents in the perioperative period.        CARDIAC: METS 4, Activity limited due to AVN of right hip     # RCRI : High risk surgery.  0.9% risk of major adverse cardiac event.     #  Echo 2014:  EF 60%, normal function     #  Taking lisinopril for renal protection. Pt denies diagnosis of HTN.  Will hold DOS.    PULMONARY:     # Former smoker, 3 pk yr hx, quit 2008    # Denies asthma or inhaler use    GI:     # Denies GERD    /RENAL:     # Creatinine 1.18, GFR 59 on 4/9/21    ENDO: BMI 23    # Cushing disease. S/P 3 resections of pituitary microadenoma in 2010, 2013, 2014.  S/P B/L adrenalectomy in 07/2014.     # Panhypopituitarism and is on fludrocortisone, hydrocortisone, levothyroxine, and desmopressin replacement      # No DM    HEME:     # Hx PE postoperatively in 2014. Not anticoagulated.    ORTHO:     # AVN of right hip      Patient is optimized and is acceptable candidate for the proposed procedure. No further diagnostic evaluation is needed.    ** Patient's visit was done virtually today.  A full physical exam was not completed.  Please refer to the physical examination documented by the anesthesiologist in the anesthesia record on the day of surgery. **      Final plan per anesthesiologist on day of  surgery.     Arrival time, NPO, shower and medication instructions provided by nursing staff today.  Preparing For Your Surgery handout given.    32 minutes spent on the date of the encounter doing chart review, history and exam, documentation and further activities as noted below:    Prep time: 14 minutes  Visit time: 8 minutes  Documentation time: 10 minutes      Flaquita Gardner PA-C  Preoperative Assessment Center  [facility]  Phone: [phone]  Fax: [phone]

## 2021-07-19 DIAGNOSIS — D35.2 PITUITARY MACROADENOMA (H): Primary | ICD-10-CM

## 2021-07-19 NOTE — PROGRESS NOTES
Imaging Order for morning of procedure per Dr Vasquez   Stealth MRI Brain with contrast with [employer] morning of procedure  Stealth CT Head without contrast with [employer] morning of procedure.    Done, note sent to Hospital Scheduling.

## 2021-07-20 ENCOUNTER — LAB (OUTPATIENT)
Dept: LAB | Facility: CLINIC | Age: 36
End: 2021-07-20
Payer: COMMERCIAL

## 2021-07-20 DIAGNOSIS — Z11.59 ENCOUNTER FOR SCREENING FOR OTHER VIRAL DISEASES: ICD-10-CM

## 2021-07-20 PROCEDURE — U0003 INFECTIOUS AGENT DETECTION BY NUCLEIC ACID (DNA OR RNA); SEVERE ACUTE RESPIRATORY SYNDROME CORONAVIRUS 2 (SARS-COV-2) (CORONAVIRUS DISEASE [COVID-19]), AMPLIFIED PROBE TECHNIQUE, MAKING USE OF HIGH THROUGHPUT TECHNOLOGIES AS DESCRIBED BY CMS-2020-01-R: HCPCS

## 2021-07-20 PROCEDURE — U0005 INFEC AGEN DETEC AMPLI PROBE: HCPCS

## 2021-07-21 ENCOUNTER — ANESTHESIA EVENT (OUTPATIENT)
Dept: SURGERY | Facility: CLINIC | Age: 36
End: 2021-07-21
Payer: COMMERCIAL

## 2021-07-21 LAB — SARS-COV-2 RNA RESP QL NAA+PROBE: NEGATIVE

## 2021-07-22 ENCOUNTER — TELEPHONE (OUTPATIENT)
Dept: GASTROENTEROLOGY | Facility: OUTPATIENT CENTER | Age: 36
End: 2021-07-22

## 2021-07-22 ENCOUNTER — HOSPITAL ENCOUNTER (OUTPATIENT)
Dept: CT IMAGING | Facility: CLINIC | Age: 36
End: 2021-07-22
Attending: NEUROLOGICAL SURGERY | Admitting: NEUROLOGICAL SURGERY
Payer: COMMERCIAL

## 2021-07-22 ENCOUNTER — HOSPITAL ENCOUNTER (INPATIENT)
Facility: CLINIC | Age: 36
LOS: 2 days | Discharge: HOME | End: 2021-07-24
Attending: NEUROLOGICAL SURGERY | Admitting: NEUROLOGICAL SURGERY
Payer: COMMERCIAL

## 2021-07-22 ENCOUNTER — HOSPITAL ENCOUNTER (OUTPATIENT)
Dept: MRI IMAGING | Facility: CLINIC | Age: 36
End: 2021-07-22
Attending: NEUROLOGICAL SURGERY | Admitting: NEUROLOGICAL SURGERY
Payer: COMMERCIAL

## 2021-07-22 ENCOUNTER — ANESTHESIA (OUTPATIENT)
Dept: SURGERY | Facility: CLINIC | Age: 36
End: 2021-07-22
Payer: COMMERCIAL

## 2021-07-22 DIAGNOSIS — D35.2 PITUITARY MACROADENOMA (H): ICD-10-CM

## 2021-07-22 DIAGNOSIS — E27.40 HYPOADRENALISM (H): ICD-10-CM

## 2021-07-22 DIAGNOSIS — D35.2 PITUITARY MICROADENOMA (H): Primary | ICD-10-CM

## 2021-07-22 DIAGNOSIS — D35.2 PITUITARY ADENOMA (H): ICD-10-CM

## 2021-07-22 DIAGNOSIS — Z11.59 ENCOUNTER FOR SCREENING FOR OTHER VIRAL DISEASES: ICD-10-CM

## 2021-07-22 DIAGNOSIS — E27.0: ICD-10-CM

## 2021-07-22 LAB
ABO/RH(D): NORMAL
ANION GAP SERPL CALCULATED.3IONS-SCNC: 5 MMOL/L (ref 3–14)
ANTIBODY SCREEN: NEGATIVE
BUN SERPL-MCNC: 17 MG/DL (ref 7–30)
CALCIUM SERPL-MCNC: 9.3 MG/DL (ref 8.5–10.1)
CHLORIDE BLD-SCNC: 106 MMOL/L (ref 94–109)
CO2 SERPL-SCNC: 24 MMOL/L (ref 20–32)
CREAT SERPL-MCNC: 0.95 MG/DL (ref 0.52–1.04)
ERYTHROCYTE [DISTWIDTH] IN BLOOD BY AUTOMATED COUNT: 12.3 % (ref 10–15)
GFR SERPL CREATININE-BSD FRML MDRD: 77 ML/MIN/1.73M2
GLUCOSE BLD-MCNC: 79 MG/DL (ref 70–99)
GLUCOSE BLDC GLUCOMTR-MCNC: 109 MG/DL (ref 70–99)
GLUCOSE BLDC GLUCOMTR-MCNC: 81 MG/DL (ref 70–99)
HCT VFR BLD AUTO: 35.8 % (ref 35–47)
HGB BLD-MCNC: 12.3 G/DL (ref 11.7–15.7)
MAGNESIUM SERPL-MCNC: 1.8 MG/DL (ref 1.6–2.3)
MCH RBC QN AUTO: 29.6 PG (ref 26.5–33)
MCHC RBC AUTO-ENTMCNC: 34.4 G/DL (ref 31.5–36.5)
MCV RBC AUTO: 86 FL (ref 78–100)
PHOSPHATE SERPL-MCNC: 3.1 MG/DL (ref 2.5–4.5)
PLATELET # BLD AUTO: 243 10E3/UL (ref 150–450)
POTASSIUM BLD-SCNC: 4 MMOL/L (ref 3.4–5.3)
POTASSIUM BLD-SCNC: 4 MMOL/L (ref 3.4–5.3)
RBC # BLD AUTO: 4.15 10E6/UL (ref 3.8–5.2)
SODIUM SERPL-SCNC: 135 MMOL/L (ref 133–144)
SPECIMEN EXPIRATION DATE: NORMAL
WBC # BLD AUTO: 5.2 10E3/UL (ref 4–11)

## 2021-07-22 PROCEDURE — 250N000011 HC RX IP 250 OP 636: Performed by: NURSE ANESTHETIST, CERTIFIED REGISTERED

## 2021-07-22 PROCEDURE — 88342 IMHCHEM/IMCYTCHM 1ST ANTB: CPT | Mod: 26 | Performed by: SPECIALIST

## 2021-07-22 PROCEDURE — 36415 COLL VENOUS BLD VENIPUNCTURE: CPT | Performed by: STUDENT IN AN ORGANIZED HEALTH CARE EDUCATION/TRAINING PROGRAM

## 2021-07-22 PROCEDURE — 710N000010 HC RECOVERY PHASE 1, LEVEL 2, PER MIN: Performed by: NEUROLOGICAL SURGERY

## 2021-07-22 PROCEDURE — 88307 TISSUE EXAM BY PATHOLOGIST: CPT | Mod: 26 | Performed by: SPECIALIST

## 2021-07-22 PROCEDURE — 0GB04ZZ EXCISION OF PITUITARY GLAND, PERCUTANEOUS ENDOSCOPIC APPROACH: ICD-10-PCS | Performed by: OTOLARYNGOLOGY

## 2021-07-22 PROCEDURE — 272N000004 HC RX 272: Performed by: NEUROLOGICAL SURGERY

## 2021-07-22 PROCEDURE — 272N000001 HC OR GENERAL SUPPLY STERILE: Performed by: NEUROLOGICAL SURGERY

## 2021-07-22 PROCEDURE — 250N000011 HC RX IP 250 OP 636: Performed by: ANESTHESIOLOGY

## 2021-07-22 PROCEDURE — 70552 MRI BRAIN STEM W/DYE: CPT | Mod: 26 | Performed by: RADIOLOGY

## 2021-07-22 PROCEDURE — 70552 MRI BRAIN STEM W/DYE: CPT

## 2021-07-22 PROCEDURE — 88341 IMHCHEM/IMCYTCHM EA ADD ANTB: CPT | Mod: TC | Performed by: NEUROLOGICAL SURGERY

## 2021-07-22 PROCEDURE — 200N000002 HC R&B ICU UMMC

## 2021-07-22 PROCEDURE — 85027 COMPLETE CBC AUTOMATED: CPT | Performed by: ANESTHESIOLOGY

## 2021-07-22 PROCEDURE — 88341 IMHCHEM/IMCYTCHM EA ADD ANTB: CPT | Mod: 26 | Performed by: SPECIALIST

## 2021-07-22 PROCEDURE — 84100 ASSAY OF PHOSPHORUS: CPT | Performed by: STUDENT IN AN ORGANIZED HEALTH CARE EDUCATION/TRAINING PROGRAM

## 2021-07-22 PROCEDURE — 250N000009 HC RX 250: Performed by: NURSE ANESTHETIST, CERTIFIED REGISTERED

## 2021-07-22 PROCEDURE — 255N000002 HC RX 255 OP 636: Performed by: NEUROLOGICAL SURGERY

## 2021-07-22 PROCEDURE — A9585 GADOBUTROL INJECTION: HCPCS | Performed by: NEUROLOGICAL SURGERY

## 2021-07-22 PROCEDURE — 250N000011 HC RX IP 250 OP 636: Performed by: STUDENT IN AN ORGANIZED HEALTH CARE EDUCATION/TRAINING PROGRAM

## 2021-07-22 PROCEDURE — 70450 CT HEAD/BRAIN W/O DYE: CPT

## 2021-07-22 PROCEDURE — 360N000079 HC SURGERY LEVEL 6, PER MIN: Performed by: NEUROLOGICAL SURGERY

## 2021-07-22 PROCEDURE — 258N000003 HC RX IP 258 OP 636: Performed by: ANESTHESIOLOGY

## 2021-07-22 PROCEDURE — C1762 CONN TISS, HUMAN(INC FASCIA): HCPCS | Performed by: NEUROLOGICAL SURGERY

## 2021-07-22 PROCEDURE — 999N000141 HC STATISTIC PRE-PROCEDURE NURSING ASSESSMENT: Performed by: NEUROLOGICAL SURGERY

## 2021-07-22 PROCEDURE — 88313 SPECIAL STAINS GROUP 2: CPT | Mod: 26 | Performed by: SPECIALIST

## 2021-07-22 PROCEDURE — 370N000017 HC ANESTHESIA TECHNICAL FEE, PER MIN: Performed by: NEUROLOGICAL SURGERY

## 2021-07-22 PROCEDURE — 70450 CT HEAD/BRAIN W/O DYE: CPT | Mod: 26 | Performed by: RADIOLOGY

## 2021-07-22 PROCEDURE — 86900 BLOOD TYPING SEROLOGIC ABO: CPT | Performed by: ANESTHESIOLOGY

## 2021-07-22 PROCEDURE — 258N000003 HC RX IP 258 OP 636: Performed by: NURSE ANESTHETIST, CERTIFIED REGISTERED

## 2021-07-22 PROCEDURE — 84132 ASSAY OF SERUM POTASSIUM: CPT | Performed by: STUDENT IN AN ORGANIZED HEALTH CARE EDUCATION/TRAINING PROGRAM

## 2021-07-22 PROCEDURE — 250N000024 HC ISOFLURANE, PER MIN: Performed by: NEUROLOGICAL SURGERY

## 2021-07-22 PROCEDURE — 83735 ASSAY OF MAGNESIUM: CPT | Performed by: STUDENT IN AN ORGANIZED HEALTH CARE EDUCATION/TRAINING PROGRAM

## 2021-07-22 PROCEDURE — 82374 ASSAY BLOOD CARBON DIOXIDE: CPT | Performed by: ANESTHESIOLOGY

## 2021-07-22 PROCEDURE — 272N000480 CT HEAD W/O CONTRAST

## 2021-07-22 PROCEDURE — 250N000009 HC RX 250: Performed by: NEUROLOGICAL SURGERY

## 2021-07-22 PROCEDURE — 272N000002 HC OR SUPPLY OTHER OPNP: Performed by: NEUROLOGICAL SURGERY

## 2021-07-22 PROCEDURE — 61781 SCAN PROC CRANIAL INTRA: CPT | Performed by: NEUROLOGICAL SURGERY

## 2021-07-22 PROCEDURE — 250N000009 HC RX 250: Performed by: OTOLARYNGOLOGY

## 2021-07-22 PROCEDURE — 62165 REMOVE PITUIT TUMOR W/SCOPE: CPT | Mod: 62 | Performed by: NEUROLOGICAL SURGERY

## 2021-07-22 PROCEDURE — 8E09XBZ COMPUTER ASSISTED PROCEDURE OF HEAD AND NECK REGION: ICD-10-PCS | Performed by: OTOLARYNGOLOGY

## 2021-07-22 PROCEDURE — 62165 REMOVE PITUIT TUMOR W/SCOPE: CPT | Mod: 62 | Performed by: OTOLARYNGOLOGY

## 2021-07-22 DEVICE — GRAFT DUREPAIR DURA MATRIX 2X2" 62100: Type: IMPLANTABLE DEVICE | Site: CRANIAL | Status: FUNCTIONAL

## 2021-07-22 RX ORDER — LEVOTHYROXINE SODIUM 75 UG/1
75 TABLET ORAL
Status: DISCONTINUED | OUTPATIENT
Start: 2021-07-23 | End: 2021-07-24 | Stop reason: HOSPADM

## 2021-07-22 RX ORDER — GLYCOPYRROLATE 0.2 MG/ML
INJECTION, SOLUTION INTRAMUSCULAR; INTRAVENOUS PRN
Status: DISCONTINUED | OUTPATIENT
Start: 2021-07-22 | End: 2021-07-22

## 2021-07-22 RX ORDER — OXYMETAZOLINE HYDROCHLORIDE 0.05 G/100ML
SPRAY NASAL PRN
Status: DISCONTINUED | OUTPATIENT
Start: 2021-07-22 | End: 2021-07-22 | Stop reason: HOSPADM

## 2021-07-22 RX ORDER — FENTANYL CITRATE 50 UG/ML
25 INJECTION, SOLUTION INTRAMUSCULAR; INTRAVENOUS EVERY 5 MIN PRN
Status: DISCONTINUED | OUTPATIENT
Start: 2021-07-22 | End: 2021-07-22 | Stop reason: HOSPADM

## 2021-07-22 RX ORDER — ONDANSETRON 4 MG/1
4 TABLET, ORALLY DISINTEGRATING ORAL EVERY 6 HOURS PRN
Status: DISCONTINUED | OUTPATIENT
Start: 2021-07-22 | End: 2021-07-24 | Stop reason: HOSPADM

## 2021-07-22 RX ORDER — FENTANYL CITRATE 50 UG/ML
INJECTION, SOLUTION INTRAMUSCULAR; INTRAVENOUS PRN
Status: DISCONTINUED | OUTPATIENT
Start: 2021-07-22 | End: 2021-07-22

## 2021-07-22 RX ORDER — DROSPIRENONE AND ETHINYL ESTRADIOL 0.02-3(28)
1 KIT ORAL DAILY
Status: DISCONTINUED | OUTPATIENT
Start: 2021-07-23 | End: 2021-07-24 | Stop reason: HOSPADM

## 2021-07-22 RX ORDER — HYDROCORTISONE 20 MG/1
20 TABLET ORAL EVERY MORNING
Status: DISCONTINUED | OUTPATIENT
Start: 2021-07-24 | End: 2021-07-22

## 2021-07-22 RX ORDER — KETOROLAC TROMETHAMINE 30 MG/ML
30 INJECTION, SOLUTION INTRAMUSCULAR; INTRAVENOUS EVERY 6 HOURS PRN
Status: DISCONTINUED | OUTPATIENT
Start: 2021-07-22 | End: 2021-07-23

## 2021-07-22 RX ORDER — DESMOPRESSIN ACETATE 0.1 MG/1
100 TABLET ORAL DAILY
Status: DISCONTINUED | OUTPATIENT
Start: 2021-07-23 | End: 2021-07-23

## 2021-07-22 RX ORDER — PROCHLORPERAZINE 25 MG
25 SUPPOSITORY, RECTAL RECTAL EVERY 12 HOURS PRN
Status: DISCONTINUED | OUTPATIENT
Start: 2021-07-22 | End: 2021-07-24 | Stop reason: HOSPADM

## 2021-07-22 RX ORDER — GADOBUTROL 604.72 MG/ML
7.5 INJECTION INTRAVENOUS ONCE
Status: COMPLETED | OUTPATIENT
Start: 2021-07-22 | End: 2021-07-22

## 2021-07-22 RX ORDER — SODIUM CHLORIDE, SODIUM LACTATE, POTASSIUM CHLORIDE, CALCIUM CHLORIDE 600; 310; 30; 20 MG/100ML; MG/100ML; MG/100ML; MG/100ML
INJECTION, SOLUTION INTRAVENOUS CONTINUOUS PRN
Status: DISCONTINUED | OUTPATIENT
Start: 2021-07-22 | End: 2021-07-22

## 2021-07-22 RX ORDER — PROPOFOL 10 MG/ML
INJECTION, EMULSION INTRAVENOUS PRN
Status: DISCONTINUED | OUTPATIENT
Start: 2021-07-22 | End: 2021-07-22

## 2021-07-22 RX ORDER — MEPERIDINE HYDROCHLORIDE 25 MG/ML
12.5 INJECTION INTRAMUSCULAR; INTRAVENOUS; SUBCUTANEOUS EVERY 5 MIN PRN
Status: DISCONTINUED | OUTPATIENT
Start: 2021-07-22 | End: 2021-07-22 | Stop reason: HOSPADM

## 2021-07-22 RX ORDER — ONDANSETRON 2 MG/ML
4 INJECTION INTRAMUSCULAR; INTRAVENOUS EVERY 6 HOURS PRN
Status: DISCONTINUED | OUTPATIENT
Start: 2021-07-22 | End: 2021-07-24 | Stop reason: HOSPADM

## 2021-07-22 RX ORDER — HYDROCORTISONE 10 MG/1
10 TABLET ORAL EVERY MORNING
Status: DISCONTINUED | OUTPATIENT
Start: 2021-07-25 | End: 2021-07-24 | Stop reason: HOSPADM

## 2021-07-22 RX ORDER — HYDROMORPHONE HCL IN WATER/PF 6 MG/30 ML
0.2 PATIENT CONTROLLED ANALGESIA SYRINGE INTRAVENOUS EVERY 5 MIN PRN
Status: DISCONTINUED | OUTPATIENT
Start: 2021-07-22 | End: 2021-07-22 | Stop reason: HOSPADM

## 2021-07-22 RX ORDER — NALOXONE HYDROCHLORIDE 0.4 MG/ML
0.4 INJECTION, SOLUTION INTRAMUSCULAR; INTRAVENOUS; SUBCUTANEOUS
Status: DISCONTINUED | OUTPATIENT
Start: 2021-07-22 | End: 2021-07-24 | Stop reason: HOSPADM

## 2021-07-22 RX ORDER — NALOXONE HYDROCHLORIDE 0.4 MG/ML
0.2 INJECTION, SOLUTION INTRAMUSCULAR; INTRAVENOUS; SUBCUTANEOUS
Status: DISCONTINUED | OUTPATIENT
Start: 2021-07-22 | End: 2021-07-24 | Stop reason: HOSPADM

## 2021-07-22 RX ORDER — MULTIPLE VITAMINS W/ MINERALS TAB 9MG-400MCG
1 TAB ORAL DAILY
Status: DISCONTINUED | OUTPATIENT
Start: 2021-07-23 | End: 2021-07-24 | Stop reason: HOSPADM

## 2021-07-22 RX ORDER — HYDROCORTISONE 10 MG/1
10 TABLET ORAL EVERY EVENING
Status: DISCONTINUED | OUTPATIENT
Start: 2021-07-24 | End: 2021-07-24 | Stop reason: HOSPADM

## 2021-07-22 RX ORDER — SODIUM CHLORIDE, SODIUM LACTATE, POTASSIUM CHLORIDE, CALCIUM CHLORIDE 600; 310; 30; 20 MG/100ML; MG/100ML; MG/100ML; MG/100ML
INJECTION, SOLUTION INTRAVENOUS CONTINUOUS
Status: DISCONTINUED | OUTPATIENT
Start: 2021-07-22 | End: 2021-07-22 | Stop reason: HOSPADM

## 2021-07-22 RX ORDER — HYDROCORTISONE 10 MG/1
10 TABLET ORAL EVERY EVENING
Status: DISCONTINUED | OUTPATIENT
Start: 2021-07-24 | End: 2021-07-22

## 2021-07-22 RX ORDER — HYDROCORTISONE 5 MG/1
5 TABLET ORAL EVERY EVENING
Status: DISCONTINUED | OUTPATIENT
Start: 2021-07-25 | End: 2021-07-22

## 2021-07-22 RX ORDER — LIDOCAINE 40 MG/G
CREAM TOPICAL
Status: DISCONTINUED | OUTPATIENT
Start: 2021-07-22 | End: 2021-07-24 | Stop reason: HOSPADM

## 2021-07-22 RX ORDER — LIDOCAINE HYDROCHLORIDE 20 MG/ML
INJECTION, SOLUTION INFILTRATION; PERINEURAL PRN
Status: DISCONTINUED | OUTPATIENT
Start: 2021-07-22 | End: 2021-07-22

## 2021-07-22 RX ORDER — HYDROCORTISONE 20 MG/1
40 TABLET ORAL ONCE
Status: COMPLETED | OUTPATIENT
Start: 2021-07-23 | End: 2021-07-23

## 2021-07-22 RX ORDER — OXYCODONE HCL 5 MG/5 ML
5 SOLUTION, ORAL ORAL EVERY 4 HOURS PRN
Status: DISCONTINUED | OUTPATIENT
Start: 2021-07-22 | End: 2021-07-22

## 2021-07-22 RX ORDER — ESMOLOL HYDROCHLORIDE 10 MG/ML
INJECTION INTRAVENOUS PRN
Status: DISCONTINUED | OUTPATIENT
Start: 2021-07-22 | End: 2021-07-22

## 2021-07-22 RX ORDER — ONDANSETRON 4 MG/1
4 TABLET, ORALLY DISINTEGRATING ORAL EVERY 30 MIN PRN
Status: DISCONTINUED | OUTPATIENT
Start: 2021-07-22 | End: 2021-07-22 | Stop reason: HOSPADM

## 2021-07-22 RX ORDER — ONDANSETRON 2 MG/ML
INJECTION INTRAMUSCULAR; INTRAVENOUS PRN
Status: DISCONTINUED | OUTPATIENT
Start: 2021-07-22 | End: 2021-07-22

## 2021-07-22 RX ORDER — ACETAMINOPHEN 325 MG/10.15ML
650 LIQUID ORAL EVERY 4 HOURS PRN
Status: DISCONTINUED | OUTPATIENT
Start: 2021-07-22 | End: 2021-07-23

## 2021-07-22 RX ORDER — METOPROLOL TARTRATE 1 MG/ML
1-2 INJECTION, SOLUTION INTRAVENOUS EVERY 5 MIN PRN
Status: DISCONTINUED | OUTPATIENT
Start: 2021-07-22 | End: 2021-07-22 | Stop reason: HOSPADM

## 2021-07-22 RX ORDER — LIDOCAINE HYDROCHLORIDE AND EPINEPHRINE 10; 10 MG/ML; UG/ML
INJECTION, SOLUTION INFILTRATION; PERINEURAL PRN
Status: DISCONTINUED | OUTPATIENT
Start: 2021-07-22 | End: 2021-07-22 | Stop reason: HOSPADM

## 2021-07-22 RX ORDER — HYDROCORTISONE 20 MG/1
20 TABLET ORAL ONCE
Status: DISCONTINUED | OUTPATIENT
Start: 2021-07-23 | End: 2021-07-22

## 2021-07-22 RX ORDER — CEFTRIAXONE 1 G/1
1 INJECTION, POWDER, FOR SOLUTION INTRAMUSCULAR; INTRAVENOUS ONCE
Status: COMPLETED | OUTPATIENT
Start: 2021-07-22 | End: 2021-07-22

## 2021-07-22 RX ORDER — SODIUM CHLORIDE 9 MG/ML
INJECTION, SOLUTION INTRAVENOUS CONTINUOUS
Status: DISCONTINUED | OUTPATIENT
Start: 2021-07-22 | End: 2021-07-24 | Stop reason: HOSPADM

## 2021-07-22 RX ORDER — PROCHLORPERAZINE MALEATE 10 MG
10 TABLET ORAL EVERY 6 HOURS PRN
Status: DISCONTINUED | OUTPATIENT
Start: 2021-07-22 | End: 2021-07-24 | Stop reason: HOSPADM

## 2021-07-22 RX ORDER — HYDROCORTISONE 20 MG/1
20 TABLET ORAL ONCE
Status: COMPLETED | OUTPATIENT
Start: 2021-07-24 | End: 2021-07-24

## 2021-07-22 RX ORDER — HYDRALAZINE HYDROCHLORIDE 20 MG/ML
5 INJECTION INTRAMUSCULAR; INTRAVENOUS EVERY 10 MIN PRN
Status: DISCONTINUED | OUTPATIENT
Start: 2021-07-22 | End: 2021-07-22

## 2021-07-22 RX ORDER — ONDANSETRON 2 MG/ML
4 INJECTION INTRAMUSCULAR; INTRAVENOUS EVERY 30 MIN PRN
Status: DISCONTINUED | OUTPATIENT
Start: 2021-07-22 | End: 2021-07-22 | Stop reason: HOSPADM

## 2021-07-22 RX ORDER — EPHEDRINE SULFATE 50 MG/ML
INJECTION, SOLUTION INTRAMUSCULAR; INTRAVENOUS; SUBCUTANEOUS PRN
Status: DISCONTINUED | OUTPATIENT
Start: 2021-07-22 | End: 2021-07-22

## 2021-07-22 RX ORDER — HYDROCORTISONE 20 MG/1
20 TABLET ORAL ONCE
Status: COMPLETED | OUTPATIENT
Start: 2021-07-23 | End: 2021-07-23

## 2021-07-22 RX ORDER — HYDRALAZINE HYDROCHLORIDE 20 MG/ML
10-20 INJECTION INTRAMUSCULAR; INTRAVENOUS EVERY 30 MIN PRN
Status: DISCONTINUED | OUTPATIENT
Start: 2021-07-22 | End: 2021-07-24 | Stop reason: HOSPADM

## 2021-07-22 RX ORDER — PANTOPRAZOLE SODIUM 40 MG/1
40 TABLET, DELAYED RELEASE ORAL
Status: DISCONTINUED | OUTPATIENT
Start: 2021-07-23 | End: 2021-07-24 | Stop reason: HOSPADM

## 2021-07-22 RX ORDER — FLUDROCORTISONE ACETATE 0.1 MG/1
0.1 TABLET ORAL DAILY
Status: DISCONTINUED | OUTPATIENT
Start: 2021-07-23 | End: 2021-07-24 | Stop reason: HOSPADM

## 2021-07-22 RX ORDER — HYDROCORTISONE 20 MG/1
40 TABLET ORAL ONCE
Status: DISCONTINUED | OUTPATIENT
Start: 2021-07-23 | End: 2021-07-22

## 2021-07-22 RX ADMIN — CEFTRIAXONE 1 G: 1 INJECTION, POWDER, FOR SOLUTION INTRAMUSCULAR; INTRAVENOUS at 14:45

## 2021-07-22 RX ADMIN — PROPOFOL 20 MG: 10 INJECTION, EMULSION INTRAVENOUS at 16:40

## 2021-07-22 RX ADMIN — FENTANYL CITRATE 50 MCG: 50 INJECTION, SOLUTION INTRAMUSCULAR; INTRAVENOUS at 16:42

## 2021-07-22 RX ADMIN — PHENYLEPHRINE HYDROCHLORIDE 100 MCG: 10 INJECTION INTRAVENOUS at 15:51

## 2021-07-22 RX ADMIN — PHENYLEPHRINE HYDROCHLORIDE 200 MCG: 10 INJECTION INTRAVENOUS at 14:37

## 2021-07-22 RX ADMIN — PHENYLEPHRINE HYDROCHLORIDE 200 MCG: 10 INJECTION INTRAVENOUS at 15:44

## 2021-07-22 RX ADMIN — PHENYLEPHRINE HYDROCHLORIDE 100 MCG: 10 INJECTION INTRAVENOUS at 18:47

## 2021-07-22 RX ADMIN — GADOBUTROL 7 ML: 604.72 INJECTION INTRAVENOUS at 12:36

## 2021-07-22 RX ADMIN — ROCURONIUM BROMIDE 20 MG: 10 INJECTION INTRAVENOUS at 16:18

## 2021-07-22 RX ADMIN — HYDROCORTISONE SODIUM SUCCINATE 100 MG: 100 INJECTION, POWDER, FOR SOLUTION INTRAMUSCULAR; INTRAVENOUS at 14:20

## 2021-07-22 RX ADMIN — ROCURONIUM BROMIDE 20 MG: 10 INJECTION INTRAVENOUS at 17:30

## 2021-07-22 RX ADMIN — SODIUM CHLORIDE: 9 INJECTION, SOLUTION INTRAVENOUS at 21:41

## 2021-07-22 RX ADMIN — PROPOFOL 20 MG: 10 INJECTION, EMULSION INTRAVENOUS at 18:08

## 2021-07-22 RX ADMIN — PHENYLEPHRINE HYDROCHLORIDE 100 MCG: 10 INJECTION INTRAVENOUS at 20:03

## 2021-07-22 RX ADMIN — Medication 10 MG: at 15:16

## 2021-07-22 RX ADMIN — PROPOFOL 30 MG: 10 INJECTION, EMULSION INTRAVENOUS at 16:42

## 2021-07-22 RX ADMIN — PROPOFOL 100 MG: 10 INJECTION, EMULSION INTRAVENOUS at 15:02

## 2021-07-22 RX ADMIN — HYDROCORTISONE SODIUM SUCCINATE 100 MG: 100 INJECTION, POWDER, FOR SOLUTION INTRAMUSCULAR; INTRAVENOUS at 21:15

## 2021-07-22 RX ADMIN — ROCURONIUM BROMIDE 20 MG: 10 INJECTION INTRAVENOUS at 16:57

## 2021-07-22 RX ADMIN — GLYCOPYRROLATE 0.2 MG: 0.2 INJECTION, SOLUTION INTRAMUSCULAR; INTRAVENOUS at 16:53

## 2021-07-22 RX ADMIN — LIDOCAINE HYDROCHLORIDE 100 MG: 20 INJECTION, SOLUTION INFILTRATION; PERINEURAL at 14:32

## 2021-07-22 RX ADMIN — PHENYLEPHRINE HYDROCHLORIDE 200 MCG: 10 INJECTION INTRAVENOUS at 15:16

## 2021-07-22 RX ADMIN — FENTANYL CITRATE 50 MCG: 50 INJECTION, SOLUTION INTRAMUSCULAR; INTRAVENOUS at 16:35

## 2021-07-22 RX ADMIN — ROCURONIUM BROMIDE 40 MG: 10 INJECTION INTRAVENOUS at 15:02

## 2021-07-22 RX ADMIN — ROCURONIUM BROMIDE 20 MG: 10 INJECTION INTRAVENOUS at 14:32

## 2021-07-22 RX ADMIN — Medication 80 MG: at 14:32

## 2021-07-22 RX ADMIN — HYDROMORPHONE HYDROCHLORIDE 0.2 MG: 1 INJECTION, SOLUTION INTRAMUSCULAR; INTRAVENOUS; SUBCUTANEOUS at 21:24

## 2021-07-22 RX ADMIN — PHENYLEPHRINE HYDROCHLORIDE 200 MCG: 10 INJECTION INTRAVENOUS at 15:00

## 2021-07-22 RX ADMIN — ROCURONIUM BROMIDE 20 MG: 10 INJECTION INTRAVENOUS at 18:10

## 2021-07-22 RX ADMIN — MIDAZOLAM 2 MG: 1 INJECTION INTRAMUSCULAR; INTRAVENOUS at 14:20

## 2021-07-22 RX ADMIN — PROPOFOL 30 MG: 10 INJECTION, EMULSION INTRAVENOUS at 17:22

## 2021-07-22 RX ADMIN — PHENYLEPHRINE HYDROCHLORIDE 100 MCG: 10 INJECTION INTRAVENOUS at 17:48

## 2021-07-22 RX ADMIN — SODIUM CHLORIDE, POTASSIUM CHLORIDE, SODIUM LACTATE AND CALCIUM CHLORIDE: 600; 310; 30; 20 INJECTION, SOLUTION INTRAVENOUS at 14:20

## 2021-07-22 RX ADMIN — FENTANYL CITRATE 75 MCG: 50 INJECTION, SOLUTION INTRAMUSCULAR; INTRAVENOUS at 14:32

## 2021-07-22 RX ADMIN — PHENYLEPHRINE HYDROCHLORIDE 200 MCG: 10 INJECTION INTRAVENOUS at 14:46

## 2021-07-22 RX ADMIN — PROPOFOL 200 MG: 10 INJECTION, EMULSION INTRAVENOUS at 14:32

## 2021-07-22 RX ADMIN — ESMOLOL HYDROCHLORIDE 30 MG: 10 INJECTION, SOLUTION INTRAVENOUS at 17:32

## 2021-07-22 RX ADMIN — PHENYLEPHRINE HYDROCHLORIDE 100 MCG: 10 INJECTION INTRAVENOUS at 19:15

## 2021-07-22 RX ADMIN — HYDROMORPHONE HYDROCHLORIDE 0.2 MG: 1 INJECTION, SOLUTION INTRAMUSCULAR; INTRAVENOUS; SUBCUTANEOUS at 22:02

## 2021-07-22 RX ADMIN — SUGAMMADEX 200 MG: 100 INJECTION, SOLUTION INTRAVENOUS at 20:24

## 2021-07-22 RX ADMIN — PHENYLEPHRINE HYDROCHLORIDE 200 MCG: 10 INJECTION INTRAVENOUS at 15:06

## 2021-07-22 RX ADMIN — PHENYLEPHRINE HYDROCHLORIDE 100 MCG: 10 INJECTION INTRAVENOUS at 16:09

## 2021-07-22 RX ADMIN — FENTANYL CITRATE 75 MCG: 50 INJECTION, SOLUTION INTRAMUSCULAR; INTRAVENOUS at 15:50

## 2021-07-22 RX ADMIN — ROCURONIUM BROMIDE 20 MG: 10 INJECTION INTRAVENOUS at 18:59

## 2021-07-22 RX ADMIN — ONDANSETRON 4 MG: 2 INJECTION INTRAMUSCULAR; INTRAVENOUS at 20:17

## 2021-07-22 ASSESSMENT — VISUAL ACUITY: OU: NORMAL ACUITY

## 2021-07-22 ASSESSMENT — ENCOUNTER SYMPTOMS: SEIZURES: 0

## 2021-07-22 ASSESSMENT — LIFESTYLE VARIABLES: TOBACCO_USE: 1

## 2021-07-22 ASSESSMENT — MIFFLIN-ST. JEOR: SCORE: 1432.5

## 2021-07-22 NOTE — ANESTHESIA PREPROCEDURE EVALUATION
Anesthesia Pre-Procedure Evaluation    Patient: Suyapa Hodge   MRN: [id number] : 1985        Preoperative Diagnosis: ACTH hypersecretion (H) [E27.0]  Pituitary macroadenoma (H) [D35.2]   Procedure : Procedure(s):  stealth assisted Redo endoscopic, endonasal resection of pituitary tumor  INSERTION, DRAIN, SPINE, LUMBAR     Past Medical History:   Diagnosis Date     Adrenal disorder (H)      Avascular necrosis of femur head,      Cervical high risk HPV (human papillomavirus) test positive 10/2015 & 3/2019    +HR HPV (NOT 16/18)     Colon polyp      Cushing syndrome (H)     pituitary microadenoma     Diabetes insipidus (H)      Fatigue      History of colposcopy 10/2015    BEVERLEY 1     Hypercalcaemia      Hypertension      Medulloadrenal hyperfunction (H)      Pulmonary emboli (H) 2014     Thyroid disease       Past Surgical History:   Procedure Laterality Date     COLONOSCOPY       COLPOSCOPY VULVA, BIOPSY, COMBINED  10/28/2015    BEVERLEY 1, 6 month follow-up pap was normal     COLPOSCOPY,BX CERVIX/ENDOCERV CURR  2019     INSERT DRAIN LUMBAR  2014    Procedure: INSERT DRAIN LUMBAR;;  Surgeon: Soham Aquino MD;  Location: [facility] OR     LAPAROSCOPIC ADRENALECTOMY  2014    Procedure: LAPAROSCOPIC ADRENALECTOMY;  Surgeon: Roni Nunn MD;  Location: [facility] OR     OPTICAL TRACKING SYSTEM ENDOSCOPIC RESECTION TUMOR CRANIAL  2013    Procedure: OPTICAL TRACKING SYSTEM ENDOSCOPIC RESECTION TUMOR CRANIAL;  Stealth Assisted Endoscopic Hypophysectomy ;  Surgeon: Soham Aquino MD;  Location: [facility] OR     OPTICAL TRACKING SYSTEM ENDOSCOPIC RESECTION TUMOR CRANIAL  2014    Procedure: OPTICAL TRACKING SYSTEM ENDOSCOPIC RESECTION TUMOR CRANIAL;  Stealth Assisted Endoscopic Transnasal Excision Of Pituitary Adenoma , Placement Of Lumbar Drain with c-arm;  Surgeon: Soham Aquino MD;  Location: [facility] OR     removal of pituitary microademona       wisdom teeth extration         Allergies   Allergen Reactions     Labetalol      Racing heart feeling, panic attack feeling.       Social History     Tobacco Use     Smoking status: Former Smoker     Packs/day: 0.50     Years: 3.00     Pack years: 1.50     Types: Cigarettes     Start date: 10/10/2005     Quit date: 2008     Years since quittin.5     Smokeless tobacco: Never Used   Substance Use Topics     Alcohol use: Yes     Comment: 3 beers 1x/month      Wt Readings from Last 1 Encounters:   21 69.4 kg (153 lb)        Anesthesia Evaluation   Pt has had prior anesthetic. Type: General.    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 Pt denies diagnosis of HTN.    (+) -----Previous cardiac testing   Echo: Date:  Results:  1. Normal LV size, wall motion, and systolic function with estimated ejection fraction 60%. Normal diastolic function   2. The right ventricle is normal size. Global right ventricular function is normal.   3. No significant valvular abnormalities     Stress Test: Date: Results:    ECG Reviewed: Date: Results:    Cath:  Date: Results:      METS/Exercise Tolerance: 3 - Able to walk 1-2 blocks without stopping Comment: Activity limited due to AVN of right hip   Hematologic: Comments: PE     (+) History of blood clots, pt is not anticoagulated,  (-) history of blood transfusion   Musculoskeletal: Comment: Avascular necrosis R femoral head      GI/Hepatic:  - neg GI/hepatic ROS  (-) GERD and liver disease   Renal/Genitourinary:     (+) renal disease, type: CRI, Pt does not require dialysis,     Endo: Comment: Pituitary tumor s/p 3 resections for adenoma causing Cushings. Pt also s/p bilateral  adrenalectomy. Pt now panhypopituitarism on fludrocortisone, hydrocortisone, levothyroxine, desmopressin.    (+) thyroid problem, hypothyroidism, Chronic steroid usage for Other. Date most recently used: this morning.  (-) Type II DM   Psychiatric/Substance Use:       Infectious Disease:  - neg infectious disease ROS     Malignancy:  - neg malignancy ROS     Other:            Physical Exam    Airway        Mallampati: I   TM distance: > 3 FB   Neck ROM: full   Mouth opening: > 3 cm    Respiratory Devices and Support         Dental  no notable dental history         Cardiovascular   cardiovascular exam normal          Pulmonary   pulmonary exam normal                OUTSIDE LABS:  CBC:   Lab Results   Component Value Date    WBC 8.2 07/15/2014    WBC 8.6 04/24/2014    HGB 13.3 07/15/2014    HGB 14.8 04/24/2014    HCT 39.7 07/15/2014    HCT 43.8 04/24/2014     07/15/2014     07/15/2014     BMP:   Lab Results   Component Value Date     04/09/2021     07/22/2020    POTASSIUM 3.6 04/09/2021    POTASSIUM 4.6 07/22/2020    CHLORIDE 105 04/09/2021    CHLORIDE 100 07/22/2020    CO2 31 04/09/2021    CO2 30 07/22/2020    BUN 19 04/09/2021    BUN 23 07/22/2020    CR 1.18 (H) 04/09/2021    CR 1.55 (H) 07/22/2020    GLC 81 07/22/2021    GLC 86 04/09/2021     COAGS:   Lab Results   Component Value Date    PTT 23 01/20/2014    INR 0.98 07/14/2014    FIBR 448 (H) 07/01/2015     POC:   Lab Results   Component Value Date     (H) 07/14/2014    HCG Negative 01/20/2014    HCGS Negative 07/14/2014     HEPATIC:   Lab Results   Component Value Date    ALBUMIN 3.6 01/12/2018    PROTTOTAL 8.1 05/12/2014    ALT 22 07/07/2017    AST 24 06/25/2014    ALKPHOS 123 05/12/2014    BILITOTAL 0.8 05/12/2014     OTHER:   Lab Results   Component Value Date    PH 7.35 01/20/2014    LACT 1.8 01/20/2014    A1C Duplicate request  SEE A1CPOC   07/23/2014    ELIEZER 8.6 04/09/2021    PHOS 3.6 01/12/2018    MAG 2.1 07/15/2014    TSH  <0.01 (L) 01/12/2018    T4 0.77 12/19/2019    T3 72 07/04/2013       Anesthesia Plan    ASA Status:  3   NPO Status:  NPO Appropriate    Anesthesia Type: General.     - Airway: ETT   Induction: Intravenous.   Maintenance: Balanced.   Techniques and Equipment:     - Lines/Monitors: 2nd IV, Arterial Line     Consents    Anesthesia Plan(s) and associated risks, benefits, and realistic alternatives discussed. Questions answered and patient/representative(s) expressed understanding.     - Discussed with:  Patient, Parent (Mother and/or Father)      - Extended Intubation/Ventilatory Support Discussed: Yes.      - Patient is DNR/DNI Status: No    Use of blood products discussed: Yes.     - Discussed with: Patient, Parent (Mother and/or Father).     - Consented: consented to blood products            Reason for refusal: other.     Postoperative Care    Pain management: IV analgesics, Oral pain medications, Multi-modal analgesia.   PONV prophylaxis: Ondansetron (or other 5HT-3), Dexamethasone or Solumedrol (STRESS DOSE STEROIDS)     Comments:                ЕЛЕНА SCRUGGS MD

## 2021-07-22 NOTE — PROGRESS NOTES
[facility], [place]   07/23/2021  Neurosurgery Progress Note:    Assessment:  Suyapa Hodge is a 35 year old woman s/p EEA for pituitary tumor resection (7/22).    Plan:    Neuro:  - Serial neuro exam  - HOB>30  - tylenlol, toradol prn  - nasal precautions  - CSF leak watch    CV:  #Hypertension  - SBP<140  - labetalol/hydralazine prn    Pulm:  #hx of PE  - RA    GI:  - prn antiemetics  - bowel regimen: miralax, senna    Renal/FEN:  #Hypernatremia  #Hypokalemia  #Hypocalcemia  #Hypophosphatemia  - Advance diet as tolerated  - Electrolyte replacement protocol  - strict I/O  - IVF until adequate PO intake    :  - Camilla    MSK:  none    HEME:  #Anemia  - Hgb > 8  - plts > 100k  - INR < 1.5    ENDO:  #hx of Adrenalectomy  #Cushing's disease  #Panhypopituitarism  #Diabetes insipidus  #Hypothyroidism  - Hydrocortisone stress dose taper  - continue PTA DDAVP, synthroid, fludrocortisone  - sodium checks Q12hrs  - DI watch    ID:  - monitor for fever    PPX:  - SCDs for DVT proph    Dispo:  - PT/OT        -----------------------------------  Jonathan Tineo MD, PhD  Neurosurgery Resident, PGY-3    Please contact neurosurgery resident on call with questions.    Dial * * [phone], enter [phone] when prompted.   -----------------------------------    Interval History: doing very well postOP, minimal pain, no signs of CSF leak    Objective:   Temp:  [97.5  F (36.4  C)-97.9  F (36.6  C)] 97.6  F (36.4  C)  Pulse:  [55-96] 87  Resp:  [12-20] 16  BP: ()/(37-91) 97/60  MAP:  [58 mmHg] 58 mmHg  Arterial Line BP: (84-93)/(40-47) 93/47  SpO2:  [95 %-100 %] 99 %  I/O last 3 completed shifts:  In: 6423.75 [I.V.:6423.75]  Out: 4295 [Urine:4275; Blood:20]    Gen: Appears comfortable, NAD  Wound: no nasal drainage  Neurologic:  - Alert & Oriented to person, place, time, and situation  - Follows commands briskly  - Speech fluent, spontaneous. No aphasia or dysarthria.  - No gaze preference. No apparent  hemineglect.  - PERRL, EOMI  - Face symmetric with sensation intact to light touch  - Palate elevates symmetrically, uvula midline, tongue protrudes midline  - Trapezii muscles 5/5 bilaterally  - No pronator drift  - No clonus     Del Tr Bi WE WF Gr   R 5 5 5 5 5 5   L 5 5 5 5 5 5    HF KE KF DF PF EHL   R 5 5 5 5 5 5   L 5 5 5 5 5 5     Reflexes 2+ throughout    Sensation intact and symmetric to light touch throughout    LABS:  Recent Labs   Lab 07/23/21  0506 07/22/21  2302 07/22/21  2144 07/22/21  1212   *  145*  --   --  135   POTASSIUM 3.2*  --  4.0 4.0   CHLORIDE 118*  --   --  106   CO2 20  --   --  24   ANIONGAP 7  --   --  5   * 109*  --  79   BUN 8  --   --  17   CR 0.75  --   --  0.95   ELIEZER 6.6*  --   --  9.3       Recent Labs   Lab 07/23/21  0506   WBC 5.9   RBC 3.13*   HGB 9.3*   HCT 28.1*   MCV 90   MCH 29.7   MCHC 33.1   RDW 12.5          IMAGING:  Recent Results (from the past 24 hour(s))   MR Brain w Contrast    Narrative    Brain MRI with/without contrast primarily for the purposes of  stereotactic evaluation    History: Stealth MRI Brain with contrast with Fiducials morning of  procedure; Pituitary macroadenoma (H)  ICD-10: Pituitary macroadenoma (H)    Comparison: MRI 2/19/2021    Technique: MR imaging performed with 3-dimensonally acquired axial  T1-weighted sequences performed with intravenous contrast.    Contrast: 7 mls Gadavist    Findings: Limited imaging for stereotactic imaging demonstrates no  midline shift or hydrocephalus. The major intracranial arterial  structures are grossly patent. Mildly hypoenhancing lesion in the left  lateral side of the sella involving the left cavernous sinus appears  similar      Impression    Impression: No acute abnormality. Limited imaging primarily for the  purposes of stereotactic localization.     PABLITO RUBIN MD         SYSTEM ID:  [id number]   CT Head w/o Contrast    Narrative    Stealth CT imaging for purposes of stereotactic  evaluation    Provided History: Stealth CT Head without contrast with  Fiducials  morning of procedure; Pituitary macroadenoma (H)  ICD-10: Pituitary macroadenoma (H)  Comparison: MRI 2/19/2021    Technique: CT imaging performed with axial, sagittal, and coronal  reconstructed images obtained without intravenous contrast.    Contrast: None    Findings: Limited imaging for stereotactic localization demonstrates  no overt mass effect, midline shift, or acute intracranial hemorrhage.  Postoperative findings of prior transsphenoidal pituitary surgery.  The ventricles are not enlarged, and there is no evidence of  hydrocephalus.      Impression    Impression: No acute abnormality. Limited imaging performed primarily  for the purposes of stereotactic localization.    PABLITO RUBIN MD         SYSTEM ID:  [id number]       I have reviewed the history above and agree with the resident's assessment and plan.  RETA Huffman MD

## 2021-07-22 NOTE — ANESTHESIA PROCEDURE NOTES
Arterial Line Procedure Note  Pre-Procedure   Staff -        Anesthesiologist:  Sallie Pearl MD       Performed By: anesthesiologist       Location: OR       Procedure Start/Stop Times: 7/22/2021 2:40 PM and 7/22/2021 2:43 PM       Pre-Anesthestic Checklist: patient identified, IV checked, risks and benefits discussed, informed consent, monitors and equipment checked, pre-op evaluation and at physician/surgeon's request  Timeout:       Correct Patient: Yes        Correct Procedure: Yes        Correct Site: Yes        Correct Position: Yes   Procedure   Procedure: arterial line       Laterality: left       Insertion Site: radial.  Sterile Prep        Standard elements of sterile barrier followed       Skin prep: Chloraprep  Insertion/Injection        Technique: Seldinger Technique        Catheter Type/Size: 20 G, 1.75 in/4.5 cm quick cath (integral wire)  Narrative        Tegaderm dressing used.       Complications: None apparent,        Arterial waveform: Yes        IBP within 10% of NIBP: Yes

## 2021-07-22 NOTE — ANESTHESIA PROCEDURE NOTES
Airway       Patient location during procedure: OR       Procedure Start/Stop Times: 7/22/2021 2:34 PM  Staff -        CRNA: Anamika Smith APRN CRNA       Performed By: CRNA  Consent for Airway        Urgency: elective  Indications and Patient Condition       Indications for airway management: christiano-procedural       Induction type:intravenous       Mask difficulty assessment: 1 - vent by mask    Final Airway Details       Final airway type: endotracheal airway       Successful airway: ETT - single  Endotracheal Airway Details        ETT size (mm): 7.0       Successful intubation technique: direct laryngoscopy       DL Blade Type: MAC 4       Grade View of Cords: 1       Adjucts: stylet       Position: Right       Measured from: lips       Secured at (cm): 22       Bite block used: None    Post intubation assessment        Placement verified by: capnometry, equal breath sounds and chest rise        Number of attempts at approach: 1       Secured with: pink tape       Ease of procedure: easy       Dentition: Intact and Unchanged

## 2021-07-23 ENCOUNTER — APPOINTMENT (OUTPATIENT)
Dept: OCCUPATIONAL THERAPY | Facility: CLINIC | Age: 36
End: 2021-07-23
Attending: STUDENT IN AN ORGANIZED HEALTH CARE EDUCATION/TRAINING PROGRAM
Payer: COMMERCIAL

## 2021-07-23 LAB
ACTH PLAS-MCNC: 14 PG/ML
ANION GAP SERPL CALCULATED.3IONS-SCNC: 7 MMOL/L (ref 3–14)
ANION GAP SERPL CALCULATED.3IONS-SCNC: 7 MMOL/L (ref 3–14)
BUN SERPL-MCNC: 16 MG/DL (ref 7–30)
BUN SERPL-MCNC: 8 MG/DL (ref 7–30)
CA-I BLD-MCNC: 4.8 MG/DL (ref 4.4–5.2)
CALCIUM SERPL-MCNC: 6.6 MG/DL (ref 8.5–10.1)
CALCIUM SERPL-MCNC: 8.4 MG/DL (ref 8.5–10.1)
CHLORIDE BLD-SCNC: 110 MMOL/L (ref 94–109)
CHLORIDE BLD-SCNC: 118 MMOL/L (ref 94–109)
CO2 SERPL-SCNC: 20 MMOL/L (ref 20–32)
CO2 SERPL-SCNC: 22 MMOL/L (ref 20–32)
CREAT SERPL-MCNC: 0.75 MG/DL (ref 0.52–1.04)
CREAT SERPL-MCNC: 1.01 MG/DL (ref 0.52–1.04)
ERYTHROCYTE [DISTWIDTH] IN BLOOD BY AUTOMATED COUNT: 12.5 % (ref 10–15)
GFR SERPL CREATININE-BSD FRML MDRD: 72 ML/MIN/1.73M2
GFR SERPL CREATININE-BSD FRML MDRD: >90 ML/MIN/1.73M2
GLUCOSE BLD-MCNC: 120 MG/DL (ref 70–99)
GLUCOSE BLD-MCNC: 127 MG/DL (ref 70–99)
HCT VFR BLD AUTO: 28.1 % (ref 35–47)
HGB BLD-MCNC: 10 G/DL (ref 11.7–15.7)
HGB BLD-MCNC: 9.3 G/DL (ref 11.7–15.7)
MAGNESIUM SERPL-MCNC: 1.6 MG/DL (ref 1.6–2.3)
MAGNESIUM SERPL-MCNC: 2.2 MG/DL (ref 1.6–2.3)
MCH RBC QN AUTO: 29.7 PG (ref 26.5–33)
MCHC RBC AUTO-ENTMCNC: 33.1 G/DL (ref 31.5–36.5)
MCV RBC AUTO: 90 FL (ref 78–100)
PHOSPHATE SERPL-MCNC: 1.4 MG/DL (ref 2.5–4.5)
PHOSPHATE SERPL-MCNC: 1.9 MG/DL (ref 2.5–4.5)
PLATELET # BLD AUTO: 161 10E3/UL (ref 150–450)
POTASSIUM BLD-SCNC: 3.2 MMOL/L (ref 3.4–5.3)
POTASSIUM BLD-SCNC: 4.6 MMOL/L (ref 3.4–5.3)
RBC # BLD AUTO: 3.13 10E6/UL (ref 3.8–5.2)
SODIUM SERPL-SCNC: 138 MMOL/L (ref 133–144)
SODIUM SERPL-SCNC: 139 MMOL/L (ref 133–144)
SODIUM SERPL-SCNC: 139 MMOL/L (ref 133–144)
SODIUM SERPL-SCNC: 145 MMOL/L (ref 133–144)
SODIUM SERPL-SCNC: 145 MMOL/L (ref 133–144)
WBC # BLD AUTO: 5.9 10E3/UL (ref 4–11)

## 2021-07-23 PROCEDURE — 84295 ASSAY OF SERUM SODIUM: CPT | Performed by: NEUROLOGICAL SURGERY

## 2021-07-23 PROCEDURE — 97535 SELF CARE MNGMENT TRAINING: CPT | Mod: GO

## 2021-07-23 PROCEDURE — 250N000009 HC RX 250: Performed by: STUDENT IN AN ORGANIZED HEALTH CARE EDUCATION/TRAINING PROGRAM

## 2021-07-23 PROCEDURE — 82330 ASSAY OF CALCIUM: CPT | Performed by: STUDENT IN AN ORGANIZED HEALTH CARE EDUCATION/TRAINING PROGRAM

## 2021-07-23 PROCEDURE — 84100 ASSAY OF PHOSPHORUS: CPT | Performed by: NEUROLOGICAL SURGERY

## 2021-07-23 PROCEDURE — 250N000013 HC RX MED GY IP 250 OP 250 PS 637: Performed by: STUDENT IN AN ORGANIZED HEALTH CARE EDUCATION/TRAINING PROGRAM

## 2021-07-23 PROCEDURE — 97165 OT EVAL LOW COMPLEX 30 MIN: CPT | Mod: GO

## 2021-07-23 PROCEDURE — 83735 ASSAY OF MAGNESIUM: CPT | Performed by: NEUROLOGICAL SURGERY

## 2021-07-23 PROCEDURE — 36415 COLL VENOUS BLD VENIPUNCTURE: CPT | Performed by: NEUROLOGICAL SURGERY

## 2021-07-23 PROCEDURE — 97530 THERAPEUTIC ACTIVITIES: CPT | Mod: GO

## 2021-07-23 PROCEDURE — 250N000011 HC RX IP 250 OP 636: Performed by: STUDENT IN AN ORGANIZED HEALTH CARE EDUCATION/TRAINING PROGRAM

## 2021-07-23 PROCEDURE — 120N000002 HC R&B MED SURG/OB UMMC

## 2021-07-23 PROCEDURE — 85018 HEMOGLOBIN: CPT | Performed by: STUDENT IN AN ORGANIZED HEALTH CARE EDUCATION/TRAINING PROGRAM

## 2021-07-23 PROCEDURE — 250N000013 HC RX MED GY IP 250 OP 250 PS 637: Performed by: NEUROLOGICAL SURGERY

## 2021-07-23 PROCEDURE — 36415 COLL VENOUS BLD VENIPUNCTURE: CPT | Performed by: STUDENT IN AN ORGANIZED HEALTH CARE EDUCATION/TRAINING PROGRAM

## 2021-07-23 PROCEDURE — 85027 COMPLETE CBC AUTOMATED: CPT | Performed by: NEUROLOGICAL SURGERY

## 2021-07-23 PROCEDURE — 84295 ASSAY OF SERUM SODIUM: CPT | Performed by: STUDENT IN AN ORGANIZED HEALTH CARE EDUCATION/TRAINING PROGRAM

## 2021-07-23 PROCEDURE — 999N000147 HC STATISTIC PT IP EVAL DEFER: Performed by: PHYSICAL THERAPIST

## 2021-07-23 PROCEDURE — 82024 ASSAY OF ACTH: CPT | Performed by: NEUROLOGICAL SURGERY

## 2021-07-23 RX ORDER — SODIUM CHLORIDE, SODIUM GLUCONATE, SODIUM ACETATE, POTASSIUM CHLORIDE AND MAGNESIUM CHLORIDE 526; 502; 368; 37; 30 MG/100ML; MG/100ML; MG/100ML; MG/100ML; MG/100ML
1000 INJECTION, SOLUTION INTRAVENOUS ONCE
Status: COMPLETED | OUTPATIENT
Start: 2021-07-23 | End: 2021-07-23

## 2021-07-23 RX ORDER — MAGNESIUM OXIDE 400 MG/1
400 TABLET ORAL 2 TIMES DAILY
Status: DISCONTINUED | OUTPATIENT
Start: 2021-07-23 | End: 2021-07-24 | Stop reason: HOSPADM

## 2021-07-23 RX ORDER — DESMOPRESSIN ACETATE 0.1 MG/1
100 TABLET ORAL DAILY
Status: DISCONTINUED | OUTPATIENT
Start: 2021-07-24 | End: 2021-07-24

## 2021-07-23 RX ORDER — ACETAMINOPHEN 325 MG/1
650 TABLET ORAL EVERY 4 HOURS PRN
Status: DISCONTINUED | OUTPATIENT
Start: 2021-07-23 | End: 2021-07-24 | Stop reason: HOSPADM

## 2021-07-23 RX ORDER — POTASSIUM CHLORIDE 750 MG/1
40 TABLET, EXTENDED RELEASE ORAL ONCE
Status: COMPLETED | OUTPATIENT
Start: 2021-07-23 | End: 2021-07-23

## 2021-07-23 RX ORDER — KETOROLAC TROMETHAMINE 15 MG/ML
15 INJECTION, SOLUTION INTRAMUSCULAR; INTRAVENOUS EVERY 6 HOURS PRN
Status: DISCONTINUED | OUTPATIENT
Start: 2021-07-23 | End: 2021-07-24 | Stop reason: HOSPADM

## 2021-07-23 RX ORDER — HYDROCORTISONE 5 MG/1
5 TABLET ORAL EVERY EVENING
Status: DISCONTINUED | OUTPATIENT
Start: 2021-07-25 | End: 2021-07-24 | Stop reason: HOSPADM

## 2021-07-23 RX ADMIN — DESMOPRESSIN ACETATE 100 MCG: 0.1 TABLET ORAL at 07:45

## 2021-07-23 RX ADMIN — Medication 1 TABLET: at 07:44

## 2021-07-23 RX ADMIN — PANTOPRAZOLE SODIUM 40 MG: 40 TABLET, DELAYED RELEASE ORAL at 06:52

## 2021-07-23 RX ADMIN — HYDROCORTISONE 20 MG: 20 TABLET ORAL at 16:30

## 2021-07-23 RX ADMIN — POTASSIUM & SODIUM PHOSPHATES POWDER PACK 280-160-250 MG 2 PACKET: 280-160-250 PACK at 07:44

## 2021-07-23 RX ADMIN — POTASSIUM CHLORIDE 40 MEQ: 750 TABLET, EXTENDED RELEASE ORAL at 06:52

## 2021-07-23 RX ADMIN — POTASSIUM & SODIUM PHOSPHATES POWDER PACK 280-160-250 MG 2 PACKET: 280-160-250 PACK at 13:27

## 2021-07-23 RX ADMIN — ACETAMINOPHEN 650 MG: 325 TABLET, FILM COATED ORAL at 20:02

## 2021-07-23 RX ADMIN — SODIUM CHLORIDE, SODIUM GLUCONATE, SODIUM ACETATE, POTASSIUM CHLORIDE AND MAGNESIUM CHLORIDE 1000 ML: 526; 502; 368; 37; 30 INJECTION, SOLUTION INTRAVENOUS at 10:01

## 2021-07-23 RX ADMIN — FLUDROCORTISONE ACETATE 0.1 MG: 0.1 TABLET ORAL at 07:44

## 2021-07-23 RX ADMIN — MAGNESIUM OXIDE TAB 400 MG (241.3 MG ELEMENTAL MG) 400 MG: 400 (241.3 MG) TAB at 07:44

## 2021-07-23 RX ADMIN — HYDROCORTISONE 40 MG: 20 TABLET ORAL at 07:44

## 2021-07-23 RX ADMIN — LEVOTHYROXINE SODIUM 75 MCG: 75 TABLET ORAL at 07:45

## 2021-07-23 RX ADMIN — KETOROLAC TROMETHAMINE 30 MG: 30 INJECTION, SOLUTION INTRAMUSCULAR; INTRAVENOUS at 04:04

## 2021-07-23 RX ADMIN — POTASSIUM & SODIUM PHOSPHATES POWDER PACK 280-160-250 MG 2 PACKET: 280-160-250 PACK at 20:00

## 2021-07-23 RX ADMIN — KETOROLAC TROMETHAMINE 15 MG: 15 INJECTION, SOLUTION INTRAMUSCULAR; INTRAVENOUS at 21:45

## 2021-07-23 RX ADMIN — DROSPIRENONE AND ETHINYL ESTRADIOL 1 TABLET: KIT at 11:43

## 2021-07-23 RX ADMIN — ACETAMINOPHEN 650 MG: 325 SOLUTION ORAL at 13:27

## 2021-07-23 RX ADMIN — MAGNESIUM OXIDE TAB 400 MG (241.3 MG ELEMENTAL MG) 400 MG: 400 (241.3 MG) TAB at 20:00

## 2021-07-23 ASSESSMENT — ACTIVITIES OF DAILY LIVING (ADL)
ADLS_ACUITY_SCORE: 10
PREVIOUS_RESPONSIBILITIES: MEAL PREP;HOUSEKEEPING;LAUNDRY;SHOPPING;YARDWORK;MEDICATION MANAGEMENT;FINANCES;DRIVING;WORK

## 2021-07-23 ASSESSMENT — VISUAL ACUITY
OU: NORMAL ACUITY

## 2021-07-23 NOTE — BRIEF OP NOTE
[facility]    Brief Operative Note    Pre-operative diagnosis: ACTH hypersecretion (H) [E27.0]  Pituitary macroadenoma (H) [D35.2]  Post-operative diagnosis Same as pre-operative diagnosis    Procedure: Procedure(s):  stealth assisted Redo endoscopic, endonasal resection of pituitary tumor  Surgeon: Surgeon(s) and Role:     * Mina Huffman MD - Primary     * Kaylin Fuentes MD - Assisting     * Maddie Sánchez MD - Resident - Assisting     * Patricio Li MD - Resident - Assisting     * Maddie Sánchez MD - Resident - Observing  Anesthesia: General   Estimated blood loss: Minimal  Drains: None  Specimens:   ID Type Source Tests Collected by Time Destination   1 : pituitary tumor Tissue Pituitary Gland SURGICAL PATHOLOGY EXAM Mina Huffman MD 7/22/2021  5:58 PM    2 : tumor vs scar Tissue Pituitary Gland SURGICAL PATHOLOGY EXAM Mina Huffman MD 7/22/2021  6:46 PM      Findings:   As expected. No CSF leak intraop.  Complications: none  Implants:   Implant Name Type Inv. Item Serial No.  Lot No. LRB No. Used Action   2 x 2 dura repair     [id number] N/A 1 Implanted       Patricio Li MD    PLAN  - no nasal saline while in-house, to be prescribed on discharge

## 2021-07-23 NOTE — PROGRESS NOTES
07/23/21 1300   Quick Adds   Type of Visit Initial Occupational Therapy Evaluation   Living Environment   People in home parent(s)   Current Living Arrangements house   Home Accessibility no concerns   Transportation Anticipated family or friend will provide   Living Environment Comments Pt will d/c to mothers home until fully recovered from surgery. 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   Disability/Function   Hearing Difficulty or Deaf no   Wear Glasses or Blind no   Concentrating, Remembering or Making Decisions Difficulty no   Difficulty Communicating no   Difficulty Eating/Swallowing no   Walking or Climbing Stairs Difficulty no   Dressing/Bathing Difficulty no   Toileting issues no   Doing Errands Independently Difficulty (such as shopping) no   Fall history within last six months no   Change in Functional Status Since Onset of Current Illness/Injury yes   General Information   Onset of Illness/Injury or Date of Surgery 07/22/21   Referring Physician Maddie Sánchez MD   Additional Occupational Profile Info/Pertinent History of Current Problem Suyapa Hodge is a 35 year old female s/p EEA for pituitary tumor resection (7/22).   Existing Precautions/Restrictions   (sinus )   Left Upper Extremity (Weight-bearing Status) other (see comments)  (15-20 lbs limit)   Right Upper Extremity (Weight-bearing Status) other (see comments)  (15-20 lbs limit)   General Observations and Info Activity: up w/ A   Cognitive Status Examination   Orientation Status orientation to person, place and time   Affect/Mental Status (Cognitive) WFL    Follows Commands WNL   Cognitive Status Comments Cognition appears intact   Visual Perception   Visual Impairment/Limitations WFL   Sensory   Sensory Comments new numbness/tingling R foot- RN and MD aware   Pain Assessment   Patient Currently in Pain No   Integumentary/Edema   Integumentary/Edema no deficits were identifed   Posture   Posture not impaired   Range of Motion Comprehensive   General Range of Motion no range of motion deficits identified   Strength Comprehensive (MMT)   Comment, General Manual Muscle Testing (MMT) Assessment Not formally tested 2/2 to precautions   Bed Mobility   Bed Mobility supine-sit   Supine-Sit [place] (Bed Mobility) modified independence   Transfers   Transfers bed-chair transfer   Transfer Skill: Bed to Chair/Chair to Bed   Bed-Chair [place] (Transfers) independent   Clinical Impression   Criteria for Skilled Therapeutic Interventions Met (OT) yes;skilled treatment is necessary   OT Diagnosis Decreased I/ADL IND d/t precautions   OT Problem List-Impairments impacting ADL problems related to;post-surgical precautions   Assessment of Occupational Performance 1-3 Performance Deficits   Identified Performance Deficits home mgmt, LB dressing/bathing   Planned Therapy Interventions (OT) ADL retraining;IADL retraining;bed mobility training;transfer training   Clinical Decision Making Complexity (OT) low complexity   Therapy Frequency (OT) 1x eval and treat   Predicted Duration of Therapy 1 day   Risk & Benefits of therapy have been explained evaluation/treatment results reviewed;care plan/treatment goals reviewed;risks/benefits reviewed;current/potential barriers reviewed;participants voiced agreement with care plan;participants included;patient;mother   Comment-Clinical Impression Pt would benefit from continued skilled OT to progress I/ADL IND   OT Discharge Planning    OT Discharge Recommendation (DC Rec) Home with assist   OT Rationale for DC Rec Pt will be safe to d/c  home w/ A w/ heavy IADLs    OT Brief overview of current status  IND   Total Evaluation Time (Minutes)   Total Evaluation Time (Minutes) 3

## 2021-07-23 NOTE — PROGRESS NOTES
"Otolaryngology Progress Note  July 23, 2021    Subjective: Patient had no acute events overnight. She is doing well this morning, denies any nasal drainage salty or metallic taste. Had mild episode of headache yesterday that resolved with toradol. Denies vision changes     Objective: BP (!) 88/43   Pulse 58   Temp 97.6  F (36.4  C) (Oral)   Resp 16   Ht 1.727 m (5' 8\")   Wt 69.4 kg (153 lb)   SpO2 96%   BMI 23.26 kg/m     General: resting comfortably and not in acute distress. Engaged in discussion.    HEENT: EOMI, without nystagmus. CN V1-V3 intact and symmetric bilaterally. No nasal drainage, nares with minimal crusting. Posterior oropharynx without drainage or blood.    Pulmonary: breathing comfortably on room air without stridor or stertor    Assessment & Plan: Suyapa Hodge is a 35F s/p endoscopic endonasal resection of recurrent pituitary macroadenoma on 7/22. No CSF leak intraop. Recovering well.     - Sinonasal precautions  - Will discharge with nasal saline   - Remainder of cares per NSGY     Patient and plan to be discussed with Dr. Eulalio Florence  Medical Student     Ashley Yi MD PGY2  ENT  "

## 2021-07-23 NOTE — ANESTHESIA POSTPROCEDURE EVALUATION
Patient: Suyapa Hodge    Procedure(s):  stealth assisted Redo endoscopic, endonasal resection of pituitary tumor    Diagnosis:ACTH hypersecretion (H) [E27.0]  Pituitary macroadenoma (H) [D35.2]  Diagnosis Additional Information: No value filed.    Anesthesia Type:  General    Note:  Disposition: Admission   Postop Pain Control: Uneventful            Sign Out: Well controlled pain   PONV: No   Neuro/Psych: Uneventful            Sign Out: Acceptable/Baseline neuro status   Airway/Respiratory: Uneventful            Sign Out: Acceptable/Baseline resp. status   CV/Hemodynamics: Uneventful            Sign Out: Acceptable CV status; No obvious hypovolemia; No obvious fluid overload   Other NRE: NONE   DID A NON-ROUTINE EVENT OCCUR?            Last vitals:  Vitals Value Taken Time   BP 89/52 07/22/21 2210   Temp 36.4  C (97.5  F) 07/22/21 2130   Pulse 80 07/22/21 2220   Resp 9 07/22/21 2220   SpO2 100 % 07/22/21 2219   Vitals shown include unvalidated device data.    Electronically Signed By: Hubert Kaufman MD  July 22, 2021  10:26 PM

## 2021-07-23 NOTE — OP NOTE
Procedure Date: 07/22/2021    PREOPERATIVE DIAGNOSES:    1.  Recurrent pituitary macroadenoma.  2.  Cushing's syndrome.  3.  Three prior pituitary adenoma resections.    POSTOPERATIVE DIAGNOSES:  1.  Recurrent pituitary macroadenoma.  2.  Cushing's syndrome.  3.  Three prior pituitary adenoma resections.    PROCEDURE:  Redo Stealth-guided endoscopic endonasal trans-sellar, trans-cavernous approach with excision of recurrent pituitary macroadenoma.     SURGEONS:  Kaylin Fuentes MD and Mina Huffman MD    RESIDENT SURGEON:  Patricio Li MD    ANESTHESIA:  General with orotracheal tube.    ESTIMATED BLOOD LOSS:  About 50 mL.    COMPLICATIONS:  None.    CONDITION:  The patient was extubated and transferred to the recovery room under stable condition.     INDICATIONS:  Briefly, Mrs. Hodge is a 35-year-old female who has undergone now 3 procedures for resection of pituitary adenoma.  She was found to have a recurrent tumor with a diagnosis of Cushing's disease.  She was advised by the Neurosurgery team to undergo resection of this pituitary tumor recurrence.  It is important to note that the patient already had 3 prior procedures to the sellar region.      FINDINGS:  Septum is in the midline.  There is a very large posterior septectomy.  The anatomy is very altered because of the prior 3 surgeries.  The sphenoid sinus has mucosalized which appears to be a prior nasoseptal flap.  There was quite a bit of scar tissue and very thick bone on the sellar region.    DESCRIPTION OF PROCEDURE:  All the risks and benefits of the procedure were explained to the patient.  Informed consent was obtained ,and the patient was taken to the operating room today, 07/22/2021.  The patient was placed in the supine position.  General anesthesia was induced.  An orotracheal tube was placed with no difficulties.  Following this, the operating table was turned 180 degrees.  The patient was placed in the Castellano head  cruz by the Neurosurgery team.  Following this, the Stealth-guided system was set up and found to be very accurate.  At this time, the patient was prepped and draped under sterile fashion for an endoscopic endonasal approach to the sella region.  Following this, a timeout was performed by myself and the operating room staff.  Next, I used a 0-degree nasal endoscope to gain access into the nasal cavity.  We did initial inspection, and the inferior turbinates are in place and normal.  The remaining septum is in the midline, large posterior septectomy.  We initiated our procedure injecting 1% lidocaine with 1:100,000 units of epinephrine in the region of the sphenoid sinus.  We then made an incision superiorly and on right-sided mucosa of the sphenoid sinus, which appears to be a flap was elevated from the sphenoid sinus. This took some effort due to very severe fibrosis and scar tissue on the sphenoid sinus.  This flap was elevated, and it was pedicled to the left lateral nasal wall, and the flap was placed in the nasopharynx for further use.  Following this, I did use a 4 mm renetta drill, and I smoothed out all the bony walls of the sphenoid sinus.  I removed all the scar tissue that was on the bone of the sphenoid sinus.  Following this, I drilled the anterior sella wall.  I removed some of the bone of the anterior sella wall.  This was very thick, most likely due to prior surgeries.  Once this was exposed, Dr. Huffman was called into the room, and he will be dictating the tumor removal portion of this procedure.  Once the tumor was removed, I did the closure in the following fashion.  I applied a small piece of Durepair as underlayer fashion.  Important to note that we did not have any CSF leak during the procedure.  Following this, I applied a small piece of Surgicel around the dural repair.  Following this, I recovered the flap that we harvested at the beginning of the case, and I applied this as an overlayer  fashion, and this flap was kept in place using pieces of Surgicel as well as DuraSeal.  At this time, I used a nasogastric tube was advanced into the stomach, and the contents of the stomach were suctioned.  At this time, the procedure was ended.  The patient was awakened, extubated, and transferred to the recovery room under stable conditions.  I was present for the entire length of the case.    Kaylin Fuentes MD        D: 2021   T: 2021   MT: GARY    Name:     VADIM ZAMBRANOMima  MRN:      [id number]        Account:        [id number]   :      1985           Procedure Date: 2021     Document: [id number]

## 2021-07-23 NOTE — PROGRESS NOTES
Admitted/transferred from: [facility]  Reason for admission/transfer: continued care  2 RN skin assessment: completed by Sallie RN and Sharath RN   Result of skin assessment and interventions/actions: WDL  Height, weight, drug calc weight: Done  Patient belongings (see Flowsheet)  MDRO education added to care planYes  ?

## 2021-07-23 NOTE — ANESTHESIA CARE TRANSFER NOTE
Patient: Suyapa Hodge    Procedure(s):  stealth assisted Redo endoscopic, endonasal resection of pituitary tumor    Diagnosis: ACTH hypersecretion (H) [E27.0]  Pituitary macroadenoma (H) [D35.2]  Diagnosis Additional Information: No value filed.    Anesthesia Type:   General     Note:    Oropharynx: oropharynx clear of all foreign objects  Level of Consciousness: drowsy  Oxygen Supplementation: nasal cannula  Level of Supplemental Oxygen (L/min / FiO2): 4  Independent Airway: airway patency satisfactory and stable  Dentition: dentition unchanged  Vital Signs Stable: post-procedure vital signs reviewed and stable  Report to RN Given: handoff report given  Patient transferred to: PACU    Handoff Report: Identifed the Patient, Identified the Reponsible Provider, Reviewed the pertinent medical history, Discussed the surgical course, Reviewed Intra-OP anesthesia mangement and issues during anesthesia, Set expectations for post-procedure period and Allowed opportunity for questions and acknowledgement of understanding      Vitals:  Vitals Value Taken Time   BP 98/48 07/22/21 2045   Temp     Pulse 93 07/22/21 2047   Resp 0 07/22/21 2047   SpO2 100 % 07/22/21 2047   Vitals shown include unvalidated device data.    Electronically Signed By: SOHEILA Low CRNA  July 22, 2021  8:48 PM

## 2021-07-23 NOTE — PROVIDER NOTIFICATION
Notified NSG that patient c/o new numbness/tingling on outer edge of R foot--MD to bedside to assess.

## 2021-07-23 NOTE — OR NURSING
Gave Dr. Kaufman update on patient via phone.    @[id number]-Dr. Kaufman to bedside, received verbal signout for patient to transfer to inpatient bed when ready.

## 2021-07-23 NOTE — PLAN OF CARE
Major Shift Events:    Pt A/Ox4, neuros intact, following commands, spontaneously moving all extremities, tele SR/SB, SBP <140 without intervention, O2 RA, advanced to regular diet tolerating well, meyers d/cd this AM DTV, no BM noted, minimal drainage from nares, headache pain reported IV Toradol given x1 with relief    Plan:   Continue to monitor neuro status, manage pain     For vital signs and complete assessments, please see documentation flowsheets.

## 2021-07-23 NOTE — PLAN OF CARE
Arrived from:  4A  Belongings/meds:  W/ pt at bedside  2 RN Skin Assessment Completed by:  Ivania GORDON RN & Mackenzie FREITAS RN  Non-intact findings documented (yes/no/NA): Scars on chest.    Status: S/p EEA for pituitary tumor resection (7/22).  Vitals: BP's soft. On RA.  Neuros: A&Ox4. Neuros intact.  IV: PIV TKO 20 mL/hr  Labs/Electrolytes: WNL.  Resp/trach: On RA. Denies SOB.  Diet: Regular diet. Good intake.  Bowel status: LBM 7/22.  : Voids w/ no difficulties.  Skin: Scars on chest.  Pain: Pain managed w/ tylenol x1 and x1 toradol this shift.  Activity: Up ad gena.  Social: Mom, Alicia, at bedside this evening.  Plan: Discharge to Mom's home when medically stable.

## 2021-07-23 NOTE — PLAN OF CARE
Major Shift Events:  A&Ox4, neuro intact. C/o new numbness along edge of R foot, has decreased over course of shift. C/o headache--resolved with PRN Tylenol. Lungs clear on room air. Goal SBP<140 maintained without use of PRNs. Received 1L Plasmalyte bolus for low UOP and hypotension. Total UOP 575cc since 0800--NSG aware; PVR 11cc. Tolerating regular diet. Up independently in room.  Plan: Transferred to  ; report given to KAJAL Redd. Backpack, clothing, and shoes sent with patient. Chart and medications sent with patient. Patient's mother, Alicia, at bedside, aware of transfer.  For vital signs and complete assessments, please see documentation flowsheets.

## 2021-07-23 NOTE — BRIEF OP NOTE
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]  Post-operative diagnosis Same as pre-operative diagnosis    Procedure: Procedure(s):  stealth assisted Redo endoscopic, endonasal resection of pituitary tumor  Surgeon: Surgeon(s) and Role:     * Mina Huffman MD - Primary     * Kaylin Fuentes MD - Assisting     * Maddie Sánchez MD - Resident - Assisting     * Patricio Li MD - Resident - Assisting     * Maddie Sánchez MD - Resident - Observing  Anesthesia: General   Estimated blood loss: 20cc  Drains: None  Specimens:   ID Type Source Tests Collected by Time Destination   1 : pituitary tumor Tissue Pituitary Gland SURGICAL PATHOLOGY EXAM Mina Huffman MD 7/22/2021  5:58 PM    2 : tumor vs scar Tissue Pituitary Gland SURGICAL PATHOLOGY EXAM Mina Huffman MD 7/22/2021  6:46 PM      Findings:   None.  Complications: None.  Implants:   Implant Name Type Inv. Item Serial No.  Lot No. LRB No. Used Action   2 x 2 dura repair     [id number] N/A 1 Implanted     Necessary csf leak due to tumor location, repaired with duragen, duraseal

## 2021-07-24 VITALS
BODY MASS INDEX: 23.19 KG/M2 | WEIGHT: 153 LBS | SYSTOLIC BLOOD PRESSURE: 105 MMHG | HEART RATE: 67 BPM | OXYGEN SATURATION: 97 % | HEIGHT: 68 IN | RESPIRATION RATE: 16 BRPM | TEMPERATURE: 97.9 F | DIASTOLIC BLOOD PRESSURE: 53 MMHG

## 2021-07-24 LAB
ERYTHROCYTE [DISTWIDTH] IN BLOOD BY AUTOMATED COUNT: 13 % (ref 10–15)
GLUCOSE BLDC GLUCOMTR-MCNC: 74 MG/DL (ref 70–99)
HCT VFR BLD AUTO: 28.1 % (ref 35–47)
HGB BLD-MCNC: 9.2 G/DL (ref 11.7–15.7)
MCH RBC QN AUTO: 29.8 PG (ref 26.5–33)
MCHC RBC AUTO-ENTMCNC: 32.7 G/DL (ref 31.5–36.5)
MCV RBC AUTO: 91 FL (ref 78–100)
PLATELET # BLD AUTO: 208 10E3/UL (ref 150–450)
RBC # BLD AUTO: 3.09 10E6/UL (ref 3.8–5.2)
SODIUM SERPL-SCNC: 141 MMOL/L (ref 133–144)
WBC # BLD AUTO: 5.6 10E3/UL (ref 4–11)

## 2021-07-24 PROCEDURE — 85027 COMPLETE CBC AUTOMATED: CPT | Performed by: NEUROLOGICAL SURGERY

## 2021-07-24 PROCEDURE — 250N000013 HC RX MED GY IP 250 OP 250 PS 637: Performed by: NEUROLOGICAL SURGERY

## 2021-07-24 PROCEDURE — 250N000013 HC RX MED GY IP 250 OP 250 PS 637: Performed by: STUDENT IN AN ORGANIZED HEALTH CARE EDUCATION/TRAINING PROGRAM

## 2021-07-24 PROCEDURE — 84295 ASSAY OF SERUM SODIUM: CPT | Performed by: NEUROLOGICAL SURGERY

## 2021-07-24 PROCEDURE — 36415 COLL VENOUS BLD VENIPUNCTURE: CPT | Performed by: NEUROLOGICAL SURGERY

## 2021-07-24 RX ORDER — DESMOPRESSIN ACETATE 0.1 MG/1
0.1 TABLET ORAL DAILY
Qty: 30 TABLET | Refills: 0 | Status: SHIPPED | OUTPATIENT
Start: 2021-07-25 | End: 2022-05-17

## 2021-07-24 RX ORDER — AMOXICILLIN 250 MG
2 CAPSULE ORAL 2 TIMES DAILY
Status: DISCONTINUED | OUTPATIENT
Start: 2021-07-24 | End: 2021-07-24 | Stop reason: HOSPADM

## 2021-07-24 RX ORDER — DESMOPRESSIN ACETATE 0.1 MG/1
50 TABLET ORAL EVERY MORNING
Status: COMPLETED | OUTPATIENT
Start: 2021-07-24 | End: 2021-07-24

## 2021-07-24 RX ORDER — DESMOPRESSIN ACETATE 0.1 MG/1
0.05 TABLET ORAL EVERY EVENING
Qty: 1 TABLET | Refills: 0 | Status: SHIPPED | OUTPATIENT
Start: 2021-07-24 | End: 2022-02-23 | Stop reason: DRUGHIGH

## 2021-07-24 RX ORDER — ECHINACEA PURPUREA EXTRACT 125 MG
TABLET ORAL
Qty: 30 ML | Refills: 0 | Status: SHIPPED | OUTPATIENT
Start: 2021-07-24

## 2021-07-24 RX ORDER — AMOXICILLIN 250 MG
2 CAPSULE ORAL 2 TIMES DAILY
Qty: 120 TABLET | Refills: 0 | Status: SHIPPED | OUTPATIENT
Start: 2021-07-24 | End: 2021-08-23

## 2021-07-24 RX ORDER — DESMOPRESSIN ACETATE 0.1 MG/1
100 TABLET ORAL AT BEDTIME
Status: DISCONTINUED | OUTPATIENT
Start: 2021-07-25 | End: 2021-07-24 | Stop reason: HOSPADM

## 2021-07-24 RX ORDER — DESMOPRESSIN ACETATE 0.1 MG/1
50 TABLET ORAL EVERY EVENING
Status: DISCONTINUED | OUTPATIENT
Start: 2021-07-24 | End: 2021-07-24 | Stop reason: HOSPADM

## 2021-07-24 RX ADMIN — POTASSIUM & SODIUM PHOSPHATES POWDER PACK 280-160-250 MG 2 PACKET: 280-160-250 PACK at 08:53

## 2021-07-24 RX ADMIN — LEVOTHYROXINE SODIUM 75 MCG: 75 TABLET ORAL at 08:54

## 2021-07-24 RX ADMIN — DOCUSATE SODIUM 50 MG AND SENNOSIDES 8.6 MG 2 TABLET: 8.6; 5 TABLET, FILM COATED ORAL at 10:01

## 2021-07-24 RX ADMIN — Medication 50 MCG: at 10:01

## 2021-07-24 RX ADMIN — PANTOPRAZOLE SODIUM 40 MG: 40 TABLET, DELAYED RELEASE ORAL at 08:54

## 2021-07-24 RX ADMIN — DROSPIRENONE AND ETHINYL ESTRADIOL 1 TABLET: KIT at 08:56

## 2021-07-24 RX ADMIN — FLUDROCORTISONE ACETATE 0.1 MG: 0.1 TABLET ORAL at 08:55

## 2021-07-24 RX ADMIN — ACETAMINOPHEN 650 MG: 325 TABLET, FILM COATED ORAL at 09:05

## 2021-07-24 RX ADMIN — POTASSIUM & SODIUM PHOSPHATES POWDER PACK 280-160-250 MG 2 PACKET: 280-160-250 PACK at 02:17

## 2021-07-24 RX ADMIN — HYDROCORTISONE 20 MG: 20 TABLET ORAL at 08:54

## 2021-07-24 RX ADMIN — Medication 1 TABLET: at 08:54

## 2021-07-24 RX ADMIN — MAGNESIUM OXIDE TAB 400 MG (241.3 MG ELEMENTAL MG) 400 MG: 400 (241.3 MG) TAB at 08:54

## 2021-07-24 ASSESSMENT — ACTIVITIES OF DAILY LIVING (ADL)
ADLS_ACUITY_SCORE: 10

## 2021-07-24 NOTE — DISCHARGE SUMMARY
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 Hodge is a 35-year-old woman who has undergone 3 procedures for resection of pituitary adenoma causing Cushing's disease.  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  She appears to have clinical manifestations with hyperpigmentation.  Although technically challenging, we recommend reoperation in an attempt to remove this residual tumor. Patient has elected to undergo above-mentioned procedure.           Hospital Course:   Patient underwent above-mentioned procedure on 7/22/21. The operation was uncomplicated and she was admitted to the surgical floor for routine post operative cares. Her diabetes insipidus was adequately controlled throughout her stay. Her schedule for DDAVP was adjusted with dose split in half for 1 day, to restart her DDAVP full dose in the evening of 7/25.  On POD1 she was ambulating, voiding without a meyers, eating a regular diet, pain was well controlled and therefore she was discharged to home.     In addition to the assessment of diagnoses detailed above, this 35 year old female  patient admitted for elective procedure has the following conditions contributing to the complexity of their medical care:    Pituitary disorder ,    Exam at  discharge:  Gen: Appears comfortable, NAD  Wound: no nasal drainage  Neurologic:  - Alert & Oriented to person, place, time, and situation  - Follows commands briskly  - Speech fluent, spontaneous. No aphasia or dysarthria.  - No gaze preference. No apparent hemineglect.  - PERRL, EOMI  - Face symmetric with sensation intact to light touch  - Palate elevates symmetrically, uvula midline, tongue protrudes midline  - Trapezii muscles 5/5 bilaterally  - No pronator drift  - No clonus       Del Tr Bi WE WF Gr   R 5 5 5 5 5 5   L 5 5 5 5 5 5     HF KE KF DF PF EHL   R 5 5 5 5 5 5   L 5 5 5 5 5 5      Reflexes 2+ throughout     Sensation intact and symmetric to light touch throughout           Discharge Medications:     Current Discharge Medication List      START taking these medications    Details   senna-docusate (SENOKOT-S/PERICOLACE) 8.6-50 MG tablet Take 2 tablets by mouth 2 times daily  Qty: 120 tablet, Refills: 0    Associated Diagnoses: Pituitary microadenoma (H)      sodium chloride (OCEAN) 0.65 % nasal spray Apply in both nostrils as needed for congestion  Qty: 30 mL, Refills: 0    Associated Diagnoses: Pituitary microadenoma (H)         CONTINUE these medications which have CHANGED    Details   !! desmopressin (DDAVP) 0.1 MG tablet Take 1 tablet (100 mcg) by mouth daily For additional refills, please schedule a follow-up appointment at [phone]  Qty: 30 tablet, Refills: 0    Associated Diagnoses: Pituitary adenoma (H)      !! desmopressin (DDAVP) 0.1 MG tablet Take 0.5 tablets (50 mcg) by mouth every evening for 1 dose  Qty: 1 tablet, Refills: 0    Associated Diagnoses: Pituitary microadenoma (H)       !! - Potential duplicate medications found. Please discuss with provider.      CONTINUE these medications which have NOT CHANGED    Details   Calcium carb-Vitamin D 500 mg [place] IRA-200 units (OSCAL WITH D;OYSTER SHELL CALCIUM) 500-200 MG-UNIT per tablet Take 1 tablet by mouth 2 times daily (with meals)       drospirenone-ethinyl estradiol (HECTOR) 3-0.02 MG tablet Take 1 tablet by mouth daily  Qty: 84 tablet, Refills: 3    Associated Diagnoses: Female hypogonadotropic hypogonadism (H)      fludrocortisone (FLORINEF) 0.1 MG tablet Take 1 tablet (0.1 mg) by mouth daily For additional refills, please schedule a follow-up appointment at [phone]  Qty: 90 tablet, Refills: 3    Associated Diagnoses: Adrenal insufficiency, primary (H)      hydrocortisone (CORTEF) 10 MG tablet 1 tab (10 mg) am 1/2 tab (5 mg) pm plus sick day supply as directed  Qty: 150 tablet, Refills: 5    Associated Diagnoses: Adrenal insufficiency (H)      levothyroxine (SYNTHROID/LEVOTHROID) 75 MCG tablet TAKE 1 TABLET BY MOUTH EVERY DAY 6 DAYS A WEEK AND NONE ON THE 7TH DAY EACH WEEK  For additional refills, please schedule a follow-up appointment at [phone]  Qty: 78 tablet, Refills: 3    Associated Diagnoses: Other specified hypothyroidism      lisinopril (ZESTRIL) 2.5 MG tablet Take 1 tablet (2.5 mg) by mouth daily  Qty: 90 tablet, Refills: 3    Associated Diagnoses: Stage 3a chronic kidney disease      methocarbamol (ROBAXIN) 500 MG tablet Take 1-2 tablets (500-1,000 mg) by mouth nightly as needed for muscle spasms  Qty: 20 tablet, Refills: 2    Associated Diagnoses: Avascular necrosis of femoral head, unspecified laterality (H)      Multiple Vitamin (MULTI-VITAMIN DAILY PO) Take 1 tablet by mouth daily       triamcinolone (KENALOG) 0.1 % external cream Apply topically 2 times daily As needed for flares  Qty: 80 g, Refills: 1    Associated Diagnoses: Flexural eczema                     Discharge Instructions and Follow-Up:     Discharge diet: Regular   Discharge activity: You may advance activity as tolerated. No strenuous exercise or heay lifting greater than 10 lbs for 4 weeks or until seen and cleared in clinic.   Discharge follow-up: Follow-up with Dr. Mina Huffman MD in 2 weeks  Follow up with Dr. Tsai on 8/12/21  Follow  up with you Endocrinologist as previously scheduled   Wound care: Nasal precautions     Please call if you have:  1. increased pain, redness, drainage, swelling at your incision  2. fevers > 101.5 F degrees  3. with any questions or concerns.  You may reach the Neurosurgery clinic at [phone] during regular work hours. ER at [phone].    and ask for the Neurosurgery Resident on call at [phone], during off hours or weekends.           Discharge Disposition:     Discharged to home          Delia Diggs MD  Neurosurgery PGY2

## 2021-07-24 NOTE — PROGRESS NOTES
"Otolaryngology Progress Note    Subjective: Patient had no acute events overnight. She is doing well this morning, denies any nasal drainage salty or metallic taste. No vision changes. Hoping to go home this morning.     Objective: /53 (BP Location: Right arm)   Pulse 67   Temp 97.9  F (36.6  C) (Oral)   Resp 16   Ht 1.727 m (5' 8\")   Wt 69.4 kg (153 lb)   SpO2 97%   BMI 23.26 kg/m     General: resting comfortably and not in acute distress. Engaged in discussion.    HEENT: EOMI, without nystagmus. No nasal drainage, b/l nares with minimal crusting. Posterior oropharynx without drainage or blood.    Pulmonary: breathing comfortably on room air without stridor or stertor    Assessment & Plan: Suyapa Hodge is a 35F s/p endoscopic endonasal resection of recurrent pituitary macroadenoma on 7/22. No CSF leak intraop. Recovering well.     - Sinonasal precautions  - Start nasal saline on discharge (per pt she has tons at home and does not need to be discharged with any)    - Follow-up scheduled for 8/12    Patient and plan to be discussed with Dr. Eulalio Yi MD PGY2  ENT  "

## 2021-07-24 NOTE — PLAN OF CARE
Status: S/p EEA for pituitary tumor resection 7/22  Vitals: BP's soft. On RA.  Neuros: A&Ox4. Denies salty/metallic taste. 5/5 all extremities. Denies N/T  IV: PIV NS @ 75mL/hr  Labs/Electrolytes: Na 139. Phos 1.9, replaced. 0600 BG 74  Resp/trach: On RA. Denies SOB.  Diet: Regular diet  Bowel status: LBM 7/22.  : Voids w/ no difficulties.  Skin: Scars on chest.  Pain: Pain managed w/ tylenol x1 and x1 toradol this shift.  Activity: Up ad gena.  Plan: Discharge to Mom's home when medically stable.

## 2021-07-24 NOTE — PLAN OF CARE
AVSS, A&Ox4, neuros intact. Tylenol x1 for headache, effective.  Following nasal precautions.  Appetite good. Denies nausea.  PIV infiltrated with NS, removed. Site CDI.  Discharge instructions, medications, follow-up appointments discussed with patient. All information understood. Paperwork signed, copy given to patient.  Patient walked to discharge pharmacy, then picked up by mom at front entrance. Will stay with parents.

## 2021-07-28 NOTE — OP NOTE
Procedure Date: 07/22/2021    PREOPERATIVE DIAGNOSES:     1.  ACTH-secreting pituitary adenoma with left parasellar extension.  2.  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    POSTOPERATIVE DIAGNOSES:    1.  ACTH-secreting pituitary adenoma with left parasellar extension.  2.  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    TITLE OF PROCEDURE:     1.  Fourth time redo endoscopic endonasal resection of pituitary adenoma.  2.  Intraoperative use of frameless stereotactic neuronavigation.    ANESTHESIA:  GETA.    ATTENDING SURGEON:  Mina Huffman MD     RESIDENT SURGEON:  Maddie Sánchez MD     HISTORY OF PRESENT ILLNESS:  Ms. Hodge is a 35-year-old woman with a history of Cushing's disease treated with 3 previous endoscopic endonasal resections of pituitary adenoma, followed by bilateral adrenalectomy.  She has a growing tumor on the left side of the sella extending into the left parasellar space and she has been developing hyperpigmentation and progressively increasing ACTH levels, all consistent with Alexei syndrome.  We have determined that a fourth time reoperation is indicated to have the best chance of controlling this ACTH-secreting pituitary adenoma.  She presents for the above-stated procedures.    DESCRIPTION OF PROCEDURE:  Upon intubation and induction of general anesthesia, the patient was placed in the supine position on the operating table.  The Castellano headholder was applied and her neck was placed in mild extension.  After securing her head in this position, scalp fiducials were registered and frameless stereotactic neuronavigation was established with the Stealth system.  Of note, she received a stress dose of hydrocortisone during induction.  Her nasal cavity and the left lower abdominal quadrant were prepared and prepped in the usual fashion.  Please see Dr. Tsai's operative report regarding details of the fourth time endoscopic endonasal approach to the sella.     The floor of the sella was overgrown with  bone and exposure required extensive drilling with the 3 and 4 mm renetta drill bit under continuous irrigation.  We extended the opening to the left parasellar space.  This area looked to be unaffected by the previous surgeries and represented a new operative corridor.  We extended the bony exposure inferiorly to the clival recess.  We used the neuronavigation intermittently to check the limits of bone removal.  We extended the resection until we identified the medial edge of the left parasellar internal carotid artery.  The dura in the left parasellar space was protruding anteriorly and this corresponded to the tumor.  The ICA seemed to be displaced laterally, facilitating an operative corridor in the parasellar space.  There was a scarred dural graft centrally at the site of her previous operations.  We opened the dura over the parasellar space in a cruciate fashion with a bayoneted 11 blades.  There was immediate extrusion of soft pituitary adenoma, some of which was collected and sent for permanent pathology.  We extended the opening laterally and medially with microscissors.  We removed tumor using ring curets of various shapes and length as well as the Blakesley forceps.  We extended the resection into the lateral aspect of the left lateral aspect of the sella.  As we removed the superior part of the tumor, we identified the diaphragm.      We then turned our attention to the inferior, lateral and superior lateral aspects.  We removed tumor that was adherent to the ICA.  We intermittently used the neuronavigation as well as the micro Doppler to confirm the position of the ICA.  Once enough tumor was removed, we could advance the endoscope and directly see the outline of the ICA.  We then removed an additional areas of tumor posterior to the ICA and the venous bleeding from the parasellar space was controlled with Gelfoam slurry and tamponade.  The wound was irrigated and bleeding points were secured.  There  were some strands of a pseudocapsule densely adherent to the carotid.  These strands were resected with microscissors and any remnants were cauterized with the Aquamantys.  Pledgets of Gelfoam saturated in hydrogen peroxide were placed in the resection cavity and then removed.      A Durepair graft was then placed in an onlay fashion with respect to the dural edges and inlay with respect to the bone edges.  There was no obvious CSF leak.  Please see Dr. Tsai's operative report regarding details of the closure.  At the end of the operation, the head cruz was removed.  The patient was extubated and transported to the recovery room without incident.  Blood loss was approximately 20 mL.  Sponge and needle counts were correct at the end of the case.      I was present for the entire neurosurgical portion and immediately available for the entire operation.    Mina Huffman MD        D: 2021   T: 2021   MT: PAKMT    Name:     VADIM ZAMBRANO  MRN:      [id number]        Account:        [id number]   :      1985           Procedure Date: 2021     Document: [id number]

## 2021-08-06 ENCOUNTER — OFFICE VISIT (OUTPATIENT)
Dept: NEUROSURGERY | Facility: CLINIC | Age: 36
End: 2021-08-06
Payer: COMMERCIAL

## 2021-08-06 VITALS — OXYGEN SATURATION: 99 % | HEART RATE: 105 BPM | SYSTOLIC BLOOD PRESSURE: 89 MMHG | DIASTOLIC BLOOD PRESSURE: 58 MMHG

## 2021-08-06 DIAGNOSIS — E24.1 NELSON'S SYNDROME (H): Primary | ICD-10-CM

## 2021-08-06 DIAGNOSIS — D35.2 PITUITARY MACROADENOMA (H): ICD-10-CM

## 2021-08-06 PROCEDURE — 99024 POSTOP FOLLOW-UP VISIT: CPT | Performed by: PHYSICIAN ASSISTANT

## 2021-08-06 NOTE — PROGRESS NOTES
[facility]  Department of Neurosurgery  Center for Skull Base and Pituitary Surgery    Name: Suyapa Hodge  MRN: [id number]  Age: 35 year old  : 2021      Chief Complaint:   Pituitary macroadenoma, Cushing's disease s/p redo endoscopic endonasal resection 2021, 2 week postop    History of Present Illness:   Suyapa Hodge is a 35 year old female with a history of ACTH hypersecretion and pituitary macroadenoma with history of resection x 3 (, , and ) who is seen today for a 2 week postop visit. She underwent redo EEA for resection of the recurrent adenoma on 2021 with Gil Huffman and Eulalio. She had an uncomplicated hospital stay and discharged home 2021. She's here today with her mother. She feels well. She has very occasional headaches which resolve with tylenol or ibuprofen use. She denies new fevers, chills, drainage from her nose or salty/metallic taste, weakness. She has a mild numbness of the bottom of her right foot which was present postop and is improving. She's walking without difficulty.    Her next Endocrinology appointment is 10/4/2021. She sees ENT for postop visit next week.     Review of Systems:   Pertinent items are noted in HPI or as in patient entered ROS below, remainder of complete ROS is negative.     Physical Exam:   BP (!) 89/58   Pulse 105   SpO2 99%    General: No acute distress.    Eyes: Conjunctivae are normal.  Lungs: Clear to auscultation bilaterally. Normal effort, equal breath sounds.   Heart: Normal s1, s2 without murmur or rub.   MSK: Moves all extremities.  No obvious deformity.  Neuro: The patient is fully oriented. Speech is normal. Extraocular movements are intact without nystagmus. Facial sensation is intact in V1, V2, V3 distributions. Facial nerve function is normal, rated as a House Brackmann 1.  strength is full. Gait is normal.  Psych: Normal mood and affect. Behavior is normal.       Imaging:  No new imaging to review today     Assessment:  Pituitary macroadenoma, Cushing's disease s/p redo endoscopic endonasal resection 7/22/2021, 2 week postop    Plan:  We'll plan for her to follow up with Dr. Huffman in 3 months, with an MRI prior to that visit. She knows to call with any new questions or concerns in the meantime. Reviewed pathology today.     Katharine William PA-C  Department of Neurosurgery

## 2021-08-06 NOTE — LETTER
2021       RE: Suyapa Hodge  [address] [facility] [address]     Dear Colleague,    Thank you for referring your patient, Suyapa Hodge, to the [facility] [place] [facility]. Please see a copy of my visit note below.      [facility]  Department of Neurosurgery  Center for Skull Base and Pituitary Surgery    Name: Suyapa Hodge  MRN: [id number]  Age: 35 year old  : 2021      Chief Complaint:   Pituitary macroadenoma, Cushing's disease s/p redo endoscopic endonasal resection 2021, 2 week postop    History of Present Illness:   Suyapa Hodge is a 35 year old female with a history of ACTH hypersecretion and pituitary macroadenoma with history of resection x 3 (, , and ) who is seen today for a 2 week postop visit. She underwent redo EEA for resection of the recurrent adenoma on 2021 with Gil Huffman and Eulalio. She had an uncomplicated hospital stay and discharged home 2021. She's here today with her mother. She feels well. She has very occasional headaches which resolve with tylenol or ibuprofen use. She denies new fevers, chills, drainage from her nose or salty/metallic taste, weakness. She has a mild numbness of the bottom of her right foot which was present postop and is improving. She's walking without difficulty.    Her next Endocrinology appointment is 10/4/2021. She sees ENT for postop visit next week.     Review of Systems:   Pertinent items are noted in HPI or as in patient entered ROS below, remainder of complete ROS is negative.     Physical Exam:   BP (!) 89/58   Pulse 105   SpO2 99%    General: No acute distress.    Eyes: Conjunctivae are normal.  Lungs: Clear to auscultation bilaterally. Normal effort, equal breath sounds.   Heart: Normal s1, s2 without murmur or rub.   MSK: Moves all extremities.  No obvious deformity.  Neuro: The patient  is fully oriented. Speech is normal. Extraocular movements are intact without nystagmus. Facial sensation is intact in V1, V2, V3 distributions. Facial nerve function is normal, rated as a House Brackmann 1.  strength is full. Gait is normal.  Psych: Normal mood and affect. Behavior is normal.      Imaging:  No new imaging to review today     Assessment:  Pituitary macroadenoma, Cushing's disease s/p redo endoscopic endonasal resection 7/22/2021, 2 week postop    Plan:  We'll plan for her to follow up with Dr. Huffman in 3 months, with an MRI prior to that visit. She knows to call with any new questions or concerns in the meantime. Reviewed pathology today.     Katharine William PA-C  Department of Neurosurgery        Again, thank you for allowing me to participate in the care of your patient.      Sincerely,    Katharine William PA-C

## 2021-08-06 NOTE — PATIENT INSTRUCTIONS
Follow up with Dr. Huffman (virtual ok) in 3 months, with MRI prior to visit.     Please let us know if you have new questions or concerns in the meantime.

## 2021-08-12 ENCOUNTER — OFFICE VISIT (OUTPATIENT)
Dept: OTOLARYNGOLOGY | Facility: CLINIC | Age: 36
End: 2021-08-12
Payer: COMMERCIAL

## 2021-08-12 VITALS
HEIGHT: 68 IN | BODY MASS INDEX: 23.59 KG/M2 | HEART RATE: 89 BPM | OXYGEN SATURATION: 99 % | TEMPERATURE: 97.1 F | WEIGHT: 155.65 LBS

## 2021-08-12 DIAGNOSIS — D35.2 PITUITARY MACROADENOMA (H): Primary | ICD-10-CM

## 2021-08-12 PROCEDURE — 99024 POSTOP FOLLOW-UP VISIT: CPT | Performed by: OTOLARYNGOLOGY

## 2021-08-12 ASSESSMENT — PAIN SCALES - GENERAL: PAINLEVEL: NO PAIN (0)

## 2021-08-12 ASSESSMENT — MIFFLIN-ST. JEOR: SCORE: 1444.5

## 2021-08-12 NOTE — LETTER
8/12/2021      RE: Suyapa Patiñoheather  [address] [facility]  [place] [address]       Dx: Recurrent pituitary adenoma. Cushing's disease     Treatment: Redo EEA on 7/22/21 Re-use prior nasoseptal flap    History of Present Illness: Patient here for her first post-operative visit. She recovered quickly from surgery. No complaints on today's visit.    MEDICATIONS:     Current Outpatient Medications   Medication Sig Dispense Refill     Calcium carb-Vitamin D 500 mg [place]-200 units (OSCAL WITH D;OYSTER SHELL CALCIUM) 500-200 MG-UNIT per tablet Take 1 tablet by mouth 2 times daily (with meals)       desmopressin (DDAVP) 0.1 MG tablet Take 1 tablet (100 mcg) by mouth daily For additional refills, please schedule a follow-up appointment at [phone] 30 tablet 0     drospirenone-ethinyl estradiol (HECTOR) 3-0.02 MG tablet Take 1 tablet by mouth daily 84 tablet 3     fludrocortisone (FLORINEF) 0.1 MG tablet Take 1 tablet (0.1 mg) by mouth daily For additional refills, please schedule a follow-up appointment at [phone] 90 tablet 3     hydrocortisone (CORTEF) 10 MG tablet 1 tab (10 mg) am 1/2 tab (5 mg) pm plus sick day supply as directed (Patient taking differently: Take 10 mg by mouth daily plus sick day supply as directed) 150 tablet 5     levothyroxine (SYNTHROID/LEVOTHROID) 75 MCG tablet TAKE 1 TABLET BY MOUTH EVERY DAY 6 DAYS A WEEK AND NONE ON THE 7TH DAY EACH WEEK  For additional refills, please schedule a follow-up appointment at [phone] 78 tablet 3     lisinopril (ZESTRIL) 2.5 MG tablet Take 1 tablet (2.5 mg) by mouth daily 90 tablet 3     methocarbamol (ROBAXIN) 500 MG tablet Take 1-2 tablets (500-1,000 mg) by mouth nightly as needed for muscle spasms 20 tablet 2     Multiple Vitamin (MULTI-VITAMIN DAILY PO) Take 1 tablet by mouth daily        senna-docusate (SENOKOT-S/PERICOLACE) 8.6-50 MG tablet Take 2 tablets by mouth 2 times daily 120 tablet 0     sodium chloride (OCEAN) 0.65 % nasal spray Apply in both nostrils  as needed for congestion 30 mL 0     triamcinolone (KENALOG) 0.1 % external cream Apply topically 2 times daily As needed for flares 80 g 1     desmopressin (DDAVP) 0.1 MG tablet Take 0.5 tablets (50 mcg) by mouth every evening for 1 dose 1 tablet 0       ALLERGIES:    Allergies   Allergen Reactions     Labetalol      Racing heart feeling, panic attack feeling.        HABITS/SOCIAL HISTORY: Unchanged    PAST MEDICAL HISTORY:   Past Medical History:   Diagnosis Date     Adrenal disorder (H)      Avascular necrosis of femur head, 2010     Cervical high risk HPV (human papillomavirus) test positive 10/2015 & 3/2019    +HR HPV (NOT 16/18)     Colon polyp      Cushing syndrome (H)     pituitary microadenoma     Diabetes insipidus (H)      Fatigue      History of colposcopy 10/2015    BEVERLEY 1     Hypercalcaemia      Hypertension      Medulloadrenal hyperfunction (H)      Pulmonary emboli (H) 01/2014     Thyroid disease         FAMILY HISTORY:    Family History   Problem Relation Age of Onset     Hyperlipidemia Father      Obesity Father      Asthma Mother      Osteoporosis Mother      Obesity Mother      Allergies Sister      Obesity Sister      Cancer Maternal Grandmother 45        colon cancer     Colon Cancer Maternal Grandmother      Cancer Maternal Grandfather         lung cancer-smoker     Other Cancer Maternal Grandfather         Smoker     Cancer Paternal Grandmother         lung cancer-smoker     Other Cancer Paternal Grandmother         Smoker     Cancer Paternal Grandfather         lung cancer     Other Cancer Paternal Grandfather      Anesthesia Reaction No family hx of      Cardiovascular No family hx of      Deep Vein Thrombosis (DVT) No family hx of        REVIEW OF SYSTEMS:  12 point ROS was negative other than the symptoms noted above in the HPI.    Nasal Endoscopy:  Consent for nasal endoscopy was obtained, and we confirmed correctness of procedure and identity of patient.  Nasal endoscopy was indicated  due to recurrent pituitary adenoma.  The nose was topically decongested and anesthetized.  The nasal endoscope was passed under endoscopic vision.  The remaining septum is in the midline. Large posterior septectomy. Removed large sphenoid crust. Flap healing well. No CSF leak.      IMPRESSION AND PLAN: S/P EEA doing well. Plan Follow-up PRN        Kaylin Fuentes MD, M.D.  Otolaryngology- Head & Neck Surgery  [phone]

## 2021-08-12 NOTE — PATIENT INSTRUCTIONS
1. Please follow-up in clinic as needed.  2. Prior to radiation therapy please see your dentist and arrange for any dental work to be done before. Also ask about teeth guards.    3. Please call the ENT clinic with any questions,concerns, new or worsening symptoms.    -Clinic number is [phone]   - [facility] direct line (Dr. Tsai's nurse) [phone]

## 2021-08-12 NOTE — PROGRESS NOTES
Dx: Recurrent pituitary adenoma. Cushing's disease     Treatment: Redo EEA on 7/22/21 Re-use prior nasoseptal flap    History of Present Illness: Patient here for her first post-operative visit. She recovered quickly from surgery. No complaints on today's visit.    MEDICATIONS:     Current Outpatient Medications   Medication Sig Dispense Refill     Calcium carb-Vitamin D 500 mg [place]-200 units (OSCAL WITH D;OYSTER SHELL CALCIUM) 500-200 MG-UNIT per tablet Take 1 tablet by mouth 2 times daily (with meals)       desmopressin (DDAVP) 0.1 MG tablet Take 1 tablet (100 mcg) by mouth daily For additional refills, please schedule a follow-up appointment at [phone] 30 tablet 0     drospirenone-ethinyl estradiol (HECTOR) 3-0.02 MG tablet Take 1 tablet by mouth daily 84 tablet 3     fludrocortisone (FLORINEF) 0.1 MG tablet Take 1 tablet (0.1 mg) by mouth daily For additional refills, please schedule a follow-up appointment at [phone] 90 tablet 3     hydrocortisone (CORTEF) 10 MG tablet 1 tab (10 mg) am 1/2 tab (5 mg) pm plus sick day supply as directed (Patient taking differently: Take 10 mg by mouth daily plus sick day supply as directed) 150 tablet 5     levothyroxine (SYNTHROID/LEVOTHROID) 75 MCG tablet TAKE 1 TABLET BY MOUTH EVERY DAY 6 DAYS A WEEK AND NONE ON THE 7TH DAY EACH WEEK  For additional refills, please schedule a follow-up appointment at [phone] 78 tablet 3     lisinopril (ZESTRIL) 2.5 MG tablet Take 1 tablet (2.5 mg) by mouth daily 90 tablet 3     methocarbamol (ROBAXIN) 500 MG tablet Take 1-2 tablets (500-1,000 mg) by mouth nightly as needed for muscle spasms 20 tablet 2     Multiple Vitamin (MULTI-VITAMIN DAILY PO) Take 1 tablet by mouth daily        senna-docusate (SENOKOT-S/PERICOLACE) 8.6-50 MG tablet Take 2 tablets by mouth 2 times daily 120 tablet 0     sodium chloride (OCEAN) 0.65 % nasal spray Apply in both nostrils as needed for congestion 30 mL 0     triamcinolone (KENALOG) 0.1 % external  cream Apply topically 2 times daily As needed for flares 80 g 1     desmopressin (DDAVP) 0.1 MG tablet Take 0.5 tablets (50 mcg) by mouth every evening for 1 dose 1 tablet 0       ALLERGIES:    Allergies   Allergen Reactions     Labetalol      Racing heart feeling, panic attack feeling.        HABITS/SOCIAL HISTORY: Unchanged    PAST MEDICAL HISTORY:   Past Medical History:   Diagnosis Date     Adrenal disorder (H)      Avascular necrosis of femur head, 2010     Cervical high risk HPV (human papillomavirus) test positive 10/2015 & 3/2019    +HR HPV (NOT 16/18)     Colon polyp      Cushing syndrome (H)     pituitary microadenoma     Diabetes insipidus (H)      Fatigue      History of colposcopy 10/2015    BEVERLEY 1     Hypercalcaemia      Hypertension      Medulloadrenal hyperfunction (H)      Pulmonary emboli (H) 01/2014     Thyroid disease         FAMILY HISTORY:    Family History   Problem Relation Age of Onset     Hyperlipidemia Father      Obesity Father      Asthma Mother      Osteoporosis Mother      Obesity Mother      Allergies Sister      Obesity Sister      Cancer Maternal Grandmother 45        colon cancer     Colon Cancer Maternal Grandmother      Cancer Maternal Grandfather         lung cancer-smoker     Other Cancer Maternal Grandfather         Smoker     Cancer Paternal Grandmother         lung cancer-smoker     Other Cancer Paternal Grandmother         Smoker     Cancer Paternal Grandfather         lung cancer     Other Cancer Paternal Grandfather      Anesthesia Reaction No family hx of      Cardiovascular No family hx of      Deep Vein Thrombosis (DVT) No family hx of        REVIEW OF SYSTEMS:  12 point ROS was negative other than the symptoms noted above in the HPI.    Nasal Endoscopy:  Consent for nasal endoscopy was obtained, and we confirmed correctness of procedure and identity of patient.  Nasal endoscopy was indicated due to recurrent pituitary adenoma.  The nose was topically decongested and  anesthetized.  The nasal endoscope was passed under endoscopic vision.  The remaining septum is in the midline. Large posterior septectomy. Removed large sphenoid crust. Flap healing well. No CSF leak.      IMPRESSION AND PLAN: S/P EEA doing well. Plan Follow-up PRN        Kaylin Fuentes MD, M.D.  Otolaryngology- Head & Neck Surgery  [phone]         244.9

## 2021-08-12 NOTE — LETTER
8/12/2021       RE: Suyapa Hodge  [address] [facility] [address]     Dear Colleague,    Thank you for referring your patient, Suyapa Hodge, to the [facility]SE [facility]AT [facility]INIC [place] [facility]. Please see a copy of my visit note below.    Dx: Recurrent pituitary adenoma. Cushing's disease     Treatment: Redo EEA on 7/22/21 Re-use prior nasoseptal flap    History of Present Illness: Patient here for her first post-operative visit. She recovered quickly from surgery. No complaints on today's visit.    MEDICATIONS:     Current Outpatient Medications   Medication Sig Dispense Refill     Calcium carb-Vitamin D 500 mg [place]-200 units (OSCAL WITH D;OYSTER SHELL CALCIUM) 500-200 MG-UNIT per tablet Take 1 tablet by mouth 2 times daily (with meals)       desmopressin (DDAVP) 0.1 MG tablet Take 1 tablet (100 mcg) by mouth daily For additional refills, please schedule a follow-up appointment at [phone] 30 tablet 0     drospirenone-ethinyl estradiol (HECTOR) 3-0.02 MG tablet Take 1 tablet by mouth daily 84 tablet 3     fludrocortisone (FLORINEF) 0.1 MG tablet Take 1 tablet (0.1 mg) by mouth daily For additional refills, please schedule a follow-up appointment at [phone] 90 tablet 3     hydrocortisone (CORTEF) 10 MG tablet 1 tab (10 mg) am 1/2 tab (5 mg) pm plus sick day supply as directed (Patient taking differently: Take 10 mg by mouth daily plus sick day supply as directed) 150 tablet 5     levothyroxine (SYNTHROID/LEVOTHROID) 75 MCG tablet TAKE 1 TABLET BY MOUTH EVERY DAY 6 DAYS A WEEK AND NONE ON THE 7TH DAY EACH WEEK  For additional refills, please schedule a follow-up appointment at [phone] 78 tablet 3     lisinopril (ZESTRIL) 2.5 MG tablet Take 1 tablet (2.5 mg) by mouth daily 90 tablet 3     methocarbamol (ROBAXIN) 500 MG tablet Take 1-2 tablets (500-1,000 mg) by mouth nightly as needed for muscle spasms 20 tablet 2      Multiple Vitamin (MULTI-VITAMIN DAILY PO) Take 1 tablet by mouth daily        senna-docusate (SENOKOT-S/PERICOLACE) 8.6-50 MG tablet Take 2 tablets by mouth 2 times daily 120 tablet 0     sodium chloride (OCEAN) 0.65 % nasal spray Apply in both nostrils as needed for congestion 30 mL 0     triamcinolone (KENALOG) 0.1 % external cream Apply topically 2 times daily As needed for flares 80 g 1     desmopressin (DDAVP) 0.1 MG tablet Take 0.5 tablets (50 mcg) by mouth every evening for 1 dose 1 tablet 0       ALLERGIES:    Allergies   Allergen Reactions     Labetalol      Racing heart feeling, panic attack feeling.        HABITS/SOCIAL HISTORY: Unchanged    PAST MEDICAL HISTORY:   Past Medical History:   Diagnosis Date     Adrenal disorder (H)      Avascular necrosis of femur head, 2010     Cervical high risk HPV (human papillomavirus) test positive 10/2015 & 3/2019    +HR HPV (NOT 16/18)     Colon polyp      Cushing syndrome (H)     pituitary microadenoma     Diabetes insipidus (H)      Fatigue      History of colposcopy 10/2015    BEVERLEY 1     Hypercalcaemia      Hypertension      Medulloadrenal hyperfunction (H)      Pulmonary emboli (H) 01/2014     Thyroid disease         FAMILY HISTORY:    Family History   Problem Relation Age of Onset     Hyperlipidemia Father      Obesity Father      Asthma Mother      Osteoporosis Mother      Obesity Mother      Allergies Sister      Obesity Sister      Cancer Maternal Grandmother 45        colon cancer     Colon Cancer Maternal Grandmother      Cancer Maternal Grandfather         lung cancer-smoker     Other Cancer Maternal Grandfather         Smoker     Cancer Paternal Grandmother         lung cancer-smoker     Other Cancer Paternal Grandmother         Smoker     Cancer Paternal Grandfather         lung cancer     Other Cancer Paternal Grandfather      Anesthesia Reaction No family hx of      Cardiovascular No family hx of      Deep Vein Thrombosis (DVT) No family hx of         REVIEW OF SYSTEMS:  12 point ROS was negative other than the symptoms noted above in the HPI.    Nasal Endoscopy:  Consent for nasal endoscopy was obtained, and we confirmed correctness of procedure and identity of patient.  Nasal endoscopy was indicated due to recurrent pituitary adenoma.  The nose was topically decongested and anesthetized.  The nasal endoscope was passed under endoscopic vision.  The remaining septum is in the midline. Large posterior septectomy. Removed large sphenoid crust. Flap healing well. No CSF leak.      IMPRESSION AND PLAN: S/P EEA doing well. Plan Follow-up PRN        Kaylin Fuentes MD, M.D.  Otolaryngology- Head & Neck Surgery  [phone]            Again, thank you for allowing me to participate in the care of your patient.      Sincerely,    Kaylin Fuentes MD

## 2021-08-20 NOTE — PROGRESS NOTES
cushing disease  Layoberna Ivana, GOVIND Loyola is a 35 year old who is being evaluated via a billable video visit.      How would you like to obtain your AVS? MyChart  If the video visit is dropped, the invitation should be resent by: Text to cell phone: [phone]  Will anyone else be joining your video visit? No        Video-Visit Details    Type of service Video  Start: 10:30 am  End: 10;50 am  Amwell      Originating Location (pt. Location): Home    Distant Location (provider location):  [facility] ENDOCRINOLOGY       Endocrinology follow up      Assessment:    Cushing disease post TSS 3 times (2010, 2013, 2014), currently Alexei's symdrome  Worsening pigmentation with ACTH>1250, increased the lesion, therefore she underwent to TSS 4 th time, July 2021 by Dr. Huffman   Pathology showed ACTH stained. Post op ACTH was 14.   Already siginificant improvement of pigmentation    - continue current dose of hydrocortisone 10 mg am and florinef 0.1 mg daily, with this dose she is maintaining her BW and feeling good.     Hypothyrodism     - TSH  - free T4    Hypogonadism  Has some skin issues with estrogen patch, we will switch to BCP with low dose of estrogen.     - continue BCP (E2 20 mcg)   - Estradiol level to check, may increase E2 dose?    RTC with me in 6 month    Adriana Wilkinson MD  Staff Physician  Endocrinology and Metabolism  [facility]  License: [id number]  Pager: [phone]    Interval History as of 8/23/2021 : Patient has been doing well. Underwent to 4th surgery 7/22/2021. Pigmentation of her dkin isn imroving, post op ACTH was 14.   Interval History as of 4/14/2021 : No HA, medication compliance good, no dizziness, no HA. Pigmentation gettign worse, lesion increasing in size.   History of Present Illness: Suyapa Hodge is a 34 year old female with a history of pituitary Cushing's syndrome, hypothalamic adrenal dysfunction and pituitary microadenoma who presents for follow up. She was last  "seen in clinic 14 months ago. She was diagnosed with pituitary microademoma serveral year ago which was suspected to cause her Cushing's disease. She had pituitary surgery x3 in 2013-14 and finally underwent bilateral adrenalectomy in July 2014. ACTH levels remained normal. In July 2017 ACTH levels were tested and was 271, which increased to 549 raising concern for recurrence vs missed diagnosis.  Chest abdomen and pelvis CT was normal. 8 mg dex suppression reduced levels to 118.     She is currently taking hydrocortisone BID (10 mg misses dose once per month), L-thyroxine 75 mcg tablets full tablet 6 days and desmopressin 0.1 mg in the morning. She denies that she gets up in the middle of the night to use the restroom. She reports that she goes to the eye doctor every 3 months to see if the tumor \"has moved to her optic nerve\".      She is on hormone replacement therapy (prometrium and vivelle patch). No history of uterine surgeries. She has a history of irregular periods but has been evaluated with GYN, per patient, and was not told specifics.     She is not taking the calcium supplement. She has a history of avascular necrosis but has been told that she is too young for a hip replacement. DXA showed low BMD for age.     No eye symptoms  No headache, change in vision, loss of peripheral vision  Complete ROS that were obtained were negative.     Active Medications:      cyclobenzaprine (FLEXERIL) 10 MG tablet, Take 1 tablet (10 mg) by mouth 3 times daily as needed for muscle spasms, Disp: 30 tablet, Rfl: 0     desmopressin (DDAVP) 0.1 MG tablet, Take 1 tablet (100 mcg) by mouth 2 times daily (1 TAB) MORNING AND AT BEDTIME, Disp: 180 tablet, Rfl: 5     estradiol (VIVELLE-DOT) 0.05 MG/24HR bi-weekly patch, Place 1 patch onto the skin twice a week, Disp: 27 patch, Rfl: 5     fludrocortisone (FLORINEF) 0.1 MG tablet, Take 0.5 tablets (0.05 mg) by mouth daily Please keep 10-21-19 clinic appt for further refills, Disp: " 45 tablet, Rfl: 5     hydrocortisone (CORTEF) 10 MG tablet, 1 tab (10 mg) am 1/2 tab (5 mg) pm plus sick day supply as directed, Disp: 150 tablet, Rfl: 5     levothyroxine (SYNTHROID/LEVOTHROID) 75 MCG tablet, One tablet day 6 days per wk and none on the 7th day each wk, Disp: 90 tablet, Rfl: 5     Multiple Vitamin (MULTI-VITAMIN DAILY PO), , Disp: , Rfl:      progesterone (PROMETRIUM) 100 MG capsule, Take 1 tablet daily, Disp: 90 capsule, Rfl: 1     triamcinolone (KENALOG) 0.1 % external cream, Apply topically 2 times daily As needed for flares, Disp: 80 g, Rfl: 1     Calcium carb-Vitamin D 500 mg [place]-200 units (OSCAL WITH D;OYSTER SHELL CALCIUM) 500-200 MG-UNIT per tablet, Take 1 tablet by mouth 2 times daily (with meals), Disp: , Rfl:      methocarbamol (ROBAXIN) 500 MG tablet, Take 1-2 tablets (500-1,000 mg) by mouth nightly as needed for muscle spasms (Patient not taking: Reported on 10/21/2019), Disp: 20 tablet, Rfl: 0     naproxen (NAPROSYN) 500 MG tablet, Take 1 tablet (500 mg) by mouth 2 times daily (with meals) (Patient not taking: Reported on 10/21/2019), Disp: 60 tablet, Rfl: 1      Allergies:   Patient has no known allergies.     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 resection   Avascular necrosis of femur head   Cervical high risk HPV   Colon polyp   Diabetes insipidus   Hypercalcemia   Hypertension   Pulmonary emboli   Hypothyroidism      Past Surgical History:  Lumbar drain- 01/2014   Adrenalectomy-07/2014   Tumor resection (hypophysectomy)- 07/2013   [place] teeth extracted     Family History:   Mother: asthma, osteoporosis, obesity   Father: hyperlipidemia, obesity   Sister: allergies, obesity   Maternal grandmother: colon cancer  Maternal grandfather: lung cancer   Paternal grandmother: lung cancer   Paternal grandfather: lung cancer      Social History:   The patient was  alone   Smoking Status: former; 1/2 pack per day for 3 years; 12 years since quitting   Smokeless Tobacco: never    Alcohol Use: yes; 3 beers per month   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 No masses, lesions, tenderness or abnormalities  Resporatory: non acute disctress  Skin: pigmentation face but improving      Data:  TSH   Date Value Ref Range Status   01/12/2018 <0.01 (L) 0.40 - 4.00 mU/L Final   01/08/2016 <0.01 (L) 0.40 - 4.00 mU/L Final   09/25/2015 <0.01 (L) 0.40 - 4.00 mU/L Final   05/22/2015 <0.01 (L) 0.40 - 4.00 mU/L Final   03/13/2015 (L) 0.40 - 4.00 mU/L Final    <0.01  Effective 7/30/2014, the reference range for this assay has changed to reflect   new instrumentation/methodology.       T4 Free   Date Value Ref Range Status   10/08/2018 0.53 (L) 0.76 - 1.46 ng/dL Final   01/12/2018 0.90 0.76 - 1.46 ng/dL Final   10/04/2017 0.90 0.76 - 1.46 ng/dL Final   07/07/2017 0.98 0.76 - 1.46 ng/dL Final   12/30/2016 0.91 0.76 - 1.46 ng/dL Final     CBC RESULTS:   Recent Labs   Lab Test 07/15/14  0806   WBC 8.2   RBC 4.51   HGB 13.3   HCT 39.7   MCV 88   MCH 29.5   MCHC 33.5   RDW 13.7        Recent Labs   Lab Test 10/08/18  0802 01/12/18  0732    137   POTASSIUM 4.1 4.3   CHLORIDE 105 105   CO2 32 27   ANIONGAP 4 5   GLC 87 78   BUN 14 19   CR 1.10* 0.93   ELIEZER 9.3 9.2     MRI: I also personally reviewed brain MRI and pituitary lesion and interepret and expalined to the patient.     intraseller maybe small hypodense 3-4 mm(?) residual vs scar in my impression           MRI: I reviewed original images of 2/19/2021 ad explained to the patient.   Brain/ Pituitary MRI without and with contrast     History: 35 yo female pituitary Cushing disease follow up; Pituitary  dependent Cushing disease (H).     ICD-10: Pituitary dependent Cushing disease (H)     Comparison:  Multiple  pituitary MRI exams since 12/3/2009, most recent  one from 3/16/2020.     Technique: Axial diffusion and FLAIR images of the whole brain  obtained without intravenous contrast. Sagittal T1 and T2-weighted,  coronal T2-weighted, coronal T1-weighted images with focus on the  sella were obtained without intravenous contrast. Post intravenous  contrast using gadolinium coronal and sagittal T1-weighted images were  obtained focused on the sella. Dynamic postcontrast coronal  T1-weighted images were also obtained.     Contrast: 7mL Gadavist IV     Findings:    Postsurgical changes of transsphenoidal approach pituitary  microadenoma resection. Hypoenhancing area within the left side of the  sella, extending to the left cavernous sinus, increased since  10/8/2018, measuring 9 x 7 x 10 mm, previously 6 x 6 x 4 mm.  Associated diffusion restriction. Abutment of the cavernous segment of  left internal carotid artery.     The pituitary stalk appears midline. The optic chiasm appears intact  and not displaced. The major cavernous carotid vascular flow-voids  appear patent.

## 2021-08-23 ENCOUNTER — VIRTUAL VISIT (OUTPATIENT)
Dept: ENDOCRINOLOGY | Facility: CLINIC | Age: 36
End: 2021-08-23
Payer: COMMERCIAL

## 2021-08-23 DIAGNOSIS — E24.1 NELSON'S SYNDROME (H): ICD-10-CM

## 2021-08-23 DIAGNOSIS — E24.0 PITUITARY DEPENDENT CUSHING DISEASE (H): Primary | ICD-10-CM

## 2021-08-23 PROCEDURE — 99214 OFFICE O/P EST MOD 30 MIN: CPT | Mod: 95 | Performed by: INTERNAL MEDICINE

## 2021-08-23 NOTE — LETTER
8/23/2021       RE: Suyapa Hodge  [address] [facility] [address]     Dear Colleague,    Thank you for referring your patient, Suyapa Hodge, to the [facility] [place] at [facility]. Please see a copy of my visit note below.    cushing disease  GOVIND Hensley is a 35 year old who is being evaluated via a billable video visit.      How would you like to obtain your AVS? MyChart  If the video visit is dropped, the invitation should be resent by: Text to cell phone: [phone]  Will anyone else be joining your video visit? No        Video-Visit Details    Type of service Video  Start: 10:30 am  End: 10;50 am  Amwell      Originating Location (pt. Location): Home    Distant Location (provider location):  [facility] ENDOCRINOLOGY       Endocrinology follow up      Assessment:    Cushing disease post TSS 3 times (2010, 2013, 2014), currently Alexei's symdrome  Worsening pigmentation with ACTH>1250, increased the lesion, therefore she underwent to TSS 4 th time, July 2021 by Dr. Huffman   Pathology showed ACTH stained. Post op ACTH was 14.   Already siginificant improvement of pigmentation    - continue current dose of hydrocortisone 10 mg am and florinef 0.1 mg daily, with this dose she is maintaining her BW and feeling good.     Hypothyrodism     - TSH  - free T4    Hypogonadism  Has some skin issues with estrogen patch, we will switch to BCP with low dose of estrogen.     - continue BCP (E2 20 mcg)   - Estradiol level to check, may increase E2 dose?    RTC with me in 6 month    Adriana Wilkinson MD  Staff Physician  Endocrinology and Metabolism  [facility]  License: [id number]  Pager: [phone]    Interval History as of 8/23/2021 : Patient has been doing well. Underwent to 4th surgery 7/22/2021. Pigmentation of her dkin isn imroving, post op ACTH was 14.   Interval History as of 4/14/2021 : No HA,  "medication compliance good, no dizziness, no HA. Pigmentation gettign worse, lesion increasing in size.   History of Present Illness: Suyapa Hodge is a 34 year old female with a history of pituitary Cushing's syndrome, hypothalamic adrenal dysfunction and pituitary microadenoma who presents for follow up. She was last seen in clinic 14 months ago. She was diagnosed with pituitary microademoma serveral year ago which was suspected to cause her Cushing's disease. She had pituitary surgery x3 in 2013-14 and finally underwent bilateral adrenalectomy in July 2014. ACTH levels remained normal. In July 2017 ACTH levels were tested and was 271, which increased to 549 raising concern for recurrence vs missed diagnosis.  Chest abdomen and pelvis CT was normal. 8 mg dex suppression reduced levels to 118.     She is currently taking hydrocortisone BID (10 mg misses dose once per month), L-thyroxine 75 mcg tablets full tablet 6 days and desmopressin 0.1 mg in the morning. She denies that she gets up in the middle of the night to use the restroom. She reports that she goes to the eye doctor every 3 months to see if the tumor \"has moved to her optic nerve\".      She is on hormone replacement therapy (prometrium and vivelle patch). No history of uterine surgeries. She has a history of irregular periods but has been evaluated with GYN, per patient, and was not told specifics.     She is not taking the calcium supplement. She has a history of avascular necrosis but has been told that she is too young for a hip replacement. DXA showed low BMD for age.     No eye symptoms  No headache, change in vision, loss of peripheral vision  Complete ROS that were obtained were negative.     Active Medications:      cyclobenzaprine (FLEXERIL) 10 MG tablet, Take 1 tablet (10 mg) by mouth 3 times daily as needed for muscle spasms, Disp: 30 tablet, Rfl: 0     desmopressin (DDAVP) 0.1 MG tablet, Take 1 tablet (100 mcg) by mouth 2 times daily (1 " TAB) MORNING AND AT BEDTIME, Disp: 180 tablet, Rfl: 5     estradiol (VIVELLE-DOT) 0.05 MG/24HR bi-weekly patch, Place 1 patch onto the skin twice a week, Disp: 27 patch, Rfl: 5     fludrocortisone (FLORINEF) 0.1 MG tablet, Take 0.5 tablets (0.05 mg) by mouth daily Please keep 10-21-19 clinic appt for further refills, Disp: 45 tablet, Rfl: 5     hydrocortisone (CORTEF) 10 MG tablet, 1 tab (10 mg) am 1/2 tab (5 mg) pm plus sick day supply as directed, Disp: 150 tablet, Rfl: 5     levothyroxine (SYNTHROID/LEVOTHROID) 75 MCG tablet, One tablet day 6 days per wk and none on the 7th day each wk, Disp: 90 tablet, Rfl: 5     Multiple Vitamin (MULTI-VITAMIN DAILY PO), , Disp: , Rfl:      progesterone (PROMETRIUM) 100 MG capsule, Take 1 tablet daily, Disp: 90 capsule, Rfl: 1     triamcinolone (KENALOG) 0.1 % external cream, Apply topically 2 times daily As needed for flares, Disp: 80 g, Rfl: 1     Calcium carb-Vitamin D 500 mg [place]-200 units (OSCAL WITH D;OYSTER SHELL CALCIUM) 500-200 MG-UNIT per tablet, Take 1 tablet by mouth 2 times daily (with meals), Disp: , Rfl:      methocarbamol (ROBAXIN) 500 MG tablet, Take 1-2 tablets (500-1,000 mg) by mouth nightly as needed for muscle spasms (Patient not taking: Reported on 10/21/2019), Disp: 20 tablet, Rfl: 0     naproxen (NAPROSYN) 500 MG tablet, Take 1 tablet (500 mg) by mouth 2 times daily (with meals) (Patient not taking: Reported on 10/21/2019), Disp: 60 tablet, Rfl: 1      Allergies:   Patient has no known allergies.     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 resection   Avascular necrosis of femur head   Cervical high risk HPV   Colon polyp   Diabetes insipidus   Hypercalcemia   Hypertension   Pulmonary emboli   Hypothyroidism      Past Surgical History:  Lumbar drain- 01/2014   Adrenalectomy-07/2014   Tumor resection (hypophysectomy)- 07/2013    [place] teeth extracted     Family History:   Mother: asthma, osteoporosis, obesity   Father: hyperlipidemia, obesity   Sister: allergies, obesity   Maternal grandmother: colon cancer  Maternal grandfather: lung cancer   Paternal grandmother: lung cancer   Paternal grandfather: lung cancer      Social History:   The patient was alone   Smoking Status: former; 1/2 pack per day for 3 years; 12 years since quitting   Smokeless Tobacco: never    Alcohol Use: yes; 3 beers per month   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 No masses, lesions, tenderness or abnormalities  Resporatory: non acute disctress  Skin: pigmentation face but improving      Data:  TSH   Date Value Ref Range Status   01/12/2018 <0.01 (L) 0.40 - 4.00 mU/L Final   01/08/2016 <0.01 (L) 0.40 - 4.00 mU/L Final   09/25/2015 <0.01 (L) 0.40 - 4.00 mU/L Final   05/22/2015 <0.01 (L) 0.40 - 4.00 mU/L Final   03/13/2015 (L) 0.40 - 4.00 mU/L Final    <0.01  Effective 7/30/2014, the reference range for this assay has changed to reflect   new instrumentation/methodology.       T4 Free   Date Value Ref Range Status   10/08/2018 0.53 (L) 0.76 - 1.46 ng/dL Final   01/12/2018 0.90 0.76 - 1.46 ng/dL Final   10/04/2017 0.90 0.76 - 1.46 ng/dL Final   07/07/2017 0.98 0.76 - 1.46 ng/dL Final   12/30/2016 0.91 0.76 - 1.46 ng/dL Final     CBC RESULTS:   Recent Labs   Lab Test 07/15/14  0806   WBC 8.2   RBC 4.51   HGB 13.3   HCT 39.7   MCV 88   MCH 29.5   MCHC 33.5   RDW 13.7        Recent Labs   Lab Test 10/08/18  0802 01/12/18  0732    137   POTASSIUM 4.1 4.3   CHLORIDE 105 105   CO2 32 27   ANIONGAP 4 5   GLC 87 78   BUN 14 19   CR 1.10* 0.93   ELIEZER 9.3 9.2     MRI: I also personally reviewed brain MRI and pituitary lesion and interepret and expalined to the patient.     intraseller maybe small hypodense 3-4 mm(?) residual vs scar in  my impression           MRI: I reviewed original images of 2/19/2021 ad explained to the patient.   Brain/ Pituitary MRI without and with contrast     History: 33 yo female pituitary Cushing disease follow up; Pituitary  dependent Cushing disease (H).     ICD-10: Pituitary dependent Cushing disease (H)     Comparison:  Multiple pituitary MRI exams since 12/3/2009, most recent  one from 3/16/2020.     Technique: Axial diffusion and FLAIR images of the whole brain  obtained without intravenous contrast. Sagittal T1 and T2-weighted,  coronal T2-weighted, coronal T1-weighted images with focus on the  sella were obtained without intravenous contrast. Post intravenous  contrast using gadolinium coronal and sagittal T1-weighted images were  obtained focused on the sella. Dynamic postcontrast coronal  T1-weighted images were also obtained.     Contrast: 7mL Gadavist IV     Findings:    Postsurgical changes of transsphenoidal approach pituitary  microadenoma resection. Hypoenhancing area within the left side of the  sella, extending to the left cavernous sinus, increased since  10/8/2018, measuring 9 x 7 x 10 mm, previously 6 x 6 x 4 mm.  Associated diffusion restriction. Abutment of the cavernous segment of  left internal carotid artery.     The pituitary stalk appears midline. The optic chiasm appears intact  and not displaced. The major cavernous carotid vascular flow-voids  appear patent.

## 2021-09-09 ENCOUNTER — TELEPHONE (OUTPATIENT)
Dept: NEUROSURGERY | Facility: CLINIC | Age: 36
End: 2021-09-09

## 2021-09-10 RX ORDER — SODIUM, POTASSIUM,MAG SULFATES 17.5-3.13G
2 SOLUTION, RECONSTITUTED, ORAL ORAL SEE ADMIN INSTRUCTIONS
Qty: 354 ML | Refills: 0 | Status: SHIPPED | OUTPATIENT
Start: 2021-09-10 | End: 2022-08-05

## 2021-09-10 RX ORDER — BISACODYL 5 MG/1
15 TABLET, DELAYED RELEASE ORAL SEE ADMIN INSTRUCTIONS
Qty: 3 TABLET | Refills: 0 | Status: SHIPPED | OUTPATIENT
Start: 2021-09-10 | End: 2022-08-05

## 2021-09-13 ENCOUNTER — LAB (OUTPATIENT)
Dept: LAB | Facility: CLINIC | Age: 36
End: 2021-09-13
Attending: INTERNAL MEDICINE
Payer: COMMERCIAL

## 2021-09-13 DIAGNOSIS — Z11.59 ENCOUNTER FOR SCREENING FOR OTHER VIRAL DISEASES: ICD-10-CM

## 2021-09-13 PROCEDURE — U0005 INFEC AGEN DETEC AMPLI PROBE: HCPCS

## 2021-09-13 PROCEDURE — U0003 INFECTIOUS AGENT DETECTION BY NUCLEIC ACID (DNA OR RNA); SEVERE ACUTE RESPIRATORY SYNDROME CORONAVIRUS 2 (SARS-COV-2) (CORONAVIRUS DISEASE [COVID-19]), AMPLIFIED PROBE TECHNIQUE, MAKING USE OF HIGH THROUGHPUT TECHNOLOGIES AS DESCRIBED BY CMS-2020-01-R: HCPCS

## 2021-09-14 LAB — SARS-COV-2 RNA RESP QL NAA+PROBE: NEGATIVE

## 2021-09-15 ENCOUNTER — MYC MEDICAL ADVICE (OUTPATIENT)
Dept: NEUROSURGERY | Facility: CLINIC | Age: 36
End: 2021-09-15

## 2021-09-16 ENCOUNTER — HOSPITAL ENCOUNTER (OUTPATIENT)
Facility: AMBULATORY SURGERY CENTER | Age: 36
Discharge: HOME | End: 2021-09-16
Attending: INTERNAL MEDICINE | Admitting: INTERNAL MEDICINE
Payer: COMMERCIAL

## 2021-09-16 VITALS
SYSTOLIC BLOOD PRESSURE: 92 MMHG | OXYGEN SATURATION: 95 % | DIASTOLIC BLOOD PRESSURE: 63 MMHG | RESPIRATION RATE: 16 BRPM | TEMPERATURE: 97.6 F

## 2021-09-16 DIAGNOSIS — Z12.11 SCREEN FOR COLON CANCER: Primary | ICD-10-CM

## 2021-09-16 LAB — COLONOSCOPY: NORMAL

## 2021-09-16 PROCEDURE — G8918 PT W/O PREOP ORDER IV AB PRO: HCPCS

## 2021-09-16 PROCEDURE — 45378 DIAGNOSTIC COLONOSCOPY: CPT

## 2021-09-16 PROCEDURE — G8907 PT DOC NO EVENTS ON DISCHARG: HCPCS

## 2021-09-16 RX ORDER — ONDANSETRON 4 MG/1
4 TABLET, ORALLY DISINTEGRATING ORAL EVERY 6 HOURS PRN
Status: DISCONTINUED | OUTPATIENT
Start: 2021-09-16 | End: 2021-09-17 | Stop reason: HOSPADM

## 2021-09-16 RX ORDER — ONDANSETRON 2 MG/ML
4 INJECTION INTRAMUSCULAR; INTRAVENOUS EVERY 6 HOURS PRN
Status: DISCONTINUED | OUTPATIENT
Start: 2021-09-16 | End: 2021-09-17 | Stop reason: HOSPADM

## 2021-09-16 RX ORDER — NALOXONE HYDROCHLORIDE 0.4 MG/ML
0.4 INJECTION, SOLUTION INTRAMUSCULAR; INTRAVENOUS; SUBCUTANEOUS
Status: DISCONTINUED | OUTPATIENT
Start: 2021-09-16 | End: 2021-09-17 | Stop reason: HOSPADM

## 2021-09-16 RX ORDER — FLUMAZENIL 0.1 MG/ML
0.2 INJECTION, SOLUTION INTRAVENOUS
Status: ACTIVE | OUTPATIENT
Start: 2021-09-16 | End: 2021-09-16

## 2021-09-16 RX ORDER — NALOXONE HYDROCHLORIDE 0.4 MG/ML
0.2 INJECTION, SOLUTION INTRAMUSCULAR; INTRAVENOUS; SUBCUTANEOUS
Status: DISCONTINUED | OUTPATIENT
Start: 2021-09-16 | End: 2021-09-17 | Stop reason: HOSPADM

## 2021-09-16 RX ORDER — LIDOCAINE 40 MG/G
CREAM TOPICAL
Status: DISCONTINUED | OUTPATIENT
Start: 2021-09-16 | End: 2021-09-17 | Stop reason: HOSPADM

## 2021-09-16 RX ORDER — PROCHLORPERAZINE MALEATE 10 MG
10 TABLET ORAL EVERY 6 HOURS PRN
Status: DISCONTINUED | OUTPATIENT
Start: 2021-09-16 | End: 2021-09-17 | Stop reason: HOSPADM

## 2021-09-16 RX ORDER — FENTANYL CITRATE 50 UG/ML
INJECTION, SOLUTION INTRAMUSCULAR; INTRAVENOUS PRN
Status: DISCONTINUED | OUTPATIENT
Start: 2021-09-16 | End: 2021-09-16 | Stop reason: HOSPADM

## 2021-09-16 RX ORDER — ONDANSETRON 2 MG/ML
4 INJECTION INTRAMUSCULAR; INTRAVENOUS
Status: DISCONTINUED | OUTPATIENT
Start: 2021-09-16 | End: 2021-09-17 | Stop reason: HOSPADM

## 2021-09-16 RX ORDER — SODIUM CHLORIDE 9 MG/ML
INJECTION, SOLUTION INTRAVENOUS CONTINUOUS PRN
Status: COMPLETED | OUTPATIENT
Start: 2021-09-16 | End: 2021-09-16

## 2021-09-26 ENCOUNTER — HEALTH MAINTENANCE LETTER (OUTPATIENT)
Age: 36
End: 2021-09-26

## 2021-10-11 ENCOUNTER — TELEPHONE (OUTPATIENT)
Dept: NEUROSURGERY | Facility: CLINIC | Age: 36
End: 2021-10-11

## 2021-10-19 ENCOUNTER — ANCILLARY PROCEDURE (OUTPATIENT)
Dept: MRI IMAGING | Facility: CLINIC | Age: 36
End: 2021-10-19
Attending: PHYSICIAN ASSISTANT
Payer: COMMERCIAL

## 2021-10-19 DIAGNOSIS — D35.2 PITUITARY MACROADENOMA (H): ICD-10-CM

## 2021-10-19 DIAGNOSIS — E24.1 NELSON'S SYNDROME (H): ICD-10-CM

## 2021-10-19 PROCEDURE — A9585 GADOBUTROL INJECTION: HCPCS | Performed by: RADIOLOGY

## 2021-10-19 PROCEDURE — 70543 MRI ORBT/FAC/NCK W/O &W/DYE: CPT | Performed by: RADIOLOGY

## 2021-10-19 RX ORDER — GADOBUTROL 604.72 MG/ML
7.5 INJECTION INTRAVENOUS ONCE
Status: COMPLETED | OUTPATIENT
Start: 2021-10-19 | End: 2021-10-19

## 2021-10-19 RX ADMIN — GADOBUTROL 7.5 ML: 604.72 INJECTION INTRAVENOUS at 08:48

## 2021-11-02 ENCOUNTER — VIRTUAL VISIT (OUTPATIENT)
Dept: NEUROSURGERY | Facility: CLINIC | Age: 36
End: 2021-11-02
Payer: COMMERCIAL

## 2021-11-02 DIAGNOSIS — D35.2 PITUITARY MACROADENOMA (H): Primary | ICD-10-CM

## 2021-11-02 DIAGNOSIS — E24.1 NELSON'S SYNDROME (H): ICD-10-CM

## 2021-11-02 PROCEDURE — 99213 OFFICE O/P EST LOW 20 MIN: CPT | Mod: 95 | Performed by: NEUROLOGICAL SURGERY

## 2021-11-02 NOTE — PROGRESS NOTES
Service Date: 2021    Clarisa Palafox, APRN, CNP  [address] [place] [address]     RE:  Suyapa Hodge  MRN: [id number]  : 1985    Dear Ms. Palafox:    We spoke to Ms. Hodge as part of a telephone followup in Pituitary Clinic today.  She is over 3 months out from a fourth time reoperation of a pituitary adenoma.  As you know, she has a complex history of Cushing disease treated with pituitary adenoma resection and bilateral adrenalectomy.  She subsequently developed Alexei syndrome resulting in hyperpigmentation and the increasing ACTH levels.  She tolerated the operation well.  It sounds like her sinonasal passages are healing well, but she has persistent sinus drainage this is tolerable.  Occasionally, the postnasal drainage will cause vocal cord irritation.    Her immediate postoperative ACTH normalized to 14 (greater than 1250 preoperatively).  Her skin pigmentation has almost returned to baseline.  She believes that she still has some darkening of the face, but her extremities have returned to baseline.  She denies any visual changes.  She is working full time.    Over the phone, she sounded well.  She seemed to be in good spirits.  Her speech, language and phonation were normal.    REVIEW OF STUDIES:  We went over her MRI from 10/19/2021 and compared it to her preoperative studies.  We see some postsurgical changes in the sphenoid and in the left side of the sella.  We do not see any obvious residual tumor.  The optic apparatus and pituitary stalk appear normal.    IMPRESSION AND PLAN:  We are pleased to see that Ms. Hodge is doing well clinically and her biochemical profile looks favorable.  The big concern is whether the current results are durable.    We talked about the various strategies such as preemptive radiation to the sella and parasellar region to reduce the chances of recurrence.  The other option is monitoring her clinical and biochemical profile  and if there is a recurrence by either criteria, we can pursue radiation.  We can repeat an MRI annually as a surrogate study to reinforce the decision making.  If the MRI shows obvious tumor recurrence, reoperation or radiosurgery can be considered.  Of course, at this time, an operation would pose technical challenges and increased risk and we would need to consider all of those variables.  For now, we will defer the monitoring to Dr. Wilkinson, our pituitary endocrinologist.      We will plan on repeating an MRI in 1 year.  If she has any changes, she will notify our team.  Please do not hesitate to contact us with questions.    Sincerely,     Mina Huffman MD        D: 2021   T: 2021   MT: ALISE    Name:     VADIM ZAMBRANO  MRN:      [id number]        Account:      [id number]   :      1985           Service Date: 2021       Document: [id number]

## 2021-11-02 NOTE — LETTER
11/2/2021       RE: Suyapa Hodge  [address] [facility] [address]     Dear Colleague,    Thank you for referring your patient, Suyapa Hodge, to the [facility] NEUROSURGERY [facility]INIC [place] at [facility]. Please see a copy of my visit note below.    Milla is a 36 year old who is being evaluated via a billable telephone visit.      What phone number would you like to be contacted at? [phone]  How would you like to obtain your AVS? MyChart  Phone call duration: 10  Minutes    Persistent sinus drainage but tolerable. Skin pigmentation has almost returned to baseline. Overdue for ACTH. Will repeat MRI in one year. See dictated note.  RETA Huffman MD        Again, thank you for allowing me to participate in the care of your patient.      Sincerely,    Mina Huffman MD

## 2021-11-02 NOTE — PATIENT INSTRUCTIONS
Repeat MRI brain with contrast in one year (ordered)    Recheck your ACTH as ordered by Dr. Wilkinson    If you develop any changes, notify us or Dr. Wilkinson

## 2021-11-02 NOTE — PROGRESS NOTES
Milla is a 36 year old who is being evaluated via a billable telephone visit.      What phone number would you like to be contacted at? [phone]  How would you like to obtain your AVS? MyChart  Phone call duration: 10  Minutes    Persistent sinus drainage but tolerable. Skin pigmentation has almost returned to baseline. Overdue for ACTH. Will repeat MRI in one year. See dictated note.  RETA Huffman MD

## 2021-11-02 NOTE — LETTER
11/2/2021      RE: Suyapa Patiñopeemeera  [address] [facility] [address]       Milla is a 36 year old who is being evaluated via a billable telephone visit.      What phone number would you like to be contacted at? [phone]  How would you like to obtain your AVS? MyChart  Phone call duration: 10  Minutes    Persistent sinus drainage but tolerable. Skin pigmentation has almost returned to baseline. Overdue for ACTH. Will repeat MRI in one year. See dictated note.  MD Mina Arechiga MD

## 2021-11-02 NOTE — LETTER
2021      RE: Suyapa Hodge  [address] [facility] [address]           Persistent sinus drainage but tolerable. Skin pigmentation has almost returned to baseline. Overdue for ACTH. Will repeat MRI in one year.  RETA Huffman MD      Service Date: 2021    Clarisa Palafox, APRN, CNP  [address] [place] [address]     RE:  Suyapa Hodge  MRN: [id number]  : 1985    Dear Ms. Palafox:    We spoke to Ms. Hodge as part of a telephone followup in Pituitary Clinic today.  She is over 3 months out from a fourth time reoperation of a pituitary adenoma.  As you know, she has a complex history of Cushing disease treated with pituitary adenoma resection and bilateral adrenalectomy.  She subsequently developed Alexei syndrome resulting in hyperpigmentation and the increasing ACTH levels.  She tolerated the operation well.  It sounds like her sinonasal passages are healing well, but she has persistent sinus drainage this is tolerable.  Occasionally, the postnasal drainage will cause vocal cord irritation.    Her immediate postoperative ACTH normalized to 14 (greater than 1250 preoperatively).  Her skin pigmentation has almost returned to baseline.  She believes that she still has some darkening of the face, but her extremities have returned to baseline.  She denies any visual changes.  She is working full time.    Over the phone, she sounded well.  She seemed to be in good spirits.  Her speech, language and phonation were normal.    REVIEW OF STUDIES:  We went over her MRI from 10/19/2021 and compared it to her preoperative studies.  We see some postsurgical changes in the sphenoid and in the left side of the sella.  We do not see any obvious residual tumor.  The optic apparatus and pituitary stalk appear normal.    IMPRESSION AND PLAN:  We are pleased to see that Ms. Hodge is doing well clinically and her biochemical profile looks favorable.  The big concern is whether the  current results are durable.    We talked about the various strategies such as preemptive radiation to the sella and parasellar region to reduce the chances of recurrence.  The other option is monitoring her clinical and biochemical profile and if there is a recurrence by either criteria, we can pursue radiation.  We can repeat an MRI annually as a surrogate study to reinforce the decision making.  If the MRI shows obvious tumor recurrence, reoperation or radiosurgery can be considered.  Of course, at this time, an operation would pose technical challenges and increased risk and we would need to consider all of those variables.  For now, we will defer the monitoring to Dr. Wilkinson, our pituitary endocrinologist.      We will plan on repeating an MRI in 1 year.  If she has any changes, she will notify our team.  Please do not hesitate to contact us with questions.    Sincerely,     Mina Huffman MD        D: 2021   T: 2021   MT: ALISE    Name:     VADIM ZAMBRANO  MRN:      [id number]        Account:      [id number]   :      1985           Service Date: 2021       Document: [id number]

## 2022-01-19 ENCOUNTER — LAB REQUISITION (OUTPATIENT)
Dept: LAB | Facility: CLINIC | Age: 37
End: 2022-01-19

## 2022-01-19 LAB — HBV SURFACE AB SERPL IA-ACNC: 5.72 M[IU]/ML

## 2022-01-19 PROCEDURE — 86765 RUBEOLA ANTIBODY: CPT | Performed by: INTERNAL MEDICINE

## 2022-01-19 PROCEDURE — 86735 MUMPS ANTIBODY: CPT | Performed by: INTERNAL MEDICINE

## 2022-01-19 PROCEDURE — 86481 TB AG RESPONSE T-CELL SUSP: CPT | Performed by: INTERNAL MEDICINE

## 2022-01-19 PROCEDURE — 86762 RUBELLA ANTIBODY: CPT | Performed by: INTERNAL MEDICINE

## 2022-01-19 PROCEDURE — 86706 HEP B SURFACE ANTIBODY: CPT | Performed by: INTERNAL MEDICINE

## 2022-01-20 LAB
GAMMA INTERFERON BACKGROUND BLD IA-ACNC: 0.07 IU/ML
M TB IFN-G BLD-IMP: NEGATIVE
M TB IFN-G CD4+ BCKGRND COR BLD-ACNC: 9.93 IU/ML
MEV IGG SER IA-ACNC: >300 AU/ML
MEV IGG SER IA-ACNC: POSITIVE
MITOGEN IGNF BCKGRD COR BLD-ACNC: 0 IU/ML
MITOGEN IGNF BCKGRD COR BLD-ACNC: 0.01 IU/ML
MUMPS ANTIBODY IGG INSTRUMENT VALUE: <5 AU/ML
MUV IGG SER QL IA: NORMAL
QUANTIFERON MITOGEN: 10 IU/ML
QUANTIFERON NIL TUBE: 0.07 IU/ML
QUANTIFERON TB1 TUBE: 0.07 IU/ML
QUANTIFERON TB2 TUBE: 0.08
RUBV IGG SERPL QL IA: 13.8 INDEX
RUBV IGG SERPL QL IA: POSITIVE

## 2022-02-23 ENCOUNTER — VIRTUAL VISIT (OUTPATIENT)
Dept: ENDOCRINOLOGY | Facility: CLINIC | Age: 37
End: 2022-02-23
Payer: COMMERCIAL

## 2022-02-23 DIAGNOSIS — E24.1: ICD-10-CM

## 2022-02-23 DIAGNOSIS — E24.0 PITUITARY DEPENDENT CUSHING DISEASE (H): Primary | ICD-10-CM

## 2022-02-23 PROCEDURE — 99214 OFFICE O/P EST MOD 30 MIN: CPT | Mod: 95 | Performed by: INTERNAL MEDICINE

## 2022-02-23 NOTE — PROGRESS NOTES
How would you like to obtain your AVS? MyChart  If the video visit is dropped, the invitation should be resent by: Text to cell phone: [phone]  Will anyone else be joining your video visit? No      cushing disease  Vel Heath CMA      Video-Visit Details    Type of service Video  Start: 10:00 am  End: 10: 20 am  Amwell      Originating Location (pt. Location): Home    Distant Location (provider location):  [facility]       Endocrinology follow up      Assessment:    Cushing's disease  Alexei syndrome  Cushing disease post TSS 3 times (2010, 2013, 2014), and adrenalectomy, currently Alexei's symdrome  Worsening pigmentation with ACTH>1250, increased the lesion, therefore she underwent to TSS 4 th time, July 2021 by Dr. Huffman   Pathology showed ACTH stained. Post op ACTH was 14 with siginificant improvement of pigmentation. Reviewed last MRI 10/2021 showed no residual tumor    - ACTH to check    - continue current dose of hydrocortisone 10 mg am and florinef 0.1 mg daily, with this dose she is maintaining her BW and feeling good.     - Next MRI 10/2022 ( 1 year follow up) and need appt with Dr. Huffman       Hypothyrodism     - TSH  - free T4  - continue LT4 75 mcg daily    Hypogonadism  Has some skin issues with estrogen patch, we will switch to BCP with low dose of estrogen. Currently has regular cycles    - continue BCP (E2 20 mcg)     DDAVP  Taking minimum dose currently (100 mcg daily po). She can try to taper by herself.       RTC with me in 6 month      35  minutes spent on the date of the encounter doing chart review, history and exam, documentation and further activities as noted above.    Adriana Wilkinson MD  Staff Physician  Endocrinology and Metabolism  [facility]  License: [id number]  Pager: [phone]    Interval History as of 2/23/2022 : Patient has been doing well. Medication compliance  excellent . New event includes L regular cycle, no pertinent medical event  "noted .  Interval History as of 8/23/2021 : Patient has been doing well. Underwent to 4th surgery 7/22/2021. Pigmentation of her dkin isn imroving, post op ACTH was 14.   Interval History as of 4/14/2021 : No HA, medication compliance good, no dizziness, no HA. Pigmentation gettign worse, lesion increasing in size.   History of Present Illness: Suyapa Hodge is a 34 year old female with a history of pituitary Cushing's syndrome, hypothalamic adrenal dysfunction and pituitary microadenoma who presents for follow up. She was last seen in clinic 14 months ago. She was diagnosed with pituitary microademoma serveral year ago which was suspected to cause her Cushing's disease. She had pituitary surgery x3 in 2013-14 and finally underwent bilateral adrenalectomy in July 2014. ACTH levels remained normal. In July 2017 ACTH levels were tested and was 271, which increased to 549 raising concern for recurrence vs missed diagnosis.  Chest abdomen and pelvis CT was normal. 8 mg dex suppression reduced levels to 118.     She is currently taking hydrocortisone BID (10 mg misses dose once per month), L-thyroxine 75 mcg tablets full tablet 6 days and desmopressin 0.1 mg in the morning. She denies that she gets up in the middle of the night to use the restroom. She reports that she goes to the eye doctor every 3 months to see if the tumor \"has moved to her optic nerve\".      She is on hormone replacement therapy (prometrium and vivelle patch). No history of uterine surgeries. She has a history of irregular periods but has been evaluated with GYN, per patient, and was not told specifics.     She is not taking the calcium supplement. She has a history of avascular necrosis but has been told that she is too young for a hip replacement. DXA showed low BMD for age.     No eye symptoms  No headache, change in vision, loss of peripheral vision  Complete ROS that were obtained were negative.     Active Medications:      cyclobenzaprine " (FLEXERIL) 10 MG tablet, Take 1 tablet (10 mg) by mouth 3 times daily as needed for muscle spasms, Disp: 30 tablet, Rfl: 0     desmopressin (DDAVP) 0.1 MG tablet, Take 1 tablet (100 mcg) by mouth 2 times daily (1 TAB) MORNING AND AT BEDTIME, Disp: 180 tablet, Rfl: 5     estradiol (VIVELLE-DOT) 0.05 MG/24HR bi-weekly patch, Place 1 patch onto the skin twice a week, Disp: 27 patch, Rfl: 5     fludrocortisone (FLORINEF) 0.1 MG tablet, Take 0.5 tablets (0.05 mg) by mouth daily Please keep 10-21-19 clinic appt for further refills, Disp: 45 tablet, Rfl: 5     hydrocortisone (CORTEF) 10 MG tablet, 1 tab (10 mg) am 1/2 tab (5 mg) pm plus sick day supply as directed, Disp: 150 tablet, Rfl: 5     levothyroxine (SYNTHROID/LEVOTHROID) 75 MCG tablet, One tablet day 6 days per wk and none on the 7th day each wk, Disp: 90 tablet, Rfl: 5     Multiple Vitamin (MULTI-VITAMIN DAILY PO), , Disp: , Rfl:      progesterone (PROMETRIUM) 100 MG capsule, Take 1 tablet daily, Disp: 90 capsule, Rfl: 1     triamcinolone (KENALOG) 0.1 % external cream, Apply topically 2 times daily As needed for flares, Disp: 80 g, Rfl: 1     Calcium carb-Vitamin D 500 mg [place]-200 units (OSCAL WITH D;OYSTER SHELL CALCIUM) 500-200 MG-UNIT per tablet, Take 1 tablet by mouth 2 times daily (with meals), Disp: , Rfl:      methocarbamol (ROBAXIN) 500 MG tablet, Take 1-2 tablets (500-1,000 mg) by mouth nightly as needed for muscle spasms (Patient not taking: Reported on 10/21/2019), Disp: 20 tablet, Rfl: 0     naproxen (NAPROSYN) 500 MG tablet, Take 1 tablet (500 mg) by mouth 2 times daily (with meals) (Patient not taking: Reported on 10/21/2019), Disp: 60 tablet, Rfl: 1      Allergies:   Patient has no known allergies.     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 resection   Avascular necrosis of femur head   Cervical high risk HPV   Colon polyp    Diabetes insipidus   Hypercalcemia   Hypertension   Pulmonary emboli   Hypothyroidism      Past Surgical History:  Lumbar drain- 01/2014   Adrenalectomy-07/2014   Tumor resection (hypophysectomy)- 07/2013   [place] teeth extracted     Family History:   Mother: asthma, osteoporosis, obesity   Father: hyperlipidemia, obesity   Sister: allergies, obesity   Maternal grandmother: colon cancer  Maternal grandfather: lung cancer   Paternal grandmother: lung cancer   Paternal grandfather: lung cancer      Social History:   The patient was alone   Smoking Status: former; 1/2 pack per day for 3 years; 12 years since quitting   Smokeless Tobacco: never    Alcohol Use: yes; 3 beers per month   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 No masses, lesions, tenderness or abnormalities  Resporatory: non acute disctress  Skin: pigmentation face but improving      Data:  TSH   Date Value Ref Range Status   01/12/2018 <0.01 (L) 0.40 - 4.00 mU/L Final   01/08/2016 <0.01 (L) 0.40 - 4.00 mU/L Final   09/25/2015 <0.01 (L) 0.40 - 4.00 mU/L Final   05/22/2015 <0.01 (L) 0.40 - 4.00 mU/L Final   03/13/2015 (L) 0.40 - 4.00 mU/L Final    <0.01  Effective 7/30/2014, the reference range for this assay has changed to reflect   new instrumentation/methodology.       T4 Free   Date Value Ref Range Status   10/08/2018 0.53 (L) 0.76 - 1.46 ng/dL Final   01/12/2018 0.90 0.76 - 1.46 ng/dL Final   10/04/2017 0.90 0.76 - 1.46 ng/dL Final   07/07/2017 0.98 0.76 - 1.46 ng/dL Final   12/30/2016 0.91 0.76 - 1.46 ng/dL Final     CBC RESULTS:   Recent Labs   Lab Test 07/15/14  0806   WBC 8.2   RBC 4.51   HGB 13.3   HCT 39.7   MCV 88   MCH 29.5   MCHC 33.5   RDW 13.7        Recent Labs   Lab Test 10/08/18  0802 01/12/18  0732    137   POTASSIUM 4.1 4.3   CHLORIDE 105 105   CO2 32 27   ANIONGAP 4 5   GLC 87 78   BUN 14 19    CR 1.10* 0.93   ELIEZER 9.3 9.2     MRI: I also personally reviewed brain MRI and pituitary lesion and interepret and expalined to the patient.     intraseller maybe small hypodense 3-4 mm(?) residual vs scar in my impression           MRI: I reviewed original images of 2/19/2021 ad explained to the patient.   Brain/ Pituitary MRI without and with contrast     History: 33 yo female pituitary Cushing disease follow up; Pituitary  dependent Cushing disease (H).     ICD-10: Pituitary dependent Cushing disease (H)     Comparison:  Multiple pituitary MRI exams since 12/3/2009, most recent  one from 3/16/2020.     Technique: Axial diffusion and FLAIR images of the whole brain  obtained without intravenous contrast. Sagittal T1 and T2-weighted,  coronal T2-weighted, coronal T1-weighted images with focus on the  sella were obtained without intravenous contrast. Post intravenous  contrast using gadolinium coronal and sagittal T1-weighted images were  obtained focused on the sella. Dynamic postcontrast coronal  T1-weighted images were also obtained.     Contrast: 7mL Gadavist IV     Findings:    Postsurgical changes of transsphenoidal approach pituitary  microadenoma resection. Hypoenhancing area within the left side of the  sella, extending to the left cavernous sinus, increased since  10/8/2018, measuring 9 x 7 x 10 mm, previously 6 x 6 x 4 mm.  Associated diffusion restriction. Abutment of the cavernous segment of  left internal carotid artery.     The pituitary stalk appears midline. The optic chiasm appears intact  and not displaced. The major cavernous carotid vascular flow-voids  appear patent.

## 2022-02-23 NOTE — LETTER
2/23/2022       RE: Suyapa Hodge  [address] [facility] [address]     Dear Colleague,    Thank you for referring your patient, Suyapa Hodge, to the [facility]CR[facility] [place] [facility]. Please see a copy of my visit note below.    How would you like to obtain your AVS? MyChart  If the video visit is dropped, the invitation should be resent by: Text to cell phone: [phone]  Will anyone else be joining your video visit? No      cushing disease  Vel Heath CMA      Video-Visit Details    Type of service Video  Start: 10:00 am  End: 10: 20 am  Amwell      Originating Location (pt. Location): Home    Distant Location (provider location):  [facility] END[facility]CR[facility]       Endocrinology follow up      Assessment:    Cushing's disease  Alexei syndrome  Cushing disease post TSS 3 times (2010, 2013, 2014), and adrenalectomy, currently Alexei's symdrome  Worsening pigmentation with ACTH>1250, increased the lesion, therefore she underwent to TSS 4 th time, July 2021 by Dr. Huffman   Pathology showed ACTH stained. Post op ACTH was 14 with siginificant improvement of pigmentation. Reviewed last MRI 10/2021 showed no residual tumor    - ACTH to check    - continue current dose of hydrocortisone 10 mg am and florinef 0.1 mg daily, with this dose she is maintaining her BW and feeling good.     - Next MRI 10/2022 ( 1 year follow up) and need appt with Dr. Huffman       Hypothyrodism     - TSH  - free T4  - continue LT4 75 mcg daily    Hypogonadism  Has some skin issues with estrogen patch, we will switch to BCP with low dose of estrogen. Currently has regular cycles    - continue BCP (E2 20 mcg)     DDAVP  Taking minimum dose currently (100 mcg daily po). She can try to taper by herself.       RTC with me in 6 month      35  minutes spent on the date of the encounter doing chart review, history and exam, documentation and further activities as  "noted above.    Adriana Wilkinson MD  Staff Physician  Endocrinology and Metabolism  [facility]  License: [id number]  Pager: [phone]    Interval History as of 2/23/2022 : Patient has been doing well. Medication compliance  excellent . New event includes L regular cycle, no pertinent medical event noted .  Interval History as of 8/23/2021 : Patient has been doing well. Underwent to 4th surgery 7/22/2021. Pigmentation of her dkin isn imroving, post op ACTH was 14.   Interval History as of 4/14/2021 : No HA, medication compliance good, no dizziness, no HA. Pigmentation gettign worse, lesion increasing in size.   History of Present Illness: Suyapa Hodge is a 34 year old female with a history of pituitary Cushing's syndrome, hypothalamic adrenal dysfunction and pituitary microadenoma who presents for follow up. She was last seen in clinic 14 months ago. She was diagnosed with pituitary microademoma serveral year ago which was suspected to cause her Cushing's disease. She had pituitary surgery x3 in 2013-14 and finally underwent bilateral adrenalectomy in July 2014. ACTH levels remained normal. In July 2017 ACTH levels were tested and was 271, which increased to 549 raising concern for recurrence vs missed diagnosis.  Chest abdomen and pelvis CT was normal. 8 mg dex suppression reduced levels to 118.     She is currently taking hydrocortisone BID (10 mg misses dose once per month), L-thyroxine 75 mcg tablets full tablet 6 days and desmopressin 0.1 mg in the morning. She denies that she gets up in the middle of the night to use the restroom. She reports that she goes to the eye doctor every 3 months to see if the tumor \"has moved to her optic nerve\".      She is on hormone replacement therapy (prometrium and vivelle patch). No history of uterine surgeries. She has a history of irregular periods but has been evaluated with GYN, per patient, and was not told specifics.     She is not taking the " calcium supplement. She has a history of avascular necrosis but has been told that she is too young for a hip replacement. DXA showed low BMD for age.     No eye symptoms  No headache, change in vision, loss of peripheral vision  Complete ROS that were obtained were negative.     Active Medications:      cyclobenzaprine (FLEXERIL) 10 MG tablet, Take 1 tablet (10 mg) by mouth 3 times daily as needed for muscle spasms, Disp: 30 tablet, Rfl: 0     desmopressin (DDAVP) 0.1 MG tablet, Take 1 tablet (100 mcg) by mouth 2 times daily (1 TAB) MORNING AND AT BEDTIME, Disp: 180 tablet, Rfl: 5     estradiol (VIVELLE-DOT) 0.05 MG/24HR bi-weekly patch, Place 1 patch onto the skin twice a week, Disp: 27 patch, Rfl: 5     fludrocortisone (FLORINEF) 0.1 MG tablet, Take 0.5 tablets (0.05 mg) by mouth daily Please keep 10-21-19 clinic appt for further refills, Disp: 45 tablet, Rfl: 5     hydrocortisone (CORTEF) 10 MG tablet, 1 tab (10 mg) am 1/2 tab (5 mg) pm plus sick day supply as directed, Disp: 150 tablet, Rfl: 5     levothyroxine (SYNTHROID/LEVOTHROID) 75 MCG tablet, One tablet day 6 days per wk and none on the 7th day each wk, Disp: 90 tablet, Rfl: 5     Multiple Vitamin (MULTI-VITAMIN DAILY PO), , Disp: , Rfl:      progesterone (PROMETRIUM) 100 MG capsule, Take 1 tablet daily, Disp: 90 capsule, Rfl: 1     triamcinolone (KENALOG) 0.1 % external cream, Apply topically 2 times daily As needed for flares, Disp: 80 g, Rfl: 1     Calcium carb-Vitamin D 500 mg [place]-200 units (OSCAL WITH D;OYSTER SHELL CALCIUM) 500-200 MG-UNIT per tablet, Take 1 tablet by mouth 2 times daily (with meals), Disp: , Rfl:      methocarbamol (ROBAXIN) 500 MG tablet, Take 1-2 tablets (500-1,000 mg) by mouth nightly as needed for muscle spasms (Patient not taking: Reported on 10/21/2019), Disp: 20 tablet, Rfl: 0     naproxen (NAPROSYN) 500 MG tablet, Take 1 tablet (500 mg) by mouth 2 times daily (with meals) (Patient not taking: Reported on 10/21/2019), Disp:  60 tablet, Rfl: 1      Allergies:   Patient has no known allergies.     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 resection   Avascular necrosis of femur head   Cervical high risk HPV   Colon polyp   Diabetes insipidus   Hypercalcemia   Hypertension   Pulmonary emboli   Hypothyroidism      Past Surgical History:  Lumbar drain- 01/2014   Adrenalectomy-07/2014   Tumor resection (hypophysectomy)- 07/2013   [place] teeth extracted     Family History:   Mother: asthma, osteoporosis, obesity   Father: hyperlipidemia, obesity   Sister: allergies, obesity   Maternal grandmother: colon cancer  Maternal grandfather: lung cancer   Paternal grandmother: lung cancer   Paternal grandfather: lung cancer      Social History:   The patient was alone   Smoking Status: former; 1/2 pack per day for 3 years; 12 years since quitting   Smokeless Tobacco: never    Alcohol Use: yes; 3 beers per month   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 No masses, lesions, tenderness or abnormalities  Resporatory: non acute disctress  Skin: pigmentation face but improving      Data:  TSH   Date Value Ref Range Status   01/12/2018 <0.01 (L) 0.40 - 4.00 mU/L Final   01/08/2016 <0.01 (L) 0.40 - 4.00 mU/L Final   09/25/2015 <0.01 (L) 0.40 - 4.00 mU/L Final   05/22/2015 <0.01 (L) 0.40 - 4.00 mU/L Final   03/13/2015 (L) 0.40 - 4.00 mU/L Final    <0.01  Effective 7/30/2014, the reference range for this assay has changed to reflect   new instrumentation/methodology.       T4 Free   Date Value Ref Range Status   10/08/2018 0.53 (L) 0.76 - 1.46 ng/dL Final   01/12/2018 0.90 0.76 - 1.46 ng/dL Final   10/04/2017 0.90 0.76 - 1.46 ng/dL Final   07/07/2017 0.98 0.76 - 1.46 ng/dL Final   12/30/2016 0.91 0.76 - 1.46 ng/dL  Final     CBC RESULTS:   Recent Labs   Lab Test 07/15/14  0806   WBC 8.2   RBC 4.51   HGB 13.3   HCT 39.7   MCV 88   MCH 29.5   MCHC 33.5   RDW 13.7        Recent Labs   Lab Test 10/08/18  0802 01/12/18  0732    137   POTASSIUM 4.1 4.3   CHLORIDE 105 105   CO2 32 27   ANIONGAP 4 5   GLC 87 78   BUN 14 19   CR 1.10* 0.93   ELIEZER 9.3 9.2     MRI: I also personally reviewed brain MRI and pituitary lesion and interepret and expalined to the patient.     intraseller maybe small hypodense 3-4 mm(?) residual vs scar in my impression           MRI: I reviewed original images of 2/19/2021 ad explained to the patient.   Brain/ Pituitary MRI without and with contrast     History: 35 yo female pituitary Cushing disease follow up; Pituitary  dependent Cushing disease (H).     ICD-10: Pituitary dependent Cushing disease (H)     Comparison:  Multiple pituitary MRI exams since 12/3/2009, most recent  one from 3/16/2020.     Technique: Axial diffusion and FLAIR images of the whole brain  obtained without intravenous contrast. Sagittal T1 and T2-weighted,  coronal T2-weighted, coronal T1-weighted images with focus on the  sella were obtained without intravenous contrast. Post intravenous  contrast using gadolinium coronal and sagittal T1-weighted images were  obtained focused on the sella. Dynamic postcontrast coronal  T1-weighted images were also obtained.     Contrast: 7mL Gadavist IV     Findings:    Postsurgical changes of transsphenoidal approach pituitary  microadenoma resection. Hypoenhancing area within the left side of the  sella, extending to the left cavernous sinus, increased since  10/8/2018, measuring 9 x 7 x 10 mm, previously 6 x 6 x 4 mm.  Associated diffusion restriction. Abutment of the cavernous segment of  left internal carotid artery.     The pituitary stalk appears midline. The optic chiasm appears intact  and not displaced. The major cavernous carotid vascular flow-voids  appear patent.

## 2022-03-08 ENCOUNTER — E-VISIT (OUTPATIENT)
Dept: FAMILY MEDICINE | Facility: CLINIC | Age: 37
End: 2022-03-08
Payer: COMMERCIAL

## 2022-03-08 DIAGNOSIS — N39.0 ACUTE UTI (URINARY TRACT INFECTION): Primary | ICD-10-CM

## 2022-03-08 PROCEDURE — 99421 OL DIG E/M SVC 5-10 MIN: CPT | Performed by: NURSE PRACTITIONER

## 2022-03-09 RX ORDER — NITROFURANTOIN 25; 75 MG/1; MG/1
100 CAPSULE ORAL 2 TIMES DAILY
Qty: 10 CAPSULE | Refills: 0 | Status: SHIPPED | OUTPATIENT
Start: 2022-03-09 | End: 2022-03-14

## 2022-03-09 NOTE — PATIENT INSTRUCTIONS
Dear Suyapa Hodge    After reviewing your responses, I've been able to diagnose you with a urinary tract infection, which is a common infection of the bladder with bacteria.  This is not a sexually transmitted infection, though urinating immediately after intercourse can help prevent infections.  Drinking lots of fluids is also helpful to clear your current infection and prevent the next one.      I have sent a prescription for antibiotics to your pharmacy to treat this infection.    It is important that you take all of your prescribed medication even if your symptoms are improving after a few doses.  Taking all of your medicine helps prevent the symptoms from returning.     If your symptoms worsen, you develop pain in your back or stomach, develop fevers, or are not improving in 5 days, please contact your primary care provider for an appointment or visit any of our convenient Walk-in or Urgent Care Centers to be seen, which can be found on our website here.    Thanks again for choosing us as your health care partner,    SOHEILA De León CNP    Urinary Tract Infections in Women  Urinary tract infections (UTIs) are most often caused by bacteria. These bacteria enter the urinary tract. The bacteria may come from inside the body. Or they may travel from the skin outside the rectum or vagina into the urethra. Female anatomy makes it easy for bacteria from the bowel to enter a woman s urinary tract, which is the most common source of UTI. This means women develop UTIs more often than men. Pain in or around the urinary tract is a common UTI symptom. But the only way to know for sure if you have a UTI for the healthcare provider to test your urine. The two tests that may be done are the urinalysis and urine culture.     Types of UTIs    Cystitis. A bladder infection (cystitis) is the most common UTI in women. You may have urgent or frequent need to pee. You may also have pain, burning when you pee, and  bloody urine.    Urethritis. This is an inflamed urethra, which is the tube that carries urine from the bladder to outside the body. You may have lower stomach or back pain. You may also have urgent or frequent need to pee.    Pyelonephritis. This is a kidney infection. If not treated, it can be serious and damage your kidneys. In severe cases, you may need to stay in the hospital. You may have a fever and lower back pain.    Medicines to treat a UTI  Most UTIs are treated with antibiotics. These kill the bacteria. The length of time you need to take them depends on the type of infection. It may be as short as 3 days. If you have repeated UTIs, you may need a low-dose antibiotic for several months. Take antibiotics exactly as directed. Don t stop taking them until all of the medicine is gone. If you stop taking the antibiotic too soon, the infection may not go away. You may also develop a resistance to the antibiotic. This can make it much harder to treat.   Lifestyle changes to treat and prevent UTIs   The lifestyle changes below will help get rid of your UTI. They may also help prevent future UTIs.     Drink plenty of fluids. This includes water, juice, or other caffeine-free drinks. Fluids help flush bacteria out of your body.    Empty your bladder. Always empty your bladder when you feel the urge to pee. And always pee before going to sleep. Urine that stays in your bladder can lead to infection. Try to pee before and after sex as well.    Practice good personal hygiene. Wipe yourself from front to back after using the toilet. This helps keep bacteria from getting into the urethra.    Use condoms during sex. These help prevent UTIs caused by sexually transmitted bacteria. Also don't use spermicides during sex. These can increase the risk for UTIs. Choose other forms of birth control instead. For women who tend to get UTIs after sex, a low-dose of a preventive antibiotic may be used. Be sure to discuss this  option with your healthcare provider.    Follow up with your healthcare provider as directed. He or she may test to make sure the infection has cleared. If needed, more treatment may be started.  Geoff last reviewed this educational content on 7/1/2019 2000-2021 [employer]. All rights reserved. This information is not intended as a substitute for professional medical care. Always follow your healthcare professional's instructions.

## 2022-03-20 DIAGNOSIS — E27.40 ADRENAL INSUFFICIENCY (H): ICD-10-CM

## 2022-03-20 DIAGNOSIS — E27.1 ADRENAL INSUFFICIENCY, PRIMARY (H): ICD-10-CM

## 2022-03-20 DIAGNOSIS — E03.8 OTHER SPECIFIED HYPOTHYROIDISM: ICD-10-CM

## 2022-03-24 RX ORDER — FLUDROCORTISONE ACETATE 0.1 MG/1
TABLET ORAL
Qty: 90 TABLET | Refills: 3 | Status: SHIPPED | OUTPATIENT
Start: 2022-03-24 | End: 2023-04-25

## 2022-03-24 RX ORDER — HYDROCORTISONE 10 MG/1
TABLET ORAL
Qty: 150 TABLET | Refills: 5 | Status: SHIPPED | OUTPATIENT
Start: 2022-03-24 | End: 2023-05-10

## 2022-03-24 RX ORDER — LEVOTHYROXINE SODIUM 75 UG/1
TABLET ORAL
Qty: 90 TABLET | Refills: 3 | Status: SHIPPED | OUTPATIENT
Start: 2022-03-24 | End: 2023-04-07

## 2022-03-24 NOTE — TELEPHONE ENCOUNTER
LEVOTHYROXINE 0.075MG (75MCG) TABS      Last Written Prescription Date:  1-  Last Fill Quantity: 78,   # refills: 3  Last Office Visit : 2-  Future Office visit:  10-    Routing refill request to provider for review/approval because:  Last recorded TSH is 2018      Kathleen M Doege RN

## 2022-03-29 ENCOUNTER — LAB (OUTPATIENT)
Dept: LAB | Facility: CLINIC | Age: 37
End: 2022-03-29
Payer: COMMERCIAL

## 2022-03-29 DIAGNOSIS — N18.30 CHRONIC KIDNEY DISEASE, STAGE 3 (H): ICD-10-CM

## 2022-03-29 DIAGNOSIS — Z13.220 SCREENING FOR HYPERLIPIDEMIA: ICD-10-CM

## 2022-03-29 DIAGNOSIS — E24.0 PITUITARY DEPENDENT CUSHING DISEASE (H): ICD-10-CM

## 2022-03-29 LAB
ANION GAP SERPL CALCULATED.3IONS-SCNC: 5 MMOL/L (ref 3–14)
BUN SERPL-MCNC: 29 MG/DL (ref 7–30)
CALCIUM SERPL-MCNC: 10 MG/DL (ref 8.5–10.1)
CHLORIDE BLD-SCNC: 98 MMOL/L (ref 94–109)
CHOLEST SERPL-MCNC: 245 MG/DL
CO2 SERPL-SCNC: 27 MMOL/L (ref 20–32)
CREAT SERPL-MCNC: 1.59 MG/DL (ref 0.52–1.04)
CREAT UR-MCNC: 34 MG/DL
ESTRADIOL SERPL-MCNC: 14 PG/ML
FASTING STATUS PATIENT QL REPORTED: NO
GFR SERPL CREATININE-BSD FRML MDRD: 42 ML/MIN/1.73M2
GLUCOSE BLD-MCNC: 101 MG/DL (ref 70–99)
HDLC SERPL-MCNC: 57 MG/DL
LDLC SERPL CALC-MCNC: 161 MG/DL
MICROALBUMIN UR-MCNC: <5 MG/L
MICROALBUMIN/CREAT UR: NORMAL MG/G{CREAT}
NONHDLC SERPL-MCNC: 188 MG/DL
POTASSIUM BLD-SCNC: 4.9 MMOL/L (ref 3.4–5.3)
SODIUM SERPL-SCNC: 130 MMOL/L (ref 133–144)
T4 FREE SERPL-MCNC: 0.28 NG/DL (ref 0.76–1.46)
TRIGL SERPL-MCNC: 134 MG/DL
TSH SERPL DL<=0.005 MIU/L-ACNC: 2.6 MU/L (ref 0.4–4)

## 2022-03-29 PROCEDURE — 82670 ASSAY OF TOTAL ESTRADIOL: CPT

## 2022-03-29 PROCEDURE — 36415 COLL VENOUS BLD VENIPUNCTURE: CPT

## 2022-03-29 PROCEDURE — 80048 BASIC METABOLIC PNL TOTAL CA: CPT

## 2022-03-29 PROCEDURE — 84305 ASSAY OF SOMATOMEDIN: CPT

## 2022-03-29 PROCEDURE — 82043 UR ALBUMIN QUANTITATIVE: CPT

## 2022-03-29 PROCEDURE — 80061 LIPID PANEL: CPT

## 2022-03-29 PROCEDURE — 84439 ASSAY OF FREE THYROXINE: CPT

## 2022-03-29 PROCEDURE — 84443 ASSAY THYROID STIM HORMONE: CPT

## 2022-03-29 PROCEDURE — 82024 ASSAY OF ACTH: CPT

## 2022-03-30 LAB — ACTH PLAS-MCNC: 141 PG/ML

## 2022-04-01 LAB — IGF-I BLD-MCNC: 59 NG/ML (ref 80–277)

## 2022-04-01 PROCEDURE — 88342 IMHCHEM/IMCYTCHM 1ST ANTB: CPT | Mod: TC | Performed by: NEUROLOGICAL SURGERY

## 2022-04-03 NOTE — RESULT ENCOUNTER NOTE
Dear Suyapa     Here is your results. IGF (growtho hormone) slight lower side, let's watich for now. In the future using growth hormone injection supplement may help your quality of life. Also I am currently sending a special staining of the tumor tissue that was operated on last year.     Please call nursing line, if you are [facility] patient at [phone], if you are [facility] patient at [phone],  if you have any questions.    Please take care  Adriana Wilkinson MD

## 2022-05-16 DIAGNOSIS — D35.2 PITUITARY ADENOMA (H): ICD-10-CM

## 2022-05-17 RX ORDER — DESMOPRESSIN ACETATE 0.1 MG/1
0.1 TABLET ORAL 2 TIMES DAILY
Qty: 180 TABLET | Refills: 3 | Status: SHIPPED | OUTPATIENT
Start: 2022-05-17 | End: 2023-05-24

## 2022-05-18 ENCOUNTER — TELEPHONE (OUTPATIENT)
Dept: ENDOCRINOLOGY | Facility: CLINIC | Age: 37
End: 2022-05-18
Payer: COMMERCIAL

## 2022-05-18 NOTE — TELEPHONE ENCOUNTER
----- Message from Stacey Yang sent at 5/18/2022  1:26 PM CDT -----  Regarding: FW: SSTR    ----- Message -----  From: Danna Og, KAJAL  Sent: 4/18/2022   1:55 PM CDT  To: [facility]  Subject: FW: SSTR                                           ----- Message -----  From: Adriana Wilkinson MD  Sent: 4/18/2022   1:52 PM CDT  To: Danna Og, KAJAL  Subject: FW: SSTR                                         Could you please offer her short video about new treatment? In May would be good  ----- Message -----  From: Mina Huffman MD  Sent: 4/18/2022  11:13 AM CDT  To: Myah Ross, KJAAL, Adriana Wilkinson MD  Subject: RE: SSTR                                         I think that is very fine idea. I worry if it starts growing back.   RT  ----- Message -----  From: Adriana Wilkinson MD  Sent: 4/13/2022   6:53 PM CDT  To: Mina Huffman MD, Myah Ross, RN  Subject: SSTR                                             Hi Dr. Huffman,    Non-urgent, your opinion need.   She still has a bit residual, post op her ACTH was 14 in 7/2021 (was >1250) and now 141, no visual growing of the tumor though.   I asked SSTR staining, which was SSTR2 positive. I am wondering to start small dose of octreotide for prevention or maybe too early?    Adriana

## 2022-05-30 ASSESSMENT — ENCOUNTER SYMPTOMS
BACK PAIN: 1
JOINT SWELLING: 0
MYALGIAS: 0
STIFFNESS: 0
MUSCLE WEAKNESS: 0
ARTHRALGIAS: 1
NECK PAIN: 0
MUSCLE CRAMPS: 0

## 2022-06-01 ENCOUNTER — VIRTUAL VISIT (OUTPATIENT)
Dept: ENDOCRINOLOGY | Facility: CLINIC | Age: 37
End: 2022-06-01
Payer: COMMERCIAL

## 2022-06-01 DIAGNOSIS — E24.1 NELSON'S SYNDROME (H): Primary | ICD-10-CM

## 2022-06-01 DIAGNOSIS — E24.0 PITUITARY DEPENDENT CUSHING DISEASE (H): ICD-10-CM

## 2022-06-01 DIAGNOSIS — L81.9 HYPERPIGMENTATION: ICD-10-CM

## 2022-06-01 PROCEDURE — 99215 OFFICE O/P EST HI 40 MIN: CPT | Mod: 95 | Performed by: INTERNAL MEDICINE

## 2022-06-01 NOTE — PROGRESS NOTES
Milla is a 36 year old who is being evaluated via a billable video visit.      How would you like to obtain your AVS? MyChart  If the video visit is dropped, the invitation should be resent by: Text to cell phone: [phone]   Will anyone else be joining your video visit? No        Video-Visit Details    Type of service Video  Start: 1:00 pm  End: 1: 25 pm  Amwell      Originating Location (pt. Location): Home    Distant Location (provider location):  [facility] ENDOCRINOLOGY         -- Endocrinology follow up ---      Assessment:    # Cushing's disease  # Alexei syndrome post adrenalectomy   Cushing disease post TSS 3 times (2010, 2013, 2014), and adrenalectomy in 7/2014, currently Alexei's symdrome with pituitary ACTH tumor regrowth,     Worsening pigmentation with ACTH>1250, increased the lesion, therefore she underwent to TSS 4 th time, July 2021 by Dr. Huffman   Pathology showed ACTH stained. Post op ACTH was 14 with siginificant improvement of pigmentation. Reviewed last MRI 10/2021 showed no residual tumor    - ACTH to check again, if continues to increase (last one from 14 to 140), then we will likely start octreotide treatment since her tumor (7/2021 resected) was tissue SSTR2 expression positive.     - continue current dose of hydrocortisone 10 mg am and florinef 0.1 mg daily,     - Next MRI winter 2022 (ordered)       Hypothyrodism     - continue LT4 75 mcg daily    Hypogonadism    - continue BCP (E2 20 mcg)     DDAVP  Taking minimum dose currently (100 mcg daily po). She can try to taper by herself.       RTC with me in 6 month      Total 45  minutes spent on the date of the encounter doing chart review, history and exam, reviewed MRI brain serially, reviewed pathology results documentation and further activities as noted above.    Adriana Wilkinson MD  Staff Physician  Endocrinology and Metabolism  [facility]  License: [id number]  Pager: [phone]    Interval History as of 6/1/2022 :  "Patient has been doing well.  Medication compliance good . New event includes: no significant medical event noted, still has some chronic fatigue+, she feels her skin tone became back to her baseline.  Interval History as of 2/23/2022 : Patient has been doing well. Medication compliance  excellent . New event includes L regular cycle, no pertinent medical event noted .  Interval History as of 8/23/2021 : Patient has been doing well. Underwent to 4th surgery 7/22/2021. Pigmentation of her dkin isn imroving, post op ACTH was 14.   Interval History as of 4/14/2021 : No HA, medication compliance good, no dizziness, no HA. Pigmentation gettign worse, lesion increasing in size.   History of Present Illness: Suyapa Hodge is a 34 year old female with a history of pituitary Cushing's syndrome, hypothalamic adrenal dysfunction and pituitary microadenoma who presents for follow up. She was last seen in clinic 14 months ago. She was diagnosed with pituitary microademoma serveral year ago which was suspected to cause her Cushing's disease. She had pituitary surgery x3 in 2013-14 and finally underwent bilateral adrenalectomy in July 2014. ACTH levels remained normal. In July 2017 ACTH levels were tested and was 271, which increased to 549 raising concern for recurrence vs missed diagnosis.  Chest abdomen and pelvis CT was normal. 8 mg dex suppression reduced levels to 118.     She is currently taking hydrocortisone BID (10 mg misses dose once per month), L-thyroxine 75 mcg tablets full tablet 6 days and desmopressin 0.1 mg in the morning. She denies that she gets up in the middle of the night to use the restroom. She reports that she goes to the eye doctor every 3 months to see if the tumor \"has moved to her optic nerve\".      She is on hormone replacement therapy (prometrium and vivelle patch). No history of uterine surgeries. She has a history of irregular periods but has been evaluated with GYN, per patient, and was not " told specifics.     She is not taking the calcium supplement. She has a history of avascular necrosis but has been told that she is too young for a hip replacement. DXA showed low BMD for age.     No eye symptoms  No headache, change in vision, loss of peripheral vision  Complete ROS that were obtained were negative.     Active Medications:      cyclobenzaprine (FLEXERIL) 10 MG tablet, Take 1 tablet (10 mg) by mouth 3 times daily as needed for muscle spasms, Disp: 30 tablet, Rfl: 0     desmopressin (DDAVP) 0.1 MG tablet, Take 1 tablet (100 mcg) by mouth 2 times daily (1 TAB) MORNING AND AT BEDTIME, Disp: 180 tablet, Rfl: 5     estradiol (VIVELLE-DOT) 0.05 MG/24HR bi-weekly patch, Place 1 patch onto the skin twice a week, Disp: 27 patch, Rfl: 5     fludrocortisone (FLORINEF) 0.1 MG tablet, Take 0.5 tablets (0.05 mg) by mouth daily Please keep 10-21-19 clinic appt for further refills, Disp: 45 tablet, Rfl: 5     hydrocortisone (CORTEF) 10 MG tablet, 1 tab (10 mg) am 1/2 tab (5 mg) pm plus sick day supply as directed, Disp: 150 tablet, Rfl: 5     levothyroxine (SYNTHROID/LEVOTHROID) 75 MCG tablet, One tablet day 6 days per wk and none on the 7th day each wk, Disp: 90 tablet, Rfl: 5     Multiple Vitamin (MULTI-VITAMIN DAILY PO), , Disp: , Rfl:      progesterone (PROMETRIUM) 100 MG capsule, Take 1 tablet daily, Disp: 90 capsule, Rfl: 1     triamcinolone (KENALOG) 0.1 % external cream, Apply topically 2 times daily As needed for flares, Disp: 80 g, Rfl: 1     Calcium carb-Vitamin D 500 mg [place]-200 units (OSCAL WITH D;OYSTER SHELL CALCIUM) 500-200 MG-UNIT per tablet, Take 1 tablet by mouth 2 times daily (with meals), Disp: , Rfl:      methocarbamol (ROBAXIN) 500 MG tablet, Take 1-2 tablets (500-1,000 mg) by mouth nightly as needed for muscle spasms (Patient not taking: Reported on 10/21/2019), Disp: 20 tablet, Rfl: 0     naproxen (NAPROSYN) 500 MG tablet, Take 1 tablet (500 mg) by mouth 2 times daily (with meals) (Patient  not taking: Reported on 10/21/2019), Disp: 60 tablet, Rfl: 1      Allergies:   Patient has no known allergies.     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 resection   Avascular necrosis of femur head   Cervical high risk HPV   Colon polyp   Diabetes insipidus   Hypercalcemia   Hypertension   Pulmonary emboli   Hypothyroidism      Past Surgical History:  Lumbar drain- 01/2014   Adrenalectomy-07/2014   Tumor resection (hypophysectomy)- 07/2013   [employer] teeth extracted     Family History:   Mother: asthma, osteoporosis, obesity   Father: hyperlipidemia, obesity   Sister: allergies, obesity   Maternal grandmother: colon cancer  Maternal grandfather: lung cancer   Paternal grandmother: lung cancer   Paternal grandfather: lung cancer      Social History:   The patient was alone   Smoking Status: former; 1/2 pack per day for 3 years; 12 years since quitting   Smokeless Tobacco: never    Alcohol Use: yes; 3 beers per month   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 No masses, lesions, tenderness or abnormalities  Resporatory: non acute disctress, breathing normally  Skin: pigmentation on her face has been improving      Data:  TSH   Date Value Ref Range Status   01/12/2018 <0.01 (L) 0.40 - 4.00 mU/L Final   01/08/2016 <0.01 (L) 0.40 - 4.00 mU/L Final   09/25/2015 <0.01 (L) 0.40 - 4.00 mU/L Final   05/22/2015 <0.01 (L) 0.40 - 4.00 mU/L Final   03/13/2015 (L) 0.40 - 4.00 mU/L Final    <0.01  Effective 7/30/2014, the reference range for this assay has changed to reflect   new instrumentation/methodology.       T4 Free   Date Value Ref Range Status   10/08/2018 0.53 (L) 0.76 - 1.46 ng/dL Final   01/12/2018 0.90 0.76 - 1.46 ng/dL Final   10/04/2017 0.90 0.76 - 1.46 ng/dL Final    07/07/2017 0.98 0.76 - 1.46 ng/dL Final   12/30/2016 0.91 0.76 - 1.46 ng/dL Final     CBC RESULTS:   Recent Labs   Lab Test 07/15/14  0806   WBC 8.2   RBC 4.51   HGB 13.3   HCT 39.7   MCV 88   MCH 29.5   MCHC 33.5   RDW 13.7        Recent Labs   Lab Test 10/08/18  0802 01/12/18  0732    137   POTASSIUM 4.1 4.3   CHLORIDE 105 105   CO2 32 27   ANIONGAP 4 5   GLC 87 78   BUN 14 19   CR 1.10* 0.93   ELIEZER 9.3 9.2     MRI: I also personally reviewed brain MRI and pituitary lesion and interepret and expalined to the patient.     intraseller maybe small hypodense 3-4 mm(?) residual vs scar in my impression           MRI: I reviewed original images of 2/19/2021 ad explained to the patient.   Brain/ Pituitary MRI without and with contrast     History: 35 yo female pituitary Cushing disease follow up; Pituitary  dependent Cushing disease (H).     ICD-10: Pituitary dependent Cushing disease (H)     Comparison:  Multiple pituitary MRI exams since 12/3/2009, most recent  one from 3/16/2020.     Technique: Axial diffusion and FLAIR images of the whole brain  obtained without intravenous contrast. Sagittal T1 and T2-weighted,  coronal T2-weighted, coronal T1-weighted images with focus on the  sella were obtained without intravenous contrast. Post intravenous  contrast using gadolinium coronal and sagittal T1-weighted images were  obtained focused on the sella. Dynamic postcontrast coronal  T1-weighted images were also obtained.     Contrast: 7mL Gadavist IV     Findings:    Postsurgical changes of transsphenoidal approach pituitary  microadenoma resection. Hypoenhancing area within the left side of the  sella, extending to the left cavernous sinus, increased since  10/8/2018, measuring 9 x 7 x 10 mm, previously 6 x 6 x 4 mm.  Associated diffusion restriction. Abutment of the cavernous segment of  left internal carotid artery.     The pituitary stalk appears midline. The optic chiasm appears intact  and not displaced.  The major cavernous carotid vascular flow-voids  appear patent.

## 2022-06-01 NOTE — LETTER
6/1/2022       RE: Suyapa Hodge  [address] [facility] [address]     Dear Colleague,    Thank you for referring your patient, Suyapa Hodge, to the [facility] [place] at [facility]. Please see a copy of my visit note below.    Milla is a 36 year old who is being evaluated via a billable video visit.      How would you like to obtain your AVS? MyChart  If the video visit is dropped, the invitation should be resent by: Text to cell phone: [phone]   Will anyone else be joining your video visit? No        Video-Visit Details    Type of service Video  Start: 1:00 pm  End: 1: 25 pm  [employer]      Originating Location (pt. Location): Home    Distant Location (provider location):  [facility] ENDOCRINOLOGY         -- Endocrinology follow up ---      Assessment:    # Cushing's disease  # Alexei syndrome post adrenalectomy   Cushing disease post TSS 3 times (2010, 2013, 2014), and adrenalectomy in 7/2014, currently Alexei's symdrome with pituitary ACTH tumor regrowth,     Worsening pigmentation with ACTH>1250, increased the lesion, therefore she underwent to TSS 4 th time, July 2021 by Dr. Huffman   Pathology showed ACTH stained. Post op ACTH was 14 with siginificant improvement of pigmentation. Reviewed last MRI 10/2021 showed no residual tumor    - ACTH to check again, if continues to increase (last one from 14 to 140), then we will likely start octreotide treatment since her tumor (7/2021 resected) was tissue SSTR2 expression positive.     - continue current dose of hydrocortisone 10 mg am and florinef 0.1 mg daily,     - Next MRI winter 2022 (ordered)       Hypothyrodism     - continue LT4 75 mcg daily    Hypogonadism    - continue BCP (E2 20 mcg)     DDAVP  Taking minimum dose currently (100 mcg daily po). She can try to taper by herself.       RTC with me in 6 month      Total 45  minutes spent on the date of the encounter doing  chart review, history and exam, reviewed MRI brain serially, reviewed pathology results documentation and further activities as noted above.    Adriana Wilkinson MD  Staff Physician  Endocrinology and Metabolism  [facility]  License: [id number]  Pager: [phone]    Interval History as of 6/1/2022 : Patient has been doing well.  Medication compliance good . New event includes: no significant medical event noted, still has some chronic fatigue+, she feels her skin tone became back to her baseline.  Interval History as of 2/23/2022 : Patient has been doing well. Medication compliance  excellent . New event includes L regular cycle, no pertinent medical event noted .  Interval History as of 8/23/2021 : Patient has been doing well. Underwent to 4th surgery 7/22/2021. Pigmentation of her dkin isn imroving, post op ACTH was 14.   Interval History as of 4/14/2021 : No HA, medication compliance good, no dizziness, no HA. Pigmentation gettign worse, lesion increasing in size.   History of Present Illness: Suyapa Hodge is a 34 year old female with a history of pituitary Cushing's syndrome, hypothalamic adrenal dysfunction and pituitary microadenoma who presents for follow up. She was last seen in clinic 14 months ago. She was diagnosed with pituitary microademoma serveral year ago which was suspected to cause her Cushing's disease. She had pituitary surgery x3 in 2013-14 and finally underwent bilateral adrenalectomy in July 2014. ACTH levels remained normal. In July 2017 ACTH levels were tested and was 271, which increased to 549 raising concern for recurrence vs missed diagnosis.  Chest abdomen and pelvis CT was normal. 8 mg dex suppression reduced levels to 118.     She is currently taking hydrocortisone BID (10 mg misses dose once per month), L-thyroxine 75 mcg tablets full tablet 6 days and desmopressin 0.1 mg in the morning. She denies that she gets up in the middle of the night to use the restroom.  "She reports that she goes to the eye doctor every 3 months to see if the tumor \"has moved to her optic nerve\".      She is on hormone replacement therapy (prometrium and vivelle patch). No history of uterine surgeries. She has a history of irregular periods but has been evaluated with GYN, per patient, and was not told specifics.     She is not taking the calcium supplement. She has a history of avascular necrosis but has been told that she is too young for a hip replacement. DXA showed low BMD for age.     No eye symptoms  No headache, change in vision, loss of peripheral vision  Complete ROS that were obtained were negative.     Active Medications:      cyclobenzaprine (FLEXERIL) 10 MG tablet, Take 1 tablet (10 mg) by mouth 3 times daily as needed for muscle spasms, Disp: 30 tablet, Rfl: 0     desmopressin (DDAVP) 0.1 MG tablet, Take 1 tablet (100 mcg) by mouth 2 times daily (1 TAB) MORNING AND AT BEDTIME, Disp: 180 tablet, Rfl: 5     estradiol (VIVELLE-DOT) 0.05 MG/24HR bi-weekly patch, Place 1 patch onto the skin twice a week, Disp: 27 patch, Rfl: 5     fludrocortisone (FLORINEF) 0.1 MG tablet, Take 0.5 tablets (0.05 mg) by mouth daily Please keep 10-21-19 clinic appt for further refills, Disp: 45 tablet, Rfl: 5     hydrocortisone (CORTEF) 10 MG tablet, 1 tab (10 mg) am 1/2 tab (5 mg) pm plus sick day supply as directed, Disp: 150 tablet, Rfl: 5     levothyroxine (SYNTHROID/LEVOTHROID) 75 MCG tablet, One tablet day 6 days per wk and none on the 7th day each wk, Disp: 90 tablet, Rfl: 5     Multiple Vitamin (MULTI-VITAMIN DAILY PO), , Disp: , Rfl:      progesterone (PROMETRIUM) 100 MG capsule, Take 1 tablet daily, Disp: 90 capsule, Rfl: 1     triamcinolone (KENALOG) 0.1 % external cream, Apply topically 2 times daily As needed for flares, Disp: 80 g, Rfl: 1     Calcium carb-Vitamin D 500 mg [place]-200 units (OSCAL WITH D;OYSTER SHELL CALCIUM) 500-200 MG-UNIT per tablet, Take 1 tablet by mouth 2 times daily (with " meals), Disp: , Rfl:      methocarbamol (ROBAXIN) 500 MG tablet, Take 1-2 tablets (500-1,000 mg) by mouth nightly as needed for muscle spasms (Patient not taking: Reported on 10/21/2019), Disp: 20 tablet, Rfl: 0     naproxen (NAPROSYN) 500 MG tablet, Take 1 tablet (500 mg) by mouth 2 times daily (with meals) (Patient not taking: Reported on 10/21/2019), Disp: 60 tablet, Rfl: 1      Allergies:   Patient has no known allergies.     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 resection   Avascular necrosis of femur head   Cervical high risk HPV   Colon polyp   Diabetes insipidus   Hypercalcemia   Hypertension   Pulmonary emboli   Hypothyroidism      Past Surgical History:  Lumbar drain- 01/2014   Adrenalectomy-07/2014   Tumor resection (hypophysectomy)- 07/2013   [place] teeth extracted     Family History:   Mother: asthma, osteoporosis, obesity   Father: hyperlipidemia, obesity   Sister: allergies, obesity   Maternal grandmother: colon cancer  Maternal grandfather: lung cancer   Paternal grandmother: lung cancer   Paternal grandfather: lung cancer      Social History:   The patient was alone   Smoking Status: former; 1/2 pack per day for 3 years; 12 years since quitting   Smokeless Tobacco: never    Alcohol Use: yes; 3 beers per month   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 No masses, lesions, tenderness or abnormalities  Resporatory: non acute disctress, breathing normally  Skin: pigmentation on her face has been improving      Data:  TSH   Date Value Ref Range Status   01/12/2018 <0.01 (L) 0.40 - 4.00 mU/L Final   01/08/2016 <0.01 (L) 0.40 - 4.00 mU/L Final   09/25/2015 <0.01 (L) 0.40 - 4.00 mU/L Final   05/22/2015 <0.01 (L) 0.40 - 4.00 mU/L Final   03/13/2015 (L) 0.40  - 4.00 mU/L Final    <0.01  Effective 7/30/2014, the reference range for this assay has changed to reflect   new instrumentation/methodology.       T4 Free   Date Value Ref Range Status   10/08/2018 0.53 (L) 0.76 - 1.46 ng/dL Final   01/12/2018 0.90 0.76 - 1.46 ng/dL Final   10/04/2017 0.90 0.76 - 1.46 ng/dL Final   07/07/2017 0.98 0.76 - 1.46 ng/dL Final   12/30/2016 0.91 0.76 - 1.46 ng/dL Final     CBC RESULTS:   Recent Labs   Lab Test 07/15/14  0806   WBC 8.2   RBC 4.51   HGB 13.3   HCT 39.7   MCV 88   MCH 29.5   MCHC 33.5   RDW 13.7        Recent Labs   Lab Test 10/08/18  0802 01/12/18  0732    137   POTASSIUM 4.1 4.3   CHLORIDE 105 105   CO2 32 27   ANIONGAP 4 5   GLC 87 78   BUN 14 19   CR 1.10* 0.93   ELIEZER 9.3 9.2     MRI: I also personally reviewed brain MRI and pituitary lesion and interepret and expalined to the patient.     intraseller maybe small hypodense 3-4 mm(?) residual vs scar in my impression           MRI: I reviewed original images of 2/19/2021 ad explained to the patient.   Brain/ Pituitary MRI without and with contrast     History: 33 yo female pituitary Cushing disease follow up; Pituitary  dependent Cushing disease (H).     ICD-10: Pituitary dependent Cushing disease (H)     Comparison:  Multiple pituitary MRI exams since 12/3/2009, most recent  one from 3/16/2020.     Technique: Axial diffusion and FLAIR images of the whole brain  obtained without intravenous contrast. Sagittal T1 and T2-weighted,  coronal T2-weighted, coronal T1-weighted images with focus on the  sella were obtained without intravenous contrast. Post intravenous  contrast using gadolinium coronal and sagittal T1-weighted images were  obtained focused on the sella. Dynamic postcontrast coronal  T1-weighted images were also obtained.     Contrast: 7mL Gadavist IV     Findings:    Postsurgical changes of transsphenoidal approach pituitary  microadenoma resection. Hypoenhancing area within the left side of  the  sella, extending to the left cavernous sinus, increased since  10/8/2018, measuring 9 x 7 x 10 mm, previously 6 x 6 x 4 mm.  Associated diffusion restriction. Abutment of the cavernous segment of  left internal carotid artery.     The pituitary stalk appears midline. The optic chiasm appears intact  and not displaced. The major cavernous carotid vascular flow-voids  appear patent.

## 2022-06-03 ENCOUNTER — TELEPHONE (OUTPATIENT)
Dept: ENDOCRINOLOGY | Facility: CLINIC | Age: 37
End: 2022-06-03
Payer: COMMERCIAL

## 2022-06-03 NOTE — TELEPHONE ENCOUNTER
Called to help schedule follow up visit for Dr. Wilkinson:  Return in about 6 months (around 12/1/2022).    No answer, LVM with scheduling number

## 2022-06-09 ENCOUNTER — TELEPHONE (OUTPATIENT)
Dept: ENDOCRINOLOGY | Facility: CLINIC | Age: 37
End: 2022-06-09
Payer: COMMERCIAL

## 2022-06-09 NOTE — TELEPHONE ENCOUNTER
Called to help schedule follow up for Dr. Wilkinson: Return in about 6 months (around 12/1/2022).    No answer, LVM with clinic number.

## 2022-07-25 ENCOUNTER — LAB (OUTPATIENT)
Dept: URGENT CARE | Facility: URGENT CARE | Age: 37
End: 2022-07-25
Attending: FAMILY MEDICINE
Payer: COMMERCIAL

## 2022-07-25 DIAGNOSIS — Z20.822 SUSPECTED 2019 NOVEL CORONAVIRUS INFECTION: ICD-10-CM

## 2022-07-25 LAB — SARS-COV-2 RNA RESP QL NAA+PROBE: POSITIVE

## 2022-07-25 PROCEDURE — U0005 INFEC AGEN DETEC AMPLI PROBE: HCPCS

## 2022-07-25 PROCEDURE — U0003 INFECTIOUS AGENT DETECTION BY NUCLEIC ACID (DNA OR RNA); SEVERE ACUTE RESPIRATORY SYNDROME CORONAVIRUS 2 (SARS-COV-2) (CORONAVIRUS DISEASE [COVID-19]), AMPLIFIED PROBE TECHNIQUE, MAKING USE OF HIGH THROUGHPUT TECHNOLOGIES AS DESCRIBED BY CMS-2020-01-R: HCPCS

## 2022-07-26 ENCOUNTER — NURSE TRIAGE (OUTPATIENT)
Dept: NURSING | Facility: CLINIC | Age: 37
End: 2022-07-26

## 2022-07-26 NOTE — TELEPHONE ENCOUNTER
"Coronavirus (COVID-19) Notification    Caller Name (Patient, parent, daughter/son, grandparent, etc)  Suyapa Hodge, patient    Reason for call  Notify of Positive Coronavirus (COVID-19) lab results, assess symptoms,  review [facility] recommendations    Lab Result    Lab test:  2019-nCoV rRt-PCR or SARS-CoV-2 PCR    Oropharyngeal AND/OR nasopharyngeal swabs is POSITIVE for 2019-nCoV RNA/SARS-COV-2 PCR (COVID-19 virus)    Brief introduction script  Introduce self then review script:  \"I am calling on behalf of [employer].  We were notified that your Coronavirus test (COVID-19) for was POSITIVE for the virus.\"    Gather patient reported symptoms      Assessment  Current Symptoms at time of phone call, reported by patient: (if no symptoms, document No symptoms]    Vomiting, diarrhea, has not urinated in \"2 days\"  Date of Symptom(s) onset (if applicable)    Sunday, July 24, 2022    If at time of call, Patients symptoms hare worsened, the Patient should contact 911 or have someone drive them to Emergency Dept promptly:         If Patient calling 911, inform 911 personal that you have tested positive for the Coronavirus (COVID-19).  Place mask on and await 911 to arrive.       If Emergency Dept, If possible, please have another adult drive you to the Emergency Dept but you need to wear mask when in contact with other people.      Monoclonal Antibody Administration    You may be eligible to receive a new treatment with a monoclonal antibody for preventing hospitalization in patients at high risk for complications from COVID-19. This medication is still experimental and available on a limited basis; it is given through an IV and must be given at an infusion center. Please note that not all people who are eligible will receive the medication since it is in limited supply.  Is the patient symptomatic and onset of symptoms within the last 7 days?  Yes  Is the patient interested in a visit with a provider to " discuss treatment options?: No.  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 Reports she will call her mom to drive her.     Review information with Patient    Your result was positive. This means you have COVID-19 (coronavirus).      How can I protect others?    These guidelines are for isolating before returning to work, school or .          If you DO have symptoms:      o    Stay home and away from others          ?    For at least 5 days after your symptoms started, AND           ?    You are fever free for 24 hours (with no medicine that reduces fever), AND          ?    Your other symptoms are better.      o    Wear a mask for 10 full days any time you are around others.       If you DON'T have symptoms:      o    Stay at home and away from others for at least 5 days after your positive test.      o    Wear a mask for 10 full days any time you are around others.    There may be different guidelines for healthcare facilities. Please check with the specific sites before arriving.     If you've been told by a doctor that you were severely ill with COVID-19 or are immunocompromised, you should isolate for at least 10 days.    You should not go back to work until you meet the guidelines above for ending your home isolation. You don't need to be retested for COVID-19 before going back to work--studies show that you won't spread the virus if it's been at least 10 days since your symptoms started (or 20 days, if you have a weak immune system).    Employers, schools, and daycares: This is an official notice for this person's medical guidelines for returning in-person. They must meet the above guidelines before going back to work, school, or  in person.    You will receive a positive COVID-19 letter via [employer] or the mail soon with additional self-care information.      Would you like me to review some of that information with you now?  Yes    How can I take care of  "myself?      Get lots of rest. Drink extra fluids (unless a doctor has told you not to).      Take Tylenol (acetaminophen) for fever or pain. If you have liver or kidney problems, ask your family doctor if it's okay to take Tylenol.     Take either:     650 mg (two 325 mg pills) every 4 to 6 hours, or     1,000 mg (two 500 mg pills) every 8 hours as needed.     Note: Do not take more than 3,000 mg in one day. Acetaminophen is found in many medicines (both prescribed and over-the-counter medicines). Read all labels to be sure you don't take too much.    For children, check the Tylenol bottle for the right dose (based on their age or weight).      If you have other health problems (like cancer, heart failure, an organ transplant or severe kidney disease): Call your specialty clinic if you don't feel better in the next 2 days.      Know when to call 911: Emergency warning signs include:    Trouble breathing or shortness of breath    Pain or pressure in the chest that doesn't go away    Feeling confused like you haven't felt before, or not being able to wake up    Bluish-colored lips or face        If you were tested for an upcoming procedure, please contact your provider for next steps.     Mary Hernández RN              Reason for Disposition    [1] Vomiting AND [2] contains red blood or black (\"coffee ground\") material  (Exception: few red streaks in vomit that only happened once)    Additional Information    Negative: Shock suspected (e.g., cold/pale/clammy skin, too weak to stand, low BP, rapid pulse)    Negative: Difficult to awaken or acting confused (e.g., disoriented, slurred speech)    Negative: Sounds like a life-threatening emergency to the triager    Negative: Vomiting occurs only while coughing    Negative: [1] Pregnant < 20 Weeks AND [2] nausea/vomiting began in early pregnancy (i.e., 4-8 weeks pregnant)    Negative: Chest pain    Negative: Vomiting (or Nausea) in a cancer patient who is currently (or " recently) receiving chemotherapy or radiation therapy, or cancer patient who has metastatic or end-stage cancer and is receiving palliative care    Negative: Headache is main symptom    Protocols used: VOMITING-A-AH

## 2022-07-27 ENCOUNTER — MYC MEDICAL ADVICE (OUTPATIENT)
Dept: ENDOCRINOLOGY | Facility: CLINIC | Age: 37
End: 2022-07-27

## 2022-07-27 DIAGNOSIS — E27.40 ADRENAL INSUFFICIENCY (H): Primary | ICD-10-CM

## 2022-08-05 ENCOUNTER — OFFICE VISIT (OUTPATIENT)
Dept: FAMILY MEDICINE | Facility: CLINIC | Age: 37
End: 2022-08-05
Payer: COMMERCIAL

## 2022-08-05 VITALS
HEIGHT: 67 IN | HEART RATE: 86 BPM | WEIGHT: 153 LBS | DIASTOLIC BLOOD PRESSURE: 72 MMHG | SYSTOLIC BLOOD PRESSURE: 98 MMHG | TEMPERATURE: 98.6 F | BODY MASS INDEX: 24.01 KG/M2 | OXYGEN SATURATION: 100 %

## 2022-08-05 DIAGNOSIS — R29.890 LOSS OF HEIGHT: ICD-10-CM

## 2022-08-05 DIAGNOSIS — M87.059 AVASCULAR NECROSIS OF FEMORAL HEAD, UNSPECIFIED LATERALITY (H): ICD-10-CM

## 2022-08-05 DIAGNOSIS — D35.2 PITUITARY MACROADENOMA (H): ICD-10-CM

## 2022-08-05 DIAGNOSIS — E78.5 HYPERLIPIDEMIA LDL GOAL <100: ICD-10-CM

## 2022-08-05 DIAGNOSIS — Z00.00 ROUTINE GENERAL MEDICAL EXAMINATION AT A HEALTH CARE FACILITY: Primary | ICD-10-CM

## 2022-08-05 DIAGNOSIS — N18.31 STAGE 3A CHRONIC KIDNEY DISEASE (H): ICD-10-CM

## 2022-08-05 PROCEDURE — 99213 OFFICE O/P EST LOW 20 MIN: CPT | Mod: 25 | Performed by: NURSE PRACTITIONER

## 2022-08-05 PROCEDURE — 99395 PREV VISIT EST AGE 18-39: CPT | Performed by: NURSE PRACTITIONER

## 2022-08-05 RX ORDER — LOSARTAN POTASSIUM 25 MG/1
12.5 TABLET ORAL DAILY
Qty: 45 TABLET | Refills: 3 | Status: SHIPPED | OUTPATIENT
Start: 2022-08-05 | End: 2023-05-11

## 2022-08-05 ASSESSMENT — ENCOUNTER SYMPTOMS
HEARTBURN: 0
DIZZINESS: 0
DYSURIA: 0
SHORTNESS OF BREATH: 0
FREQUENCY: 0
JOINT SWELLING: 0
HEMATOCHEZIA: 0
MYALGIAS: 0
PALPITATIONS: 0
SORE THROAT: 0
PARESTHESIAS: 0
CHILLS: 0
FEVER: 0
CONSTIPATION: 1
DIARRHEA: 1
BREAST MASS: 0
WEAKNESS: 0
EYE PAIN: 0
COUGH: 0
HEMATURIA: 0
ARTHRALGIAS: 0
HEADACHES: 0
ABDOMINAL PAIN: 0
NAUSEA: 0

## 2022-08-05 NOTE — PROGRESS NOTES
SUBJECTIVE:   CC: Suyapa Hodge is an 36 year old woman who presents for preventive health visit.       Patient has been advised of split billing requirements and indicates understanding: Yes  Healthy Habits:     Getting at least 3 servings of Calcium per day:  Yes    Bi-annual eye exam:  NO    Dental care twice a year:  Yes    Sleep apnea or symptoms of sleep apnea:  None    Diet:  Regular (no restrictions)    Frequency of exercise:  1 day/week    Duration of exercise:  15-30 minutes    Taking medications regularly:  Yes    Medication side effects:  None    PHQ-2 Total Score: 0    Additional concerns today:  No    -Following with Endocrinology for Cushing disease, pituitary tumor, Alexei's syndrome  -Hip pain is bearable when on daily steroids- hoping for joint replacement next year, knees becoming more painful  -Lisinopril causing cough so rarely taking this  -Had COVID-19 11 days ago- was very ill and went to Emergency Room for dehydration and needed steroids. Doing better now.    Today's PHQ-2 Score:   PHQ-2 (  [employer]) 2022   Q1: Little interest or pleasure in doing things 0   Q2: Feeling down, depressed or hopeless 0   PHQ-2 Score 0   PHQ-2 Total Score (12-17 Years)- Positive if 3 or more points; Administer PHQ-A if positive -   Q1: Little interest or pleasure in doing things Not at all   Q2: Feeling down, depressed or hopeless Not at all   PHQ-2 Score 0       Abuse: Current or Past (Physical, Sexual or Emotional) - No  Do you feel safe in your environment? Yes        Social History     Tobacco Use     Smoking status: Former Smoker     Packs/day: 0.50     Years: 3.00     Pack years: 1.50     Types: Cigarettes     Start date: 10/10/2005     Quit date: 2008     Years since quittin.6     Smokeless tobacco: Never Used   Substance Use Topics     Alcohol use: Yes     Comment: 3 beers 1x/month         Alcohol Use 2022   Prescreen: >3 drinks/day or >7 drinks/week? No   Prescreen: >3  drinks/day or >7 drinks/week? -       Reviewed orders with patient.  Reviewed health maintenance and updated orders accordingly - Yes  Labs reviewed in EPIC    Breast Cancer Screening:    FHS-7:   Breast CA Risk Assessment (FHS-7) 6/25/2021 8/5/2022   Did any of your first-degree relatives have breast or ovarian cancer? No No   Did any of your relatives have bilateral breast cancer? No No   Did any man in your family have breast cancer? No No   Did any woman in your family have breast and ovarian cancer? No No   Did any woman in your family have breast cancer before age 50 y? No No   Do you have 2 or more relatives with breast and/or ovarian cancer? No No   Do you have 2 or more relatives with breast and/or bowel cancer? No No       Patient under 40 years of age: Routine Mammogram Screening not recommended.   Pertinent mammograms are reviewed under the imaging tab.    History of abnormal Pap smear: YES - updated in Problem List and Health Maintenance accordingly  PAP / HPV Latest Ref Rng & Units 7/8/2020 3/14/2019   PAP (Historical) - NIL NIL   HPV16 NEG:Negative Negative Negative   HPV18 NEG:Negative Negative Negative   HRHPV NEG:Negative Negative Positive(A)     Reviewed and updated as needed this visit by clinical staff   Tobacco  Allergies  Meds                Reviewed and updated as needed this visit by Provider                       Review of Systems   Constitutional: Negative for chills and fever.   HENT: Negative for congestion, ear pain, hearing loss and sore throat.    Eyes: Negative for pain and visual disturbance.   Respiratory: Negative for cough and shortness of breath.    Cardiovascular: Negative for chest pain, palpitations and peripheral edema.   Gastrointestinal: Positive for constipation and diarrhea. Negative for abdominal pain, heartburn, hematochezia and nausea.   Breasts:  Negative for tenderness, breast mass and discharge.   Genitourinary: Positive for vaginal discharge. Negative for  "dysuria, frequency, genital sores, hematuria, pelvic pain, urgency and vaginal bleeding.   Musculoskeletal: Negative for arthralgias, joint swelling and myalgias.   Skin: Negative for rash.   Neurological: Negative for dizziness, weakness, headaches and paresthesias.   Psychiatric/Behavioral: Negative for mood changes.          OBJECTIVE:   BP 98/72 (BP Location: Right arm, Patient Position: Chair, Cuff Size: Adult Regular)   Pulse 86   Temp 98.6  F (37  C) (Oral)   Ht 1.689 m (5' 6.5\")   Wt 69.4 kg (153 lb)   SpO2 100%   BMI 24.32 kg/m    Physical Exam  GENERAL: healthy, alert and no distress  EYES: Eyes grossly normal to inspection, PERRL and conjunctivae and sclerae normal  HENT: ear canals and TM's normal, nose and mouth without ulcers or lesions  NECK: no adenopathy, no asymmetry, masses, or scars and thyroid normal to palpation  RESP: lungs clear to auscultation - no rales, rhonchi or wheezes  CV: regular rate and rhythm, normal S1 S2, no S3 or S4, no murmur, click or rub, no peripheral edema and peripheral pulses strong  ABDOMEN: soft, nontender, no hepatosplenomegaly, no masses and bowel sounds normal  MS: no gross musculoskeletal defects noted, no edema  SKIN: no suspicious lesions or rashes  NEURO: antalgic gait, Normal strength and tone, mentation intact and speech normal  PSYCH: mentation appears normal, affect normal/bright    Diagnostic Test Results:  Labs reviewed in Epic  No results found for any visits on 08/05/22.    ASSESSMENT/PLAN:   (Z00.00) Routine general medical examination at a health care facility  (primary encounter diagnosis)      (M87.059) Avascular necrosis of femoral head, unspecified laterality (H)  Comment: Plans to see Ortho in 2023 to discuss timing of joint replacement, managing ok on steroids for now    (D35.2) Pituitary macroadenoma (H)  Comment: s/p resection 1 year ago  Plan: following [facility] Endocrinology    (N18.31) Stage 3a chronic kidney disease (H)  Comment: Possibly " "due to prior medications, multiple Endocrinopathies. Due to young age, reasonable to involve Nephrology. Hasn't been taking Lisinopril much due to cough- change to Losartan.  Plan: losartan (COZAAR) 25 MG tablet, US Renal         Complete, Adult Nephrology  Referral          (E78.5) Hyperlipidemia LDL goal <100  Comment: Will repeat fasting labs in 2 months with Endo labs  Plan: Lipid panel reflex to direct LDL Fasting          (R29.650) Loss of height  Comment: Per measurement today, has lost 1.5 inches. Reviewed prior DEXA- recommend discussing this with her Endocrinology. Question if related to Osteoporosis or hips?            COUNSELING:  Reviewed preventive health counseling, as reflected in patient instructions       Regular exercise       Healthy diet/nutrition       Immunizations    Defer COVID-19 booster by 3 months due to recent infection             Osteoporosis prevention/bone health    Estimated body mass index is 24.32 kg/m  as calculated from the following:    Height as of this encounter: 1.689 m (5' 6.5\").    Weight as of this encounter: 69.4 kg (153 lb).        She reports that she quit smoking about 14 years ago. Her smoking use included cigarettes. She started smoking about 16 years ago. She has a 1.50 pack-year smoking history. She has never used smokeless tobacco.      Counseling Resources:  ATP IV Guidelines  Pooled Cohorts Equation Calculator  Breast Cancer Risk Calculator  BRCA-Related Cancer Risk Assessment: FHS-7 Tool  FRAX Risk Assessment  ICSI Preventive Guidelines  Dietary Guidelines for Americans, 2010  USDA's MyPlate  ASA Prophylaxis  Lung CA Screening    Clarisa Palafox, SOHEILA CNP  [facility]  "

## 2022-08-05 NOTE — PATIENT INSTRUCTIONS
Talk to your Endocrinologist about your loss of height.    Preventive Health Recommendations  Female Ages 26 - 39  Yearly exam:   See your health care provider every year in order to  Review health changes.   Discuss preventive care.    Review your medicines if you your doctor has prescribed any.    Until age 30: Get a Pap test every three years (more often if you have had an abnormal result).    After age 30: Talk to your doctor about whether you should have a Pap test every 3 years or have a Pap test with HPV screening every 5 years.   You do not need a Pap test if your uterus was removed (hysterectomy) and you have not had cancer.  You should be tested each year for STDs (sexually transmitted diseases), if you're at risk.   Talk to your provider about how often to have your cholesterol checked.  If you are at risk for diabetes, you should have a diabetes test (fasting glucose).  Shots: Get a flu shot each year. Get a tetanus shot every 10 years.   Nutrition:   Eat at least 5 servings of fruits and vegetables each day.  Eat whole-grain bread, whole-wheat pasta and brown rice instead of white grains and rice.  Get adequate Calcium and Vitamin D.     Lifestyle  Exercise at least 150 minutes a week (30 minutes a day, 5 days of the week). This will help you control your weight and prevent disease.  Limit alcohol to one drink per day.  No smoking.   Wear sunscreen to prevent skin cancer.  See your dentist every six months for an exam and cleaning.

## 2022-08-08 RX ORDER — HYDROCORTISONE SODIUM SUCCINATE 100 MG/2ML
100 INJECTION, POWDER, FOR SOLUTION INTRAMUSCULAR; INTRAVENOUS
Qty: 2 ML | Refills: 3 | Status: SHIPPED | OUTPATIENT
Start: 2022-08-08 | End: 2024-01-17

## 2022-08-11 ENCOUNTER — ANCILLARY PROCEDURE (OUTPATIENT)
Dept: ULTRASOUND IMAGING | Facility: CLINIC | Age: 37
End: 2022-08-11
Attending: NURSE PRACTITIONER
Payer: COMMERCIAL

## 2022-08-11 DIAGNOSIS — N18.31 STAGE 3A CHRONIC KIDNEY DISEASE (H): ICD-10-CM

## 2022-08-11 PROCEDURE — 76770 US EXAM ABDO BACK WALL COMP: CPT | Mod: TC | Performed by: RADIOLOGY

## 2022-09-02 ENCOUNTER — TELEPHONE (OUTPATIENT)
Dept: NEUROSURGERY | Facility: CLINIC | Age: 37
End: 2022-09-02

## 2022-09-03 ENCOUNTER — TELEPHONE (OUTPATIENT)
Dept: NEUROSURGERY | Facility: CLINIC | Age: 37
End: 2022-09-03

## 2022-09-06 ENCOUNTER — TELEPHONE (OUTPATIENT)
Dept: NEUROSURGERY | Facility: CLINIC | Age: 37
End: 2022-09-06

## 2022-09-07 ENCOUNTER — TELEPHONE (OUTPATIENT)
Dept: NEUROSURGERY | Facility: CLINIC | Age: 37
End: 2022-09-07

## 2022-09-21 ENCOUNTER — TELEPHONE (OUTPATIENT)
Dept: NEUROSURGERY | Facility: CLINIC | Age: 37
End: 2022-09-21

## 2022-10-26 DIAGNOSIS — E23.0 FEMALE HYPOGONADOTROPIC HYPOGONADISM (H): ICD-10-CM

## 2022-10-31 RX ORDER — DROSPIRENONE AND ETHINYL ESTRADIOL 0.02-3(28)
1 KIT ORAL DAILY
Qty: 84 TABLET | Refills: 2 | Status: SHIPPED | OUTPATIENT
Start: 2022-10-31 | End: 2023-12-29

## 2022-10-31 NOTE — TELEPHONE ENCOUNTER
drospirenone-ethinyl estradiol (HECTOR) 3-0.02 MG tablet      Last Written Prescription Date:  4/14/2021  Last Fill Quantity: 84,   # refills: 3  Last Office Visit : 6/1/2022  Future Office visit:  none    Routing refill request to on-call Endocrine provider for review/approval because:  Medication/Contraceptives not on the Endocrine refill protocol.

## 2022-11-08 ENCOUNTER — HOSPITAL ENCOUNTER (OUTPATIENT)
Dept: MRI IMAGING | Facility: CLINIC | Age: 37
Discharge: HOME | End: 2022-11-08
Attending: INTERNAL MEDICINE | Admitting: INTERNAL MEDICINE
Payer: COMMERCIAL

## 2022-11-08 DIAGNOSIS — E24.0 PITUITARY DEPENDENT CUSHING DISEASE (H): ICD-10-CM

## 2022-11-08 DIAGNOSIS — E24.1 NELSON'S SYNDROME (H): ICD-10-CM

## 2022-11-08 PROCEDURE — A9585 GADOBUTROL INJECTION: HCPCS | Performed by: INTERNAL MEDICINE

## 2022-11-08 PROCEDURE — 70543 MRI ORBT/FAC/NCK W/O &W/DYE: CPT

## 2022-11-08 PROCEDURE — 70543 MRI ORBT/FAC/NCK W/O &W/DYE: CPT | Mod: 26 | Performed by: RADIOLOGY

## 2022-11-08 PROCEDURE — 255N000002 HC RX 255 OP 636: Performed by: INTERNAL MEDICINE

## 2022-11-08 RX ORDER — GADOBUTROL 604.72 MG/ML
0.1 INJECTION INTRAVENOUS ONCE
Status: COMPLETED | OUTPATIENT
Start: 2022-11-08 | End: 2022-11-08

## 2022-11-08 RX ADMIN — GADOBUTROL 6.9 ML: 604.72 INJECTION INTRAVENOUS at 17:59

## 2022-11-15 ENCOUNTER — VIRTUAL VISIT (OUTPATIENT)
Dept: NEUROSURGERY | Facility: CLINIC | Age: 37
End: 2022-11-15
Payer: COMMERCIAL

## 2022-11-15 DIAGNOSIS — E24.0 CUSHING'S DISEASE (H): Primary | ICD-10-CM

## 2022-11-15 DIAGNOSIS — E24.1: ICD-10-CM

## 2022-11-15 PROCEDURE — 99214 OFFICE O/P EST MOD 30 MIN: CPT | Mod: 95 | Performed by: NEUROLOGICAL SURGERY

## 2022-11-15 NOTE — LETTER
11/15/2022       RE: Suyapa Hodge  [address] [facility] [address]     Dear Colleague,    Thank you for referring your patient, Suyapa Hodge, to the [facility] NEUROSURGERY CLINIC [place] at [facility]. Please see a copy of my visit note below.    Repeat MRI one year.  Follow with Dr. Wilkinson  See dictated note.  RETA Huffman MD    Service Date: 11/15/2022    SOHEILA Mckeon, CNP   [facility]   [address] [place] [address]    RE:  Suyapa Hodge  MRN:  [id number]  :  1985    Dear Ms. Palafox:      We spoke to Ms. Hodge as part of a telephone followup in Pituitary Clinic.  She is over 15 months out from a fourth time reoperation of a pituitary adenoma.  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  She had bilateral adrenalectomy previously and in combination with residual pituitary adenoma, she developed Alexei syndrome.  We obtained a good resection and her immediate postoperative ACTH normalized to 14.  However, her last ACTH level in 2022 abrahan back up to 141.    She is feeling well overall.  Her skin color is at its baseline and she has not had recurrent darkening or bronzing of the skin.  She is overdue to have an ACTH and IGF-1 level rechecked, but she has not been to the lab yet.  She is not having any significant headaches.  She denies any visual changes.    Over the phone, she sounded drowsy.  She was working the overnight shift last night.  Her speech, language and phonation were normal.    REVIEW OF STUDIES:  We went over her MRI from 2022 and compared it to her previous studies.  This shows some fullness in the left side of the sella and some patchy hypo-enhancement, but no obvious residual tumor.  On the preoperative scans from 2021, there was a clear hypo-enhancing mass extending into the left parasellar space.  On the  current scan, we do not see any obvious recurrent mass that fits that pattern.    IMPRESSION AND PLAN:  The absence of any recurrence of symptoms and signs is encouraging.  We will await her followup ACTH levels.  Dr. Wilkinson is contemplating using octreotide for medical management if there is biochemical evidence of recurrence.  We will repeat an MRI in 1 year.  We will keep you informed of her progress.  Please do not hesitate to contact us with questions.    Sincerely,    Mina Huffman MD        D: 11/15/2022   T: 11/15/2022   MT: [email or web address]    Name:     VADIM ZAMBRANO  MRN:      [id number]        Account:      [id number]   :      1985           Service Date: 11/15/2022       Document: [id number]

## 2022-11-15 NOTE — LETTER
11/15/2022       RE: Suyapa Hodge  [address] [facility] [address]     Dear Colleague,    Thank you for referring your patient, Suyapa Hodge, to the [facility] NEUROSURGERY CLINIC [place] at [facility]. Please see a copy of my visit note below.    Milla is a 37 year old who is being evaluated via a billable telephone visit.      What phone number would you like to be contacted at? [phone]   How would you like to obtain your AVS? MyChart  Phone call duration: 15 minutes    Repeat MRI one year.  Follow with Dr. Wilkinson  See dictated note.  RETA Huffman MD      Again, thank you for allowing me to participate in the care of your patient.      Sincerely,    Mina Huffman MD

## 2022-11-15 NOTE — NURSING NOTE
Chief Complaint   Patient presents with     Telephone     1 year follow up for Putuitary Macroadenoma

## 2022-11-15 NOTE — LETTER
11/15/2022      RE: Suyapa Hodge  [address] [facility] [place] [address]         Repeat MRI one year.  Follow with Dr. Wilkinson  See dictated note.  RETA Huffman MD    Service Date: 11/15/2022    SOHEILA Mckeon, CNP   [address] [place] [id number] [facility] [place] [address]    RE:  Suyapa Hodge  MRN:  [id number]  :  1985    Dear Ms. Palafox:      We spoke to Ms. Hodge as part of a telephone followup in Pituitary Clinic.  She is over 15 months out from a fourth time reoperation of a pituitary adenoma.  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  She had bilateral adrenalectomy previously and in combination with residual pituitary adenoma, she developed Aelxei syndrome.  We obtained a good resection and her immediate postoperative ACTH normalized to 14.  However, her last ACTH level in 2022 abrahan back up to 141.    She is feeling well overall.  Her skin color is at its baseline and she has not had recurrent darkening or bronzing of the skin.  She is overdue to have an ACTH and IGF-1 level rechecked, but she has not been to the lab yet.  She is not having any significant headaches.  She denies any visual changes.    Over the phone, she sounded drowsy.  She was working the overnight shift last night.  Her speech, language and phonation were normal.    REVIEW OF STUDIES:  We went over her MRI from 2022 and compared it to her previous studies.  This shows some fullness in the left side of the sella and some patchy hypo-enhancement, but no obvious residual tumor.  On the preoperative scans from 2021, there was a clear hypo-enhancing mass extending into the left parasellar space.  On the current scan, we do not see any obvious recurrent mass that fits that pattern.    IMPRESSION AND PLAN:  The absence of any recurrence of symptoms and signs is encouraging.  We will await her followup ACTH levels.  Dr. Wilkinson is contemplating  using octreotide for medical management if there is biochemical evidence of recurrence.  We will repeat an MRI in 1 year.  We will keep you informed of her progress.  Please do not hesitate to contact us with questions.    Sincerely,    Mina Huffman MD        D: 11/15/2022   T: 11/15/2022   MTDorothy vaz    Name:     VADIM ZAMBRANO  MRN:      [id number]        Account:      [id number]   :      1985           Service Date: 11/15/2022       Document: [id number]

## 2022-11-15 NOTE — PROGRESS NOTES
Milla is a 37 year old who is being evaluated via a billable telephone visit.      What phone number would you like to be contacted at? [phone]   How would you like to obtain your AVS? MyChart  Phone call duration: 15 minutes    Repeat MRI one year.  Follow with Dr. Wilkinson  See dictated note.  RETA Huffman MD

## 2022-11-15 NOTE — LETTER
November 15, 2022       TO: Suyapa Hodge  [address] [facility] [address]       DearMs.Ayesha,    We are writing to inform you of your test results.    {[facility] results letter list:[id number]}    No results found from the In Basket message.    ***

## 2022-11-16 NOTE — PROGRESS NOTES
Service Date: 11/15/2022    Clarisa Palafox, APRN, CNP   [id number] [place] [address]    RE:  Suyapa Hodge  MRN:  [id number]  :  1985    Dear Ms. Palafox:      We spoke to Ms. Hodge as part of a telephone followup in [facility].  She is over 15 months out from a fourth time reoperation of a pituitary adenoma.  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  She had bilateral adrenalectomy previously and in combination with residual pituitary adenoma, she developed Alexei syndrome.  We obtained a good resection and her immediate postoperative ACTH normalized to 14.  However, her last ACTH level in 2022 abrahan back up to 141.    She is feeling well overall.  Her skin color is at its baseline and she has not had recurrent darkening or bronzing of the skin.  She is overdue to have an ACTH and IGF-1 level rechecked, but she has not been to the lab yet.  She is not having any significant headaches.  She denies any visual changes.    Over the phone, she sounded drowsy.  She was working the overnight shift last night.  Her speech, language and phonation were normal.    REVIEW OF STUDIES:  We went over her MRI from 2022 and compared it to her previous studies.  This shows some fullness in the left side of the sella and some patchy hypo-enhancement, but no obvious residual tumor.  On the preoperative scans from 2021, there was a clear hypo-enhancing mass extending into the left parasellar space.  On the current scan, we do not see any obvious recurrent mass that fits that pattern.    IMPRESSION AND PLAN:  The absence of any recurrence of symptoms and signs is encouraging.  We will await her followup ACTH levels.  Dr. Wilkinson is contemplating using octreotide for medical management if there is biochemical evidence of recurrence.  We will repeat an MRI in 1 year.  We will keep you informed of her  progress.  Please do not hesitate to contact us with questions.    Sincerely,    Mina Huffman MD        D: 11/15/2022   T: 11/15/2022   MT: milind    Name:     JUNIORARIELVADIMMima  MRN:      [id number]        Account:      [id number]   :      1985           Service Date: 11/15/2022       Document: [id number]

## 2022-12-05 ENCOUNTER — TELEPHONE (OUTPATIENT)
Dept: ENDOCRINOLOGY | Facility: CLINIC | Age: 37
End: 2022-12-05

## 2022-12-05 NOTE — TELEPHONE ENCOUNTER
Danna Og, RN  [facility]  Please offer short follow up visit in a month Per Dr Wilkinson           Previous Messages     ----- Message -----   From: Adriana Wilkinson MD   Sent: 12/4/2022  11:05 AM CST   To: [facility]     Could you offer in 1 month short visit?   -----------------     Dear Suyapa     Here is your results. MRI looks great. We need a follow up appointment     Please call nursing line, if you are [facility] patient at [phone], if you are [facility] patient at [phone],  if you have any questions.     Please take care   Adriana Wilkinson MD

## 2022-12-05 NOTE — TELEPHONE ENCOUNTER
CORDELL and sent Tredhart 12/5/22 for pt to schedule appointment with Dr. Wilkinson. Pt can be scheduled for a virtual or telephone visit on 1/4/23 at 09:00 or 09:30. Pt can also be scheduled for an in person appointment on 1/11/23 at 14:30. These appointments will need to be manually scheduled as they are marked as ALEXANDER and will not appear using auto search.

## 2022-12-08 ENCOUNTER — LAB (OUTPATIENT)
Dept: LAB | Facility: CLINIC | Age: 37
End: 2022-12-08
Payer: COMMERCIAL

## 2022-12-08 DIAGNOSIS — E24.1 NELSON'S SYNDROME (H): ICD-10-CM

## 2022-12-08 DIAGNOSIS — R94.4 NONSPECIFIC ABNORMAL RESULTS OF KIDNEY FUNCTION STUDY: Primary | ICD-10-CM

## 2022-12-08 DIAGNOSIS — E78.5 HYPERLIPIDEMIA LDL GOAL <100: ICD-10-CM

## 2022-12-08 DIAGNOSIS — R94.4 NONSPECIFIC ABNORMAL RESULTS OF KIDNEY FUNCTION STUDY: ICD-10-CM

## 2022-12-08 DIAGNOSIS — E24.0 PITUITARY DEPENDENT CUSHING DISEASE (H): ICD-10-CM

## 2022-12-08 DIAGNOSIS — N18.30 CHRONIC KIDNEY DISEASE, STAGE 3 (H): ICD-10-CM

## 2022-12-08 LAB
ANION GAP SERPL CALCULATED.3IONS-SCNC: 5 MMOL/L (ref 3–14)
BUN SERPL-MCNC: 19 MG/DL (ref 7–30)
CALCIUM SERPL-MCNC: 9.3 MG/DL (ref 8.5–10.1)
CHLORIDE BLD-SCNC: 108 MMOL/L (ref 94–109)
CHOLEST SERPL-MCNC: 202 MG/DL
CO2 SERPL-SCNC: 26 MMOL/L (ref 20–32)
CREAT SERPL-MCNC: 1.11 MG/DL (ref 0.52–1.04)
FASTING STATUS PATIENT QL REPORTED: YES
GFR SERPL CREATININE-BSD FRML MDRD: 65 ML/MIN/1.73M2
GLUCOSE BLD-MCNC: 88 MG/DL (ref 70–99)
HDLC SERPL-MCNC: 49 MG/DL
HGB BLD-MCNC: 12.9 G/DL (ref 11.7–15.7)
LDLC SERPL CALC-MCNC: 125 MG/DL
NONHDLC SERPL-MCNC: 153 MG/DL
POTASSIUM BLD-SCNC: 4.3 MMOL/L (ref 3.4–5.3)
SODIUM SERPL-SCNC: 139 MMOL/L (ref 133–144)
TRIGL SERPL-MCNC: 139 MG/DL

## 2022-12-08 PROCEDURE — 36415 COLL VENOUS BLD VENIPUNCTURE: CPT

## 2022-12-08 PROCEDURE — 84305 ASSAY OF SOMATOMEDIN: CPT

## 2022-12-08 PROCEDURE — 80048 BASIC METABOLIC PNL TOTAL CA: CPT

## 2022-12-08 PROCEDURE — 80061 LIPID PANEL: CPT

## 2022-12-08 PROCEDURE — 82024 ASSAY OF ACTH: CPT

## 2022-12-08 PROCEDURE — 85018 HEMOGLOBIN: CPT

## 2022-12-09 LAB
ACTH PLAS-MCNC: 419 PG/ML
IGF-I BLD-MCNC: 35 NG/ML (ref 79–276)

## 2023-01-04 ENCOUNTER — TELEPHONE (OUTPATIENT)
Dept: ENDOCRINOLOGY | Facility: CLINIC | Age: 38
End: 2023-01-04

## 2023-01-04 ENCOUNTER — VIRTUAL VISIT (OUTPATIENT)
Dept: ENDOCRINOLOGY | Facility: CLINIC | Age: 38
End: 2023-01-04
Payer: COMMERCIAL

## 2023-01-04 DIAGNOSIS — E24.0 PITUITARY DEPENDENT CUSHING DISEASE (H): Primary | ICD-10-CM

## 2023-01-04 DIAGNOSIS — E24.1: ICD-10-CM

## 2023-01-04 PROCEDURE — 99214 OFFICE O/P EST MOD 30 MIN: CPT | Mod: GT | Performed by: INTERNAL MEDICINE

## 2023-01-04 RX ORDER — OCTREOTIDE 20 MG/1
20 CAPSULE, DELAYED RELEASE ORAL DAILY
Qty: 90 CAPSULE | Refills: 3 | Status: SHIPPED | OUTPATIENT
Start: 2023-01-04 | End: 2023-05-11

## 2023-01-04 NOTE — TELEPHONE ENCOUNTER
LDD medication. If approved, needs to be sent to [facility] per LDD list.         Per CMM Pa, patient may need to try Octreotide or Lanreotide prior to approval. No notes stating has tried/failed/tolerated treatment with.   Chart documentation sent to plan includes: Visit notes from 01/04/2023 and 06/01/2022. MRI from 11/08/2022, Adrenal corticotropin lab results from 12/08/2022 and igf1 results from 12/08/2022.

## 2023-01-04 NOTE — LETTER
1/4/2023       RE: Suyapa Hodge  [address] [place] [address]     Dear Colleague,    Thank you for referring your patient, Suyapa Hodge, to the [facility] [place] at [facility]. Please see a copy of my visit note below.    Suyapa Hodge is being evaluated via a billable video visit.        How would you like to obtain your AVS? [employer]  For the video visit, send the invitation by: Text to cell phone: [phone]  Will anyone else be joining your video visit? No      Milla is a 36 year old who is being evaluated via a billable video visit.      How would you like to obtain your AVS? [employer]  If the video visit is dropped, the invitation should be resent by: Text to cell phone: [phone]   Will anyone else be joining your video visit? No        Video-Visit Details    Type of service Video  Start: 9:10 am  End: 9:30 am  [place]      Originating Location (pt. Location): Home    Distant Location (provider location):  [facility] ENDOCRINOLOGY         -- Endocrinology follow up ---      Assessment:    # Cushing's disease  # Alexei syndrome post adrenalectomy   Cushing disease post TSS 3 times (2010, 2013, 2014), and adrenalectomy in 7/2014, currently Alexei's symdrome with pituitary ACTH tumor regrowth,     Worsening pigmentation with ACTH>1250, increased the lesion, therefore she underwent to TSS 4 th time, July 2021 by Dr. Huffman   Pathology showed ACTH stained. Post op ACTH was 14 with siginificant improvement of pigmentation. Reviewed last MRI 10/2021 showed no residual tumor    ACTH even more rising from 4, 140, then 418, no significant pigmentation seen . MRI 12/2022 no sign of recurrence    - continue current dose of hydrocortisone 10 mg am and florinef 0.1 mg daily,     - presscribed Mycapssa 20 mg daily to aim for prevent tumor recurrence (tissue SSTR2 expression positive.)         Hypothyrodism     - continue LT4 75  mcg daily    Hypogonadism    - continue BCP (E2 20 mcg)     DDAVP  Taking minimum dose currently (100 mcg daily po). She can try to taper by herself.       RTC with me in 6 month      Total 35  minutes spent on the date of the encounter doing chart review, history and exam, reviewed MRI brain serially, reviewed pathology results documentation and further activities as noted above.    Adriana Wilkinson MD  Staff Physician  Endocrinology and Metabolism  [facility]  License: [id number]  Pager: [phone]    Interval History as of 1/4/2023 : Patient has been doing well. Not noting any pigmentation. New event includes  : ACTH now increase to 418..  Interval History as of 6/1/2022 : Patient has been doing well.  Medication compliance good . New event includes: no significant medical event noted, still has some chronic fatigue+, she feels her skin tone became back to her baseline.  Interval History as of 2/23/2022 : Patient has been doing well. Medication compliance  excellent . New event includes L regular cycle, no pertinent medical event noted .  Interval History as of 8/23/2021 : Patient has been doing well. Underwent to 4th surgery 7/22/2021. Pigmentation of her dkin isn imroving, post op ACTH was 14.   Interval History as of 4/14/2021 : No HA, medication compliance good, no dizziness, no HA. Pigmentation gettign worse, lesion increasing in size.   History of Present Illness: Suyapa Hodge is a 34 year old female with a history of pituitary Cushing's syndrome, hypothalamic adrenal dysfunction and pituitary microadenoma who presents for follow up. She was last seen in clinic 14 months ago. She was diagnosed with pituitary microademoma serveral year ago which was suspected to cause her Cushing's disease. She had pituitary surgery x3 in 2013-14 and finally underwent bilateral adrenalectomy in July 2014. ACTH levels remained normal. In July 2017 ACTH levels were tested and was 271, which increased to  "549 raising concern for recurrence vs missed diagnosis.  Chest abdomen and pelvis CT was normal. 8 mg dex suppression reduced levels to 118.     She is currently taking hydrocortisone BID (10 mg misses dose once per month), L-thyroxine 75 mcg tablets full tablet 6 days and desmopressin 0.1 mg in the morning. She denies that she gets up in the middle of the night to use the restroom. She reports that she goes to the eye doctor every 3 months to see if the tumor \"has moved to her optic nerve\".      She is on hormone replacement therapy (prometrium and vivelle patch). No history of uterine surgeries. She has a history of irregular periods but has been evaluated with GYN, per patient, and was not told specifics.     She is not taking the calcium supplement. She has a history of avascular necrosis but has been told that she is too young for a hip replacement. DXA showed low BMD for age.     No eye symptoms  No headache, change in vision, loss of peripheral vision  Complete ROS that were obtained were negative.     Active Medications:      cyclobenzaprine (FLEXERIL) 10 MG tablet, Take 1 tablet (10 mg) by mouth 3 times daily as needed for muscle spasms, Disp: 30 tablet, Rfl: 0     desmopressin (DDAVP) 0.1 MG tablet, Take 1 tablet (100 mcg) by mouth 2 times daily (1 TAB) MORNING AND AT BEDTIME, Disp: 180 tablet, Rfl: 5     estradiol (VIVELLE-DOT) 0.05 MG/24HR bi-weekly patch, Place 1 patch onto the skin twice a week, Disp: 27 patch, Rfl: 5     fludrocortisone (FLORINEF) 0.1 MG tablet, Take 0.5 tablets (0.05 mg) by mouth daily Please keep 10-21-19 clinic appt for further refills, Disp: 45 tablet, Rfl: 5     hydrocortisone (CORTEF) 10 MG tablet, 1 tab (10 mg) am 1/2 tab (5 mg) pm plus sick day supply as directed, Disp: 150 tablet, Rfl: 5     levothyroxine (SYNTHROID/LEVOTHROID) 75 MCG tablet, One tablet day 6 days per wk and none on the 7th day each wk, Disp: 90 tablet, Rfl: 5     Multiple Vitamin (MULTI-VITAMIN DAILY PO), , " Disp: , Rfl:      progesterone (PROMETRIUM) 100 MG capsule, Take 1 tablet daily, Disp: 90 capsule, Rfl: 1     triamcinolone (KENALOG) 0.1 % external cream, Apply topically 2 times daily As needed for flares, Disp: 80 g, Rfl: 1     Calcium carb-Vitamin D 500 mg [employer]-200 units (OSCAL WITH D;OYSTER SHELL CALCIUM) 500-200 MG-UNIT per tablet, Take 1 tablet by mouth 2 times daily (with meals), Disp: , Rfl:      methocarbamol (ROBAXIN) 500 MG tablet, Take 1-2 tablets (500-1,000 mg) by mouth nightly as needed for muscle spasms (Patient not taking: Reported on 10/21/2019), Disp: 20 tablet, Rfl: 0     naproxen (NAPROSYN) 500 MG tablet, Take 1 tablet (500 mg) by mouth 2 times daily (with meals) (Patient not taking: Reported on 10/21/2019), Disp: 60 tablet, Rfl: 1      Allergies:   Patient has no known allergies.     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 resection   Avascular necrosis of femur head   Cervical high risk HPV   Colon polyp   Diabetes insipidus   Hypercalcemia   Hypertension   Pulmonary emboli   Hypothyroidism      Past Surgical History:  Lumbar drain- 01/2014   Adrenalectomy-07/2014   Tumor resection (hypophysectomy)- 07/2013   Herscher teeth extracted     Family History:   Mother: asthma, osteoporosis, obesity   Father: hyperlipidemia, obesity   Sister: allergies, obesity   Maternal grandmother: colon cancer  Maternal grandfather: lung cancer   Paternal grandmother: lung cancer   Paternal grandfather: lung cancer      Social History:   The patient was alone   Smoking Status: former; 1/2 pack per day for 3 years; 12 years since quitting   Smokeless Tobacco: never    Alcohol Use: yes; 3 beers per month   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 No masses, lesions,  tenderness or abnormalities  Resporatory: non acute disctress, breathing normally  Skin: pigmentation on her face has been improving      Data:  TSH   Date Value Ref Range Status   01/12/2018 <0.01 (L) 0.40 - 4.00 mU/L Final   01/08/2016 <0.01 (L) 0.40 - 4.00 mU/L Final   09/25/2015 <0.01 (L) 0.40 - 4.00 mU/L Final   05/22/2015 <0.01 (L) 0.40 - 4.00 mU/L Final   03/13/2015 (L) 0.40 - 4.00 mU/L Final    <0.01  Effective 7/30/2014, the reference range for this assay has changed to reflect   new instrumentation/methodology.       T4 Free   Date Value Ref Range Status   10/08/2018 0.53 (L) 0.76 - 1.46 ng/dL Final   01/12/2018 0.90 0.76 - 1.46 ng/dL Final   10/04/2017 0.90 0.76 - 1.46 ng/dL Final   07/07/2017 0.98 0.76 - 1.46 ng/dL Final   12/30/2016 0.91 0.76 - 1.46 ng/dL Final     CBC RESULTS:   Recent Labs   Lab Test 07/15/14  0806   WBC 8.2   RBC 4.51   HGB 13.3   HCT 39.7   MCV 88   MCH 29.5   MCHC 33.5   RDW 13.7        Recent Labs   Lab Test 10/08/18  0802 01/12/18  0732    137   POTASSIUM 4.1 4.3   CHLORIDE 105 105   CO2 32 27   ANIONGAP 4 5   GLC 87 78   BUN 14 19   CR 1.10* 0.93   ELIEZER 9.3 9.2     MRI: I also personally reviewed brain MRI and pituitary lesion and interepret and expalined to the patient.     intraseller maybe small hypodense 3-4 mm(?) residual vs scar in my impression           MRI: I reviewed original images of 2/19/2021 ad explained to the patient.   Brain/ Pituitary MRI without and with contrast     History: 33 yo female pituitary Cushing disease follow up; Pituitary  dependent Cushing disease (H).     ICD-10: Pituitary dependent Cushing disease (H)     Comparison:  Multiple pituitary MRI exams since 12/3/2009, most recent  one from 3/16/2020.     Technique: Axial diffusion and FLAIR images of the whole brain  obtained without intravenous contrast. Sagittal T1 and T2-weighted,  coronal T2-weighted, coronal T1-weighted images with focus on the  sella were obtained without  intravenous contrast. Post intravenous  contrast using gadolinium coronal and sagittal T1-weighted images were  obtained focused on the sella. Dynamic postcontrast coronal  T1-weighted images were also obtained.     Contrast: 7mL Gadavist IV     Findings:    Postsurgical changes of transsphenoidal approach pituitary  microadenoma resection. Hypoenhancing area within the left side of the  sella, extending to the left cavernous sinus, increased since  10/8/2018, measuring 9 x 7 x 10 mm, previously 6 x 6 x 4 mm.  Associated diffusion restriction. Abutment of the cavernous segment of  left internal carotid artery.     The pituitary stalk appears midline. The optic chiasm appears intact  and not displaced. The major cavernous carotid vascular flow-voids  appear patent.            Adriana Wilkinson MD

## 2023-01-04 NOTE — PROGRESS NOTES
Suyapa Hodge is being evaluated via a billable video visit.        How would you like to obtain your AVS? [employer]  For the video visit, send the invitation by: Text to cell phone: [phone]  Will anyone else be joining your video visit? No      Milla is a 36 year old who is being evaluated via a billable video visit.      How would you like to obtain your AVS? [employer]  If the video visit is dropped, the invitation should be resent by: Text to cell phone: [phone]   Will anyone else be joining your video visit? No        Video-Visit Details    Type of service Video  Start: 9:10 am  End: 9:30 am  Amwell      Originating Location (pt. Location): Home    Distant Location (provider location):  [facility] ENDOCRINOLOGY         -- Endocrinology follow up ---      Assessment:    # Cushing's disease  # Alexei syndrome post adrenalectomy   Cushing disease post TSS 3 times (2010, 2013, 2014), and adrenalectomy in 7/2014, currently Alexei's symdrome with pituitary ACTH tumor regrowth,     Worsening pigmentation with ACTH>1250, increased the lesion, therefore she underwent to TSS 4 th time, July 2021 by Dr. Huffman   Pathology showed ACTH stained. Post op ACTH was 14 with siginificant improvement of pigmentation. Reviewed last MRI 10/2021 showed no residual tumor    ACTH even more rising from 4, 140, then 418, no significant pigmentation seen . MRI 12/2022 no sign of recurrence    - continue current dose of hydrocortisone 10 mg am and florinef 0.1 mg daily,     - presscribed Mycapssa 20 mg daily to aim for prevent tumor recurrence (tissue SSTR2 expression positive.)         Hypothyrodism     - continue LT4 75 mcg daily    Hypogonadism    - continue BCP (E2 20 mcg)     DDAVP  Taking minimum dose currently (100 mcg daily po). She can try to taper by herself.       RTC with me in 6 month      Total 35  minutes spent on the date of the encounter doing chart review, history and exam, reviewed MRI brain serially, reviewed  pathology results documentation and further activities as noted above.    Adriana Wilkinson MD  Staff Physician  Endocrinology and Metabolism  [facility]  License: [id number]  Pager: [phone]    Interval History as of 1/4/2023 : Patient has been doing well. Not noting any pigmentation. New event includes  : ACTH now increase to 418..  Interval History as of 6/1/2022 : Patient has been doing well.  Medication compliance good . New event includes: no significant medical event noted, still has some chronic fatigue+, she feels her skin tone became back to her baseline.  Interval History as of 2/23/2022 : Patient has been doing well. Medication compliance  excellent . New event includes L regular cycle, no pertinent medical event noted .  Interval History as of 8/23/2021 : Patient has been doing well. Underwent to 4th surgery 7/22/2021. Pigmentation of her dkin isn imroving, post op ACTH was 14.   Interval History as of 4/14/2021 : No HA, medication compliance good, no dizziness, no HA. Pigmentation gettign worse, lesion increasing in size.   History of Present Illness: Suyapa Hodge is a 34 year old female with a history of pituitary Cushing's syndrome, hypothalamic adrenal dysfunction and pituitary microadenoma who presents for follow up. She was last seen in clinic 14 months ago. She was diagnosed with pituitary microademoma serveral year ago which was suspected to cause her Cushing's disease. She had pituitary surgery x3 in 2013-14 and finally underwent bilateral adrenalectomy in July 2014. ACTH levels remained normal. In July 2017 ACTH levels were tested and was 271, which increased to 549 raising concern for recurrence vs missed diagnosis.  Chest abdomen and pelvis CT was normal. 8 mg dex suppression reduced levels to 118.     She is currently taking hydrocortisone BID (10 mg misses dose once per month), L-thyroxine 75 mcg tablets full tablet 6 days and desmopressin 0.1 mg in the morning. She  "denies that she gets up in the middle of the night to use the restroom. She reports that she goes to the eye doctor every 3 months to see if the tumor \"has moved to her optic nerve\".      She is on hormone replacement therapy (prometrium and vivelle patch). No history of uterine surgeries. She has a history of irregular periods but has been evaluated with GYN, per patient, and was not told specifics.     She is not taking the calcium supplement. She has a history of avascular necrosis but has been told that she is too young for a hip replacement. DXA showed low BMD for age.     No eye symptoms  No headache, change in vision, loss of peripheral vision  Complete ROS that were obtained were negative.     Active Medications:      cyclobenzaprine (FLEXERIL) 10 MG tablet, Take 1 tablet (10 mg) by mouth 3 times daily as needed for muscle spasms, Disp: 30 tablet, Rfl: 0     desmopressin (DDAVP) 0.1 MG tablet, Take 1 tablet (100 mcg) by mouth 2 times daily (1 TAB) MORNING AND AT BEDTIME, Disp: 180 tablet, Rfl: 5     estradiol (VIVELLE-DOT) 0.05 MG/24HR bi-weekly patch, Place 1 patch onto the skin twice a week, Disp: 27 patch, Rfl: 5     fludrocortisone (FLORINEF) 0.1 MG tablet, Take 0.5 tablets (0.05 mg) by mouth daily Please keep 10-21-19 clinic appt for further refills, Disp: 45 tablet, Rfl: 5     hydrocortisone (CORTEF) 10 MG tablet, 1 tab (10 mg) am 1/2 tab (5 mg) pm plus sick day supply as directed, Disp: 150 tablet, Rfl: 5     levothyroxine (SYNTHROID/LEVOTHROID) 75 MCG tablet, One tablet day 6 days per wk and none on the 7th day each wk, Disp: 90 tablet, Rfl: 5     Multiple Vitamin (MULTI-VITAMIN DAILY PO), , Disp: , Rfl:      progesterone (PROMETRIUM) 100 MG capsule, Take 1 tablet daily, Disp: 90 capsule, Rfl: 1     triamcinolone (KENALOG) 0.1 % external cream, Apply topically 2 times daily As needed for flares, Disp: 80 g, Rfl: 1     Calcium carb-Vitamin D 500 mg [place]-200 units (OSCAL WITH D;OYSTER SHELL CALCIUM) " 500-200 MG-UNIT per tablet, Take 1 tablet by mouth 2 times daily (with meals), Disp: , Rfl:      methocarbamol (ROBAXIN) 500 MG tablet, Take 1-2 tablets (500-1,000 mg) by mouth nightly as needed for muscle spasms (Patient not taking: Reported on 10/21/2019), Disp: 20 tablet, Rfl: 0     naproxen (NAPROSYN) 500 MG tablet, Take 1 tablet (500 mg) by mouth 2 times daily (with meals) (Patient not taking: Reported on 10/21/2019), Disp: 60 tablet, Rfl: 1      Allergies:   Patient has no known allergies.     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 resection   Avascular necrosis of femur head   Cervical high risk HPV   Colon polyp   Diabetes insipidus   Hypercalcemia   Hypertension   Pulmonary emboli   Hypothyroidism      Past Surgical History:  Lumbar drain- 01/2014   Adrenalectomy-07/2014   Tumor resection (hypophysectomy)- 07/2013   [place] teeth extracted     Family History:   Mother: asthma, osteoporosis, obesity   Father: hyperlipidemia, obesity   Sister: allergies, obesity   Maternal grandmother: colon cancer  Maternal grandfather: lung cancer   Paternal grandmother: lung cancer   Paternal grandfather: lung cancer      Social History:   The patient was alone   Smoking Status: former; 1/2 pack per day for 3 years; 12 years since quitting   Smokeless Tobacco: never    Alcohol Use: yes; 3 beers per month   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 No masses, lesions, tenderness or abnormalities  Resporatory: non acute disctress, breathing normally  Skin: pigmentation on her face has been improving      Data:  TSH   Date Value Ref Range Status   01/12/2018 <0.01 (L) 0.40 - 4.00 mU/L Final   01/08/2016 <0.01 (L) 0.40 - 4.00 mU/L Final   09/25/2015 <0.01 (L) 0.40 - 4.00 mU/L  Final   05/22/2015 <0.01 (L) 0.40 - 4.00 mU/L Final   03/13/2015 (L) 0.40 - 4.00 mU/L Final    <0.01  Effective 7/30/2014, the reference range for this assay has changed to reflect   new instrumentation/methodology.       T4 Free   Date Value Ref Range Status   10/08/2018 0.53 (L) 0.76 - 1.46 ng/dL Final   01/12/2018 0.90 0.76 - 1.46 ng/dL Final   10/04/2017 0.90 0.76 - 1.46 ng/dL Final   07/07/2017 0.98 0.76 - 1.46 ng/dL Final   12/30/2016 0.91 0.76 - 1.46 ng/dL Final     CBC RESULTS:   Recent Labs   Lab Test 07/15/14  0806   WBC 8.2   RBC 4.51   HGB 13.3   HCT 39.7   MCV 88   MCH 29.5   MCHC 33.5   RDW 13.7        Recent Labs   Lab Test 10/08/18  0802 01/12/18  0732    137   POTASSIUM 4.1 4.3   CHLORIDE 105 105   CO2 32 27   ANIONGAP 4 5   GLC 87 78   BUN 14 19   CR 1.10* 0.93   ELIEZER 9.3 9.2     MRI: I also personally reviewed brain MRI and pituitary lesion and interepret and expalined to the patient.     intraseller maybe small hypodense 3-4 mm(?) residual vs scar in my impression           MRI: I reviewed original images of 2/19/2021 ad explained to the patient.   Brain/ Pituitary MRI without and with contrast     History: 35 yo female pituitary Cushing disease follow up; Pituitary  dependent Cushing disease (H).     ICD-10: Pituitary dependent Cushing disease (H)     Comparison:  Multiple pituitary MRI exams since 12/3/2009, most recent  one from 3/16/2020.     Technique: Axial diffusion and FLAIR images of the whole brain  obtained without intravenous contrast. Sagittal T1 and T2-weighted,  coronal T2-weighted, coronal T1-weighted images with focus on the  sella were obtained without intravenous contrast. Post intravenous  contrast using gadolinium coronal and sagittal T1-weighted images were  obtained focused on the sella. Dynamic postcontrast coronal  T1-weighted images were also obtained.     Contrast: 7mL Gadavist IV     Findings:    Postsurgical changes of transsphenoidal approach  pituitary  microadenoma resection. Hypoenhancing area within the left side of the  sella, extending to the left cavernous sinus, increased since  10/8/2018, measuring 9 x 7 x 10 mm, previously 6 x 6 x 4 mm.  Associated diffusion restriction. Abutment of the cavernous segment of  left internal carotid artery.     The pituitary stalk appears midline. The optic chiasm appears intact  and not displaced. The major cavernous carotid vascular flow-voids  appear patent.

## 2023-01-05 ENCOUNTER — E-VISIT (OUTPATIENT)
Dept: URGENT CARE | Facility: CLINIC | Age: 38
End: 2023-01-05
Payer: COMMERCIAL

## 2023-01-05 ENCOUNTER — TELEPHONE (OUTPATIENT)
Dept: ENDOCRINOLOGY | Facility: CLINIC | Age: 38
End: 2023-01-05

## 2023-01-05 DIAGNOSIS — J01.00 ACUTE MAXILLARY SINUSITIS, RECURRENCE NOT SPECIFIED: Primary | ICD-10-CM

## 2023-01-05 PROCEDURE — 99421 OL DIG E/M SVC 5-10 MIN: CPT | Performed by: PHYSICIAN ASSISTANT

## 2023-01-05 RX ORDER — OMEGA-3 FATTY ACIDS/FISH OIL 300-1000MG
600 CAPSULE ORAL EVERY 4 HOURS PRN
Qty: 30 CAPSULE | Refills: 0 | Status: SHIPPED | OUTPATIENT
Start: 2023-01-05 | End: 2023-05-11

## 2023-01-05 RX ORDER — PSEUDOEPHEDRINE HCL 60 MG
60 TABLET ORAL EVERY 4 HOURS PRN
Qty: 20 TABLET | Refills: 0 | Status: SHIPPED | OUTPATIENT
Start: 2023-01-05 | End: 2023-05-11

## 2023-01-05 RX ORDER — FLUTICASONE PROPIONATE 50 MCG
1 SPRAY, SUSPENSION (ML) NASAL DAILY
Qty: 16 G | Refills: 0 | Status: SHIPPED | OUTPATIENT
Start: 2023-01-05 | End: 2023-05-11

## 2023-01-05 NOTE — PATIENT INSTRUCTIONS
Viral Upper Respiratory Illness (Adult)    You have a viral upper respiratory illness (URI), which is another term for the common cold. This illness is contagious during the first few days. It is spread through the air by coughing and sneezing. It may also be spread by direct contact (touching the sick person and then touching your own eyes, nose, or mouth). Frequent handwashing will decrease risk of spread. Most viral illnesses go away within 7 to 10 days with rest and simple home remedies. Sometimes the illness may last for several weeks. Antibiotics will not kill a virus, and they are generally not prescribed for this condition.  Home care    If symptoms are severe, rest at home for the first 2 to 3 days. When you resume activity, don't let yourself get too tired.    Don't smoke. If you need help stopping, talk with your healthcare provider.    Avoid being exposed to cigarette smoke (yours or others ).    You may use acetaminophen or ibuprofen to control pain and fever, unless another medicine was prescribed. If you have chronic liver or kidney disease, have ever had a stomach ulcer or gastrointestinal bleeding, or are taking blood-thinning medicines, talk with your healthcare provider before using these medicines. Aspirin should never be given to anyone under 18 years of age who is ill with a viral infection or fever. It may cause severe liver or brain damage.    Your appetite may be poor, so a light diet is fine. Stay well hydrated by drinking 6 to 8 glasses of fluids per day (water, soft drinks, juices, tea, or soup). Extra fluids will help loosen secretions in the nose and lungs.    Over-the-counter cold medicines will not shorten the length of time you re sick, but they may be helpful for the following symptoms: cough, sore throat, and nasal and sinus congestion. If you take prescription medicines, ask your healthcare provider or pharmacist which over-the-counter medicines are safe to use. (Note: Don't  use decongestants if you have high blood pressure.)  Follow-up care  Follow up with your healthcare provider, or as advised.  When to seek medical advice  Call your healthcare provider right away if any of these occur:    Cough with lots of colored sputum (mucus)    Severe headache; face, neck, or ear pain    Difficulty swallowing due to throat pain    Fever of 100.4 F (38 C) or higher, or as directed by your healthcare provider  Call 911  Call 911 if any of these occur:    Chest pain, shortness of breath, wheezing, or difficulty breathing    Coughing up blood    Very severe pain with swallowing, especially if it goes along with a muffled voice   [employer] last reviewed this educational content on 6/1/2018 2000-2021 [employer]. All rights reserved. This information is not intended as a substitute for professional medical care. Always follow your healthcare professional's instructions.        Dear Suyapa Hodge    After reviewing your responses, I've been able to diagnose you with viral URI     Based on your responses and diagnosis, I have prescribed No orders of the defined types were placed in this encounter.   to treat your symptoms. I have sent this to your pharmacy.?     It is also important to stay well hydrated, get lots of rest and take over-the-counter decongestants,?tylenol?or ibuprofen if you?are able to?take those medications per your primary care provider to help relieve discomfort.?     It is important that you take?all of?your prescribed medication even if your symptoms are improving after a few doses.? Taking?all of?your medicine helps prevent the symptoms from returning.?     If your symptoms worsen, you develop severe headache, vomiting, high fever (>102), or are not improving in 7 days, please contact your primary care provider for an appointment or visit any of our convenient Walk-in Care or Urgent Care Centers to be seen which can be found on our website?here.?     Thanks  again for choosing?us?as your health care partner,?   ?  BYRON Palacios, PATYLER?

## 2023-01-05 NOTE — TELEPHONE ENCOUNTER
Per pt, will schedule labs when she knows her work schedule.   [facility] scheduled for 7/5/2023 and 1/3/2024.  Taylor Myhre, RMA

## 2023-01-09 NOTE — TELEPHONE ENCOUNTER
Received another request this one dated 01/04, received on 01/09 with a fax header date of 01/07

## 2023-01-10 NOTE — TELEPHONE ENCOUNTER
Need  2 journal articles that support mycapssa for Cushings disease do we have them ? Mya Carbajal RN on 1/10/2023 at 8:52 AM

## 2023-01-24 NOTE — TELEPHONE ENCOUNTER
Email sent to Karin at  [facility] for 2 journal articles Mya Carbajal RN on 1/24/2023 at 11:46 AM

## 2023-01-25 ENCOUNTER — TELEPHONE (OUTPATIENT)
Dept: ENDOCRINOLOGY | Facility: CLINIC | Age: 38
End: 2023-01-25
Payer: COMMERCIAL

## 2023-01-25 NOTE — TELEPHONE ENCOUNTER
Mycapssa form faxed to [phone] for 20 mg  1 tablet daily for  E24.0 Pituitary Dependent Cushings per Dr Wilkinson . Insurance won't cover unless they receive 2 journals supporting the use for this DX. Mya Carbajal RN on 1/25/2023 at 1:22 PM

## 2023-01-26 ENCOUNTER — TELEPHONE (OUTPATIENT)
Dept: ENDOCRINOLOGY | Facility: CLINIC | Age: 38
End: 2023-01-26

## 2023-01-26 NOTE — TELEPHONE ENCOUNTER
I received 2 articles  On Mycapssa helping prevent tumor growth . Please attach to the most recent letter  from [employer]  for appeal. I faxed the articles over to PA jerzy .Mya Carbajal RN on 1/26/2023 at 3:30 PM

## 2023-01-26 NOTE — TELEPHONE ENCOUNTER
Insurance sent another notice for 2 articles from medical journals that support this medication in the disease state noted.

## 2023-01-27 NOTE — TELEPHONE ENCOUNTER
Faxed request below, along with appeal letter and 2 articles to [phone] 01/27/2023 1028am cover plus 31 pages

## 2023-01-29 NOTE — TELEPHONE ENCOUNTER
Prior auth was denied.       Does the patient have acromegaly and has tried and tolerated treatment with octreotide or lanreotide?   If so please document that information in full in an appeal letter (one dated 01/26/2023 was sent with journal articles). Letter does not state that that she has acromegaly. Does state there is data to support injectable is inferior, however there is no indication of data or treatment with injectables to prove need for mycapssa.

## 2023-01-29 NOTE — TELEPHONE ENCOUNTER
PRIOR AUTHORIZATION DENIED    Medication: Mycapssa pa denied    Denial Date: 1/27/2023    Denial Rational:     Appeal Information:

## 2023-01-31 NOTE — TELEPHONE ENCOUNTER
Action January 31, 2023 2:46 PM MT   Action Taken Called patient for more information, patient states no newer imaging of hips and no surgery.      DIAGNOSIS: RT Hip Pain   APPOINTMENT DATE: 02/09/2023   NOTES STATUS DETAILS   OFFICE NOTE from referring provider SELF    OFFICE NOTE from other specialist Internal 03/27/2019 - george Pereira MD - [facility]    03/19/2019 - Godwin Laguerre MD - [facility]   MEDICATION LIST Care Everywhere    LABS     CT SCAN PACS Internal:  02/05/2018 - Chest/Abd/Pel   XRAYS (IMAGES & REPORTS) PACS Internal:  03/19/2019, 07/06/2015 - Hip/Pelvis

## 2023-02-01 NOTE — TELEPHONE ENCOUNTER
Received fax from clinic with 2 new journal articles based on in the denial.   Faxed articles along with pa denial and previous appeal letter to [phone] 02/01/2023 938am cover plus 26 pages

## 2023-02-04 ASSESSMENT — ENCOUNTER SYMPTOMS
MUSCLE CRAMPS: 0
MUSCLE WEAKNESS: 0
BACK PAIN: 1
STIFFNESS: 1
MYALGIAS: 0
ARTHRALGIAS: 1
NECK PAIN: 0
JOINT SWELLING: 0

## 2023-02-07 ENCOUNTER — TELEPHONE (OUTPATIENT)
Dept: PHARMACY | Facility: CLINIC | Age: 38
End: 2023-02-07
Payer: COMMERCIAL

## 2023-02-07 NOTE — TELEPHONE ENCOUNTER
Email to Dr Wilkinson from [facility] MyCapssa stating Pt use is off label and no support can be offered. Copy on file.  Danna Og RN on 2/7/2023 at 10:48 AM

## 2023-02-08 DIAGNOSIS — M25.551 RIGHT HIP PAIN: Primary | ICD-10-CM

## 2023-02-08 NOTE — TELEPHONE ENCOUNTER
Called plan was informed MyCapssa appeal has been upheld, per rep the appeal letter has been faxed to clinic on 2/2/2023.

## 2023-02-09 ENCOUNTER — ANCILLARY PROCEDURE (OUTPATIENT)
Dept: GENERAL RADIOLOGY | Facility: CLINIC | Age: 38
End: 2023-02-09
Attending: PHYSICIAN ASSISTANT
Payer: COMMERCIAL

## 2023-02-09 ENCOUNTER — OFFICE VISIT (OUTPATIENT)
Dept: ORTHOPEDICS | Facility: CLINIC | Age: 38
End: 2023-02-09
Payer: COMMERCIAL

## 2023-02-09 ENCOUNTER — ANCILLARY PROCEDURE (OUTPATIENT)
Dept: GENERAL RADIOLOGY | Facility: CLINIC | Age: 38
End: 2023-02-09
Attending: ORTHOPAEDIC SURGERY
Payer: COMMERCIAL

## 2023-02-09 ENCOUNTER — PRE VISIT (OUTPATIENT)
Dept: ORTHOPEDICS | Facility: CLINIC | Age: 38
End: 2023-02-09

## 2023-02-09 ENCOUNTER — PREP FOR PROCEDURE (OUTPATIENT)
Dept: ORTHOPEDICS | Facility: CLINIC | Age: 38
End: 2023-02-09

## 2023-02-09 VITALS — BODY MASS INDEX: 25.15 KG/M2 | WEIGHT: 158.2 LBS

## 2023-02-09 DIAGNOSIS — M87.9 OSTEONECROSIS (H): Primary | ICD-10-CM

## 2023-02-09 DIAGNOSIS — M87.9 OSTEONECROSIS (H): ICD-10-CM

## 2023-02-09 DIAGNOSIS — M25.551 RIGHT HIP PAIN: Primary | ICD-10-CM

## 2023-02-09 DIAGNOSIS — M25.551 RIGHT HIP PAIN: ICD-10-CM

## 2023-02-09 PROCEDURE — 73502 X-RAY EXAM HIP UNI 2-3 VIEWS: CPT | Mod: RT | Performed by: RADIOLOGY

## 2023-02-09 PROCEDURE — 73501 X-RAY EXAM HIP UNI 1 VIEW: CPT | Mod: LT | Performed by: RADIOLOGY

## 2023-02-09 PROCEDURE — 73560 X-RAY EXAM OF KNEE 1 OR 2: CPT | Mod: LT | Performed by: RADIOLOGY

## 2023-02-09 PROCEDURE — 99204 OFFICE O/P NEW MOD 45 MIN: CPT | Performed by: PHYSICIAN ASSISTANT

## 2023-02-09 RX ORDER — ACETAMINOPHEN 325 MG/1
975 TABLET ORAL ONCE
Status: CANCELLED | OUTPATIENT
Start: 2023-02-09 | End: 2023-02-09

## 2023-02-09 RX ORDER — CELECOXIB 100 MG/1
400 CAPSULE ORAL ONCE
Status: CANCELLED | OUTPATIENT
Start: 2023-02-09 | End: 2023-02-09

## 2023-02-09 RX ORDER — TRANEXAMIC ACID 650 MG/1
1950 TABLET ORAL ONCE
Status: CANCELLED | OUTPATIENT
Start: 2023-02-09 | End: 2023-02-09

## 2023-02-09 RX ORDER — CEFAZOLIN SODIUM 2 G/50ML
2 SOLUTION INTRAVENOUS
Status: CANCELLED | OUTPATIENT
Start: 2023-02-09

## 2023-02-09 RX ORDER — CEFAZOLIN SODIUM 2 G/50ML
2 SOLUTION INTRAVENOUS SEE ADMIN INSTRUCTIONS
Status: CANCELLED | OUTPATIENT
Start: 2023-02-09

## 2023-02-09 ASSESSMENT — ACTIVITIES OF DAILY LIVING (ADL)
ADL_MEAN: 3
ADL_SUBSCALE_SCORE: 25
ADL_SUM: 51

## 2023-02-09 ASSESSMENT — HOOS S4: HOW SEVERE IS YOUR HIP JOINT STIFFNESS AFTER FIRST WAKENING IN THE MORNING?: EXTREME

## 2023-02-09 NOTE — LETTER
2/9/2023         RE: Suyapa Hodge  [address] [facility] [address]        Dear Colleague,    Thank you for referring your patient, Suyapa Hodge, to the [facility] [place]. Please see a copy of my visit note below.        [facility]  Orthopaedic Surgery, Joint Replacement Consultation  by Wiliam Grubbs M.D.    Suyapa Hodge MRN# [id number]    YOB: 1985     Requesting physician: No ref. provider found  Clarisa Palafox            Assessment and Plan:   Assessment:  37-year-old female with history of pituitary Cushing's syndrome who presents today with right hip osteonecrosis and severe degenerative changes with collapse     Plan:  After examined the patient and reviewing the imaging we extensively discussed my findings and the pathophysiology of osteonecrosis.  Given her right hip pain is severely impacting her life and job she wishes to proceed with total hip arthroplasty.  I feel this is reasonable therefore the risk and benefits were discussed including infection, bleeding, numbness, and potential for revision given her young age.  I think she be a good candidate for a direct anterior approach.  Given her longstanding history of steroid use I would also like to get survey MRIs of the left hip and bilateral knees.  Upon completion of these MRIs I like to schedule a virtual visit to review the results.  The patient will have a discussion with our nurse today for preoperative discussion.  She hopes to have the procedure done in early June.  All questions answered today and the patient will follow-up virtually to discuss the MRI findings of the left hip and bilateral knees.     Chan Sarmiento PA-C  Physician Assistant   Oncology and Adult Reconstructive Surgery  Dept Orthopaedic Surgery, [facility]     Attending MD (Dr. Wiliam Grubbs) Attestation :  I performed a history and physical exam of the patient and discussed the patient's  management with the resident. I reviewed the resident's note and agree with the documented findings and plan of care.      MD Madhu Cramer Family Professor  Oncology and Adult Reconstructive Surgery  Dept Orthopaedic Surgery, [facility]  [phone] office, [phone] pager  [email or web address]    Total combined visit time and work time before and after the clinic visit on the day of the encounter equals 30 minutes.      For additional information and frequently asked questions regarding joint replacements, scan the QR code image below on your phone camera:     or go to:   [email or web address]            History of Present Illness:   37 year old female with history of pituitary Cushing's syndrome who presents today for evaluation of her right hip osteonecrosis.  She states her Cushing syndrome was first diagnosed in 2009 and recurred in 2013.  She has history of total adrenalectomy and partial resection of her pituitary gland.  She is currently on 10 mg of prednisone daily.  Her previous orthopedic care was done by Dr. Pereira in 2019.  At that time osteonecrosis of the right femoral head was noted and recommend she wait to have total hip arthroplasty until she is not able to function at work or is limited in her activities of daily living.  She states that over the years the pain in her right hip is gotten progressively worse and is now severe with weightbearing and range of motion.  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  She presents today for discussion of a right total hip arthroplasty.      Current symptoms:  Problem: Right hip pain  Onset and duration: 10 years ago; Cushing's disease   Awakens from sleep due to sx's:  Yes  Precipitating Injury:  No    Other joints or sites painful:  Yes      Background history:  DX:  1. Osteonecrosis of right femoral head  2. Chronic kidney disease stage III  3. ACTC  hypersecretion  4. 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 History of total adrenalectomy  7. Diabetes insipidus  8. History of pituitary Cushing's syndrome  9. Hypothyroidism  10. Hypoadrenalism    TREATMENTS:  1. Total adrenalectomy  2. Partial pituitary resection    Past Surgical History:   Procedure Laterality Date     COLONOSCOPY       COLONOSCOPY WITH CO2 INSUFFLATION N/A 9/16/2021    Procedure: COLONOSCOPY, WITH CO2 INSUFFLATION;  Surgeon: Harman Pearl MD;  Location: [facility] OR     COLPOSCOPY VULVA, BIOPSY, COMBINED  10/28/2015    BEVERLEY 1, 6 month follow-up pap was normal     COLPOSCOPY,BX CERVIX/ENDOCERV CURR  04/02/2019     INSERT DRAIN LUMBAR  1/20/2014    Procedure: INSERT DRAIN LUMBAR;;  Surgeon: Soham Aquino MD;  [facility]     LAPAROSCOPIC ADRENALECTOMY  7/14/2014    Procedure: LAPAROSCOPIC ADRENALECTOMY;  Surgeon: Roni Nunn MD;  [facility]     OPTICAL TRACKING SYSTEM ENDOSCOPIC RESECTION TUMOR CRANIAL  7/1/2013    Procedure: OPTICAL TRACKING SYSTEM ENDOSCOPIC RESECTION TUMOR CRANIAL;  Stealth Assisted Endoscopic Hypophysectomy ;  Surgeon: Soham Aquino MD;  [facility]     OPTICAL TRACKING SYSTEM ENDOSCOPIC RESECTION TUMOR CRANIAL  1/20/2014    Procedure: OPTICAL TRACKING SYSTEM ENDOSCOPIC RESECTION TUMOR CRANIAL;  Stealth Assisted Endoscopic Transnasal Excision Of Pituitary Adenoma , Placement Of Lumbar Drain with c-arm;  Surgeon: Soham Aquino MD;  [facility]     OPTICAL TRACKING SYSTEM ENDOSCOPIC RESECTION TUMOR CRANIAL N/A 7/22/2021    Procedure: stealth assisted Redo endoscopic, endonasal resection of pituitary tumor;  Surgeon: Mina Huffman MD;  [facility]     removal of pituitary microademona       wisdom teeth extration                Physical Exam:     EXAMINATION pertinent findings:   PSYCH: Pleasant, healthy-appearing, alert, oriented x3, cooperative. Normal mood and  affect.  VITAL SIGNS: not currently breastfeeding..  Reviewed nursing intake notes.   There is no height or weight on file to calculate BMI.  RESP: non labored breathing   ABD: benign, soft, non-tender, no acute peritoneal findings  SKIN: grossly normal   LYMPHATIC: grossly normal, no adenopathy, no extremity edema  NEURO: grossly normal , no motor deficits  VASCULAR: satisfactory perfusion of all extremities   MUSCULOSKELETAL:   Gait: Antalgic over RLE    right Hip:  ROM  FF 90  with pain, Extension 0 , IRF 5  with pain, ERF 25 , ABD 20  with pain, ADD 5 . No pain with palpation of greater trochanter.     left Hip:  Full ROM of left hip with no pain  No  pain with palpation of greater trochanter.       Bilateral Knee exam:   o Patellar apprehension: Negative  o Effusion: Negative  o TTP Patellar tendon: Negative  o TTP at tibial tubercle: Negative  o TTP at pes insertion: Negative  o TTP medial joint line: Negative  o TTP lateral joint line: Negative  o Ligamentous exam:   - Negative laxity with Lachman, Anterior Drawer, Posterior Drawer, Varus and Valgus stress at 30 and 0 degrees of flexion.   o ROM 0-120 with no pain.                   Thigh and leg compartments soft and compressible              +Quad/TA/GSC/FHL/EHL              SILT DP/SP/Marissa/Saph/Tib nerve distributions              Palpable dorsalis pedis and posterior tibial pulses               Data:   All laboratory data reviewed  All imaging studies reviewed by me    X-ray AP pelvis bilateral hips: Osteonecrosis of right hip noted with severe degenerative changes and femoral head collapse.  Subchondral sclerosis of left hip noted with potential early signs of osteonecrosis.  No collapse noted in left hip.  No fractures or other bony abnormalities noted        X-ray 2 views left knee: Minor degenerative changes in medial compartment noted.  No signs of osteonecrosis fractures or bony abnormalities.    X-ray 2 views right knee: Minor degenerative changes  in medial compartment noted.  No signs of osteonecrosis fractures or bony abnormalities.      DATA for DOCUMENTATION:         Past Medical History:     Patient Active Problem List   Diagnosis     Pituitary Cushing's syndrome (H)     Amenorrhea     Hypothalamic-adrenal dysfunction (H)     Fatigue     Thirst     Pituitary adenoma (H)     History of benign pituitary tumor     Pituitary microadenoma (H)     Cushing syndrome (H)     Cushing's disease (H)     Diabetes insipidus (H)     Tachycardia     History of Cushing disease     Hx of total adrenalectomy (H)     Hypothyroidism     Hypoadrenalism (H)     Pituitary hypogonadism (H)     Hematologic abnormality     Estrogen deficiency     Abnormal coagulation profile     Premature menopause on hormone replacement therapy     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)     Avascular necrosis of femoral head, unspecified laterality (H)     History of colonic polyps     Family history of colon cancer     Breast hypertrophy in female     Cervical high risk HPV (human papillomavirus) test positive     ACTH hypersecretion (H)     Pituitary macroadenoma (H)     Chronic kidney disease, stage 3 (H)     Past Medical History:   Diagnosis Date     Adrenal disorder (H)      Avascular necrosis of femur head, 2010     Cervical high risk HPV (human papillomavirus) test positive 10/2015 & 3/2019    +HR HPV (NOT 16/18)     Colon polyp      Cushing syndrome (H)     pituitary microadenoma     Diabetes insipidus (H)      Fatigue      History of colposcopy 10/2015    BEVERLEY 1     Hypercalcaemia      Hypertension      Medulloadrenal hyperfunction (H)      Pulmonary emboli (H) 01/2014     Renal disease      Thyroid disease        Also see scanned health assessment forms.       Past Surgical History:     Past Surgical History:   Procedure Laterality Date     COLONOSCOPY       COLONOSCOPY WITH CO2 INSUFFLATION N/A 9/16/2021    Procedure: COLONOSCOPY, WITH CO2 INSUFFLATION;  Surgeon: Dae  Harman Alford MD;  Location: [facility] OR     COLPOSCOPY VULVA, BIOPSY, COMBINED  10/28/2015    BEVERLEY 1, 6 month follow-up pap was normal     COLPOSCOPY,BX CERVIX/ENDOCERV CURR  04/02/2019     INSERT DRAIN LUMBAR  1/20/2014    Procedure: INSERT DRAIN LUMBAR;;  Surgeon: Soham Aquino MD;  [facility]     LAPAROSCOPIC ADRENALECTOMY  7/14/2014    Procedure: LAPAROSCOPIC ADRENALECTOMY;  Surgeon: Roni Nunn MD;  [facility]     OPTICAL TRACKING SYSTEM ENDOSCOPIC RESECTION TUMOR CRANIAL  7/1/2013    Procedure: OPTICAL TRACKING SYSTEM ENDOSCOPIC RESECTION TUMOR CRANIAL;  Stealth Assisted Endoscopic Hypophysectomy ;  Surgeon: Soham Aquino MD;  [facility]     OPTICAL TRACKING SYSTEM ENDOSCOPIC RESECTION TUMOR CRANIAL  1/20/2014    Procedure: OPTICAL TRACKING SYSTEM ENDOSCOPIC RESECTION TUMOR CRANIAL;  Stealth Assisted Endoscopic Transnasal Excision Of Pituitary Adenoma , Placement Of Lumbar Drain with c-arm;  Surgeon: Soham Aquino MD;  [facility]     OPTICAL TRACKING SYSTEM ENDOSCOPIC RESECTION TUMOR CRANIAL N/A 7/22/2021    Procedure: stealth assisted Redo endoscopic, endonasal resection of pituitary tumor;  Surgeon: Mina Huffman MD;  [facility]     removal of pituitary microademona       wisdom teeth extration              Social History:     Social History     Socioeconomic History     Marital status: Single     Spouse name: Not on file     Number of children: Not on file     Years of education: Not on file     Highest education level: Not on file   Occupational History     Not on file   Tobacco Use     Smoking status: Former     Packs/day: 0.50     Years: 3.00     Pack years: 1.50     Types: Cigarettes     Start date: 10/10/2005     Quit date: 1/2/2008     Years since quitting: 15.1     Smokeless tobacco: Never   Substance and Sexual Activity     Alcohol use: Yes     Comment: 3 beers 1x/month     Drug use: No     Sexual activity: Not Currently      Partners: Male     Birth control/protection: Condom   Other Topics Concern     Parent/sibling w/ CABG, MI or angioplasty before 65F 55M? No   Social History Narrative     Not on file     Social Determinants of Health     Financial Resource Strain: Not on file   Food Insecurity: Not on file   Transportation Needs: Not on file   Physical Activity: Not on file   Stress: Not on file   Social Connections: Not on file   Intimate Partner Violence: Not on file   Housing Stability: Not on file            Family History:       Family History   Problem Relation Age of Onset     Hyperlipidemia Father      Obesity Father      Asthma Mother      Osteoporosis Mother      Obesity Mother      Allergies Sister      Obesity Sister      Cancer Maternal Grandmother 45        colon cancer     Colon Cancer Maternal Grandmother      Cancer Maternal Grandfather         lung cancer-smoker     Other Cancer Maternal Grandfather         Smoker     Cancer Paternal Grandmother         lung cancer-smoker     Other Cancer Paternal Grandmother         Smoker     Cancer Paternal Grandfather         lung cancer     Other Cancer Paternal Grandfather      Anesthesia Reaction No family hx of      Cardiovascular No family hx of      Deep Vein Thrombosis (DVT) No family hx of             Medications:     Current Outpatient Medications   Medication Sig     Calcium carb-Vitamin D 500 mg [place]-ni'-200 units (OSCAL WITH D;OYSTER SHELL CALCIUM) 500-200 MG-UNIT per tablet Take 1 tablet by mouth 2 times daily (with meals)     desmopressin (DDAVP) 0.1 MG tablet Take 1 tablet (100 mcg) by mouth 2 times daily     drospirenone-ethinyl estradiol (HETCOR) 3-0.02 MG tablet Take 1 tablet by mouth daily     fludrocortisone (FLORINEF) 0.1 MG tablet TAKE 1 TABLET BY MOUTH ONCE DAILY     fluticasone (FLONASE) 50 MCG/ACT nasal spray Spray 1 spray into both nostrils daily     hydrocortisone (CORTEF) 10 MG tablet TAKE 1 TABLET EVERY MORNING, 1/2 TABLET IN THE EVENING PLUS SICK DAY  SUPPLY AS DIRECTED.     hydrocortisone sodium succinate PF (SOLU-CORTEF) 100 MG injection Inject 2 mLs (100 mg) into the vein once as needed (use when you cannot tolerate po intake.) Dispense Act-O-Vial     ibuprofen (ADVIL/MOTRIN) 200 MG capsule Take 3 capsules (600 mg) by mouth every 4 hours as needed for fever     levothyroxine (SYNTHROID/LEVOTHROID) 75 MCG tablet TAKE 1 TABLET BY MOUTH ONCE DAILY 6 DAYS A WEEKS AND TAKE NOTHING ON THE 7TH DAY EACH WEEK     losartan (COZAAR) 25 MG tablet Take 0.5 tablets (12.5 mg) by mouth daily (Patient not taking: Reported on 11/15/2022)     methocarbamol (ROBAXIN) 500 MG tablet Take 1-2 tablets (500-1,000 mg) by mouth nightly as needed for muscle spasms     Multiple Vitamin (MULTI-VITAMIN DAILY PO) Take 1 tablet by mouth daily      Octreotide Acetate (MYCAPSSA) 20 MG CPDR Take 20 mg by mouth daily     pseudoePHEDrine (SUDAFED) 60 MG tablet Take 1 tablet (60 mg) by mouth every 4 hours as needed for congestion     sodium chloride (OCEAN) 0.65 % nasal spray Apply in both nostrils as needed for congestion     triamcinolone (KENALOG) 0.1 % external cream Apply topically 2 times daily As needed for flares (Patient not taking: Reported on 1/4/2023)     No current facility-administered medications for this visit.              Review of Systems:   A comprehensive 10 point review of systems (constitutional, ENT, cardiac, peripheral vascular, lymphatic, respiratory, GI, , Musculoskeletal, skin, Neurological) was performed and found to be negative except as described in this note.       HOOS Hip Dysfunction & Osteoarthritis Outcome Questionnaire    Hip Dysfunction & Osteoarthritis Outcome Score (HOOS), English Version LK 2.0 (Kristal Callaway, Rosalba HALL, 2003) 2/9/2023   S1. Do you feel grinding, hear clicking or any other type of noise from your hip? Never   S2. Difficulties spreading legs wide apart Severe   S3. Difficulties to stride out when walking Severe   S4. How severe is  your hip joint stiffness after first wakening in the morning? Extreme   S5. How severe is your hip stiffness after sitting, lying or resting LATER IN THE DAY? Severe   Symptom Count 5   Symptom Sum 13   Symptom Mean 2.6   Symptom Subscale Score 35   P1. How often is your hip painful? Always   P2. Straightening your hip fully Severe   P3. Bending your hip FULLY Moderate   P4. Walking on a flat surface Severe   P5. Going up or down stairs Extreme   P6. At night while in bed Severe   P7. Sitting or lying Severe   P8. Standing upright Severe   P9. Walking on a hard surface (asphalt, concrete, etc.) Extreme   P10. Walking on an uneven surface Extreme   Pain Count 10   Pain Sum 33   Pain Mean 3.3   Pain Subscale Score 17.5   A1. Descending stairs Moderate   A2. Ascending stairs Extreme   A3. Rising from sitting Extreme   A4. Standing Severe   A5. Bending to the floor/ an object Severe   A6. Walking on a flat surface Severe   A7. Getting in/out of car Severe   A8. Going shopping Severe   A9. Putting on socks/stockings Severe   A10. Rising from bed Severe   A11. Taking off socks/stockings Moderate   A12. Lying in bed (turning over, maintaining hip position) Severe   A13. Getting in/out of bed Severe   A14. Sitting Severe   A15. Getting on/off toilet Severe   A16. Heavy domestic duties (moving heavy boxes, scrubbing floors, etc.) Extreme   A17. Light domestic duties (cooking, dusting, etc.) Moderate   ADL Count 17   ADL Sum 51   ADL Mean 3   ADL Subscale Score 25   SP1. Squatting Severe   SP2. Running Extreme   SP3. Twisting/pivoting on loaded leg Severe   SP4. Walking on uneven surface Severe   Sports/Rec Count 4   Sports/Rec Sum 13   Sports/Rec Mean 3.25   Sports/Rec Subscale Score 18.75   Q1. How often are you aware of your hip problem? Constantly   Q2. Have you modified you life style to avoid activities potentially damaging to your hip? Totally   Q3. How much are you troubled with lack of confidence in your hip?  Extremely   Q4. In general, how much difficulty do you have with your hip? Extreme   QOL Count 4   QOL Sum 16   QOL Mean 4   Quality of Life Subscale Score 0              [unfilled]    KOOS Knee Survey Assessment    No flowsheet data found.           Promis 10 Assessment    PROMIS 10 2/9/2023   In general, would you say your health is: Very good   In general, would you say your quality of life is: Good   In general, how would you rate your physical health? Good   In general, how would you rate your mental health, including your mood and your ability to think? Very good   In general, how would you rate your satisfaction with your social activities and relationships? Fair   In general, please rate how well you carry out your usual social activities and roles Good   To what extent are you able to carry out your everyday physical activities such as walking, climbing stairs, carrying groceries, or moving a chair? Mostly   How often have you been bothered by emotional problems such as feeling anxious, depressed or irritable? Rarely   How would you rate your fatigue on average? Moderate   How would you rate your pain on average?   0 = No Pain  to  10 = Worst Imaginable Pain 7   In general, would you say your health is: 4   In general, would you say your quality of life is: 3   In general, how would you rate your physical health? 3   In general, how would you rate your mental health, including your mood and your ability to think? 4   In general, how would you rate your satisfaction with your social activities and relationships? 2   In general, please rate how well you carry out your usual social activities and roles. (This includes activities at home, at work and in your community, and responsibilities as a parent, child, spouse, employee, friend, etc.) 3   To what extent are you able to carry out your everyday physical activities such as walking, climbing stairs, carrying groceries, or moving a chair? 4   In the past 7  days, how often have you been bothered by emotional problems such as feeling anxious, depressed, or irritable? 2   In the past 7 days, how would you rate your fatigue on average? 3   In the past 7 days, how would you rate your pain on average, where 0 means no pain, and 10 means worst imaginable pain? 7   Global Mental Health Score 13   Global Physical Health Score 12   PROMIS TOTAL - SUBSCORES 25   Some recent data might be hidden              Or[facility] [place] Knee Questionnaire    No flowsheet data found.             See intake form completed by patient

## 2023-02-09 NOTE — PROGRESS NOTES
[facility]  Orthopaedic Surgery, Joint Replacement Consultation  by Wiliam Grubbs M.D.    Suyapa Hodge MRN# [id number]    YOB: 1985     Requesting physician: No ref. provider found  Clarisa Palafox            Assessment and Plan:   Assessment:  37-year-old female with history of pituitary Cushing's syndrome who presents today with right hip osteonecrosis and severe degenerative changes with collapse     Plan:  After examined the patient and reviewing the imaging we extensively discussed my findings and the pathophysiology of osteonecrosis.  Given her right hip pain is severely impacting her life and job she wishes to proceed with total hip arthroplasty.  I feel this is reasonable therefore the risk and benefits were discussed including infection, bleeding, numbness, and potential for revision given her young age.  I think she be a good candidate for a direct anterior approach.  Given her longstanding history of steroid use I would also like to get survey MRIs of the left hip and bilateral knees.  Upon completion of these MRIs I like to schedule a virtual visit to review the results.  The patient will have a discussion with our nurse today for preoperative discussion.  She hopes to have the procedure done in early June.  All questions answered today and the patient will follow-up virtually to discuss the MRI findings of the left hip and bilateral knees.     Chan Sarmiento PA-C  Physician Assistant   Oncology and Adult Reconstructive Surgery  Dept Orthopaedic Surgery, [facility]     Attending MD (Dr. Wiliam Grubbs) Attestation :  I performed a history and physical exam of the patient and discussed the patient's management with the resident. I reviewed the resident's note and agree with the documented findings and plan of care.      MD Madhu Cramer  Oncology and Adult Reconstructive Surgery  Dept Orthopaedic Surgery, [facility]  [facility]icians  [phone] office, [phone] pager  [email or web address]    Total combined visit time and work time before and after the clinic visit on the day of the encounter equals 30 minutes.      For additional information and frequently asked questions regarding joint replacements, scan the QR code image below on your phone camera:     or go to:   [email or web address]            History of Present Illness:   37 year old female with history of pituitary Cushing's syndrome who presents today for evaluation of her right hip osteonecrosis.  She states her Cushing syndrome was first diagnosed in 2009 and recurred in 2013.  She has history of total adrenalectomy and partial resection of her pituitary gland.  She is currently on 10 mg of prednisone daily.  Her previous orthopedic care was done by Dr. Pereira in 2019.  At that time osteonecrosis of the right femoral head was noted and recommend she wait to have total hip arthroplasty until she is not able to function at work or is limited in her activities of daily living.  She states that over the years the pain in her right hip is gotten progressively worse and is now severe with weightbearing and range of motion.  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  She presents today for discussion of a right total hip arthroplasty.      Current symptoms:  Problem: Right hip pain  Onset and duration: 10 years ago; Cushing's disease   Awakens from sleep due to sx's:  Yes  Precipitating Injury:  No    Other joints or sites painful:  Yes      Background history:  DX:  1. Osteonecrosis of right femoral head  2. Chronic kidney disease stage III  3. ACTC hypersecretion  4. 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 History of total adrenalectomy  7. Diabetes insipidus  8. History of pituitary Cushing's  syndrome  9. Hypothyroidism  10. Hypoadrenalism    TREATMENTS:  1. Total adrenalectomy  2. Partial pituitary resection    Past Surgical History:   Procedure Laterality Date     COLONOSCOPY       COLONOSCOPY WITH CO2 INSUFFLATION N/A 9/16/2021    Procedure: COLONOSCOPY, WITH CO2 INSUFFLATION;  Surgeon: Harman Pearl MD;  Location: [facility] OR     COLPOSCOPY VULVA, BIOPSY, COMBINED  10/28/2015    BEVERLEY 1, 6 month follow-up pap was normal     COLPOSCOPY,BX CERVIX/ENDOCERV CURR  04/02/2019     INSERT DRAIN LUMBAR  1/20/2014    Procedure: INSERT DRAIN LUMBAR;;  Surgeon: Soham Aquino MD;  [facility]: [facility] OR     LAPAROSCOPIC ADRENALECTOMY  7/14/2014    Procedure: LAPAROSCOPIC ADRENALECTOMY;  Surgeon: Roni Nunn MD;  [facility] OR     OPTICAL TRACKING SYSTEM ENDOSCOPIC RESECTION TUMOR CRANIAL  7/1/2013    Procedure: OPTICAL TRACKING SYSTEM ENDOSCOPIC RESECTION TUMOR CRANIAL;  Stealth Assisted Endoscopic Hypophysectomy ;  Surgeon: Soham Aquino MD;  Location: [facility] OR     OPTICAL TRACKING SYSTEM ENDOSCOPIC RESECTION TUMOR CRANIAL  1/20/2014    Procedure: OPTICAL TRACKING SYSTEM ENDOSCOPIC RESECTION TUMOR CRANIAL;  Stealth Assisted Endoscopic Transnasal Excision Of Pituitary Adenoma , Placement Of Lumbar Drain with c-arm;  Surgeon: Soham Aquino MD;  [facility]     OPTICAL TRACKING SYSTEM ENDOSCOPIC RESECTION TUMOR CRANIAL N/A 7/22/2021    Procedure: stealth assisted Redo endoscopic, endonasal resection of pituitary tumor;  Surgeon: Mina Huffman MD;  Location: [facility] OR     removal of pituitary microademona       wisdom teeth extration                Physical Exam:     EXAMINATION pertinent findings:   PSYCH: Pleasant, healthy-appearing, alert, oriented x3, cooperative. Normal mood and affect.  VITAL SIGNS: not currently breastfeeding..  Reviewed nursing intake notes.   There is no height or weight on file to calculate BMI.  RESP: non labored breathing   ABD: benign,  soft, non-tender, no acute peritoneal findings  SKIN: grossly normal   LYMPHATIC: grossly normal, no adenopathy, no extremity edema  NEURO: grossly normal , no motor deficits  VASCULAR: satisfactory perfusion of all extremities   MUSCULOSKELETAL:   Gait: Antalgic over RLE    right Hip:  ROM  FF 90  with pain, Extension 0 , IRF 5  with pain, ERF 25 , ABD 20  with pain, ADD 5 . No pain with palpation of greater trochanter.     left Hip:  Full ROM of left hip with no pain  No  pain with palpation of greater trochanter.       Bilateral Knee exam:   o Patellar apprehension: Negative  o Effusion: Negative  o TTP Patellar tendon: Negative  o TTP at tibial tubercle: Negative  o TTP at pes insertion: Negative  o TTP medial joint line: Negative  o TTP lateral joint line: Negative  o Ligamentous exam:   - Negative laxity with Lachman, Anterior Drawer, Posterior Drawer, Varus and Valgus stress at 30 and 0 degrees of flexion.   o ROM 0-120 with no pain.                   Thigh and leg compartments soft and compressible              +Quad/TA/GSC/FHL/EHL              SILT DP/SP/Marissa/Saph/Tib nerve distributions              Palpable dorsalis pedis and posterior tibial pulses               Data:   All laboratory data reviewed  All imaging studies reviewed by me    X-ray AP pelvis bilateral hips: Osteonecrosis of right hip noted with severe degenerative changes and femoral head collapse.  Subchondral sclerosis of left hip noted with potential early signs of osteonecrosis.  No collapse noted in left hip.  No fractures or other bony abnormalities noted        X-ray 2 views left knee: Minor degenerative changes in medial compartment noted.  No signs of osteonecrosis fractures or bony abnormalities.    X-ray 2 views right knee: Minor degenerative changes in medial compartment noted.  No signs of osteonecrosis fractures or bony abnormalities.      DATA for DOCUMENTATION:         Past Medical History:     Patient Active Problem List    Diagnosis     Pituitary Cushing's syndrome (H)     Amenorrhea     Hypothalamic-adrenal dysfunction (H)     Fatigue     Thirst     Pituitary adenoma (H)     History of benign pituitary tumor     Pituitary microadenoma (H)     Cushing syndrome (H)     Cushing's disease (H)     Diabetes insipidus (H)     Tachycardia     History of Cushing disease     Hx of total adrenalectomy (H)     Hypothyroidism     Hypoadrenalism (H)     Pituitary hypogonadism (H)     Hematologic abnormality     Estrogen deficiency     Abnormal coagulation profile     Premature menopause on hormone replacement therapy     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)     Avascular necrosis of femoral head, unspecified laterality (H)     History of colonic polyps     Family history of colon cancer     Breast hypertrophy in female     Cervical high risk HPV (human papillomavirus) test positive     ACTH hypersecretion (H)     Pituitary macroadenoma (H)     Chronic kidney disease, stage 3 (H)     Past Medical History:   Diagnosis Date     Adrenal disorder (H)      Avascular necrosis of femur head, 2010     Cervical high risk HPV (human papillomavirus) test positive 10/2015 & 3/2019    +HR HPV (NOT 16/18)     Colon polyp      Cushing syndrome (H)     pituitary microadenoma     Diabetes insipidus (H)      Fatigue      History of colposcopy 10/2015    BEVERLEY 1     Hypercalcaemia      Hypertension      Medulloadrenal hyperfunction (H)      Pulmonary emboli (H) 01/2014     Renal disease      Thyroid disease        Also see scanned health assessment forms.       Past Surgical History:     Past Surgical History:   Procedure Laterality Date     COLONOSCOPY       COLONOSCOPY WITH CO2 INSUFFLATION N/A 9/16/2021    Procedure: COLONOSCOPY, WITH CO2 INSUFFLATION;  Surgeon: Harman Pearl MD;  Location: [facility] OR     COLPOSCOPY VULVA, BIOPSY, COMBINED  10/28/2015    BEVERLEY 1, 6 month follow-up pap was normal     COLPOSCOPY,BX CERVIX/ENDOCERV CURR  04/02/2019      INSERT DRAIN LUMBAR  1/20/2014    Procedure: INSERT DRAIN LUMBAR;;  Surgeon: Soham Aquino MD;  [facility]     LAPAROSCOPIC ADRENALECTOMY  7/14/2014    Procedure: LAPAROSCOPIC ADRENALECTOMY;  Surgeon: Roni Nunn MD;  [facility]     OPTICAL TRACKING SYSTEM ENDOSCOPIC RESECTION TUMOR CRANIAL  7/1/2013    Procedure: OPTICAL TRACKING SYSTEM ENDOSCOPIC RESECTION TUMOR CRANIAL;  Stealth Assisted Endoscopic Hypophysectomy ;  Surgeon: Soham Aquino MD;  [facility]     OPTICAL TRACKING SYSTEM ENDOSCOPIC RESECTION TUMOR CRANIAL  1/20/2014    Procedure: OPTICAL TRACKING SYSTEM ENDOSCOPIC RESECTION TUMOR CRANIAL;  Stealth Assisted Endoscopic Transnasal Excision Of Pituitary Adenoma , Placement Of Lumbar Drain with c-arm;  Surgeon: Soham Aquino MD;  [facility]     OPTICAL TRACKING SYSTEM ENDOSCOPIC RESECTION TUMOR CRANIAL N/A 7/22/2021    Procedure: stealth assisted Redo endoscopic, endonasal resection of pituitary tumor;  Surgeon: Mina Huffman MD;  [facility]: [facility] OR     removal of pituitary microademona       wisdom teeth extration              Social History:     Social History     Socioeconomic History     Marital status: Single     Spouse name: Not on file     Number of children: Not on file     Years of education: Not on file     Highest education level: Not on file   Occupational History     Not on file   Tobacco Use     Smoking status: Former     Packs/day: 0.50     Years: 3.00     Pack years: 1.50     Types: Cigarettes     Start date: 10/10/2005     Quit date: 1/2/2008     Years since quitting: 15.1     Smokeless tobacco: Never   Substance and Sexual Activity     Alcohol use: Yes     Comment: 3 beers 1x/month     Drug use: No     Sexual activity: Not Currently     Partners: Male     Birth control/protection: Condom   Other Topics Concern     Parent/sibling w/ CABG, MI or angioplasty before 65F 55M? No   Social History Narrative     Not on file      Social Determinants of Health     Financial Resource Strain: Not on file   Food Insecurity: Not on file   Transportation Needs: Not on file   Physical Activity: Not on file   Stress: Not on file   Social Connections: Not on file   Intimate Partner Violence: Not on file   Housing Stability: Not on file            Family History:       Family History   Problem Relation Age of Onset     Hyperlipidemia Father      Obesity Father      Asthma Mother      Osteoporosis Mother      Obesity Mother      Allergies Sister      Obesity Sister      Cancer Maternal Grandmother 45        colon cancer     Colon Cancer Maternal Grandmother      Cancer Maternal Grandfather         lung cancer-smoker     Other Cancer Maternal Grandfather         Smoker     Cancer Paternal Grandmother         lung cancer-smoker     Other Cancer Paternal Grandmother         Smoker     Cancer Paternal Grandfather         lung cancer     Other Cancer Paternal Grandfather      Anesthesia Reaction No family hx of      Cardiovascular No family hx of      Deep Vein Thrombosis (DVT) No family hx of             Medications:     Current Outpatient Medications   Medication Sig     Calcium carb-Vitamin D 500 mg [place]-200 units (OSCAL WITH D;OYSTER SHELL CALCIUM) 500-200 MG-UNIT per tablet Take 1 tablet by mouth 2 times daily (with meals)     desmopressin (DDAVP) 0.1 MG tablet Take 1 tablet (100 mcg) by mouth 2 times daily     drospirenone-ethinyl estradiol (HECTOR) 3-0.02 MG tablet Take 1 tablet by mouth daily     fludrocortisone (FLORINEF) 0.1 MG tablet TAKE 1 TABLET BY MOUTH ONCE DAILY     fluticasone (FLONASE) 50 MCG/ACT nasal spray Spray 1 spray into both nostrils daily     hydrocortisone (CORTEF) 10 MG tablet TAKE 1 TABLET EVERY MORNING, 1/2 TABLET IN THE EVENING PLUS SICK DAY SUPPLY AS DIRECTED.     hydrocortisone sodium succinate PF (SOLU-CORTEF) 100 MG injection Inject 2 mLs (100 mg) into the vein once as needed (use when you cannot tolerate po intake.)  Dispense Act-O-Vial     ibuprofen (ADVIL/MOTRIN) 200 MG capsule Take 3 capsules (600 mg) by mouth every 4 hours as needed for fever     levothyroxine (SYNTHROID/LEVOTHROID) 75 MCG tablet TAKE 1 TABLET BY MOUTH ONCE DAILY 6 DAYS A WEEKS AND TAKE NOTHING ON THE 7TH DAY EACH WEEK     losartan (COZAAR) 25 MG tablet Take 0.5 tablets (12.5 mg) by mouth daily (Patient not taking: Reported on 11/15/2022)     methocarbamol (ROBAXIN) 500 MG tablet Take 1-2 tablets (500-1,000 mg) by mouth nightly as needed for muscle spasms     Multiple Vitamin (MULTI-VITAMIN DAILY PO) Take 1 tablet by mouth daily      Octreotide Acetate (MYCAPSSA) 20 MG CPDR Take 20 mg by mouth daily     pseudoePHEDrine (SUDAFED) 60 MG tablet Take 1 tablet (60 mg) by mouth every 4 hours as needed for congestion     sodium chloride (OCEAN) 0.65 % nasal spray Apply in both nostrils as needed for congestion     triamcinolone (KENALOG) 0.1 % external cream Apply topically 2 times daily As needed for flares (Patient not taking: Reported on 1/4/2023)     No current facility-administered medications for this visit.              Review of Systems:   A comprehensive 10 point review of systems (constitutional, ENT, cardiac, peripheral vascular, lymphatic, respiratory, GI, , Musculoskeletal, skin, Neurological) was performed and found to be negative except as described in this note.       HOOS Hip Dysfunction & Osteoarthritis Outcome Questionnaire    Hip Dysfunction & Osteoarthritis Outcome Score (HOOS), English Version LK 2.0 (Kristal Callaway, Rosalba CHOI., 2003) 2/9/2023   S1. Do you feel grinding, hear clicking or any other type of noise from your hip? Never   S2. Difficulties spreading legs wide apart Severe   S3. Difficulties to stride out when walking Severe   S4. How severe is your hip joint stiffness after first wakening in the morning? Extreme   S5. How severe is your hip stiffness after sitting, lying or resting LATER IN THE DAY? Severe   Symptom Count 5    Symptom Sum 13   Symptom Mean 2.6   Symptom Subscale Score 35   P1. How often is your hip painful? Always   P2. Straightening your hip fully Severe   P3. Bending your hip FULLY Moderate   P4. Walking on a flat surface Severe   P5. Going up or down stairs Extreme   P6. At night while in bed Severe   P7. Sitting or lying Severe   P8. Standing upright Severe   P9. Walking on a hard surface (asphalt, concrete, etc.) Extreme   P10. Walking on an uneven surface Extreme   Pain Count 10   Pain Sum 33   Pain Mean 3.3   Pain Subscale Score 17.5   A1. Descending stairs Moderate   A2. Ascending stairs Extreme   A3. Rising from sitting Extreme   A4. Standing Severe   A5. Bending to the floor/ an object Severe   A6. Walking on a flat surface Severe   A7. Getting in/out of car Severe   A8. Going shopping Severe   A9. Putting on socks/stockings Severe   A10. Rising from bed Severe   A11. Taking off socks/stockings Moderate   A12. Lying in bed (turning over, maintaining hip position) Severe   A13. Getting in/out of bed Severe   A14. Sitting Severe   A15. Getting on/off toilet Severe   A16. Heavy domestic duties (moving heavy boxes, scrubbing floors, etc.) Extreme   A17. Light domestic duties (cooking, dusting, etc.) Moderate   ADL Count 17   ADL Sum 51   ADL Mean 3   ADL Subscale Score 25   SP1. Squatting Severe   SP2. Running Extreme   SP3. Twisting/pivoting on loaded leg Severe   SP4. Walking on uneven surface Severe   Sports/Rec Count 4   Sports/Rec Sum 13   Sports/Rec Mean 3.25   Sports/Rec Subscale Score 18.75   Q1. How often are you aware of your hip problem? Constantly   Q2. Have you modified you life style to avoid activities potentially damaging to your hip? Totally   Q3. How much are you troubled with lack of confidence in your hip? Extremely   Q4. In general, how much difficulty do you have with your hip? Extreme   QOL Count 4   QOL Sum 16   QOL Mean 4   Quality of Life Subscale Score 0               [unfilled]    KOCATHRYN Knee Survey Assessment    No flowsheet data found.           Promis 10 Assessment    PROMIS 10 2/9/2023   In general, would you say your health is: Very good   In general, would you say your quality of life is: Good   In general, how would you rate your physical health? Good   In general, how would you rate your mental health, including your mood and your ability to think? Very good   In general, how would you rate your satisfaction with your social activities and relationships? Fair   In general, please rate how well you carry out your usual social activities and roles Good   To what extent are you able to carry out your everyday physical activities such as walking, climbing stairs, carrying groceries, or moving a chair? Mostly   How often have you been bothered by emotional problems such as feeling anxious, depressed or irritable? Rarely   How would you rate your fatigue on average? Moderate   How would you rate your pain on average?   0 = No Pain  to  10 = Worst Imaginable Pain 7   In general, would you say your health is: 4   In general, would you say your quality of life is: 3   In general, how would you rate your physical health? 3   In general, how would you rate your mental health, including your mood and your ability to think? 4   In general, how would you rate your satisfaction with your social activities and relationships? 2   In general, please rate how well you carry out your usual social activities and roles. (This includes activities at home, at work and in your community, and responsibilities as a parent, child, spouse, employee, friend, etc.) 3   To what extent are you able to carry out your everyday physical activities such as walking, climbing stairs, carrying groceries, or moving a chair? 4   In the past 7 days, how often have you been bothered by emotional problems such as feeling anxious, depressed, or irritable? 2   In the past 7 days, how would you rate your  fatigue on average? 3   In the past 7 days, how would you rate your pain on average, where 0 means no pain, and 10 means worst imaginable pain? 7   Global Mental Health Score 13   Global Physical Health Score 12   PROMIS TOTAL - SUBSCORES 25   Some recent data might be hidden              Ortho [place] Knee Questionnaire    No flowsheet data found.             See intake form completed by patient

## 2023-02-09 NOTE — NURSING NOTE
Pre-Op Teaching was done in person at the clinic.    Teaching Flowsheet   Relevant Diagnosis: Pre-Op Teaching  Teaching Topic:R ZAHEER Pre-Op     Person(s) involved in teaching:   Patient     Motivation Level:  Asks Questions: Yes  Eager to Learn: Yes  Cooperative: Yes  Receptive (willing/able to accept information): Yes  Any cultural factors/Mormon beliefs that may influence understanding or compliance? No     Patient demonstrates understanding of the following:  Reason for the appointment, diagnosis and treatment plan: Yes  Knowledge of proper use of medications and conditions for which they are ordered (with special attention to potential side effects or drug interactions): Yes  Which situations necessitate calling provider and whom to contact: Yes- discussed the stoplight tool to help assist with this.      Teaching Concerns Addressed:      Proper use of surgical scrub explain and provided to patient.    Nutritional needs and diet plan: Yes  Pain management techniques: Yes  Wound Care: Yes  How and/when to access community resources: Yes     Instructional Materials Used/Given:  2 bottle of chlorhexidine, a surgery packet and a joint booklet given to patient in clinic     - Important contact info/ phone numbers  - Map/ location of surgery  - Medications to avoid  - Showering instructions  - Stop light tool  - Your Guide to Joint Replacement Booklet   - Antibiotic post op before every dentist appointment, procedure, or surgery for the rest of their life.     Additionally the following was discussed with patient:  - Mother (Alicia), [phone], will be driving the patient to surgery and able to pick the patient up after discharge and staying with them for 4-5 days after surgery.  - Need to schedule a dentist appointment and get done any recommended dental work prior to surgery.   - Online joint replacement call and the use of [employer] to send educational messages.        -Next step: Surgery 5/30 and schedule pre-op  with PCP.    Time spent with patient: 15 minutes.

## 2023-02-13 ENCOUNTER — TELEPHONE (OUTPATIENT)
Dept: PHARMACY | Facility: CLINIC | Age: 38
End: 2023-02-13
Payer: COMMERCIAL

## 2023-02-13 NOTE — TELEPHONE ENCOUNTER
Li Hart     3:42 PM  Note  Called plan was informed [employer] appeal has been upheld, per rep the appeal letter has been faxed to clinic on 2/2/2023.        February 1, 2023  Brenda Mehta     9:40 AM  Note  Received fax from clinic with 2 new journal articles based on in the denial.   Faxed articles along with pa denial and previous appeal letter to [phone] 02/01/2023 938am cover plus 26 pages

## 2023-02-21 ENCOUNTER — TELEPHONE (OUTPATIENT)
Dept: ENDOCRINOLOGY | Facility: CLINIC | Age: 38
End: 2023-02-21
Payer: COMMERCIAL

## 2023-02-21 DIAGNOSIS — D35.2 PITUITARY ADENOMA (H): Primary | ICD-10-CM

## 2023-02-21 NOTE — TELEPHONE ENCOUNTER
The PA  And appeal were denied for [employer] pill form . Are you wanting to send a 2nd appeal  ? The information we have hasn't changed  So not sure what we would appeal . Mya Carbajal RN on 2/21/2023 at 2:28 PM

## 2023-02-22 ENCOUNTER — MYC MEDICAL ADVICE (OUTPATIENT)
Dept: ENDOCRINOLOGY | Facility: CLINIC | Age: 38
End: 2023-02-22
Payer: COMMERCIAL

## 2023-02-22 RX ORDER — CABERGOLINE 0.5 MG/1
0.25 TABLET ORAL WEEKLY
Qty: 12 TABLET | Refills: 3 | Status: SHIPPED | OUTPATIENT
Start: 2023-02-22 | End: 2023-09-07

## 2023-02-22 NOTE — TELEPHONE ENCOUNTER
I discussed with the patient about not approved mycapssa, we will try cabergoline 0.5 mg weekly     Adriana Wilkinson MD

## 2023-02-23 ENCOUNTER — LAB (OUTPATIENT)
Dept: LAB | Facility: CLINIC | Age: 38
End: 2023-02-23
Payer: COMMERCIAL

## 2023-02-23 ENCOUNTER — ANCILLARY PROCEDURE (OUTPATIENT)
Dept: MRI IMAGING | Facility: CLINIC | Age: 38
End: 2023-02-23
Attending: PHYSICIAN ASSISTANT
Payer: COMMERCIAL

## 2023-02-23 DIAGNOSIS — M87.9 OSTEONECROSIS (H): ICD-10-CM

## 2023-02-23 DIAGNOSIS — E24.0 PITUITARY DEPENDENT CUSHING DISEASE (H): ICD-10-CM

## 2023-02-23 DIAGNOSIS — D35.2 PITUITARY ADENOMA (H): ICD-10-CM

## 2023-02-23 LAB
CORTIS SERPL-MCNC: 0.8 UG/DL
T4 FREE SERPL-MCNC: 0.24 NG/DL (ref 0.9–1.7)
TSH SERPL DL<=0.005 MIU/L-ACNC: 3.96 UIU/ML (ref 0.3–4.2)

## 2023-02-23 PROCEDURE — 82533 TOTAL CORTISOL: CPT | Performed by: INTERNAL MEDICINE

## 2023-02-23 PROCEDURE — 36415 COLL VENOUS BLD VENIPUNCTURE: CPT | Performed by: PATHOLOGY

## 2023-02-23 PROCEDURE — 82024 ASSAY OF ACTH: CPT | Performed by: INTERNAL MEDICINE

## 2023-02-23 PROCEDURE — 84443 ASSAY THYROID STIM HORMONE: CPT | Performed by: PATHOLOGY

## 2023-02-23 PROCEDURE — 84439 ASSAY OF FREE THYROXINE: CPT | Performed by: PATHOLOGY

## 2023-02-23 PROCEDURE — 73721 MRI JNT OF LWR EXTRE W/O DYE: CPT | Mod: LT | Performed by: RADIOLOGY

## 2023-02-23 PROCEDURE — 73721 MRI JNT OF LWR EXTRE W/O DYE: CPT | Mod: RT | Performed by: RADIOLOGY

## 2023-02-24 LAB — ACTH PLAS-MCNC: 351 PG/ML

## 2023-02-28 ASSESSMENT — HOOS S4: HOW SEVERE IS YOUR HIP JOINT STIFFNESS AFTER FIRST WAKENING IN THE MORNING?: SEVERE

## 2023-02-28 ASSESSMENT — ACTIVITIES OF DAILY LIVING (ADL)
ADL_SUBSCALE_SCORE: 41.18
ADL_MEAN: 2.35
ADL_SUM: 40

## 2023-03-02 ENCOUNTER — VIRTUAL VISIT (OUTPATIENT)
Dept: ORTHOPEDICS | Facility: CLINIC | Age: 38
End: 2023-03-02
Payer: COMMERCIAL

## 2023-03-02 DIAGNOSIS — M87.059 AVASCULAR NECROSIS OF FEMORAL HEAD, UNSPECIFIED LATERALITY (H): Primary | ICD-10-CM

## 2023-03-02 PROCEDURE — 99214 OFFICE O/P EST MOD 30 MIN: CPT | Mod: GT | Performed by: ORTHOPAEDIC SURGERY

## 2023-03-02 NOTE — LETTER
3/2/2023         RE: Suyapa Hodge  [address] [facility] [address]        Dear Colleague,    Thank you for referring your patient, Suyapa Hodge, to the [facility] [place]. Please see a copy of my visit note below.         [facility]  Orthopaedic Surgery, Joint Replacement Consultation  by Wiliam Grubbs M.D.     Suyapa Hodge MRN# [id number]     YOB: 1985      Requesting physician: No ref. provider found  Clarisa Palafox       Azalea is seen today virtually to review the MRI results of her knees and her left hip joint.  She has known severe Arco stage IV osteonecrosis of the right femoral head.           Assessment and Plan:   Assessment:  1. Right femoral head hip osteonecrosis and severe degenerative changes with collapse. Arco stage IV.  Steroid related secondary to Cushing's disease.  Advised patient to proceed with right total of arthroplasty, direct anterior approach.  2. Left femoral head osteonecrosis Arco stage II.  She is a candidate to undergo core decompression with bone marrow aspirate concentrate and cellular grafting.  She also is a candidate to undergo our MRI study prospectively assessing advanced MRI techniques to evaluate osteonecrosis.  I discussed this trial with her and she indicated interest in enrolling once the trial is open.  3. No evidence of osteonecrosis in either knee joint.       Plan:  1. R hip ZAHEER, direct anterior.  We discussed the risk benefits and alternatives to total hip arthroplasty and she is excellent understanding would like to proceed.  2. L femoral head core decompression and BMAC.  Enroll on MOST osteonecrosis trial.      MD Madhu Cramer Family Professor  Oncology and Adult Reconstructive Surgery  Dept Orthopaedic Surgery, [facility]  [phone] office, [phone] pager  [email or web address]       Virtual-Visit Details    Type of service:  Video Visit  Visit total duration  (including visit time, pre and post visit work time as documented above on the same day of service): 20    Video start time: 1440  Video end time: 3:03 PM  Originating Location (pt. Location): Home  Distant Location (provider location):  [facility]   Platform used for Virtual Visit: Shilpa                 History of Present Illness:   37 year old female with history of pituitary Cushing's syndrome who presents today for evaluation of her right hip osteonecrosis.  She states her Cushing syndrome was first diagnosed in 2009 and recurred in 2013.  She has history of total adrenalectomy and partial resection of her pituitary gland.  She is currently on 10 mg of prednisone daily.  Her previous orthopedic care was done by Dr. Pereira in 2019.  At that time osteonecrosis of the right femoral head was noted and recommend she wait to have total hip arthroplasty until she is not able to function at work or is limited in her activities of daily living.  She states that over the years the pain in her right hip is gotten progressively worse and is now severe with weightbearing and range of motion.  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  She presents today for discussion of a right total hip arthroplasty.        Current symptoms:  Problem: Right hip pain  Onset and duration: 10 years ago; Cushing's disease   Awakens from sleep due to sx's:  Yes  Precipitating Injury:  No    Other joints or sites painful:  Yes        Background history:  DX:  1. Osteonecrosis of right femoral head  2. Chronic kidney disease stage III  3. ACTC hypersecretion  4. 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 History of total adrenalectomy  7. Diabetes insipidus  8. History of pituitary Cushing's syndrome  9. Hypothyroidism  10. Hypoadrenalism     TREATMENTS:  1. Total adrenalectomy  2. Partial pituitary resection     Past Surgical History          Past Surgical History:   Procedure Laterality Date     COLONOSCOPY         COLONOSCOPY WITH CO2 INSUFFLATION N/A 9/16/2021     Procedure: COLONOSCOPY, WITH CO2 INSUFFLATION;  Surgeon: Harman Pearl MD;  Location: [facility] OR     COLPOSCOPY VULVA, BIOPSY, COMBINED   10/28/2015     BEVERLEY 1, 6 month follow-up pap was normal     COLPOSCOPY,BX CERVIX/ENDOCERV CURR   04/02/2019     INSERT DRAIN LUMBAR   1/20/2014     Procedure: INSERT DRAIN LUMBAR;;  Surgeon: Soham Aquino MD;  [facility]     LAPAROSCOPIC ADRENALECTOMY   7/14/2014     Procedure: LAPAROSCOPIC ADRENALECTOMY;  Surgeon: Roni Nunn MD;  [facility]     OPTICAL TRACKING SYSTEM ENDOSCOPIC RESECTION TUMOR CRANIAL   7/1/2013     Procedure: OPTICAL TRACKING SYSTEM ENDOSCOPIC RESECTION TUMOR CRANIAL;  Stealth Assisted Endoscopic Hypophysectomy ;  Surgeon: Soham Aquino MD;  [facility]     OPTICAL TRACKING SYSTEM ENDOSCOPIC RESECTION TUMOR CRANIAL   1/20/2014     Procedure: OPTICAL TRACKING SYSTEM ENDOSCOPIC RESECTION TUMOR CRANIAL;  Stealth Assisted Endoscopic Transnasal Excision Of Pituitary Adenoma , Placement Of Lumbar Drain with c-arm;  Surgeon: Soham Aquino MD;  [facility]     OPTICAL TRACKING SYSTEM ENDOSCOPIC RESECTION TUMOR CRANIAL N/A 7/22/2021     Procedure: stealth assisted Redo endoscopic, endonasal resection of pituitary tumor;  Surgeon: Mina Huffman MD;  [facility]     removal of pituitary microademona         wisdom teeth extration                        Physical Exam:      EXAMINATION pertinent findings:   PSYCH: Pleasant, healthy-appearing, alert, oriented x3, cooperative. Normal mood and affect.  VITAL SIGNS: not currently breastfeeding..  Reviewed nursing intake notes.   There is no height or weight on file to calculate BMI.  RESP: non labored breathing   ABD: benign, soft, non-tender, no acute peritoneal findings  SKIN: grossly normal   LYMPHATIC: grossly  normal, no adenopathy, no extremity edema  NEURO: grossly normal , no motor deficits  VASCULAR: satisfactory perfusion of all extremities   MUSCULOSKELETAL:   Gait: Antalgic over RLE     right Hip:  ROM  FF 90  with pain, Extension 0 , IRF 5  with pain, ERF 25 , ABD 20  with pain, ADD 5 . No pain with palpation of greater trochanter.      left Hip:  Full ROM of left hip with no pain  No  pain with palpation of greater trochanter.        Bilateral Knee exam:   ? Patellar apprehension: Negative  ? Effusion: Negative  ? TTP Patellar tendon: Negative  ? TTP at tibial tubercle: Negative  ? TTP at pes insertion: Negative  ? TTP medial joint line: Negative  ? TTP lateral joint line: Negative  ? Ligamentous exam:   - Negative laxity with Lachman, Anterior Drawer, Posterior Drawer, Varus and Valgus stress at 30 and 0 degrees of flexion.   ? ROM 0-120 with no pain.                     Thigh and leg compartments soft and compressible              +Quad/TA/GSC/FHL/EHL              SILT DP/SP/Marissa/Saph/Tib nerve distributions              Palpable dorsalis pedis and posterior tibial pulses               Data:   All laboratory data reviewed  All imaging studies reviewed by me     X-ray AP pelvis bilateral hips: Osteonecrosis of right hip noted with severe degenerative changes and femoral head collapse.  Subchondral sclerosis of left hip noted with potential early signs of osteonecrosis.  No collapse noted in left hip.  No fractures or other bony abnormalities noted          X-ray 2 views left knee: Minor degenerative changes in medial compartment noted.  No signs of osteonecrosis fractures or bony abnormalities.     X-ray 2 views right knee: Minor degenerative changes in medial compartment noted.  No signs of osteonecrosis fractures or bony abnormalities

## 2023-03-02 NOTE — NURSING NOTE
Chief Complaint   Patient presents with     RECHECK     MRI results review 2/23/23       37 year old  1985    There were no vitals taken for this visit.      Date/Surgery/Surgeon/Hospital:  1.   Past Surgical History:   Procedure Laterality Date     COLONOSCOPY       COLONOSCOPY WITH CO2 INSUFFLATION N/A 9/16/2021    Procedure: COLONOSCOPY, WITH CO2 INSUFFLATION;  Surgeon: Harman Pearl MD;  Location: [facility] OR     COLPOSCOPY VULVA, BIOPSY, COMBINED  10/28/2015    BEVERLEY 1, 6 month follow-up pap was normal     COLPOSCOPY,BX CERVIX/ENDOCERV CURR  04/02/2019     INSERT DRAIN LUMBAR  1/20/2014    Procedure: INSERT DRAIN LUMBAR;;  Surgeon: Soham Aquino MD;  [facility]     LAPAROSCOPIC ADRENALECTOMY  7/14/2014    Procedure: LAPAROSCOPIC ADRENALECTOMY;  Surgeon: Roni Nunn MD;  [facility]     OPTICAL TRACKING SYSTEM ENDOSCOPIC RESECTION TUMOR CRANIAL  7/1/2013    Procedure: OPTICAL TRACKING SYSTEM ENDOSCOPIC RESECTION TUMOR CRANIAL;  Stealth Assisted Endoscopic Hypophysectomy ;  Surgeon: Soham Aquino MD;  [facility]     OPTICAL TRACKING SYSTEM ENDOSCOPIC RESECTION TUMOR CRANIAL  1/20/2014    Procedure: OPTICAL TRACKING SYSTEM ENDOSCOPIC RESECTION TUMOR CRANIAL;  Stealth Assisted Endoscopic Transnasal Excision Of Pituitary Adenoma , Placement Of Lumbar Drain with c-arm;  Surgeon: Soham Aquino MD;  [facility]     OPTICAL TRACKING SYSTEM ENDOSCOPIC RESECTION TUMOR CRANIAL N/A 7/22/2021    Procedure: stealth assisted Redo endoscopic, endonasal resection of pituitary tumor;  Surgeon: Mina Huffman MD;  [facility]     removal of pituitary microademona       wisdom teeth extration                               Pain Assessment  Patient Currently in Pain: Yes  0-10 Pain Scale: 4  Primary Pain Location: Knee (Right knee and hip)  Alleviating Factors: Rest                              [id number] [address]  [address] AT [facility] #[id number] - [facility] [address] AT [facility] OF [place] & [address]        Allergies   Allergen Reactions     Labetalol      Racing heart feeling, panic attack feeling.            Current Outpatient Medications   Medication     cabergoline (DOSTINEX) 0.5 MG tablet     Calcium carb-Vitamin D 500 mg [place]-200 units (OSCAL WITH D;OYSTER SHELL CALCIUM) 500-200 MG-UNIT per tablet     desmopressin (DDAVP) 0.1 MG tablet     drospirenone-ethinyl estradiol (HECTOR) 3-0.02 MG tablet     fludrocortisone (FLORINEF) 0.1 MG tablet     fluticasone (FLONASE) 50 MCG/ACT nasal spray     hydrocortisone (CORTEF) 10 MG tablet     hydrocortisone sodium succinate PF (SOLU-CORTEF) 100 MG injection     levothyroxine (SYNTHROID/LEVOTHROID) 75 MCG tablet     methocarbamol (ROBAXIN) 500 MG tablet     Multiple Vitamin (MULTI-VITAMIN DAILY PO)     ibuprofen (ADVIL/MOTRIN) 200 MG capsule     losartan (COZAAR) 25 MG tablet     Octreotide Acetate (MYCAPSSA) 20 MG CPDR     pseudoePHEDrine (SUDAFED) 60 MG tablet     sodium chloride (OCEAN) 0.65 % nasal spray     triamcinolone (KENALOG) 0.1 % external cream     No current facility-administered medications for this visit.             Questionnaires:    HOOS Hip Dysfunction & Osteoarthritis Outcome Questionnaire    Hip Dysfunction & Osteoarthritis Outcome Score (HOOS), English Version LK 2.0 (Kristal Callaway, Rosalba HALL, 2003) 2/28/2023   S1. Do you feel grinding, hear clicking or any other type of noise from your hip? Never   S2. Difficulties spreading legs wide apart Extreme   S3. Difficulties to stride out when walking Moderate   S4. How severe is your hip joint stiffness after first wakening in the morning? Severe   S5. How severe is your hip stiffness after sitting, lying or resting LATER IN THE DAY? Extreme   Symptom Count 5   Symptom Sum 13   Symptom Mean 2.6   Symptom Subscale Score 35   P1. How often is your hip painful? Always    P2. Straightening your hip fully Severe   P3. Bending your hip FULLY Moderate   P4. Walking on a flat surface Moderate   P5. Going up or down stairs Severe   P6. At night while in bed Severe   P7. Sitting or lying Moderate   P8. Standing upright Mild   P9. Walking on a hard surface (asphalt, concrete, etc.) Severe   P10. Walking on an uneven surface Severe   Pain Count 10   Pain Sum 26   Pain Mean 2.6   Pain Subscale Score 35   A1. Descending stairs Mild   A2. Ascending stairs Extreme   A3. Rising from sitting Severe   A4. Standing Mild   A5. Bending to the floor/ an object Severe   A6. Walking on a flat surface Severe   A7. Getting in/out of car Severe   A8. Going shopping Severe   A9. Putting on socks/stockings Moderate   A10. Rising from bed Severe   A11. Taking off socks/stockings Mild   A12. Lying in bed (turning over, maintaining hip position) Moderate   A13. Getting in/out of bed Moderate   A14. Sitting Severe   A15. Getting on/off toilet Moderate   A16. Heavy domestic duties (moving heavy boxes, scrubbing floors, etc.) Severe   A17. Light domestic duties (cooking, dusting, etc.) Mild   ADL Count 17   ADL Sum 40   ADL Mean 2.35   ADL Subscale Score 41.18   SP1. Squatting Severe   SP2. Running Severe   SP3. Twisting/pivoting on loaded leg Extreme   SP4. Walking on uneven surface Severe   Sports/Rec Count 4   Sports/Rec Sum 13   Sports/Rec Mean 3.25   Sports/Rec Subscale Score 18.75   Q1. How often are you aware of your hip problem? Constantly   Q2. Have you modified you life style to avoid activities potentially damaging to your hip? Totally   Q3. How much are you troubled with lack of confidence in your hip? Severely   Q4. In general, how much difficulty do you have with your hip? Extreme   QOL Count 4   QOL Sum 15   QOL Mean 3.75   Quality of Life Subscale Score 6.25              KOOS Knee Survey Assessment    No flowsheet data found.           Promis 10 Assessment    PROMIS 10 2/28/2023   In  general, would you say your health is: Very good   In general, would you say your quality of life is: Good   In general, how would you rate your physical health? Fair   In general, how would you rate your mental health, including your mood and your ability to think? Good   In general, how would you rate your satisfaction with your social activities and relationships? Fair   In general, please rate how well you carry out your usual social activities and roles Good   To what extent are you able to carry out your everyday physical activities such as walking, climbing stairs, carrying groceries, or moving a chair? A little   How often have you been bothered by emotional problems such as feeling anxious, depressed or irritable? Never   How would you rate your fatigue on average? Moderate   How would you rate your pain on average?   0 = No Pain  to  10 = Worst Imaginable Pain 6   In general, would you say your health is: 4   In general, would you say your quality of life is: 3   In general, how would you rate your physical health? 2   In general, how would you rate your mental health, including your mood and your ability to think? 3   In general, how would you rate your satisfaction with your social activities and relationships? 2   In general, please rate how well you carry out your usual social activities and roles. (This includes activities at home, at work and in your community, and responsibilities as a parent, child, spouse, employee, friend, etc.) 3   To what extent are you able to carry out your everyday physical activities such as walking, climbing stairs, carrying groceries, or moving a chair? 2   In the past 7 days, how often have you been bothered by emotional problems such as feeling anxious, depressed, or irritable? 1   In the past 7 days, how would you rate your fatigue on average? 3   In the past 7 days, how would you rate your pain on average, where 0 means no pain, and 10 means worst imaginable pain? 6    Global Mental Health Score 13   Global Physical Health Score 10   PROMIS TOTAL - SUBSCORES 23   Some recent data might be hidden              Ortho [place] Knee Questionnaire    No flowsheet data found.

## 2023-03-02 NOTE — PROGRESS NOTES
[facility]  Orthopaedic Surgery, Joint Replacement Consultation  by Wiliam Grubbs M.D.     Suyapa Hodge MRN# [id number]     YOB: 1985      Requesting physician: No ref. provider found  Clarisa Palafoxjose alejandro Tristan is seen today virtually to review the MRI results of her knees and her left hip joint.  She has known severe Arco stage IV osteonecrosis of the right femoral head.           Assessment and Plan:   Assessment:  1. Right femoral head hip osteonecrosis and severe degenerative changes with collapse. Arco stage IV.  Steroid related secondary to Cushing's disease.  Advised patient to proceed with right total of arthroplasty, direct anterior approach.  2. Left femoral head osteonecrosis Arco stage II.  She is a candidate to undergo core decompression with bone marrow aspirate concentrate and cellular grafting.  She also is a candidate to undergo our MRI study prospectively assessing advanced MRI techniques to evaluate osteonecrosis.  I discussed this trial with her and she indicated interest in enrolling once the trial is open.  3. No evidence of osteonecrosis in either knee joint.       Plan:  1. R hip ZAHEER, direct anterior.  We discussed the risk benefits and alternatives to total hip arthroplasty and she is excellent understanding would like to proceed.  2. L femoral head core decompression and BMAC.  Enroll on MOST osteonecrosis trial.      Wiliam Grubbs MD  [facility] Family Professor  Oncology and Adult Reconstructive Surgery  Dept Orthopaedic Surgery, [facility]  [phone] office, [phone] pager  [email or web address]       Virtual-Visit Details    Type of service:  Video Visit  Visit total duration (including visit time, pre and post visit work time as documented above on the same day of service): 20    Video start time: 1440  Video end time: 3:03 PM  Originating Location (pt. Location): Home  Distant Location (provider location):  [facility]  ORTHOPEDIC CLINIC [place]   Platform used for Virtual Visit: Shilpa                 History of Present Illness:   37 year old female with history of pituitary Cushing's syndrome who presents today for evaluation of her right hip osteonecrosis.  She states her Cushing syndrome was first diagnosed in 2009 and recurred in 2013.  She has history of total adrenalectomy and partial resection of her pituitary gland.  She is currently on 10 mg of prednisone daily.  Her previous orthopedic care was done by Dr. Pereira in 2019.  At that time osteonecrosis of the right femoral head was noted and recommend she wait to have total hip arthroplasty until she is not able to function at work or is limited in her activities of daily living.  She states that over the years the pain in her right hip is gotten progressively worse and is now severe with weightbearing and range of motion.  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  She presents today for discussion of a right total hip arthroplasty.        Current symptoms:  Problem: Right hip pain  Onset and duration: 10 years ago; Cushing's disease   Awakens from sleep due to sx's:  Yes  Precipitating Injury:  No    Other joints or sites painful:  Yes        Background history:  DX:  1. Osteonecrosis of right femoral head  2. Chronic kidney disease stage III  3. ACTC hypersecretion  4. 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 History of total adrenalectomy  7. Diabetes insipidus  8. History of pituitary Cushing's syndrome  9. Hypothyroidism  10. Hypoadrenalism     TREATMENTS:  1. Total adrenalectomy  2. Partial pituitary resection     Past Surgical History         Past Surgical History:   Procedure Laterality Date     COLONOSCOPY         COLONOSCOPY WITH CO2 INSUFFLATION N/A 9/16/2021     Procedure: COLONOSCOPY, WITH CO2 INSUFFLATION;  Surgeon: Harman Pearl MD;  Location:  OR     COLPOSCOPY  VULVA, BIOPSY, COMBINED   10/28/2015     BEVERLEY 1, 6 month follow-up pap was normal     COLPOSCOPY,BX CERVIX/ENDOCERV CURR   04/02/2019     INSERT DRAIN LUMBAR   1/20/2014     Procedure: INSERT DRAIN LUMBAR;;  Surgeon: Soham Aquino MD;  [facility]     LAPAROSCOPIC ADRENALECTOMY   7/14/2014     Procedure: LAPAROSCOPIC ADRENALECTOMY;  Surgeon: Roni Nunn MD;  [facility]     OPTICAL TRACKING SYSTEM ENDOSCOPIC RESECTION TUMOR CRANIAL   7/1/2013     Procedure: OPTICAL TRACKING SYSTEM ENDOSCOPIC RESECTION TUMOR CRANIAL;  Stealth Assisted Endoscopic Hypophysectomy ;  Surgeon: Soham Aquino MD;  [facility]     OPTICAL TRACKING SYSTEM ENDOSCOPIC RESECTION TUMOR CRANIAL   1/20/2014     Procedure: OPTICAL TRACKING SYSTEM ENDOSCOPIC RESECTION TUMOR CRANIAL;  Stealth Assisted Endoscopic Transnasal Excision Of Pituitary Adenoma , Placement Of Lumbar Drain with c-arm;  Surgeon: Soham Aquino MD;  [facility]     OPTICAL TRACKING SYSTEM ENDOSCOPIC RESECTION TUMOR CRANIAL N/A 7/22/2021     Procedure: stealth assisted Redo endoscopic, endonasal resection of pituitary tumor;  Surgeon: Mina Huffman MD;  [facility]     removal of pituitary microademona         wisdom teeth extration                        Physical Exam:      EXAMINATION pertinent findings:   PSYCH: Pleasant, healthy-appearing, alert, oriented x3, cooperative. Normal mood and affect.  VITAL SIGNS: not currently breastfeeding..  Reviewed nursing intake notes.   There is no height or weight on file to calculate BMI.  RESP: non labored breathing   ABD: benign, soft, non-tender, no acute peritoneal findings  SKIN: grossly normal   LYMPHATIC: grossly normal, no adenopathy, no extremity edema  NEURO: grossly normal , no motor deficits  VASCULAR: satisfactory perfusion of all extremities   MUSCULOSKELETAL:   Gait: Antalgic over RLE     right Hip:  ROM  FF 90  with pain, Extension 0 , IRF 5  with pain, ERF  25 , ABD 20  with pain, ADD 5 . No pain with palpation of greater trochanter.      left Hip:  Full ROM of left hip with no pain  No  pain with palpation of greater trochanter.        Bilateral Knee exam:   ? Patellar apprehension: Negative  ? Effusion: Negative  ? TTP Patellar tendon: Negative  ? TTP at tibial tubercle: Negative  ? TTP at pes insertion: Negative  ? TTP medial joint line: Negative  ? TTP lateral joint line: Negative  ? Ligamentous exam:   - Negative laxity with Lachman, Anterior Drawer, Posterior Drawer, Varus and Valgus stress at 30 and 0 degrees of flexion.   ? ROM 0-120 with no pain.                     Thigh and leg compartments soft and compressible              +Quad/TA/GSC/FHL/EHL              SILT DP/SP/Marissa/Saph/Tib nerve distributions              Palpable dorsalis pedis and posterior tibial pulses               Data:   All laboratory data reviewed  All imaging studies reviewed by me     X-ray AP pelvis bilateral hips: Osteonecrosis of right hip noted with severe degenerative changes and femoral head collapse.  Subchondral sclerosis of left hip noted with potential early signs of osteonecrosis.  No collapse noted in left hip.  No fractures or other bony abnormalities noted          X-ray 2 views left knee: Minor degenerative changes in medial compartment noted.  No signs of osteonecrosis fractures or bony abnormalities.     X-ray 2 views right knee: Minor degenerative changes in medial compartment noted.  No signs of osteonecrosis fractures or bony abnormalities

## 2023-03-12 NOTE — RESULT ENCOUNTER NOTE
Dear Suyapa     Here is your results. High ACTH relativley stable.   Your thryoid hormone T4 is very low.   Could you verify the dose? Then we need to increase. If you are taking 75 mcg then we need to do 88 mcg.      if you are [facility] patient at [phone], if you are [place] patient at [phone],  if you have any questions.    Please take care  Adriana Wilkinson MD

## 2023-04-05 DIAGNOSIS — E03.8 OTHER SPECIFIED HYPOTHYROIDISM: ICD-10-CM

## 2023-04-07 RX ORDER — LEVOTHYROXINE SODIUM 75 UG/1
TABLET ORAL
Qty: 90 TABLET | Refills: 3 | Status: SHIPPED | OUTPATIENT
Start: 2023-04-07 | End: 2024-01-31

## 2023-04-11 ENCOUNTER — DOCUMENTATION ONLY (OUTPATIENT)
Dept: ORTHOPEDICS | Facility: CLINIC | Age: 38
End: 2023-04-11
Payer: COMMERCIAL

## 2023-04-11 NOTE — PROGRESS NOTES
Received Completed forms Yes   Faxed Forms Faxed To: [facility] [place]   Fax Number: [phone]   Sent to [facility] (Date) 4/10/23

## 2023-04-22 DIAGNOSIS — E27.1 ADRENAL INSUFFICIENCY, PRIMARY (H): ICD-10-CM

## 2023-04-25 RX ORDER — FLUDROCORTISONE ACETATE 0.1 MG/1
0.1 TABLET ORAL DAILY
Qty: 90 TABLET | Refills: 2 | Status: SHIPPED | OUTPATIENT
Start: 2023-04-25 | End: 2024-01-31

## 2023-05-01 ENCOUNTER — TELEPHONE (OUTPATIENT)
Dept: ENDOCRINOLOGY | Facility: CLINIC | Age: 38
End: 2023-05-01
Payer: COMMERCIAL

## 2023-05-01 NOTE — TELEPHONE ENCOUNTER
CORDELL and sent mychart for patient to reschedule 1/3/24 appointment with Dr. Wilkinson as provider.

## 2023-05-07 DIAGNOSIS — E27.40 ADRENAL INSUFFICIENCY (H): ICD-10-CM

## 2023-05-10 RX ORDER — HYDROCORTISONE 10 MG/1
TABLET ORAL
Qty: 150 TABLET | Refills: 5 | Status: ON HOLD | OUTPATIENT
Start: 2023-05-10 | End: 2023-06-01

## 2023-05-10 NOTE — TELEPHONE ENCOUNTER
HYDROCORTISONE 10MG TABLETS      Last Written Prescription Date:  3/24/22  Last Fill Quantity: 150,   # refills: 5  Last Office Visit : 1/4/23  Future Office visit:  7/5/23

## 2023-05-11 ENCOUNTER — OFFICE VISIT (OUTPATIENT)
Dept: FAMILY MEDICINE | Facility: CLINIC | Age: 38
End: 2023-05-11
Payer: COMMERCIAL

## 2023-05-11 VITALS
WEIGHT: 160 LBS | DIASTOLIC BLOOD PRESSURE: 75 MMHG | SYSTOLIC BLOOD PRESSURE: 109 MMHG | TEMPERATURE: 98.3 F | OXYGEN SATURATION: 98 % | BODY MASS INDEX: 25.44 KG/M2 | HEART RATE: 104 BPM

## 2023-05-11 DIAGNOSIS — M87.059 AVASCULAR NECROSIS OF FEMORAL HEAD, UNSPECIFIED LATERALITY (H): ICD-10-CM

## 2023-05-11 DIAGNOSIS — Z01.818 PREOP GENERAL PHYSICAL EXAM: Primary | ICD-10-CM

## 2023-05-11 LAB
ANION GAP SERPL CALCULATED.3IONS-SCNC: 13 MMOL/L (ref 7–15)
BUN SERPL-MCNC: 16.6 MG/DL (ref 6–20)
CALCIUM SERPL-MCNC: 10.1 MG/DL (ref 8.6–10)
CHLORIDE SERPL-SCNC: 99 MMOL/L (ref 98–107)
CREAT SERPL-MCNC: 1.24 MG/DL (ref 0.51–0.95)
DEPRECATED HCO3 PLAS-SCNC: 21 MMOL/L (ref 22–29)
ERYTHROCYTE [DISTWIDTH] IN BLOOD BY AUTOMATED COUNT: 12.7 % (ref 10–15)
GFR SERPL CREATININE-BSD FRML MDRD: 57 ML/MIN/1.73M2
GLUCOSE SERPL-MCNC: 103 MG/DL (ref 70–99)
HCT VFR BLD AUTO: 40 % (ref 35–47)
HGB BLD-MCNC: 14.6 G/DL (ref 11.7–15.7)
MCH RBC QN AUTO: 30.9 PG (ref 26.5–33)
MCHC RBC AUTO-ENTMCNC: 36.5 G/DL (ref 31.5–36.5)
MCV RBC AUTO: 85 FL (ref 78–100)
PLATELET # BLD AUTO: 327 10E3/UL (ref 150–450)
POTASSIUM SERPL-SCNC: 4.6 MMOL/L (ref 3.4–5.3)
RBC # BLD AUTO: 4.72 10E6/UL (ref 3.8–5.2)
SODIUM SERPL-SCNC: 133 MMOL/L (ref 136–145)
WBC # BLD AUTO: 5.1 10E3/UL (ref 4–11)

## 2023-05-11 PROCEDURE — 36415 COLL VENOUS BLD VENIPUNCTURE: CPT | Performed by: PHYSICIAN ASSISTANT

## 2023-05-11 PROCEDURE — 99214 OFFICE O/P EST MOD 30 MIN: CPT | Performed by: PHYSICIAN ASSISTANT

## 2023-05-11 PROCEDURE — 80048 BASIC METABOLIC PNL TOTAL CA: CPT | Performed by: PHYSICIAN ASSISTANT

## 2023-05-11 PROCEDURE — 85027 COMPLETE CBC AUTOMATED: CPT | Performed by: PHYSICIAN ASSISTANT

## 2023-05-11 RX ORDER — METHOCARBAMOL 500 MG/1
500-1000 TABLET, FILM COATED ORAL
Qty: 20 TABLET | Refills: 2 | Status: SHIPPED | OUTPATIENT
Start: 2023-05-11 | End: 2024-01-31

## 2023-05-11 NOTE — PATIENT INSTRUCTIONS
For informational purposes only. Not to replace the advice of your health care provider. Copyright   2003,  [employer] [facility]. All rights reserved. Clinically reviewed by Fidelina Murphy MD. [employer] [id number] - REV .  Preparing for Your Surgery  Getting started  A nurse will call you to review your health history and instructions. They will give you an arrival time based on your scheduled surgery time. Please be ready to share:  Your doctor's clinic name and phone number  Your medical, surgical, and anesthesia history  A list of allergies and sensitivities  A list of medicines, including herbal treatments and over-the-counter drugs  Whether the patient has a legal guardian (ask how to send us the papers in advance)  Please tell us if you're pregnant--or if there's any chance you might be pregnant. Some surgeries may injure a fetus (unborn baby), so they require a pregnancy test. Surgeries that are safe for a fetus don't always need a test, and you can choose whether to have one.   If you have a child who's having surgery, please ask for a copy of Preparing for Your Child's Surgery.    Preparing for surgery  Within 10 to 30 days of surgery: Have a pre-op exam (sometimes called an H&P, or History and Physical). This can be done at a clinic or pre-operative center.  If you're having a , you may not need this exam. Talk to your care team.  At your pre-op exam, talk to your care team about all medicines you take. If you need to stop any medicines before surgery, ask when to start taking them again.  We do this for your safety. Many medicines can make you bleed too much during surgery. Some change how well surgery (anesthesia) drugs work.  Call your insurance company to let them know you're having surgery. (If you don't have insurance, call [phone].)  Call your clinic if there's any change in your health. This includes signs of a cold or flu (sore throat, runny nose, cough, rash, fever). It  also includes a scrape or scratch near the surgery site.  If you have questions on the day of surgery, call your hospital or surgery center.  Eating and drinking guidelines  For your safety: Unless your surgeon tells you otherwise, follow the guidelines below.  Eat and drink as usual until 8 hours before you arrive for surgery. After that, no food or milk.  Drink clear liquids until 2 hours before you arrive. These are liquids you can see through, like water, Gatorade, and Propel Water. They also include plain black coffee and tea (no cream or milk), candy, and breath mints. You can spit out gum when you arrive.  If you drink alcohol: Stop drinking it the night before surgery.  If your care team tells you to take medicine on the morning of surgery, it's okay to take it with a sip of water.  Preventing infection  Shower or bathe the night before and morning of your surgery. Follow the instructions your clinic gave you. (If no instructions, use regular soap.)  Don't shave or clip hair near your surgery site. We'll remove the hair if needed.  Don't smoke or vape the morning of surgery. You may chew nicotine gum up to 2 hours before surgery. A nicotine patch is okay.  Note: Some surgeries require you to completely quit smoking and nicotine. Check with your surgeon.  Your care team will make every effort to keep you safe from infection. We will:  Clean our hands often with soap and water (or an alcohol-based hand rub).  Clean the skin at your surgery site with a special soap that kills germs.  Give you a special gown to keep you warm. (Cold raises the risk of infection.)  Wear special hair covers, masks, gowns and gloves during surgery.  Give antibiotic medicine, if prescribed. Not all surgeries need antibiotics.  What to bring on the day of surgery  Photo ID and insurance card  Copy of your health care directive, if you have one  Glasses and hearing aids (bring cases)  You can't wear contacts during surgery  Inhaler and  eye drops, if you use them (tell us about these when you arrive)  CPAP machine or breathing device, if you use them  A few personal items, if spending the night  If you have . . .  A pacemaker, ICD (cardiac defibrillator) or other implant: Bring the ID card.  An implanted stimulator: Bring the remote control.  A legal guardian: Bring a copy of the certified (court-stamped) guardianship papers.  Please remove any jewelry, including body piercings. Leave jewelry and other valuables at home.  If you're going home the day of surgery  You must have a responsible adult drive you home. They should stay with you overnight as well.  If you don't have someone to stay with you, and you aren't safe to go home alone, we may keep you overnight. Insurance often won't pay for this.  After surgery  If it's hard to control your pain or you need more pain medicine, please call your surgeon's office.  Questions?   If you have any questions for your care team, list them here: _________________________________________________________________________________________________________________________________________________________________________ ____________________________________ ____________________________________ ____________________________________    How to Take Your Medication Before Surgery  - Take all of your medications before surgery as usual

## 2023-05-15 DIAGNOSIS — Z96.641 H/O TOTAL HIP ARTHROPLASTY, RIGHT: Primary | ICD-10-CM

## 2023-05-16 NOTE — PROGRESS NOTES
Ortho Navigator Note      Pre-op Date 5/11/23 [facility]     Medical Clearance  Cleared     Labs WNR     COVID Test Date No longer indicated      Skin  Intact      Activity: Ambulates independently without assistive device      Equipment Need Patient will bring a walker for discharge. Defer to PT/OT for recs.       Meds to Hold Held all supplements 14 days prior to surgery  * Medication recommendations are not intended to be exhaustive; they are limited to common medications that are potentially dangerous if incorrectly managed   NPO Instructions  Defer to PAN RN     Pre-op Joint Education Complete? Complete   Discharge Plan Patient has plan to discharge home on morning of POD 1.   Alicia (mother) will arrive at hospital at 0800 to participate in therapy and discharge education. They will then transport patient to mothers home. Mothers home is handicap accessible.    /Transportation Parents will be coaches.  is physically able to care for patient.      Barriers to Discharge No barriers to discharge.      Additional Info/   Special Needs : Patient had no unanswered questions or concerns.            05/16/23 1329   Discharge Planning   Patient/Family Anticipates Transition to family members' home   Concerns to be Addressed no discharge needs identified   Living Arrangements   People in Home parent(s)   Type of Residence Private Residence   Is your private residence a single family home or apartment? Single family home   Number of Stairs, Within Home, Primary none   Once home, are you able to live on one level? Yes   Which level? Main Level   Bathroom Shower/Tub Walk-in shower   Equipment Currently Used at Home none   Support System   Support Systems Parent   Medical Clearance   Date of Physical 05/11/23   Clinic Name [facility]   It is recommended that you call and check with any specialty providers before surgery to see if you need surgical clearance.  Do you see any specialty providers outside of your primary  care provider? No   Blood   Known Bleeding Disorder or Coagulopathy No   Does the patient have any Jain/cultural preferences related to blood products? No   Education   Has the patient scheduled or completed pre-op total joint education, either in class or online, in the last 12 months? Yes   Patient attended total joint pre-op class/received pre-op teaching  online   Relationship/Living Environment   Name(s) of People in Home Alicia (mother)

## 2023-05-19 ENCOUNTER — DOCUMENTATION ONLY (OUTPATIENT)
Dept: ORTHOPEDICS | Facility: CLINIC | Age: 38
End: 2023-05-19
Payer: COMMERCIAL

## 2023-05-20 NOTE — PROGRESS NOTES
Received Completed forms Yes   Faxed Forms Faxed To: TEN[facility]  Fax Number: [phone]   Sent to HIM (Date) 5/18/23

## 2023-05-22 ENCOUNTER — ALLIED HEALTH/NURSE VISIT (OUTPATIENT)
Facility: CLINIC | Age: 38
End: 2023-05-22
Payer: COMMERCIAL

## 2023-05-22 DIAGNOSIS — D35.2 PITUITARY ADENOMA (H): ICD-10-CM

## 2023-05-22 DIAGNOSIS — Z00.6 RESEARCH EXAM: Primary | ICD-10-CM

## 2023-05-22 NOTE — PROGRESS NOTES
Dr. Grubbs informed me that this patient is a potential candidate for one of his research studies, MOST study trial. I called this patient on April 19, 2023 to discuss and screen her the study.     The patient expressed interest and is presently eligible to participate in the study. I informed the patient that I would reach back out after she has scheduled a core decompression surgery so that she could be consented and participate in the study.          Elsa Burciaga,   Research Coordinator

## 2023-05-24 ENCOUNTER — DOCUMENTATION ONLY (OUTPATIENT)
Dept: ORTHOPEDICS | Facility: CLINIC | Age: 38
End: 2023-05-24
Payer: COMMERCIAL

## 2023-05-24 RX ORDER — DESMOPRESSIN ACETATE 0.1 MG/1
0.1 TABLET ORAL 2 TIMES DAILY
Qty: 180 TABLET | Refills: 3 | Status: SHIPPED | OUTPATIENT
Start: 2023-05-24 | End: 2023-09-07

## 2023-05-24 NOTE — TELEPHONE ENCOUNTER
DESMOPRESSIN 0.1MG TABLETS      Last Written Prescription Date:  5/17/22  Last Fill Quantity: 180,   # refills: 3  Last Office Visit : 1/4/23  Future Office visit:  7/5/23    Routing refill request to provider for review/approval because:  Abnormal sodium level          Component      Latest Ref Rng 5/11/2023  11:51 AM   Sodium      136 - 145 mmol/L 133 (L)    Anion Gap      7 - 15 mmol/L 13    Creatinine      0.51 - 0.95 mg/dL 1.24 (H)    GFR Estimate      >60 mL/min/1.73m2 57 (L)    Calcium      8.6 - 10.0 mg/dL 10.1 (H)    Potassium      3.4 - 5.3 mmol/L 4.6    Chloride      98 - 107 mmol/L 99    Carbon Dioxide (CO2)      22 - 29 mmol/L 21 (L)    Urea Nitrogen      6.0 - 20.0 mg/dL 16.6    Glucose      70 - 99 mg/dL 103 (H)       Legend:  (L) Low  (H) High

## 2023-05-24 NOTE — PROGRESS NOTES
Received Completed forms Yes   Faxed Forms Faxed To: mikey  Fax Number: [phone]   Sent to HIM (Date) 5/23/23

## 2023-05-24 NOTE — PROGRESS NOTES
Patient Position (indicated by x):  x  Supine on HANA table, both feet in foot holders, unlocked   x  HANA table attachments:    Femoral elevator hooks x 2 sizes (-66-10), attachment, side specific, must sterilize    Attachment arm for femoral elevator hooks    Sterile plastic sleeve for 'scopes to place over attachment arm base    Extra ioban for sealing       x  Both Arms out on arm extensions   x   blue plastic split U drape x 2, top and bottom    Hip fracture curtain drape on top layer    Use blue drapes in pack to extend from clear curtain drape over both feet    Ioban     x  No Sampson catheter          General Equipment Requests (indicated by x):    x  C-Arm, large Siemens unit, CIOS-A (Positioned on patients contralateral side prior to patient arriving in room)   x Long, large spinal Gelpi retractors x 2   x Pistol  pituitary with teeth   x Acetabular wipe (4x4 guaze, folded side inwards, on ring forceps)   x 2-0 silk ties on passer   x 2-0 silk stick ties   x #1 pop off ethibond sutures X425 (x 5)   x 2-0 vicryl    x 3-0 PDS   x Skin glue, alginate, tegaderm   x  Bolanos's cement plug  bessie (for fluoro marker to check leg lengths)     Reciprocating and oscillating saw blade 4141-775-140    Romie's angled curettes, narrow shaft    Kerlix roll           ZAHEER Requests (indicated by x):    Biomet ECHO Bimetric ingrowth stem   x  Biomet Taperloc stem, CoChr and Ceramic heads, 32 and 36mm     Biomet retractor set for anterior hip.  Single offset broach handles x 2     Biomet Bipolar cup     Biomet Constrained Freedom liner with heads     Morejon Nephew BHR resurfacing ZAHEER with Copiny navigation unit (need 2 weeks advance notice)     DePuy S-ROM long stems     Brandon Restoration modular long stem     Biomet CLINT long stems, both interlocked and splined stems   x  Biomet G7 cup, vit D and standard poly liners     Specimens and cultures (indicated by x):      Tissue cultures, aerobic and anaerobic  without gram stain      Frozen section      pathology specimens - fresh      pathology specimens - formalin     Gina Jalloh MD  Adult Reconstructive Surgery Fellow  Department of Orthopaedic Surgery, [facility]

## 2023-05-29 ENCOUNTER — ANESTHESIA EVENT (OUTPATIENT)
Dept: SURGERY | Facility: CLINIC | Age: 38
End: 2023-05-29
Payer: COMMERCIAL

## 2023-05-30 ENCOUNTER — DOCUMENTATION ONLY (OUTPATIENT)
Dept: ORTHOPEDICS | Facility: CLINIC | Age: 38
End: 2023-05-30

## 2023-05-30 ENCOUNTER — HOSPITAL ENCOUNTER (OUTPATIENT)
Facility: CLINIC | Age: 38
Discharge: HOME | End: 2023-06-01
Attending: ORTHOPAEDIC SURGERY | Admitting: ORTHOPAEDIC SURGERY
Payer: COMMERCIAL

## 2023-05-30 ENCOUNTER — APPOINTMENT (OUTPATIENT)
Dept: GENERAL RADIOLOGY | Facility: CLINIC | Age: 38
End: 2023-05-30
Attending: ORTHOPAEDIC SURGERY
Payer: COMMERCIAL

## 2023-05-30 ENCOUNTER — APPOINTMENT (OUTPATIENT)
Dept: PHYSICAL THERAPY | Facility: CLINIC | Age: 38
End: 2023-05-30
Attending: ORTHOPAEDIC SURGERY
Payer: COMMERCIAL

## 2023-05-30 ENCOUNTER — ANESTHESIA (OUTPATIENT)
Dept: SURGERY | Facility: CLINIC | Age: 38
End: 2023-05-30
Payer: COMMERCIAL

## 2023-05-30 ENCOUNTER — APPOINTMENT (OUTPATIENT)
Dept: GENERAL RADIOLOGY | Facility: CLINIC | Age: 38
End: 2023-05-30
Attending: PHYSICIAN ASSISTANT
Payer: COMMERCIAL

## 2023-05-30 DIAGNOSIS — M87.9 OSTEONECROSIS (H): ICD-10-CM

## 2023-05-30 DIAGNOSIS — Z96.641 STATUS POST TOTAL HIP REPLACEMENT, RIGHT: Primary | ICD-10-CM

## 2023-05-30 DIAGNOSIS — M25.551 RIGHT HIP PAIN: ICD-10-CM

## 2023-05-30 DIAGNOSIS — E87.1 HYPONATREMIA: ICD-10-CM

## 2023-05-30 DIAGNOSIS — E27.40 ADRENAL INSUFFICIENCY (H): ICD-10-CM

## 2023-05-30 DIAGNOSIS — E27.40 HYPOADRENALISM (H): ICD-10-CM

## 2023-05-30 LAB
ABO/RH(D): NORMAL
ANTIBODY SCREEN: NEGATIVE
GLUCOSE BLDC GLUCOMTR-MCNC: 109 MG/DL (ref 70–99)
GLUCOSE BLDC GLUCOMTR-MCNC: 166 MG/DL (ref 70–99)
SPECIMEN EXPIRATION DATE: NORMAL

## 2023-05-30 PROCEDURE — 272N000001 HC OR GENERAL SUPPLY STERILE: Performed by: ORTHOPAEDIC SURGERY

## 2023-05-30 PROCEDURE — 250N000011 HC RX IP 250 OP 636: Performed by: PHYSICIAN ASSISTANT

## 2023-05-30 PROCEDURE — 250N000011 HC RX IP 250 OP 636: Performed by: ANESTHESIOLOGY

## 2023-05-30 PROCEDURE — 258N000003 HC RX IP 258 OP 636

## 2023-05-30 PROCEDURE — 27130 TOTAL HIP ARTHROPLASTY: CPT | Mod: RT | Performed by: ORTHOPAEDIC SURGERY

## 2023-05-30 PROCEDURE — 86850 RBC ANTIBODY SCREEN: CPT | Performed by: PHYSICIAN ASSISTANT

## 2023-05-30 PROCEDURE — 710N000010 HC RECOVERY PHASE 1, LEVEL 2, PER MIN: Performed by: ORTHOPAEDIC SURGERY

## 2023-05-30 PROCEDURE — 97110 THERAPEUTIC EXERCISES: CPT | Mod: GP

## 2023-05-30 PROCEDURE — 250N000011 HC RX IP 250 OP 636: Performed by: STUDENT IN AN ORGANIZED HEALTH CARE EDUCATION/TRAINING PROGRAM

## 2023-05-30 PROCEDURE — 250N000025 HC SEVOFLURANE, PER MIN: Performed by: ORTHOPAEDIC SURGERY

## 2023-05-30 PROCEDURE — 250N000009 HC RX 250

## 2023-05-30 PROCEDURE — 999N000065 XR PELVIS AND HIP PORTABLE RIGHT 1 VIEW

## 2023-05-30 PROCEDURE — 258N000003 HC RX IP 258 OP 636: Performed by: PHYSICIAN ASSISTANT

## 2023-05-30 PROCEDURE — 250N000011 HC RX IP 250 OP 636

## 2023-05-30 PROCEDURE — 250N000011 HC RX IP 250 OP 636: Performed by: ORTHOPAEDIC SURGERY

## 2023-05-30 PROCEDURE — 999N000180 XR SURGERY CARM FLUORO LESS THAN 5 MIN: Mod: TC

## 2023-05-30 PROCEDURE — 97116 GAIT TRAINING THERAPY: CPT | Mod: GP

## 2023-05-30 PROCEDURE — 250N000013 HC RX MED GY IP 250 OP 250 PS 637: Performed by: PHYSICIAN ASSISTANT

## 2023-05-30 PROCEDURE — 36415 COLL VENOUS BLD VENIPUNCTURE: CPT | Performed by: PHYSICIAN ASSISTANT

## 2023-05-30 PROCEDURE — 99222 1ST HOSP IP/OBS MODERATE 55: CPT | Performed by: STUDENT IN AN ORGANIZED HEALTH CARE EDUCATION/TRAINING PROGRAM

## 2023-05-30 PROCEDURE — 360N000084 HC SURGERY LEVEL 4 W/ FLUORO, PER MIN: Performed by: ORTHOPAEDIC SURGERY

## 2023-05-30 PROCEDURE — 82962 GLUCOSE BLOOD TEST: CPT

## 2023-05-30 PROCEDURE — C1776 JOINT DEVICE (IMPLANTABLE): HCPCS | Performed by: ORTHOPAEDIC SURGERY

## 2023-05-30 PROCEDURE — 370N000017 HC ANESTHESIA TECHNICAL FEE, PER MIN: Performed by: ORTHOPAEDIC SURGERY

## 2023-05-30 PROCEDURE — 250N000009 HC RX 250: Performed by: ORTHOPAEDIC SURGERY

## 2023-05-30 PROCEDURE — 999N000141 HC STATISTIC PRE-PROCEDURE NURSING ASSESSMENT: Performed by: ORTHOPAEDIC SURGERY

## 2023-05-30 PROCEDURE — 97161 PT EVAL LOW COMPLEX 20 MIN: CPT | Mod: GP

## 2023-05-30 DEVICE — IMPLANTABLE DEVICE
Type: IMPLANTABLE DEVICE | Site: HIP | Status: FUNCTIONAL
Brand: G7® ACETABULAR SYSTEM

## 2023-05-30 DEVICE — IMPLANTABLE DEVICE
Type: IMPLANTABLE DEVICE | Site: HIP | Status: FUNCTIONAL
Brand: TAPERLOC COMPLETE PRIMARY FEMORAL

## 2023-05-30 DEVICE — IMPLANTABLE DEVICE
Type: IMPLANTABLE DEVICE | Site: HIP | Status: FUNCTIONAL
Brand: G7® VIVACIT-E®

## 2023-05-30 DEVICE — IMPLANTABLE DEVICE
Type: IMPLANTABLE DEVICE | Site: HIP | Status: FUNCTIONAL
Brand: BIOLOX® DELTA MODULAR CERAMIC HEAD

## 2023-05-30 RX ORDER — AMOXICILLIN 250 MG
1-2 CAPSULE ORAL 2 TIMES DAILY
Qty: 30 TABLET | Refills: 0 | Status: SHIPPED | OUTPATIENT
Start: 2023-05-30 | End: 2023-09-07

## 2023-05-30 RX ORDER — NALOXONE HYDROCHLORIDE 0.4 MG/ML
0.2 INJECTION, SOLUTION INTRAMUSCULAR; INTRAVENOUS; SUBCUTANEOUS
Status: DISCONTINUED | OUTPATIENT
Start: 2023-05-30 | End: 2023-06-01 | Stop reason: HOSPADM

## 2023-05-30 RX ORDER — ACETAMINOPHEN 325 MG/1
975 TABLET ORAL ONCE
Status: COMPLETED | OUTPATIENT
Start: 2023-05-30 | End: 2023-05-30

## 2023-05-30 RX ORDER — ONDANSETRON 2 MG/ML
4 INJECTION INTRAMUSCULAR; INTRAVENOUS EVERY 30 MIN PRN
Status: DISCONTINUED | OUTPATIENT
Start: 2023-05-30 | End: 2023-05-30 | Stop reason: HOSPADM

## 2023-05-30 RX ORDER — SODIUM CHLORIDE, SODIUM LACTATE, POTASSIUM CHLORIDE, CALCIUM CHLORIDE 600; 310; 30; 20 MG/100ML; MG/100ML; MG/100ML; MG/100ML
INJECTION, SOLUTION INTRAVENOUS CONTINUOUS
Status: DISCONTINUED | OUTPATIENT
Start: 2023-05-30 | End: 2023-05-30 | Stop reason: HOSPADM

## 2023-05-30 RX ORDER — BISACODYL 10 MG
10 SUPPOSITORY, RECTAL RECTAL DAILY PRN
Status: DISCONTINUED | OUTPATIENT
Start: 2023-05-30 | End: 2023-06-01 | Stop reason: HOSPADM

## 2023-05-30 RX ORDER — LIDOCAINE HYDROCHLORIDE 20 MG/ML
INJECTION, SOLUTION INFILTRATION; PERINEURAL PRN
Status: DISCONTINUED | OUTPATIENT
Start: 2023-05-30 | End: 2023-05-30

## 2023-05-30 RX ORDER — ACETAMINOPHEN 325 MG/1
650 TABLET ORAL EVERY 4 HOURS PRN
Qty: 100 TABLET | Refills: 0 | Status: SHIPPED | OUTPATIENT
Start: 2023-05-30

## 2023-05-30 RX ORDER — ACETAMINOPHEN 325 MG/1
975 TABLET ORAL ONCE
Status: DISCONTINUED | OUTPATIENT
Start: 2023-05-30 | End: 2023-05-30

## 2023-05-30 RX ORDER — SODIUM CHLORIDE, SODIUM LACTATE, POTASSIUM CHLORIDE, CALCIUM CHLORIDE 600; 310; 30; 20 MG/100ML; MG/100ML; MG/100ML; MG/100ML
INJECTION, SOLUTION INTRAVENOUS CONTINUOUS PRN
Status: DISCONTINUED | OUTPATIENT
Start: 2023-05-30 | End: 2023-05-30

## 2023-05-30 RX ORDER — OXYCODONE HYDROCHLORIDE 5 MG/1
5 TABLET ORAL EVERY 4 HOURS PRN
Status: DISCONTINUED | OUTPATIENT
Start: 2023-05-30 | End: 2023-06-01 | Stop reason: HOSPADM

## 2023-05-30 RX ORDER — HYDROXYZINE HYDROCHLORIDE 25 MG/1
25 TABLET, FILM COATED ORAL EVERY 6 HOURS PRN
Status: DISCONTINUED | OUTPATIENT
Start: 2023-05-30 | End: 2023-06-01 | Stop reason: HOSPADM

## 2023-05-30 RX ORDER — CABERGOLINE 0.5 MG/1
0.25 TABLET ORAL WEEKLY
Status: DISCONTINUED | OUTPATIENT
Start: 2023-06-02 | End: 2023-06-01 | Stop reason: HOSPADM

## 2023-05-30 RX ORDER — METHOCARBAMOL 750 MG/1
750 TABLET, FILM COATED ORAL EVERY 6 HOURS PRN
Status: DISCONTINUED | OUTPATIENT
Start: 2023-05-30 | End: 2023-06-01 | Stop reason: HOSPADM

## 2023-05-30 RX ORDER — OXYCODONE HYDROCHLORIDE 5 MG/1
10 TABLET ORAL EVERY 4 HOURS PRN
Status: DISCONTINUED | OUTPATIENT
Start: 2023-05-30 | End: 2023-06-01 | Stop reason: HOSPADM

## 2023-05-30 RX ORDER — NALOXONE HYDROCHLORIDE 0.4 MG/ML
0.4 INJECTION, SOLUTION INTRAMUSCULAR; INTRAVENOUS; SUBCUTANEOUS
Status: DISCONTINUED | OUTPATIENT
Start: 2023-05-30 | End: 2023-06-01 | Stop reason: HOSPADM

## 2023-05-30 RX ORDER — ONDANSETRON 2 MG/ML
4 INJECTION INTRAMUSCULAR; INTRAVENOUS EVERY 6 HOURS PRN
Status: DISCONTINUED | OUTPATIENT
Start: 2023-05-30 | End: 2023-06-01 | Stop reason: HOSPADM

## 2023-05-30 RX ORDER — FENTANYL CITRATE 50 UG/ML
INJECTION, SOLUTION INTRAMUSCULAR; INTRAVENOUS PRN
Status: DISCONTINUED | OUTPATIENT
Start: 2023-05-30 | End: 2023-05-30

## 2023-05-30 RX ORDER — OXYCODONE HYDROCHLORIDE 5 MG/1
5-10 TABLET ORAL EVERY 4 HOURS PRN
Qty: 26 TABLET | Refills: 0 | Status: SHIPPED | OUTPATIENT
Start: 2023-05-30 | End: 2023-06-06

## 2023-05-30 RX ORDER — ASPIRIN 81 MG/1
162 TABLET ORAL DAILY
Status: DISCONTINUED | OUTPATIENT
Start: 2023-05-31 | End: 2023-06-01 | Stop reason: HOSPADM

## 2023-05-30 RX ORDER — SODIUM CHLORIDE, SODIUM LACTATE, POTASSIUM CHLORIDE, CALCIUM CHLORIDE 600; 310; 30; 20 MG/100ML; MG/100ML; MG/100ML; MG/100ML
INJECTION, SOLUTION INTRAVENOUS CONTINUOUS
Status: ACTIVE | OUTPATIENT
Start: 2023-05-30 | End: 2023-05-30

## 2023-05-30 RX ORDER — LIDOCAINE 40 MG/G
CREAM TOPICAL
Status: DISCONTINUED | OUTPATIENT
Start: 2023-05-30 | End: 2023-06-01 | Stop reason: HOSPADM

## 2023-05-30 RX ORDER — PROCHLORPERAZINE MALEATE 10 MG
10 TABLET ORAL EVERY 6 HOURS PRN
Status: DISCONTINUED | OUTPATIENT
Start: 2023-05-30 | End: 2023-06-01 | Stop reason: HOSPADM

## 2023-05-30 RX ORDER — TRANEXAMIC ACID 650 MG/1
1950 TABLET ORAL ONCE
Status: COMPLETED | OUTPATIENT
Start: 2023-05-30 | End: 2023-05-30

## 2023-05-30 RX ORDER — ONDANSETRON 4 MG/1
4 TABLET, ORALLY DISINTEGRATING ORAL EVERY 30 MIN PRN
Status: DISCONTINUED | OUTPATIENT
Start: 2023-05-30 | End: 2023-05-30 | Stop reason: HOSPADM

## 2023-05-30 RX ORDER — AMOXICILLIN 250 MG
1 CAPSULE ORAL 2 TIMES DAILY
Status: DISCONTINUED | OUTPATIENT
Start: 2023-05-30 | End: 2023-06-01 | Stop reason: HOSPADM

## 2023-05-30 RX ORDER — DESMOPRESSIN ACETATE 0.1 MG/1
0.1 TABLET ORAL DAILY
Status: DISCONTINUED | OUTPATIENT
Start: 2023-05-31 | End: 2023-06-01 | Stop reason: HOSPADM

## 2023-05-30 RX ORDER — ACETAMINOPHEN 325 MG/1
975 TABLET ORAL EVERY 8 HOURS
Status: DISCONTINUED | OUTPATIENT
Start: 2023-05-30 | End: 2023-06-01 | Stop reason: HOSPADM

## 2023-05-30 RX ORDER — ASPIRIN 81 MG/1
162 TABLET ORAL DAILY
Qty: 60 TABLET | Refills: 0 | Status: SHIPPED | OUTPATIENT
Start: 2023-05-30 | End: 2023-09-07

## 2023-05-30 RX ORDER — LIDOCAINE 40 MG/G
CREAM TOPICAL
Status: DISCONTINUED | OUTPATIENT
Start: 2023-05-30 | End: 2023-05-30 | Stop reason: HOSPADM

## 2023-05-30 RX ORDER — HYDROMORPHONE HYDROCHLORIDE 1 MG/ML
0.2 INJECTION, SOLUTION INTRAMUSCULAR; INTRAVENOUS; SUBCUTANEOUS EVERY 5 MIN PRN
Status: DISCONTINUED | OUTPATIENT
Start: 2023-05-30 | End: 2023-05-30 | Stop reason: HOSPADM

## 2023-05-30 RX ORDER — FENTANYL CITRATE 50 UG/ML
25 INJECTION, SOLUTION INTRAMUSCULAR; INTRAVENOUS EVERY 5 MIN PRN
Status: DISCONTINUED | OUTPATIENT
Start: 2023-05-30 | End: 2023-05-30 | Stop reason: HOSPADM

## 2023-05-30 RX ORDER — ACETAMINOPHEN 325 MG/1
650 TABLET ORAL EVERY 4 HOURS PRN
Status: DISCONTINUED | OUTPATIENT
Start: 2023-06-02 | End: 2023-06-01 | Stop reason: HOSPADM

## 2023-05-30 RX ORDER — ONDANSETRON 4 MG/1
4 TABLET, ORALLY DISINTEGRATING ORAL EVERY 6 HOURS PRN
Status: DISCONTINUED | OUTPATIENT
Start: 2023-05-30 | End: 2023-06-01 | Stop reason: HOSPADM

## 2023-05-30 RX ORDER — FENTANYL CITRATE 50 UG/ML
50 INJECTION, SOLUTION INTRAMUSCULAR; INTRAVENOUS EVERY 5 MIN PRN
Status: DISCONTINUED | OUTPATIENT
Start: 2023-05-30 | End: 2023-05-30 | Stop reason: HOSPADM

## 2023-05-30 RX ORDER — POLYETHYLENE GLYCOL 3350 17 G/17G
17 POWDER, FOR SOLUTION ORAL DAILY
Status: DISCONTINUED | OUTPATIENT
Start: 2023-05-31 | End: 2023-06-01 | Stop reason: HOSPADM

## 2023-05-30 RX ORDER — MAGNESIUM HYDROXIDE 1200 MG/15ML
LIQUID ORAL PRN
Status: DISCONTINUED | OUTPATIENT
Start: 2023-05-30 | End: 2023-05-30 | Stop reason: HOSPADM

## 2023-05-30 RX ORDER — METOPROLOL TARTRATE 1 MG/ML
1-2 INJECTION, SOLUTION INTRAVENOUS EVERY 5 MIN PRN
Status: DISCONTINUED | OUTPATIENT
Start: 2023-05-30 | End: 2023-05-30 | Stop reason: HOSPADM

## 2023-05-30 RX ORDER — HYDROMORPHONE HYDROCHLORIDE 1 MG/ML
0.2 INJECTION, SOLUTION INTRAMUSCULAR; INTRAVENOUS; SUBCUTANEOUS
Status: DISCONTINUED | OUTPATIENT
Start: 2023-05-30 | End: 2023-06-01 | Stop reason: HOSPADM

## 2023-05-30 RX ORDER — PROPOFOL 10 MG/ML
INJECTION, EMULSION INTRAVENOUS PRN
Status: DISCONTINUED | OUTPATIENT
Start: 2023-05-30 | End: 2023-05-30

## 2023-05-30 RX ORDER — CEFAZOLIN SODIUM/WATER 2 G/20 ML
2 SYRINGE (ML) INTRAVENOUS
Status: COMPLETED | OUTPATIENT
Start: 2023-05-30 | End: 2023-05-30

## 2023-05-30 RX ORDER — CEFAZOLIN SODIUM/WATER 2 G/20 ML
2 SYRINGE (ML) INTRAVENOUS SEE ADMIN INSTRUCTIONS
Status: DISCONTINUED | OUTPATIENT
Start: 2023-05-30 | End: 2023-05-30 | Stop reason: HOSPADM

## 2023-05-30 RX ORDER — FLUDROCORTISONE ACETATE 0.1 MG/1
0.1 TABLET ORAL DAILY
Status: DISCONTINUED | OUTPATIENT
Start: 2023-05-31 | End: 2023-06-01 | Stop reason: HOSPADM

## 2023-05-30 RX ORDER — ONDANSETRON 2 MG/ML
INJECTION INTRAMUSCULAR; INTRAVENOUS PRN
Status: DISCONTINUED | OUTPATIENT
Start: 2023-05-30 | End: 2023-05-30

## 2023-05-30 RX ORDER — HYDRALAZINE HYDROCHLORIDE 20 MG/ML
2.5-5 INJECTION INTRAMUSCULAR; INTRAVENOUS EVERY 10 MIN PRN
Status: DISCONTINUED | OUTPATIENT
Start: 2023-05-30 | End: 2023-05-30 | Stop reason: HOSPADM

## 2023-05-30 RX ORDER — CEFAZOLIN SODIUM 1 G/3ML
1 INJECTION, POWDER, FOR SOLUTION INTRAMUSCULAR; INTRAVENOUS EVERY 8 HOURS
Status: COMPLETED | OUTPATIENT
Start: 2023-05-30 | End: 2023-05-31

## 2023-05-30 RX ORDER — HYDROMORPHONE HYDROCHLORIDE 1 MG/ML
0.4 INJECTION, SOLUTION INTRAMUSCULAR; INTRAVENOUS; SUBCUTANEOUS EVERY 5 MIN PRN
Status: DISCONTINUED | OUTPATIENT
Start: 2023-05-30 | End: 2023-05-30 | Stop reason: HOSPADM

## 2023-05-30 RX ORDER — DROSPIRENONE AND ETHINYL ESTRADIOL 0.02-3(28)
1 KIT ORAL DAILY
Status: DISCONTINUED | OUTPATIENT
Start: 2023-05-31 | End: 2023-06-01 | Stop reason: HOSPADM

## 2023-05-30 RX ORDER — HYDROMORPHONE HYDROCHLORIDE 1 MG/ML
0.4 INJECTION, SOLUTION INTRAMUSCULAR; INTRAVENOUS; SUBCUTANEOUS
Status: DISCONTINUED | OUTPATIENT
Start: 2023-05-30 | End: 2023-06-01 | Stop reason: HOSPADM

## 2023-05-30 RX ORDER — CELECOXIB 200 MG/1
400 CAPSULE ORAL ONCE
Status: COMPLETED | OUTPATIENT
Start: 2023-05-30 | End: 2023-05-30

## 2023-05-30 RX ORDER — POLYETHYLENE GLYCOL 3350 17 G/17G
1 POWDER, FOR SOLUTION ORAL DAILY
Qty: 7 PACKET | Refills: 0 | Status: SHIPPED | OUTPATIENT
Start: 2023-05-30 | End: 2023-09-07

## 2023-05-30 RX ADMIN — HYDROCORTISONE SODIUM SUCCINATE 50 MG: 100 INJECTION, POWDER, FOR SOLUTION INTRAMUSCULAR; INTRAVENOUS at 21:35

## 2023-05-30 RX ADMIN — SUGAMMADEX 200 MG: 100 INJECTION, SOLUTION INTRAVENOUS at 10:38

## 2023-05-30 RX ADMIN — SENNOSIDES AND DOCUSATE SODIUM 1 TABLET: 50; 8.6 TABLET ORAL at 21:36

## 2023-05-30 RX ADMIN — PHENYLEPHRINE HYDROCHLORIDE 100 MCG: 10 INJECTION INTRAVENOUS at 08:08

## 2023-05-30 RX ADMIN — HYDROMORPHONE HYDROCHLORIDE 0.2 MG: 1 INJECTION, SOLUTION INTRAMUSCULAR; INTRAVENOUS; SUBCUTANEOUS at 11:36

## 2023-05-30 RX ADMIN — OXYCODONE HYDROCHLORIDE 5 MG: 5 TABLET ORAL at 11:48

## 2023-05-30 RX ADMIN — Medication 2 G: at 08:13

## 2023-05-30 RX ADMIN — OXYCODONE HYDROCHLORIDE 5 MG: 5 TABLET ORAL at 15:03

## 2023-05-30 RX ADMIN — TRANEXAMIC ACID 1950 MG: 650 TABLET ORAL at 06:52

## 2023-05-30 RX ADMIN — HYDROCORTISONE SODIUM SUCCINATE 50 MG: 100 INJECTION, POWDER, FOR SOLUTION INTRAMUSCULAR; INTRAVENOUS at 13:40

## 2023-05-30 RX ADMIN — LIDOCAINE HYDROCHLORIDE 80 MG: 20 INJECTION, SOLUTION INFILTRATION; PERINEURAL at 08:08

## 2023-05-30 RX ADMIN — SODIUM CHLORIDE, POTASSIUM CHLORIDE, SODIUM LACTATE AND CALCIUM CHLORIDE: 600; 310; 30; 20 INJECTION, SOLUTION INTRAVENOUS at 10:45

## 2023-05-30 RX ADMIN — FENTANYL CITRATE 50 MCG: 50 INJECTION, SOLUTION INTRAMUSCULAR; INTRAVENOUS at 08:08

## 2023-05-30 RX ADMIN — HYDROXYZINE HYDROCHLORIDE 25 MG: 25 TABLET, FILM COATED ORAL at 21:36

## 2023-05-30 RX ADMIN — HYDROMORPHONE HYDROCHLORIDE 0.5 MG: 1 INJECTION, SOLUTION INTRAMUSCULAR; INTRAVENOUS; SUBCUTANEOUS at 09:47

## 2023-05-30 RX ADMIN — CEFAZOLIN 1 G: 1 INJECTION, POWDER, FOR SOLUTION INTRAMUSCULAR; INTRAVENOUS at 15:05

## 2023-05-30 RX ADMIN — PHENYLEPHRINE HYDROCHLORIDE 0.4 MCG/KG/MIN: 10 INJECTION INTRAVENOUS at 08:45

## 2023-05-30 RX ADMIN — SODIUM CHLORIDE, POTASSIUM CHLORIDE, SODIUM LACTATE AND CALCIUM CHLORIDE: 600; 310; 30; 20 INJECTION, SOLUTION INTRAVENOUS at 07:59

## 2023-05-30 RX ADMIN — OXYCODONE HYDROCHLORIDE 5 MG: 5 TABLET ORAL at 18:56

## 2023-05-30 RX ADMIN — ACETAMINOPHEN 975 MG: 325 TABLET ORAL at 21:36

## 2023-05-30 RX ADMIN — PHENYLEPHRINE HYDROCHLORIDE 100 MCG: 10 INJECTION INTRAVENOUS at 08:40

## 2023-05-30 RX ADMIN — ACETAMINOPHEN 975 MG: 325 TABLET ORAL at 06:52

## 2023-05-30 RX ADMIN — FENTANYL CITRATE 50 MCG: 50 INJECTION, SOLUTION INTRAMUSCULAR; INTRAVENOUS at 08:47

## 2023-05-30 RX ADMIN — HYDROCORTISONE SODIUM SUCCINATE 100 MG: 100 INJECTION, POWDER, FOR SOLUTION INTRAMUSCULAR; INTRAVENOUS at 08:33

## 2023-05-30 RX ADMIN — Medication 60 MG: at 08:09

## 2023-05-30 RX ADMIN — PHENYLEPHRINE HYDROCHLORIDE 100 MCG: 10 INJECTION INTRAVENOUS at 08:14

## 2023-05-30 RX ADMIN — ONDANSETRON 4 MG: 2 INJECTION INTRAMUSCULAR; INTRAVENOUS at 10:17

## 2023-05-30 RX ADMIN — CELECOXIB 400 MG: 200 CAPSULE ORAL at 06:52

## 2023-05-30 RX ADMIN — MIDAZOLAM 2 MG: 1 INJECTION INTRAMUSCULAR; INTRAVENOUS at 08:00

## 2023-05-30 RX ADMIN — PROPOFOL 150 MG: 10 INJECTION, EMULSION INTRAVENOUS at 08:08

## 2023-05-30 RX ADMIN — PHENYLEPHRINE HYDROCHLORIDE 100 MCG: 10 INJECTION INTRAVENOUS at 08:31

## 2023-05-30 RX ADMIN — ACETAMINOPHEN 975 MG: 325 TABLET ORAL at 15:03

## 2023-05-30 RX ADMIN — HYDROMORPHONE HYDROCHLORIDE 0.5 MG: 1 INJECTION, SOLUTION INTRAMUSCULAR; INTRAVENOUS; SUBCUTANEOUS at 09:20

## 2023-05-30 ASSESSMENT — ACTIVITIES OF DAILY LIVING (ADL)
ADLS_ACUITY_SCORE: 28
ADLS_ACUITY_SCORE: 31
ADLS_ACUITY_SCORE: 32
ADLS_ACUITY_SCORE: 28
ADLS_ACUITY_SCORE: 28
ADLS_ACUITY_SCORE: 32
ADLS_ACUITY_SCORE: 28

## 2023-05-30 NOTE — ANESTHESIA CARE TRANSFER NOTE
Patient: Suyapa Hodge    Procedure: Procedure(s):  Direct Anterior Right Total Hip Arthroplasty       Diagnosis: Osteonecrosis (H) [M87.9]  Right hip pain [M25.551]  Diagnosis Additional Information: No value filed.    Anesthesia Type:   General     Note:    Oropharynx: oropharynx clear of all foreign objects and spontaneously breathing  Level of Consciousness: drowsy and awake  Oxygen Supplementation: face mask  Level of Supplemental Oxygen (L/min / FiO2): 6  Independent Airway: airway patency satisfactory and stable  Dentition: dentition unchanged  Vital Signs Stable: post-procedure vital signs reviewed and stable  Report to RN Given: handoff report given  Patient transferred to: PACU    Handoff Report: Identifed the Patient, Identified the Reponsible Provider, Reviewed the pertinent medical history, Discussed the surgical course, Reviewed Intra-OP anesthesia mangement and issues during anesthesia, Set expectations for post-procedure period and Allowed opportunity for questions and acknowledgement of understanding      Vitals:  CRNA VITALS  5/30/2023 1026 - 5/30/2023 1101      5/30/2023             NIBP: 106/56    Pulse: 108    NIBP Mean: 77    Temp: 36.4  C (97.6  F)    SpO2: 99 %    Resp Rate (observed): 22        Vitals shown include unvalidated device data.    Electronically Signed By: SOHEILA Tucker CRNA  May 30, 2023  11:01 AM

## 2023-05-30 NOTE — BRIEF OP NOTE
Orthopaedic Surgery Brief Op-Note      Patient: Suyapa Hodge  : 1985  Date of Service: 2023 10:53 AM    Pre-operative Diagnosis: Osteonecrosis (H) [M87.9]  Right hip pain [M25.551]  Post-operative Diagnosis: same    Procedure(s) Performed: Procedure(s):  Direct Anterior Right Total Hip Arthroplasty    Staff: Dr. Grubbs  Assistants:   Chan Sarmiento PA-C    Anesthesia: General  EBL: 50 cc  UOP: see anesthesia record    Implants:   Implant Name Type Inv. Item Serial No.  Lot No. LRB No. Used Action   IMP SHELL BIOM G7 ACETAB PPS PARHAM HOLE 54MM SZ  [id number] - [id number] Total Joint Component/Insert IMP SHELL BIOM G7 ACETAB PPS PARHAM HOLE 54MM [facility] [id number]  [employer] [id number] Right 1 Implanted   LINER ACTB G7 F 36MM VIVACIT-E HIP STRL LUM LF [id number] - [id number] Total Joint Component/Insert LINER ACTB G7 F 36MM VIVACIT-E HIP STRL LUM LF [id number]  [employer] [id number] Right 1 Implanted   IMP STEM FEM BIOM TAPERLOC STD OFFSET TYPE 1 [facility] 51103-110 - [id number] Total Joint Component/Insert IMP STEM FEM BIOM TAPERLOC STD OFFSET TYPE 1 [facility] -110  [employer] [id number] Right 1 Implanted   HEAD FEM 0MM OFST 36MM HIP ACTB MDLR TY 1 BLX D G7 - [id number] Total Joint Component/Insert HEAD FEM 0MM OFST 36MM HIP ACTB MDLR TY 1 BLX D G7  [employer] [id number] Right 1 Implanted     Drains: none  Intra-op Labs/Cxs/Specimens: * No specimens in log *  Complications: No apparent complications during procedure  Findings: Please see dictated operative note for details    Disposition: Stable to PACU, then admit to Orthopaedics     POST OPERATIVE PLAN    Assessment/Plan: Suyapa Hodge is a 37 year old female s/p Procedure(s):  Direct Anterior Right Total Hip Arthroplasty on 2023 with Dr. Grubbs.    Activity: Up with assist and assistive devices as needed until independent. Anterior hip precautions to operative hip x 4 weeks:  1) No hip hyperflexion  2) No adduction beyond midline  3) No  external rotation beyond neutral   Weight bearing status: WBAT  Antibiotics: Cefazolin x 24 hours  Diet: Begin with clear fluids and progress diet as tolerated. Bowel regimen. Anti-emetics PRN.    DVT prophylaxis: Mechanical while in hospital with Aspirin 162mg daily x 4 weeks, starting morning of POD 1  Elevation: Elevate heels off of bed on pillows   Wound Care: Alginate, tegaderm to be changed PRN by ortho  Drains: none  Sampson: none  Pain management: Orals PRN, IV for breakthrough only  X-rays: AP Pelvis and lateral in PACU  Physical Therapy: Mobilization, ROM, ADL's, Posterior hip precautions  Occupational Therapy: ADL's  Labs: Trend Hgb on POD #1, 2  Consults: PT, OT. Hospitalist, appreciate assistance in caring for this patient throughout the perioperative period    Future Appointments   Date Time Provider Department Center   5/30/2023  4:00 PM Malou Muniz, PT [facility] [place]   5/31/2023  8:15 AM Cynthia Ledezma, PT [facility] [place]   5/31/2023  9:15 AM Cynthia Hall, OTR [facility] [place]   5/31/2023  1:00 PM Sallie Nguyen, PT [facility] [place]   6/26/2023 10:30 AM Chan Sarmiento PA-C [facility]   7/5/2023  3:00 PM Adriana Wilkinson MD [facility]   9/14/2023  2:00 PM Ashley Carbone PA-C [facility]   1/17/2024  9:30 AM Adriana Wilkinson MD [facility]       Disposition: Pending progress with therapies, pain control on orals, and medical stability, anticipate discharge to Home vs TCU on POD #1-2.  Follow up: Plan for follow up with Dr. Grubbs in 4 weeks     I assisted with positioning, prepping and draping, and closure.    Chan Sarmiento PA-C  5/30/2023 10:53 AM  Physician Assistant   Oncology and Adult Reconstructive Surgery  Dept Orthopaedic Surgery, [facility]     Thank you for allowing me to participate in this patient's care. Please page me directly any questions/concerns.   Securely message with the Vocera Web Console (learn more here)  Text page via [employer] Paging/Directory    If there  is no response, if it is a weekend, or if it is during evening hours, please page the orthopaedic surgery resident on call via [facility] Paging/Directory

## 2023-05-30 NOTE — ANESTHESIA POSTPROCEDURE EVALUATION
Patient: Suyapa Hodge    Procedure: Procedure(s):  Direct Anterior Right Total Hip Arthroplasty       Anesthesia Type:  General    Note:  Disposition: Admission; Inpatient   Postop Pain Control: Uneventful            Sign Out: Well controlled pain   PONV: No   Neuro/Psych: Uneventful            Sign Out: Acceptable/Baseline neuro status   Airway/Respiratory: Uneventful            Sign Out: Acceptable/Baseline resp. status   CV/Hemodynamics: Uneventful            Sign Out: Acceptable CV status; No obvious hypovolemia; No obvious fluid overload   Other NRE:    DID A NON-ROUTINE EVENT OCCUR? No           Last vitals:  Vitals Value Taken Time   BP 96/68 05/30/23 1200   Temp 36.2  C (97.2  F) 05/30/23 1200   Pulse 87 05/30/23 1200   Resp 16 05/30/23 1150   SpO2 99 % 05/30/23 1207   Vitals shown include unvalidated device data.    Electronically Signed By: Kael Bautista MD  May 30, 2023  12:08 PM

## 2023-05-30 NOTE — PHARMACY-ADMISSION MEDICATION HISTORY
Pharmacist Admission Medication History    Admission medication history is complete. The information provided in this note is only as accurate as the sources available at the time of the update.    Medication reconciliation/reorder completed by provider prior to medication history? Yes    Information Source(s): Patient and CareEverywhere/SureScripts via in-person    Pertinent Information:   - all meds are daily per patient (none are BID, updated orders including desmopressin and hydrocortisone from BID to daily)   - levothyroxine, patient clarified that she does not take a dose on Sundays. Patient forgot this past Sunday so took it on Monday but it is normally on Sundays.   - cabergoline half tab weekly, patient takes dose on Sundays     Changes made to PTA medication list:    Added: None    Deleted: None    Changed:   o Hydrocortisone changed from 10 mg AM, 5 mg PM to 10 mg daily in AM only  o Desmopressin changed from 0.1 mg BID to 0.1 mg daily in AM   o Calcium+vit D: changed from BID to 1 tablet daily per patient  o Hydrocortisone prn when unable to take oral meds: changed from IV to IM, per pt, jabs in leg muscle    Medication Affordability:       Allergies reviewed with patient and updates made in EHR: yes    Medication History Completed By: Toni Lara RPH 5/30/2023 4:11 PM    Prior to Admission medications    Medication Sig Last Dose Taking? Auth Provider Long Term End Date   cabergoline (DOSTINEX) 0.5 MG tablet Take 0.5 tablets (0.25 mg) by mouth once a week  Patient taking differently: Take 0.25 mg by mouth once a week On Sundays 5/29/2023 at 1000 Yes Adriana Wilkinson MD Yes    Calcium carb-Vitamin D 500 mg [place]-200 units (OSCAL WITH D;OYSTER SHELL CALCIUM) 500-200 MG-UNIT per tablet Take 1 tablet by mouth daily Past Month Yes Reported, Patient     desmopressin (DDAVP) 0.1 MG tablet Take 1 tablet (0.1 mg) by mouth 2 times daily  Patient taking differently: Take 0.1 mg by mouth daily 5/30/2023 at 0400 Yes  Adriana Wilkinson MD Yes    drospirenone-ethinyl estradiol (HECTOR) 3-0.02 MG tablet Take 1 tablet by mouth daily 5/30/2023 at 0400 Yes Sallie Fox MD Yes    fludrocortisone (FLORINEF) 0.1 MG tablet Take 1 tablet (0.1 mg) by mouth daily 5/30/2023 at 0400 Yes Adriana Wilkinson MD Yes    hydrocortisone (CORTEF) 10 MG tablet TAKE 1 TABLET EVERY MORNING, 0.5 TABLET IN THE EVENING PLUS SICK DAY SUPPLY AS DIRECTED  Patient taking differently: Take 10 mg by mouth daily TAKE 1 TABLET EVERY MORNING, 0.5 TABLET IN THE EVENING PLUS SICK DAY SUPPLY AS DIRECTED 5/30/2023 at 0400 Yes Adriana Wilkinson MD Yes    hydrocortisone sodium succinate PF (SOLU-CORTEF) 100 MG injection Inject 2 mLs (100 mg) into the vein once as needed (use when you cannot tolerate po intake.) Dispense Act-O-Vial  Patient taking differently: Inject 100 mg into the muscle once as needed (use when you cannot tolerate po intake.) Dispense Act-O-Vial Unknown Yes Adriana Wilkinson MD Yes    levothyroxine (SYNTHROID/LEVOTHROID) 75 MCG tablet TAKE 1 TABLET BY MOUTH ONCE DAILY 6 DAYS A WEEK AND TAKE NOTHING ON THE 7TH DAY EACH WEEK  Patient taking differently: Take 75 mcg by mouth daily TAKE 1 TABLET BY MOUTH ONCE DAILY 6 DAYS A WEEK AND TAKE NOTHING ON THE 7TH DAY EACH WEEK (does not take on Sundays) 5/30/2023 at 0400 Yes Adriana Wilkinson MD Yes    methocarbamol (ROBAXIN) 500 MG tablet Take 1-2 tablets (500-1,000 mg) by mouth nightly as needed for muscle spasms Past Month Yes Ashley Carbone PA-C     Multiple Vitamin (MULTI-VITAMIN DAILY PO) Take 1 tablet by mouth daily  Past Month Yes Reported, Patient     sodium chloride (OCEAN) 0.65 % nasal spray Apply in both nostrils as needed for congestion Past Month Yes Delia Vences MD

## 2023-05-30 NOTE — PLAN OF CARE
"VS: BP 96/56 (BP Location: Right arm)   Pulse 90   Temp (!) 95.7  F (35.4  C) (Oral)   Resp 11   Ht 1.715 m (5' 7.5\")   Wt 73.2 kg (161 lb 6 oz)   LMP  (LMP Unknown)   SpO2 98%   BMI 24.90 kg/m     O2: >90% on room air. Denies SOB/N/V/chest. Lungs CTA. IS encouraged    Output: Voiding spontaneously without difficulties.    Last BM: Per patient report LBM 5/29/23   Activity: WBAT. Ax1 walker and gait belt. Ambulated to bathroom    Skin: Incision to right hip, protective dressing to sacrum   Pain: Managed with schedule Tylenol, PRN oxycodone, and ice applied    CMS: Alert and oriented x4.denies numbness and tingling    Dressing: CDI   Diet: Regular diet    LDA: PIV to right hand infusing LR @50mL/hr  PIV to left hand saline locked    Equipment: IV pole/pump, CAPNO, PCDs, walker, gait belt, personal belongings, call light within patient reach    Plan: Pending progress with therapy and pain management   Additional Info:         "

## 2023-05-30 NOTE — ANESTHESIA PROCEDURE NOTES
Airway       Patient location during procedure: OR       Procedure Start/Stop Times: 5/30/2023 8:10 AM  Staff -        CRNA: Christian Paz APRN CRNA       Performed By: CRNAIndications and Patient Condition       Indications for airway management: christiano-procedural       Induction type:intravenous       Mask difficulty assessment: 1 - vent by mask    Final Airway Details       Final airway type: endotracheal airway       Successful airway: ETT - single and Oral  Endotracheal Airway Details        ETT size (mm): 7.0       Cuffed: yes       Successful intubation technique: direct laryngoscopy       DL Blade Type: MAC 3       Grade View of Cords: 1       Position: Right       Measured from: gums/teeth       Secured at (cm): 20       Bite block used: None    Post intubation assessment        Placement verified by: capnometry, equal breath sounds and chest rise        Number of attempts at approach: 1       Number of other approaches attempted: 0       Secured with: silk tape       Ease of procedure: easy       Dentition: Intact and Unchanged    Medication(s) Administered   Medication Administration Time: 5/30/2023 8:10 AM

## 2023-05-30 NOTE — PLAN OF CARE
[facility]  OUTPATIENT PHYSICAL THERAPY EVALUATION  PLAN OF TREATMENT FOR OUTPATIENT REHABILITATION  (COMPLETE FOR INITIAL CLAIMS ONLY)  Patient's Last Name, First Name, M.I.  YOB: 1985  Suyapa Hodge                        Provider's Name  [facility] Medical Record No.  [id number]                             Onset Date:  05/30/23   Start of Care Date:  05/30/23   Type:     _X_PT   ___OT   ___SLP Medical Diagnosis:  s/p R ZAHEER              PT Diagnosis:  difficulty ambulating Visits from SOC:  1     See note for plan of treatment, functional goals and certification details    I CERTIFY THE NEED FOR THESE SERVICES FURNISHED UNDER        THIS PLAN OF TREATMENT AND WHILE UNDER MY CARE     (Physician co-signature of this document indicates review and certification of the therapy plan).

## 2023-05-30 NOTE — PROGRESS NOTES
"PACU to Inpatient Nursing Handoff    Patient Suyapa Hodge is a 37 year old female who speaks English.   Procedure Procedure(s):  Direct Anterior Right Total Hip Arthroplasty   Surgeon(s) Primary: Wiliam Grubbs MD  Assisting: Chan Sarmiento PATYLER  Fellow - Assisting: Gina Jalloh MD     Allergies   Allergen Reactions     Labetalol      Racing heart feeling, panic attack feeling.        Isolation  No active isolations     Past Medical History   has a past medical history of Adrenal disorder (H), Avascular necrosis of femur head, (2010), Cervical high risk HPV (human papillomavirus) test positive (10/2015 & 3/2019), Colon polyp, Cushing syndrome (H), Diabetes insipidus (H), Fatigue, History of colposcopy (10/2015), Hypercalcaemia, Hypertension, Medulloadrenal hyperfunction (H), Pulmonary emboli (H) (01/2014), Renal disease, and Thyroid disease.    Anesthesia Choice   Dermatome Level     Preop Meds acetaminophen (Tylenol) - time given: 0652  celecoxib (Celebrex) - time given: 0652  TXA - time given: 0652   Nerve block Not applicable   Intraop Meds dexamethasone (Decadron)  fentanyl (Sublimaze): 100 mcg total  hydromorphone (Dilaudid): 1 mg total   Local Meds Yes   Antibiotics cefazolin (Ancef) - last given at 0813     Pain Patient Currently in Pain: no   PACU meds  hydromorphone (Dilaudid): 0.2 mg (total dose) last given at 1140   oxycodone (Roxicodone): 5 mg (total dose) last given at 1150    PCA / epidural No   Capnography     Telemetry ECG Rhythm: Sinus tachycardia   Inpatient Telemetry Monitor Ordered? No        Labs Glucose Lab Results   Component Value Date     05/30/2023    GLC 88 12/08/2022    GLC 86 04/09/2021       Hgb Lab Results   Component Value Date    HGB 14.6 05/11/2023    HGB 13.3 07/15/2014       INR Lab Results   Component Value Date    INR 0.98 07/14/2014      PACU Imaging Completed     Wound/Incision Wound 01/25/14 Left Arm elbow (anterior), per patient a \"dog scratch\" (Active) "   Number of days: 3412       Incision/Surgical Site with Packing 01/20/14 Bilateral Nostril Qty Placed: 2 (Active)   Number of days: 3417       Incision/Surgical Site 07/14/14 Back (Active)   Number of days: 3242       Incision/Surgical Site 05/30/23 Right;Anterior Hip (Active)   Incision Assessment UTV 05/30/23 1058   Trudi-Incision Assessment UTV 05/30/23 1058   Closure Approximated;Liquid bandage;Sutures;SPENCER 05/30/23 1058   Incision Drainage Amount None 05/30/23 1058   Dressing Intervention Clean, dry, intact 05/30/23 1058   Number of days: 0      CMS        Equipment ice pack   Other LDA       IV Access Peripheral IV 05/30/23 Left Hand (Active)   Site Assessment WDL 05/30/23 1058   Line Status Saline locked 05/30/23 1058   Dressing Transparent 05/30/23 1058   Dressing Status clean;dry;intact 05/30/23 1058   Dressing Intervention New dressing  05/30/23 0712   Line Intervention Flushed 05/30/23 1058   Phlebitis Scale 0-->no symptoms 05/30/23 1058   Infiltration? no 05/30/23 1058   Number of days: 0       Peripheral IV 05/30/23 Right Hand (Active)   Site Assessment WDL 05/30/23 1058   Line Status Infusing 05/30/23 1058   Dressing Transparent 05/30/23 1058   Dressing Status clean;dry;intact 05/30/23 1058   Phlebitis Scale 0-->no symptoms 05/30/23 1058   Infiltration? no 05/30/23 1058   Number of days: 0      Blood Products Not applicable  mL   Intake/Output Date 05/30/23 0700 - 05/31/23 0659   Shift 1987-9732 1677-3939 4926-6699 24 Hour Total   INTAKE   I.V. 1000   1000   Shift Total(mL/kg) 1000(13.66)   1000(13.66)   OUTPUT   Blood 150   150   Shift Total(mL/kg) 150(2.05)   150(2.05)   Weight (kg) 73.2 73.2 73.2 73.2      Drains / Sampson     Time of void PreOp Time of Void Prior to Procedure: 0545 (05/30/23 0646)    PostOp      Diapered? No   Bladder Scan Bladder Scan Volume (mL): 135 ml (05/30/23 1100)   PO    crackers and water     Vitals    B/P: 98/59  T: 97.7  F (36.5  C)    Temp src: Axillary  P:  Pulse:  94 (05/30/23 1115)          R: (!) 9  O2:  SpO2: 99 %    O2 Device: Simple face mask (05/30/23 1058)    Oxygen Delivery: 7 LPM (05/30/23 1058)         Family/support present mother   Patient belongings     Patient transported on cart   DC meds/scripts (obs/outpt) Yes, scripts   Inpatient Pain Meds Released? Yes       Special needs/considerations None   Tasks needing completion None       BRIA WALTERS RN  ASCOM [id number]

## 2023-05-30 NOTE — CONSULTS
[facility]  Consult Note - Hospitalist Service, GOLD TEAM 21  Date of Admission:  5/30/2023  Consult Requested by: Orthopaedics  Reason for Consult: Medical Co-management    Assessment & Plan   Suyapa Hodge is a 37 year old female admitted on 5/30/2023 after right total hip arthoplasty for osteonecrosis of right hip.     #Osteonecrosis of Right s/p right total hip arthoplasty - 5/30  -Orthopaedics primary, medicine consulting  -Pain, DVT, Bowel regiment per orthopaedics  -Anterior hip precautions x 4 weeks for operative hip per orthopaedics    #Cushing's Disease  #Adrenal insufficiency  #Alexei Syndrome post adrenalectomy  -Follows with [facility] endocrinology  -Continue weekly cabergoline,   -On hydrocortisone 10 mg qAM, 5 pm qPM and florined 0.1  -Will stress dose steroids for 50 mg Q8H x 1 day, then 50 mg BID x 1 day, then 50 mg daily back to home dose. If ready to discharge prior to completing taper we will double dose hydrocortisone for additional days until able to resume home regiment on 6/2    #Hyponatremia, hypopituitarism - Continue desmopressin 100 mcg  #CKD stage III - Cr 1.2  #Hypothyroidism - Continue synthroid  #Hypogonadism - continue bCP       Clinically Significant Risk Factors Present on Admission                                Aditya Singh MD  Hospitalist Service, GOLD TEAM 21  Securely message with Vocera (more info)  Text page via [facility] Paging/Directory   See signed in provider for up to date coverage information  ______________________________________________________________________    Chief Complaint   Osteonecrosis    History is obtained from the patient    History of Present Illness   Suyapa Hodge is a 37 year old female with adrenal insufficiency who presented for osteonecrosis of right hip and is s/p direct right anterior total hip arthoplasty. The procedure went well without any complications.She notes she is tired post operatively. SHe  is hoping to discharge in the morning if possible. She took her home medications this morning. We discussed stress dose steroids for next few days post procedure before returning to home hydrocortisone dosing. No concerns from family at bedside. No fever or chills. No chest pain. No shortness of breath. No abdominal pain.       Past Medical History    Past Medical History:   Diagnosis Date     Adrenal disorder (H)      Avascular necrosis of femur head, 2010     Cervical high risk HPV (human papillomavirus) test positive 10/2015 & 3/2019    +HR HPV (NOT 16/18)     Colon polyp      Cushing syndrome (H)     pituitary microadenoma     Diabetes insipidus (H)      Fatigue      History of colposcopy 10/2015    BEVERLEY 1     Hypercalcaemia      Hypertension      Medulloadrenal hyperfunction (H)      Pulmonary emboli (H) 01/2014     Renal disease      Thyroid disease        Past Surgical History   Past Surgical History:   Procedure Laterality Date     BIOPSY  1/2010, 7/2013, 1/2014, 7/2021    After each surgery on pituitary     COLONOSCOPY       COLONOSCOPY WITH CO2 INSUFFLATION N/A 09/16/2021    Procedure: COLONOSCOPY, WITH CO2 INSUFFLATION;  Surgeon: Harman Pearl MD;  Location: [facility] OR     COLPOSCOPY VULVA, BIOPSY, COMBINED  10/28/2015    BEVERLEY 1, 6 month follow-up pap was normal     COLPOSCOPY,BX CERVIX/ENDOCERV CURR  04/02/2019     INSERT DRAIN LUMBAR  01/20/2014    Procedure: INSERT DRAIN LUMBAR;;  Surgeon: Soham Aquino MD;  Location: [facility] OR     LAPAROSCOPIC ADRENALECTOMY  07/14/2014    Procedure: LAPAROSCOPIC ADRENALECTOMY;  Surgeon: Roni Nnun MD;  Location: [facility] OR     OPTICAL TRACKING SYSTEM ENDOSCOPIC RESECTION TUMOR CRANIAL  07/01/2013    Procedure: OPTICAL TRACKING SYSTEM ENDOSCOPIC RESECTION TUMOR CRANIAL;  Stealth Assisted Endoscopic Hypophysectomy ;  Surgeon: Soham Aquino MD;  Location: [facility] OR     OPTICAL TRACKING SYSTEM ENDOSCOPIC RESECTION TUMOR CRANIAL  01/20/2014     Procedure: OPTICAL TRACKING SYSTEM ENDOSCOPIC RESECTION TUMOR CRANIAL;  Stealth Assisted Endoscopic Transnasal Excision Of Pituitary Adenoma , Placement Of Lumbar Drain with c-arm;  Surgeon: Soham Aquino MD;  Location: [facility] OR     OPTICAL TRACKING SYSTEM ENDOSCOPIC RESECTION TUMOR CRANIAL N/A 07/22/2021    Procedure: stealth assisted Redo endoscopic, endonasal resection of pituitary tumor;  Surgeon: Mina Huffman MD;  Location: [facility] OR     removal of pituitary microademona       wisdom teeth extration         Medications   I have reviewed this patient's current medications  Medications Prior to Admission   Medication Sig Dispense Refill Last Dose     cabergoline (DOSTINEX) 0.5 MG tablet Take 0.5 tablets (0.25 mg) by mouth once a week 12 tablet 3 5/29/2023 at 1000     Calcium carb-Vitamin D 500 mg [place]-200 units (OSCAL WITH D;OYSTER SHELL CALCIUM) 500-200 MG-UNIT per tablet Take 1 tablet by mouth 2 times daily (with meals)   Past Month     desmopressin (DDAVP) 0.1 MG tablet Take 1 tablet (0.1 mg) by mouth 2 times daily 180 tablet 3 5/30/2023 at 0400     drospirenone-ethinyl estradiol (HECTOR) 3-0.02 MG tablet Take 1 tablet by mouth daily 84 tablet 2 5/30/2023 at 0400     fludrocortisone (FLORINEF) 0.1 MG tablet Take 1 tablet (0.1 mg) by mouth daily 90 tablet 2 5/30/2023 at 0400     hydrocortisone (CORTEF) 10 MG tablet TAKE 1 TABLET EVERY MORNING, 0.5 TABLET IN THE EVENING PLUS SICK DAY SUPPLY AS DIRECTED 150 tablet 5 5/30/2023 at 0400     hydrocortisone sodium succinate PF (SOLU-CORTEF) 100 MG injection Inject 2 mLs (100 mg) into the vein once as needed (use when you cannot tolerate po intake.) Dispense Act-O-Vial 2 mL 3 Unknown     levothyroxine (SYNTHROID/LEVOTHROID) 75 MCG tablet TAKE 1 TABLET BY MOUTH ONCE DAILY 6 DAYS A WEEK AND TAKE NOTHING ON THE 7TH DAY EACH WEEK 90 tablet 3 5/30/2023 at 0400     methocarbamol (ROBAXIN) 500 MG tablet Take 1-2 tablets (500-1,000 mg) by mouth nightly as needed  for muscle spasms 20 tablet 2 Past Month     Multiple Vitamin (MULTI-VITAMIN DAILY PO) Take 1 tablet by mouth daily    Past Month     sodium chloride (OCEAN) 0.65 % nasal spray Apply in both nostrils as needed for congestion 30 mL 0 Past Month          Social History   I have reviewed this patient's social history and updated it with pertinent information if needed.  Social History     Tobacco Use     Smoking status: Former     Packs/day: 0.50     Years: 3.00     Pack years: 1.50     Types: Cigarettes     Start date: 10/10/2005     Quit date: 1/2/2008     Years since quitting: 15.4     Smokeless tobacco: Never   Vaping Use     Vaping status: Never Used   Substance Use Topics     Alcohol use: Yes     Comment: Average 2-3/week in summer     Drug use: Never       Allergies   Allergies   Allergen Reactions     Labetalol      Racing heart feeling, panic attack feeling.         Physical Exam   Vital Signs: Temp: (!) 95.7  F (35.4  C) Temp src: Oral BP: 105/56 Pulse: 83   Resp: 12 SpO2: 95 % O2 Device: None (Room air) Oxygen Delivery: 2 LPM  Weight: 161 lbs 6.03 oz    Constitutional: alert, somnolent but awakens to voice, lying in bed  Eyes: no icterus  Nose: nasal cannula in place  ENT: supple, no JVD  Respiratory: CTAB, no wheezing or crackles  Cardiovascular: RRR  GI: soft, distended, bowel sounds present  Skin: hyperpigmentation noted, incision with dressing in place  Musculoskeletal: foot plantar and dorsiflexion in tact bilaterally  Neurologic: intact sensation to light touch in bilateral distal lower extremities     Medical Decision Making             Data

## 2023-05-30 NOTE — PROGRESS NOTES
Received Completed forms Yes   Faxed Forms Faxed To: [facility]  Fax Number: [phone]   Sent to HIM (Date) 5/26/23

## 2023-05-30 NOTE — PROGRESS NOTES
"   05/30/23 3953   Appointment Info   Signing Clinician's Name / Credentials (PT) Malou Muniz, PT, DPT   Quick Adds   Quick Adds Certification   Living Environment   People in Home alone  (lives alone at baseline but will be staying with her parents for ~6 wks)   Current Living Arrangements house   Living Environment Comments Pt will be staying at her parents house for recovery (staying there 6 wks or until ready to go back to work- works as RN at [employer]). Ramp to enter, all needs met 1 level. Bathroom includes walk in shower with small 4\" threshold & has shower chair available & comfort height toilet with handles   Self-Care   Usual Activity Tolerance excellent   Current Activity Tolerance moderate   Activity/Exercise/Self-Care Comment IND with all mobility without AD, fairly active job as RN. Has walker at home available to use   General Information   Onset of Illness/Injury or Date of Surgery 05/30/23   Referring Physician Chan Sarmiento, PA-C   Patient/Family Therapy Goals Statement (PT) To be able to walk without knee buckling/ be ready to go home   Pertinent History of Current Problem (include personal factors and/or comorbidities that impact the POC) Pt is POD#0 R direct anterior ZAHEER with Dr. Grubbs, due to osteonecrosis.   Existing Precautions/Restrictions no hip ER;no hip hyperextension;no pivoting or twisting;no hip ADD past midline  (\"no hyperflexion\"; All hip precautions x4 wks per ortho note)   Weight-Bearing Status - LUE full weight-bearing   Weight-Bearing Status - RUE full weight-bearing   Weight-Bearing Status - LLE full weight-bearing   Weight-Bearing Status - RLE weight-bearing as tolerated   General Observations Patient is pleasant & agreeable to PT   Cognition   Orientation Status (Cognition) oriented x 4   Pain Assessment   Patient Currently in Pain Yes, see Vital Sign flowsheet  (3-4/10 incision area)   Integumentary/Edema   Integumentary/Edema Comments dressing on hip appears intact "   Posture    Posture Forward head position;Protracted shoulders   Range of Motion (ROM)   ROM Comment R LE hip ROM reduced somewhat 2/2 pain & post op precautions; all other ROM grossly WFL   Strength (Manual Muscle Testing)   Strength Comments Some post-op R quad weakness, block not fully work off- unable to acheive full knee extension against gravity with SAQ & ousmane in R stance unless offloaded with UE support on walker   Bed Mobility   Comment, (Bed Mobility) Semifowler's>sit with HOB slightly elevated & SBA   Transfers   Comment, (Transfers) Sit>stand from EOB to walker with CGA, pt with R foot in front for reduced WB'ing, heavy reliance on UE pushoff   Gait/Stairs (Locomotion)   Comment, (Gait/Stairs) Attempted steps in place with minimal UE support on walker- pt buckling in R stance but able to catch self with support on walker and CGA; able to compenste well with UE support on walker though R knee starts to buckle as pt fatigues   Balance   Balance Comments Relies on UE support on walker moreso due to R weakness than balance impairment   Sensory Examination   Sensory Perception Comments reports numbness/tingling around R knee when standing, none when laying down   Clinical Impression   Criteria for Skilled Therapeutic Intervention Yes, treatment indicated   PT Diagnosis (PT) difficulty ambulating   Influenced by the following impairments Post op R LE/quad weakness/ block not fully worn off, R LE pain, reduced activity tolerance, novelty of hip precautions   Functional limitations due to impairments difficulty/ decreased independence with bed mobility, transfers, ambulation, stairs   Clinical Presentation (PT Evaluation Complexity) Stable/Uncomplicated   Clinical Presentation Rationale clinical judgement   Clinical Decision Making (Complexity) low complexity   Planned Therapy Interventions (PT) balance training;bed mobility training;cryotherapy;gait training;home exercise program;neuromuscular  re-education;patient/family education;ROM (range of motion);stair training;strengthening;transfer training;progressive activity/exercise;home program guidelines;motor coordination training;manual therapy techniques   Anticipated Equipment Needs at Discharge (PT)   (patient has walker at home, also has WC, shower chair, comfort height toilet with toilet frame)   Risk & Benefits of therapy have been explained evaluation/treatment results reviewed;care plan/treatment goals reviewed;risks/benefits reviewed;current/potential barriers reviewed;participants voiced agreement with care plan;participants included   Clinical Impression Comments Patient is mobilizing fairly well POD#0 R direct anterior ZAHEER. Currently with block not fully worn off and some R knee buckling, pt compensates well with UE support on walker but does need CGA for safety currently. Anticipate she will progress well & as block wears off will be appropriate for discharge home with OP PT follow up. Will continue to see while hospitalized to progress mobility & safety   PT Total Evaluation Time   PT Eval, Low Complexity Minutes (60604) 8   Plan of Care Review   Plan of Care Reviewed With patient   Therapy Certification   Start of care date 05/30/23   Certification date from 05/30/23   Certification date to 06/02/23   Medical Diagnosis s/p R ZAHEER   Physical Therapy Goals   PT Frequency 2x/day   PT Predicted Duration/Target Date for Goal Attainment 06/01/23   PT Goals Bed Mobility;Transfers;Gait   PT: Bed Mobility Supervision/stand-by assist;Supine to/from sit;Within precautions  (bed per home set up)   PT: Transfers Sit to/from stand;Supervision/stand-by assist;Assistive device;Within precautions   PT: Gait Supervision/stand-by assist;150 feet;Within precautions;Rolling walker   Interventions   Interventions Quick Adds Gait Training;Therapeutic Activity;Therapeutic Procedure   Therapeutic Procedure/Exercise   Ther. Procedure: strength, endurance, ROM,  "flexibillity Minutes (76460) 10   Symptoms Noted During/After Treatment increased pain   Treatment Detail/Skilled Intervention Edu on ZAHEER exercises & pt completed x5-10 each AP, quad sets, heel slides, SAQ with pt with difficulty- not able to acheive full extension but nearly, & glute sets. Left handout in room but did not go over- would be good to review in future session.   Therapeutic Activity   Therapeutic Activities: dynamic activities to improve functional performance Minutes (06700) 5   Symptoms Noted During/After Treatment Fatigue;Increased pain   Treatment Detail/Skilled Intervention Edu pt on post-op precautions including no ER beyond neutral, no hyperextension, no hyperflexion, no hip ADD past midline and No pivoting or twisting; pt voiced understanding of precautions. Edu on technique for supine>sit within precautions, see evalf or performance. Post gait, pt stand>sit at EOB uncontrolled \"plop\" due to R LE fatigue, Sit>supine with pt using L LE to lift R LE onto bed & HOB slightly elevated. Pt in bed upon PT departure, needs in reach, fresh cold pack provided by PT.   Gait Training   Gait Training Minutes (24631) 10   Symptoms Noted During/After Treatment (Gait Training) fatigue;increased pain   Treatment Detail/Skilled Intervention After inital steps at EOB, encouraged pt to put more weight through UEs when in R stance to offload R LE to prevent buckling. Pt ambulated ~180 ft total with SBA initially progressing to CGA as pt starting to buckle more in 2nd half as she fatigued. Able to compensate with UE support on walker but pt UEs also starting to fatigue. Able to get back to room safely but definitely fatigued by then. Pt with step-to pattern & relys on B UE support on walker. Edu pt that R LE stability should improve as block wears off fully & we will work more on symmetry of gait then but for now, use UEs as needed for safety. Pt voiced understanding.   PT Discharge Planning   PT Plan Flat bed " mobility (or per home set up), progress IND with gait & transfers, monitor R knee buckling   PT Discharge Recommendation (DC Rec) home with assist;home with outpatient physical therapy   PT Rationale for DC Rec Recommend assist of parents as needed for household tasks. Pt mobilizing well POD#0, anticipate she will be appropriate for dc home by time of medical readiness.   PT Brief overview of current status SBA bed mobility, CGA transfers and gait with walker, watch for R knee buckling   Total Session Time   Timed Code Treatment Minutes 25   Total Session Time (sum of timed and untimed services) 33

## 2023-05-30 NOTE — ANESTHESIA PREPROCEDURE EVALUATION
Anesthesia Pre-Procedure Evaluation    Patient: Suyapa Hodge   MRN: [id number] : 1985        Procedure : Procedure(s):  Direct Anterior Right Total Hip Arthroplasty          Past Medical History:   Diagnosis Date     Adrenal disorder (H)      Avascular necrosis of femur head,      Cervical high risk HPV (human papillomavirus) test positive 10/2015 & 3/2019    +HR HPV (NOT 16/18)     Colon polyp      Cushing syndrome (H)     pituitary microadenoma     Diabetes insipidus (H)      Fatigue      History of colposcopy 10/2015    BEVERLEY 1     Hypercalcaemia      Hypertension      Medulloadrenal hyperfunction (H)      Pulmonary emboli (H) 2014     Renal disease      Thyroid disease       Past Surgical History:   Procedure Laterality Date     BIOPSY  2010, 2013, 2014, 2021    After each surgery on pituitary     COLONOSCOPY       COLONOSCOPY WITH CO2 INSUFFLATION N/A 2021    Procedure: COLONOSCOPY, WITH CO2 INSUFFLATION;  Surgeon: Harman Pearl MD;  Location: [facility]     COLPOSCOPY VULVA, BIOPSY, COMBINED  10/28/2015    BEVERLEY 1, 6 month follow-up pap was normal     COLPOSCOPY,BX CERVIX/ENDOCERV CURR  2019     INSERT DRAIN LUMBAR  2014    Procedure: INSERT DRAIN LUMBAR;;  Surgeon: Soham Aquino MD;  Location: [facility]     LAPAROSCOPIC ADRENALECTOMY  2014    Procedure: LAPAROSCOPIC ADRENALECTOMY;  Surgeon: Roni Nunn MD;  Location: [facility]     OPTICAL TRACKING SYSTEM ENDOSCOPIC RESECTION TUMOR CRANIAL  2013    Procedure: OPTICAL TRACKING SYSTEM ENDOSCOPIC RESECTION TUMOR CRANIAL;  Stealth Assisted Endoscopic Hypophysectomy ;  Surgeon: Soham Aquino MD;  Location: [facility]     OPTICAL TRACKING SYSTEM ENDOSCOPIC RESECTION TUMOR CRANIAL  2014    Procedure: OPTICAL TRACKING SYSTEM ENDOSCOPIC RESECTION TUMOR CRANIAL;  Stealth Assisted Endoscopic Transnasal Excision Of Pituitary Adenoma , Placement Of Lumbar Drain with c-arm;  Surgeon: Miles  Soham Connelly MD;  Location: [facility] OR     OPTICAL TRACKING SYSTEM ENDOSCOPIC RESECTION TUMOR CRANIAL N/A 07/22/2021    Procedure: stealth assisted Redo endoscopic, endonasal resection of pituitary tumor;  Surgeon: Mina Huffman MD;  Location: [facility] OR     removal of pituitary microademona       wisdom teeth extration        Allergies   Allergen Reactions     Labetalol      Racing heart feeling, panic attack feeling.       Social History     Tobacco Use     Smoking status: Former     Packs/day: 0.50     Years: 3.00     Pack years: 1.50     Types: Cigarettes     Start date: 10/10/2005     Quit date: 1/2/2008     Years since quitting: 15.4     Smokeless tobacco: Never   Vaping Use     Vaping status: Never Used   Substance Use Topics     Alcohol use: Yes     Comment: Average 2-3/week in summer      Wt Readings from Last 1 Encounters:   05/11/23 72.6 kg (160 lb)        Anesthesia Evaluation   Pt has had prior anesthetic.     No history of anesthetic complications       ROS/MED HX  ENT/Pulmonary:  - neg pulmonary ROS     Neurologic:  - neg neurologic ROS     Cardiovascular:     (+) hypertension----- (-) murmur   METS/Exercise Tolerance: >4 METS    Hematologic:     (+) History of blood clots (post surgery in 2014), pt is not anticoagulated,     Musculoskeletal:  - neg musculoskeletal ROS     GI/Hepatic:  - neg GI/hepatic ROS     Renal/Genitourinary:  - neg Renal ROS   (+) renal disease, type: CRI,     Endo: Comment: Adrenal insufficiency on replacement hormones, s/p adrenalectomy for ACTH producing tumor  Diabetes insipidus Na 133    No symptoms recently.  - neg endo ROS   (+) thyroid problem, hypothyroidism, Chronic steroid usage for     Psychiatric/Substance Use:  - neg psychiatric ROS     Infectious Disease:  - neg infectious disease ROS     Malignancy:  - neg malignancy ROS     Other:  - neg other ROS          Physical Exam    Airway        Mallampati: II   TM distance: > 3 FB   Neck ROM: full   Mouth opening:  > 3 cm    Respiratory Devices and Support         Dental       (+) Modest Abnormalities - crowns, retainers, 1 or 2 missing teeth      Cardiovascular          Rhythm and rate: regular and normal (-) no murmur    Pulmonary   pulmonary exam normal        breath sounds clear to auscultation           OUTSIDE LABS:  CBC:   Lab Results   Component Value Date    WBC 5.1 05/11/2023    WBC 5.6 07/24/2021    HGB 14.6 05/11/2023    HGB 12.9 12/08/2022    HCT 40.0 05/11/2023    HCT 28.1 (L) 07/24/2021     05/11/2023     07/24/2021     BMP:   Lab Results   Component Value Date     (L) 05/11/2023     12/08/2022    POTASSIUM 4.6 05/11/2023    POTASSIUM 4.3 12/08/2022    CHLORIDE 99 05/11/2023    CHLORIDE 108 12/08/2022    CO2 21 (L) 05/11/2023    CO2 26 12/08/2022    BUN 16.6 05/11/2023    BUN 19 12/08/2022    CR 1.24 (H) 05/11/2023    CR 1.11 (H) 12/08/2022     (H) 05/11/2023    GLC 88 12/08/2022     COAGS:   Lab Results   Component Value Date    PTT 23 01/20/2014    INR 0.98 07/14/2014    FIBR 448 (H) 07/01/2015     POC:   Lab Results   Component Value Date     (H) 07/14/2014    HCG Negative 01/20/2014    HCGS Negative 07/14/2014     HEPATIC:   Lab Results   Component Value Date    ALBUMIN 3.6 01/12/2018    PROTTOTAL 8.1 05/12/2014    ALT 22 07/07/2017    AST 24 06/25/2014    ALKPHOS 123 05/12/2014    BILITOTAL 0.8 05/12/2014     OTHER:   Lab Results   Component Value Date    PH 7.35 01/20/2014    LACT 1.8 01/20/2014    A1C Duplicate request  SEE A1CPOC   07/23/2014    ELIEZER 10.1 (H) 05/11/2023    PHOS 1.9 (L) 07/23/2021    MAG 2.2 07/23/2021    TSH 3.96 02/23/2023    T4 0.24 (L) 02/23/2023    T3 72 07/04/2013       Anesthesia Plan    ASA Status:  3   NPO Status:  NPO Appropriate    Anesthesia Type: General.     - Airway: ETT   Induction: Intravenous, Propofol.   Maintenance: Balanced.   Techniques and Equipment:       - Drips/Meds: Steroid Stress Dose, Phenylephrine      Consents    Anesthesia Plan(s) and associated risks, benefits, and realistic alternatives discussed. Questions answered and patient/representative(s) expressed understanding.    - Discussed:     - Discussed with:  Patient      - Extended Intubation/Ventilatory Support Discussed: No.      - Patient is DNR/DNI Status: No    Use of blood products discussed: Yes.     - Discussed with: Patient.     - Consented: consented to blood products            Reason for refusal: other.     Postoperative Care    Pain management: IV analgesics, Oral pain medications, Multi-modal analgesia.   PONV prophylaxis: Ondansetron (or other 5HT-3)     Comments:    Other Comments: Discussed plan for general anesthetic with Oral ETT. Discussed risks of sore throat, post op pain/nausea, oropharyngeal damage, rare major complications.  Took all replacement hormones as scheduled. Electrolytes normal. Will give stress dose steroids.        H&P reviewed: Unable to attach H&P to encounter due to EHR limitations. H&P Update: appropriate H&P reviewed, patient examined. No interval changes since H&P (within 30 days).         Kael Bautista MD

## 2023-05-31 ENCOUNTER — APPOINTMENT (OUTPATIENT)
Dept: PHYSICAL THERAPY | Facility: CLINIC | Age: 38
End: 2023-05-31
Attending: ORTHOPAEDIC SURGERY
Payer: COMMERCIAL

## 2023-05-31 ENCOUNTER — APPOINTMENT (OUTPATIENT)
Dept: OCCUPATIONAL THERAPY | Facility: CLINIC | Age: 38
End: 2023-05-31
Attending: ORTHOPAEDIC SURGERY
Payer: COMMERCIAL

## 2023-05-31 LAB
ANION GAP SERPL CALCULATED.3IONS-SCNC: 9 MMOL/L (ref 7–15)
BUN SERPL-MCNC: 29 MG/DL (ref 6–20)
CALCIUM SERPL-MCNC: 8.6 MG/DL (ref 8.6–10)
CHLORIDE SERPL-SCNC: 95 MMOL/L (ref 98–107)
CREAT SERPL-MCNC: 1.4 MG/DL (ref 0.51–0.95)
DEPRECATED HCO3 PLAS-SCNC: 22 MMOL/L (ref 22–29)
ERYTHROCYTE [DISTWIDTH] IN BLOOD BY AUTOMATED COUNT: 12.4 % (ref 10–15)
GFR SERPL CREATININE-BSD FRML MDRD: 49 ML/MIN/1.73M2
GLUCOSE SERPL-MCNC: 116 MG/DL (ref 70–99)
HCT VFR BLD AUTO: 27 % (ref 35–47)
HGB BLD-MCNC: 9.3 G/DL (ref 11.7–15.7)
MCH RBC QN AUTO: 30.7 PG (ref 26.5–33)
MCHC RBC AUTO-ENTMCNC: 34.4 G/DL (ref 31.5–36.5)
MCV RBC AUTO: 89 FL (ref 78–100)
PLATELET # BLD AUTO: 224 10E3/UL (ref 150–450)
POTASSIUM SERPL-SCNC: 4.5 MMOL/L (ref 3.4–5.3)
RBC # BLD AUTO: 3.03 10E6/UL (ref 3.8–5.2)
SODIUM SERPL-SCNC: 126 MMOL/L (ref 136–145)
SODIUM SERPL-SCNC: 126 MMOL/L (ref 136–145)
WBC # BLD AUTO: 10.6 10E3/UL (ref 4–11)

## 2023-05-31 PROCEDURE — 250N000011 HC RX IP 250 OP 636: Performed by: PHYSICIAN ASSISTANT

## 2023-05-31 PROCEDURE — 250N000011 HC RX IP 250 OP 636: Performed by: STUDENT IN AN ORGANIZED HEALTH CARE EDUCATION/TRAINING PROGRAM

## 2023-05-31 PROCEDURE — 84295 ASSAY OF SERUM SODIUM: CPT | Performed by: STUDENT IN AN ORGANIZED HEALTH CARE EDUCATION/TRAINING PROGRAM

## 2023-05-31 PROCEDURE — 97535 SELF CARE MNGMENT TRAINING: CPT | Mod: GO

## 2023-05-31 PROCEDURE — 99233 SBSQ HOSP IP/OBS HIGH 50: CPT | Performed by: STUDENT IN AN ORGANIZED HEALTH CARE EDUCATION/TRAINING PROGRAM

## 2023-05-31 PROCEDURE — 80048 BASIC METABOLIC PNL TOTAL CA: CPT | Performed by: STUDENT IN AN ORGANIZED HEALTH CARE EDUCATION/TRAINING PROGRAM

## 2023-05-31 PROCEDURE — 250N000013 HC RX MED GY IP 250 OP 250 PS 637: Performed by: STUDENT IN AN ORGANIZED HEALTH CARE EDUCATION/TRAINING PROGRAM

## 2023-05-31 PROCEDURE — 97165 OT EVAL LOW COMPLEX 30 MIN: CPT | Mod: GO

## 2023-05-31 PROCEDURE — 250N000013 HC RX MED GY IP 250 OP 250 PS 637: Performed by: PHYSICIAN ASSISTANT

## 2023-05-31 PROCEDURE — 85014 HEMATOCRIT: CPT | Performed by: STUDENT IN AN ORGANIZED HEALTH CARE EDUCATION/TRAINING PROGRAM

## 2023-05-31 PROCEDURE — 36415 COLL VENOUS BLD VENIPUNCTURE: CPT | Performed by: STUDENT IN AN ORGANIZED HEALTH CARE EDUCATION/TRAINING PROGRAM

## 2023-05-31 PROCEDURE — 97530 THERAPEUTIC ACTIVITIES: CPT | Mod: GP | Performed by: PHYSICAL THERAPIST

## 2023-05-31 PROCEDURE — 250N000013 HC RX MED GY IP 250 OP 250 PS 637: Performed by: ORTHOPAEDIC SURGERY

## 2023-05-31 RX ORDER — DESMOPRESSIN ACETATE 0.1 MG/1
0.05 TABLET ORAL ONCE
Status: COMPLETED | OUTPATIENT
Start: 2023-05-31 | End: 2023-05-31

## 2023-05-31 RX ADMIN — ACETAMINOPHEN 975 MG: 325 TABLET ORAL at 05:31

## 2023-05-31 RX ADMIN — SENNOSIDES AND DOCUSATE SODIUM 1 TABLET: 50; 8.6 TABLET ORAL at 07:47

## 2023-05-31 RX ADMIN — HYDROCORTISONE SODIUM SUCCINATE 50 MG: 100 INJECTION, POWDER, FOR SOLUTION INTRAMUSCULAR; INTRAVENOUS at 20:16

## 2023-05-31 RX ADMIN — OXYCODONE HYDROCHLORIDE 5 MG: 5 TABLET ORAL at 13:33

## 2023-05-31 RX ADMIN — ACETAMINOPHEN 975 MG: 325 TABLET ORAL at 13:32

## 2023-05-31 RX ADMIN — DROSPIRENONE AND ETHINYL ESTRADIOL 1 TABLET: KIT at 07:47

## 2023-05-31 RX ADMIN — HYDROCORTISONE SODIUM SUCCINATE 50 MG: 100 INJECTION, POWDER, FOR SOLUTION INTRAMUSCULAR; INTRAVENOUS at 05:32

## 2023-05-31 RX ADMIN — ASPIRIN 162 MG: 81 TABLET ORAL at 07:47

## 2023-05-31 RX ADMIN — LEVOTHYROXINE SODIUM 75 MCG: 50 TABLET ORAL at 07:52

## 2023-05-31 RX ADMIN — ACETAMINOPHEN 975 MG: 325 TABLET ORAL at 21:24

## 2023-05-31 RX ADMIN — OXYCODONE HYDROCHLORIDE 5 MG: 5 TABLET ORAL at 17:44

## 2023-05-31 RX ADMIN — DESMOPRESSIN ACETATE 0.1 MG: 0.1 TABLET ORAL at 07:47

## 2023-05-31 RX ADMIN — SENNOSIDES AND DOCUSATE SODIUM 1 TABLET: 50; 8.6 TABLET ORAL at 20:16

## 2023-05-31 RX ADMIN — FLUDROCORTISONE ACETATE 0.1 MG: 0.1 TABLET ORAL at 07:47

## 2023-05-31 RX ADMIN — CEFAZOLIN 1 G: 1 INJECTION, POWDER, FOR SOLUTION INTRAMUSCULAR; INTRAVENOUS at 00:20

## 2023-05-31 RX ADMIN — OXYCODONE HYDROCHLORIDE 5 MG: 5 TABLET ORAL at 07:47

## 2023-05-31 RX ADMIN — OXYCODONE HYDROCHLORIDE 10 MG: 5 TABLET ORAL at 00:20

## 2023-05-31 RX ADMIN — HYDROCORTISONE SODIUM SUCCINATE 50 MG: 100 INJECTION, POWDER, FOR SOLUTION INTRAMUSCULAR; INTRAVENOUS at 13:33

## 2023-05-31 RX ADMIN — POLYETHYLENE GLYCOL 3350 17 G: 17 POWDER, FOR SOLUTION ORAL at 07:47

## 2023-05-31 RX ADMIN — DESMOPRESSIN ACETATE 0.05 MG: 0.1 TABLET ORAL at 16:43

## 2023-05-31 ASSESSMENT — ACTIVITIES OF DAILY LIVING (ADL)
ADLS_ACUITY_SCORE: 28

## 2023-05-31 NOTE — PLAN OF CARE
Physical Therapy Discharge Summary    Reason for therapy discharge:    All goals and outcomes met, no further needs identified.    Progress towards therapy goal(s). See goals on Care Plan in [facility] electronic health record for goal details.  Goals met    Therapy recommendation(s):    Continue home exercise program.

## 2023-05-31 NOTE — PLAN OF CARE
[facility]      OUTPATIENT OCCUPATIONAL THERAPY  EVALUATION  PLAN OF TREATMENT FOR OUTPATIENT REHABILITATION  (COMPLETE FOR INITIAL CLAIMS ONLY)  Patient's Last Name, First Name, M.I.  YOB: 1985  AyeshaSuyapa LOPEZ                          Provider's Name  [facility] Medical Record No.  [id number]                               Onset Date:  05/30/23   Start of Care Date:  05/31/23     Type:     ___PT   _X_OT   ___SLP Medical Diagnosis:  s/p R ZAHEER                        OT Diagnosis:  decreased ADL and IADL independence   Visits from SOC:  1   _________________________________________________________________________________  Plan of Treatment/Functional Goals    Planned Interventions: ADL retraining, IADL retraining, transfer training, home program guidelines, progressive activity/exercise   Goals: See Occupational Therapy Goals on Care Plan in [facility] electronic health record.    Therapy Frequency: One time eval and treatment  Predicted Duration of Therapy Intervention: 06/01/23  _________________________________________________________________________________    I CERTIFY THE NEED FOR THESE SERVICES FURNISHED UNDER        THIS PLAN OF TREATMENT AND WHILE UNDER MY CARE .             Physician Signature               Date    X_____________________________________________________                  Certification date from: 05/31/23, Certification date to: 06/01/23    Referring Physician: Chan Sarmiento PA-C            Initial Assessment        See Occupational Therapy evaluation dated 05/31/23 in Epic electronic health record.

## 2023-05-31 NOTE — PROGRESS NOTES
Orthopaedic Surgery Progress Note 05/31/2023    S: No acute events overnight.  Pain adequately controlled. Denies numbness or tingling in the affected extremity. chest pain (-), SOB(-), nausea/vomiting(-). Tolerating oral diet.     O:  Temp: (!) 95.7  F (35.4  C) Temp src: Oral BP: 91/40 Pulse: 82   Resp: 16 SpO2: 98 % O2 Device: None (Room air) Oxygen Delivery: 2 LPM    Exam:  Gen: No acute distress, resting comfortably in bed.  Resp: Non-labored breathing  MSK:    LE:  - Dressings c/d/i  - SILT femoral/tibial/sural/saphenous/DP/SP nerves  - Fires Quad, TA, EHL, FHL, GaSC  - Extremity wwp    Recent Labs   Lab 05/31/23  0530   WBC 10.6   HGB 9.3*        No results found for: SED    Assessment: Suyapa Hodge is a 37 year old female s/p right direct anterior total hip arthroplasty on 5/30/2023 with Dr. Grubbs.     Postoperative plan:  WBAT with anterior hip precautions  Post-operative antibiotics   Post-operative XR; complete  DVT prophylaxis: ASA   Wound Care: Alginate, tegaderm to be changed PRN by ortho PRN  Pain control  PT/OT, Out of bed  Labs: Trend Hgb on PODs #1 & 2    Marcio Ruffin MD

## 2023-05-31 NOTE — PROGRESS NOTES
"Occupational Therapy Evaluation       05/31/23 1000   Appointment Info   Signing Clinician's Name / Credentials (OT) Kim Roca OTR/L   Living Environment   People in Home alone  (staying with parents for 6 wks)   Current Living Arrangements house   Home Accessibility no concerns   Transportation Anticipated family or friend will provide   Living Environment Comments Pt reported she is staying with her parents for the next 6 wks, no stairs, walk in shower, shower chair, tall toilet with handles.   Self-Care   Usual Activity Tolerance good   Current Activity Tolerance moderate   Equipment Currently Used at Home none   Fall history within last six months no   Activity/Exercise/Self-Care Comment Pt reported she is IND at baseline with ADLs.   Instrumental Activities of Daily Living (IADL)   IADL Comments Pt reported she is IND at baseline with IADLs.   General Information   Onset of Illness/Injury or Date of Surgery 05/30/23   Referring Physician Chan Sarmiento, PABijalC   Patient/Family Therapy Goal Statement (OT) to return home   Additional Occupational Profile Info/Pertinent History of Current Problem Per chart, \"Suyapa Hodge is a 37 year old female s/p right direct anterior total hip arthroplasty on 5/30/2023 with Dr. Grubbs. \"   Existing Precautions/Restrictions no hip ER;no hip ADD past midline;no hip hyperextension   Left Upper Extremity (Weight-bearing Status) full weight-bearing (FWB)   Right Upper Extremity (Weight-bearing Status) full weight-bearing (FWB)   Left Lower Extremity (Weight-bearing Status) full weight-bearing (FWB)   Right Lower Extremity (Weight-bearing Status) weight-bearing as tolerated (WBAT)   General Observations and Info Activity: ambulate with assist   Cognitive Status Examination   Orientation Status orientation to person, place and time   Affect/Mental Status (Cognitive) WFL   Follows Commands WFL   Cognitive Status Comments Appears at baseline. Pt conversationally appropriate, " demonstrated good insight into current functioning, safety awareness, and maintenance of precautions.   Visual Perception   Visual Impairment/Limitations WFL   Sensory   Sensory Comments Pt reported no numbness or tinling aside from nerve block   Pain Assessment   Patient Currently in Pain Yes, see Vital Sign flowsheet   Posture   Posture forward head position;protracted shoulders   Range of Motion Comprehensive   General Range of Motion bilateral upper extremity ROM WFL   Strength Comprehensive (MMT)   Comment, General Manual Muscle Testing (MMT) Assessment WFL   Coordination   Upper Extremity Coordination No deficits were identified   Bed Mobility   Bed Mobility supine-sit;sit-supine   Supine-Sit [place] (Bed Mobility) modified independence   Sit-Supine [place] (Bed Mobility) modified independence   Transfers   Transfers sit-stand transfer   Sit-Stand Transfer   Sit-Stand [place] (Transfers) modified independence   Balance   Balance Comments Pt with no LOB during functional mobility. Dynamic and static sitting and standing balance appear intact. Defer to PT for formal balance assessment.   Activities of Daily Living   BADL Assessment/Intervention upper body dressing;bathing;lower body dressing;grooming;toileting   Bathing Assessment/Intervention   Position (Bathing) unsupported sitting   [place] Level (Bathing) minimum assist (75% patient effort)   Comment, (Bathing) Per clinical judgement   Upper Body Dressing Assessment/Training   [place] Level (Upper Body Dressing) modified independence   Lower Body Dressing Assessment/Training   Comment, (Lower Body Dressing) Assist for socks and shoes   [place] Level (Lower Body Dressing) modified independence   Grooming Assessment/Training   [place] Level (Grooming) independent   Comment, (Grooming) Per clinical judgment   Toileting   [place] Level (Toileting) modified independence   Clinical Impression   Criteria for Skilled  Therapeutic Interventions Met (OT) Yes, treatment indicated   OT Diagnosis decreased ADL and IADL independence   OT Problem List-Impairments impacting ADL problems related to;activity tolerance impaired;pain;post-surgical precautions   Assessment of Occupational Performance 1-3 Performance Deficits   Identified Performance Deficits ADL including bathing and LB dressing; IADL   Planned Therapy Interventions (OT) ADL retraining;IADL retraining;transfer training;home program guidelines;progressive activity/exercise   Clinical Decision Making Complexity (OT) low complexity   Risk & Benefits of therapy have been explained evaluation/treatment results reviewed;care plan/treatment goals reviewed;risks/benefits reviewed;current/potential barriers reviewed;participants voiced agreement with care plan;participants included;patient   Clinical Impression Comments Pt presents below functional baseline, limited by pain and post surgical precautions. Pt would benefit from 1 OT session to progress ADL independence within precautions.   OT Total Evaluation Time   OT Eval, Low Complexity Minutes (62312) 10   OT Goals   Therapy Frequency (OT) One time eval and treatment   OT Predicted Duration/Target Date for Goal Attainment 06/01/23   OT Goals Hygiene/Grooming;Upper Body Dressing;Lower Body Dressing;Bed Mobility;Toilet Transfer/Toileting;Transfers   OT: Hygiene/Grooming independent;Goal Met   OT: Upper Body Dressing Modified independent;Goal Met   OT: Lower Body Dressing Modified independent;Goal Met;Moderate assist;within precautions   OT: Bed Mobility Modified independent;Goal Met;supine to/from sitting;within precautions   OT: Transfer Modified independent;Goal Met;within precautions  (shower transfer with grab bar)   OT: Toilet Transfer/Toileting Modified independent;toilet transfer;cleaning and garment management;within precautions;Goal Met   Self-Care/Home Management   Self-Care/Home Mgmt/ADL, Compensatory, Meal Prep Minutes  (69274) 15   Symptoms Noted During/After Treatment (Meal Preparation/Planning Training) fatigue   Treatment Detail/Skilled Intervention OT: Following evaluation, treatment indicated. Facilitated ADL and education on adaptive technique within precautions for increased IND. Pt sat EoB mod IND. Reviewed with pt anterior hip precautions, pt able to recall without cues. Pt completed FB dressing with set up assist. Completed STS mod IND using FWW, walked to bathroom mod IND. Completed shower and toilet transfer mod IND. Reviewed with pt recommendations for assist during shower transfer and use of shower chair. Pt returned to supine mod IND, left with RN and physician present in room.   OT Discharge Planning   OT Plan dc   OT Discharge Recommendation (DC Rec)   (defer to ortho per protocol)   OT Rationale for DC Rec Pt presents below functional baseline, limited by pain and post surgical precautions. Pt currently mobilizing SBA and completing ADLs mod IND-IND. Recommend assistance for LB dressing.   OT Brief overview of current status SBA mobility, mod IND-IND ADLs   Total Session Time   Timed Code Treatment Minutes 15   Total Session Time (sum of timed and untimed services) 25

## 2023-05-31 NOTE — PROGRESS NOTES
[facility]    Medicine Progress Note - Hospitalist Service, GOLD TEAM 21    Date of Admission:  5/30/2023    Assessment & Plan   Suyapa Hodge is a 37 year old female admitted on 5/30/2023 after right total hip arthoplasty for osteonecrosis of right hip.     Recommendations:  -Recommend continued stress dose steroids with IV hydrocortisone per taper given hypotension this morning. When discharging discussed doing 3 day of hydrocortisone 10 mg BID before resuming home dosing  -Worsening hyponatremia this AM, repeat at 1400, if worsening will give additional DDAVP, if improving will monitor overnight  -Recommend continued observation in the hospital overnight, discussed with patient and orthopaedics given hypotension and worsening hyponatremia    #Osteonecrosis of Right s/p right total hip arthoplasty - 5/30  -Orthopaedics primary, medicine consulting  -Pain, DVT, Bowel regiment per orthopaedics  -Anterior hip precautions x 4 weeks for operative hip per orthopaedics    #Cushing's Disease  #Adrenal insufficiency  #Alexei Syndrome post adrenalectomy  -Follows with [facility] endocrinology  -Continue weekly cabergoline,   -On hydrocortisone 10 mg qAM, and florined 0.1  -Will stress dose steroids for 50 mg Q8H x 1 day, then 50 mg BID x 1 day, then 50 mg daily back to home dose. If ready to discharge prior to completing taper we will double dose hydrocortisone for additional days until able to resume home regiment on 6/2    #Hyponatremia, hypopituitarism - Continue desmopressin 100 mcg, repeat sodium on 5/31 PM, if worsening will give additional DDAVP  #CKD stage III - Cr 1.2  #Hypothyroidism - Continue synthroid  #Hypogonadism - continue bCP         Diet: Advance Diet as Tolerated: Regular Diet Adult  Discharge Instruction - Regular Diet Adult    DVT Prophylaxis: aspirin  Sampson Catheter: Not present  Lines: None     Cardiac Monitoring: None  Code Status: Full Code      Clinically  Significant Risk Factors Present on Admission                    # Circulatory Shock: currently requiring pressors for blood pressure support              Disposition Plan      Expected Discharge Date: 05/31/2023                  Aditya Singh MD  Hospitalist Service, GOLD TEAM 21  [facility]  Securely message with [employer] (more info)  Text page via [facility] Paging/Directory   See signed in provider for up to date coverage information  ______________________________________________________________________    Interval History   This morning hypotensive. She denies dizziness or lightheadedness. No fever or chills. No chest pain. No shortness of breath. No nausea or vomiting. No abdominal pain.     Physical Exam   Vital Signs: Temp: (!) 95.7  F (35.4  C) Temp src: Oral BP: 91/40 Pulse: 82   Resp: 16 SpO2: 98 % O2 Device: None (Room air) Oxygen Delivery: 2 LPM  Weight: 161 lbs 6.03 oz    Constitutional: alert, sitting in bed, no acute distress  Eyes: no icterus  ENT: supple, no JVD  Respiratory: CTAB, no wheezing  Cardiovascular: RRR  GI: soft, non-tender, non-distended  Skin: right hip incision, c/d/i  Musculoskeletal: plantar and dorsiflexion of bilateral feet in tact  Neurologic: intact sensation to light touch on bilateral lower extremities     Medical Decision Making             Data     I have personally reviewed the following data over the past 24 hrs:    10.6  \   9.3 (L)   / 224     126 (L) 95 (L) 29.0 (H) /  116 (H)   4.5 22 1.40 (H) \

## 2023-05-31 NOTE — PLAN OF CARE
"  VS: Blood pressure 90/42, pulse 86, temperature (!) 95.7  F (35.4  C), temperature source Oral, resp. rate 15, height 1.715 m (5' 7.5\"), weight 73.2 kg (161 lb 6 oz), SpO2 97 %, not currently breastfeeding.   On capno   O2: Stable on room air>95%.Lungs clear. Denies  SOB and chest pain    Output: Voids to the bathroom without diffcult   Last BM: 5/29 per pt report. Bowel sounds hypoactive. Passing gas. Denies abdominal discomfort    Activity: Stand by assist with gait belt and walker   Skin: Intact except  right hip surgical incision    Pain: Right hip managed with Oxycodone and atarax   CMS: AXO X 4. Numbness on the RLE from block . Wearing off per PT report    Dressing: Right hip-CDI  Mepilex dressing to coccyx    Diet: Reg diet   LDA: Left and right PIV both saline locked    Equipment: Capno,pt belongings, iv pole and pump   Plan: Count with POC   Additional Info:  Calm and Pleasant.  Call light within reach. Makes needs known.                            "

## 2023-05-31 NOTE — PLAN OF CARE
"VS: BP (!) 87/35 (BP Location: Right arm)   Pulse 83   Temp 96.8  F (36  C) (Oral)   Resp 12   Ht 1.715 m (5' 7.5\")   Wt 73.2 kg (161 lb 6 oz)   LMP  (LMP Unknown)   SpO2 97%   BMI 24.90 kg/m     O2: >90% on room air. Denies SOB/N/V/chest pain    Output: Voiding spontaneously without difficulties    Last BM: 05/29/23. Bowel sounds active in all quadrant and passing flatus    Activity: WBAT. SBA x1 with walker and gait belt. Ambulating in hallway x2 this shift    Skin: Incision to right hip    Pain: Managed with schedule Tylenol, PRN Oxycodone, and ice applied    CMS: Alert and oriented x4. Denies numbness and tingling   Dressing: CDI   Diet: Regular diet    LDA: PIV saline locked    Equipment: IV pole/pump, walker, gait belt, PCDs, personal belongings, call light within patient reach.    Plan: MD recommending continued observation overnight,due to hypotension and worsening hyponatremia   Additional Info:         "

## 2023-06-01 VITALS
TEMPERATURE: 96.7 F | WEIGHT: 161.38 LBS | HEIGHT: 68 IN | OXYGEN SATURATION: 96 % | HEART RATE: 92 BPM | DIASTOLIC BLOOD PRESSURE: 48 MMHG | RESPIRATION RATE: 18 BRPM | BODY MASS INDEX: 24.46 KG/M2 | SYSTOLIC BLOOD PRESSURE: 100 MMHG

## 2023-06-01 LAB
ANION GAP SERPL CALCULATED.3IONS-SCNC: 7 MMOL/L (ref 7–15)
BUN SERPL-MCNC: 25.6 MG/DL (ref 6–20)
CALCIUM SERPL-MCNC: 8.6 MG/DL (ref 8.6–10)
CHLORIDE SERPL-SCNC: 97 MMOL/L (ref 98–107)
CREAT SERPL-MCNC: 0.97 MG/DL (ref 0.51–0.95)
DEPRECATED HCO3 PLAS-SCNC: 22 MMOL/L (ref 22–29)
GFR SERPL CREATININE-BSD FRML MDRD: 76 ML/MIN/1.73M2
GLUCOSE BLDC GLUCOMTR-MCNC: 112 MG/DL (ref 70–99)
GLUCOSE SERPL-MCNC: 101 MG/DL (ref 70–99)
HGB BLD-MCNC: 8.1 G/DL (ref 11.7–15.7)
POTASSIUM SERPL-SCNC: 4.4 MMOL/L (ref 3.4–5.3)
SODIUM SERPL-SCNC: 126 MMOL/L (ref 136–145)

## 2023-06-01 PROCEDURE — 250N000013 HC RX MED GY IP 250 OP 250 PS 637: Performed by: ORTHOPAEDIC SURGERY

## 2023-06-01 PROCEDURE — 250N000011 HC RX IP 250 OP 636: Performed by: STUDENT IN AN ORGANIZED HEALTH CARE EDUCATION/TRAINING PROGRAM

## 2023-06-01 PROCEDURE — 250N000013 HC RX MED GY IP 250 OP 250 PS 637: Performed by: STUDENT IN AN ORGANIZED HEALTH CARE EDUCATION/TRAINING PROGRAM

## 2023-06-01 PROCEDURE — 250N000013 HC RX MED GY IP 250 OP 250 PS 637: Performed by: PHYSICIAN ASSISTANT

## 2023-06-01 PROCEDURE — 80048 BASIC METABOLIC PNL TOTAL CA: CPT | Performed by: STUDENT IN AN ORGANIZED HEALTH CARE EDUCATION/TRAINING PROGRAM

## 2023-06-01 PROCEDURE — 36415 COLL VENOUS BLD VENIPUNCTURE: CPT | Performed by: STUDENT IN AN ORGANIZED HEALTH CARE EDUCATION/TRAINING PROGRAM

## 2023-06-01 PROCEDURE — 99232 SBSQ HOSP IP/OBS MODERATE 35: CPT | Performed by: STUDENT IN AN ORGANIZED HEALTH CARE EDUCATION/TRAINING PROGRAM

## 2023-06-01 PROCEDURE — 85018 HEMOGLOBIN: CPT | Performed by: PHYSICIAN ASSISTANT

## 2023-06-01 PROCEDURE — 82962 GLUCOSE BLOOD TEST: CPT

## 2023-06-01 RX ORDER — DESMOPRESSIN ACETATE 0.1 MG/1
0.05 TABLET ORAL ONCE
Status: COMPLETED | OUTPATIENT
Start: 2023-06-01 | End: 2023-06-01

## 2023-06-01 RX ORDER — HYDROCORTISONE 10 MG/1
10 TABLET ORAL DAILY
COMMUNITY
Start: 2023-06-01 | End: 2024-01-31

## 2023-06-01 RX ORDER — HYDROCORTISONE 10 MG/1
10 TABLET ORAL 2 TIMES DAILY
Status: DISCONTINUED | OUTPATIENT
Start: 2023-06-01 | End: 2023-06-01 | Stop reason: HOSPADM

## 2023-06-01 RX ADMIN — SENNOSIDES AND DOCUSATE SODIUM 1 TABLET: 50; 8.6 TABLET ORAL at 07:46

## 2023-06-01 RX ADMIN — FLUDROCORTISONE ACETATE 0.1 MG: 0.1 TABLET ORAL at 07:46

## 2023-06-01 RX ADMIN — HYDROCORTISONE 10 MG: 10 TABLET ORAL at 11:38

## 2023-06-01 RX ADMIN — ACETAMINOPHEN 975 MG: 325 TABLET ORAL at 05:41

## 2023-06-01 RX ADMIN — LEVOTHYROXINE SODIUM 75 MCG: 50 TABLET ORAL at 07:59

## 2023-06-01 RX ADMIN — OXYCODONE HYDROCHLORIDE 5 MG: 5 TABLET ORAL at 02:08

## 2023-06-01 RX ADMIN — DESMOPRESSIN ACETATE 0.05 MG: 0.1 TABLET ORAL at 11:38

## 2023-06-01 RX ADMIN — DESMOPRESSIN ACETATE 0.1 MG: 0.1 TABLET ORAL at 07:46

## 2023-06-01 RX ADMIN — ASPIRIN 162 MG: 81 TABLET ORAL at 07:46

## 2023-06-01 RX ADMIN — DROSPIRENONE AND ETHINYL ESTRADIOL 1 TABLET: KIT at 07:47

## 2023-06-01 RX ADMIN — OXYCODONE HYDROCHLORIDE 5 MG: 5 TABLET ORAL at 07:45

## 2023-06-01 RX ADMIN — POLYETHYLENE GLYCOL 3350 17 G: 17 POWDER, FOR SOLUTION ORAL at 07:46

## 2023-06-01 RX ADMIN — HYDROCORTISONE SODIUM SUCCINATE 50 MG: 100 INJECTION, POWDER, FOR SOLUTION INTRAMUSCULAR; INTRAVENOUS at 07:49

## 2023-06-01 ASSESSMENT — ACTIVITIES OF DAILY LIVING (ADL)
ADLS_ACUITY_SCORE: 28

## 2023-06-01 NOTE — PROGRESS NOTES
Focus: Discharge  D: Likely discharging today pending MD order. Reviewed discharge instructions. Take home medications will be given. P: Discharge to home pending MD order.   Discharge. Stop light tool utilized.

## 2023-06-01 NOTE — PROGRESS NOTES
[facility]    Medicine Progress Note - Hospitalist Service, GOLD TEAM 21    Date of Admission:  5/30/2023    Assessment & Plan   Suyapa Hodge is a 37 year old female admitted on 5/30/2023 after right total hip arthoplasty for osteonecrosis of right hip.     Recommendations:  -3 days of hydrocortisone 10 mg BID until 6/4 and then resume home dosing of hydrocortisone  -Additional dose of desmopressin this AM  -Hyponatremia has been stable and is known issue with hypoadrenalism, given lack of symptoms and chronicity does not require further inpatient monitoring  -Repeat BMP on 6/5 with PCP to re-assess hyponatremia stability  -Resume other home medications on discharge    #Osteonecrosis of Right s/p right total hip arthoplasty - 5/30  -Orthopaedics primary, medicine consulting  -Pain, DVT, Bowel regiment per orthopaedics  -Anterior hip precautions x 4 weeks for operative hip per orthopaedics    #Cushing's Disease  #Adrenal insufficiency  #Alexei Syndrome post adrenalectomy  -Follows with [facility] endocrinology  -Continue weekly cabergoline,   -On hydrocortisone 10 mg qAM, and florined 0.1  -Will stress dose steroids for 50 mg Q8H x 1 day, then 50 mg BID x 1 day, then 50 mg daily then hydrocortisone 10 mg BID x 3 days and then resume home dose    #Hyponatremia, hypopituitarism - Continue desmopressin 100 mcg, additional desmopressin 50 mcg given on 5/31 and 6/1  #CKD stage III - Cr 1.2  #Hypothyroidism - Continue synthroid  #Hypogonadism - continue bCP         Diet: Advance Diet as Tolerated: Regular Diet Adult  Discharge Instruction - Regular Diet Adult    DVT Prophylaxis: aspirin  Sampson Catheter: Not present  Lines: None     Cardiac Monitoring: None  Code Status: Full Code      Clinically Significant Risk Factors Present on Admission         # Hyponatremia: Lowest Na = 126 mmol/L in last 2 days, will monitor as appropriate                        Disposition Plan     Expected  Discharge Date: 06/01/2023,  3:00 PM                Aditya Singh MD  Hospitalist Service, GOLD TEAM 21  [facility]  Securely message with [employer] (more info)  Text page via [employer] Paging/Directory   See signed in provider for up to date coverage information  ______________________________________________________________________    Interval History   No acute events overnight. No dizziness or light headedness. No fever or chills. NO chest pain. No shortness of breath. No new numbness in lower extremities     Physical Exam   Vital Signs: Temp: (!) 96.5  F (35.8  C) Temp src: Oral BP: 99/47 Pulse: 89   Resp: 16 SpO2: 93 % O2 Device: None (Room air)    Weight: 161 lbs 6.03 oz    Constitutional: alert, sitting in bed, no acute distress  Eyes: no icterus  ENT: supple, no JVD  Respiratory: CTAB, no wheezing  Cardiovascular: RRR  GI: soft, non-tender, non-distended  Skin: right hip incision, c/d/i  Musculoskeletal: plantar and dorsiflexion of bilateral feet in tact  Neurologic: intact sensation to light touch on bilateral lower extremities     Medical Decision Making             Data     I have personally reviewed the following data over the past 24 hrs:    N/A  \   N/A   / N/A     126 (L) N/A N/A /  112 (H)   N/A N/A N/A \

## 2023-06-01 NOTE — PROGRESS NOTES
Orthopaedic Surgery Progress Note 06/01/2023    S: Patient with soft pressures yesterday, hemoglobin 8.1 this morning.  Blood pressures have now normalized.  Hyponatremic to 126 yesterday.  Medicine aware, following.    O:  Temp: (!) 96.5  F (35.8  C) Temp src: Oral BP: 99/47 Pulse: 89   Resp: 16 SpO2: 93 % O2 Device: None (Room air)      Exam:  Gen: No acute distress, resting comfortably in bed.  Resp: Non-labored breathing  MSK:    LE:  - Dressings c/d/i  - SILT femoral/tibial/sural/saphenous/DP/SP nerves  - Fires Quad, TA, EHL, FHL, GaSC  - Extremity wwp    Recent Labs   Lab 06/01/23  0606 05/31/23  0530   WBC  --  10.6   HGB 8.1* 9.3*   PLT  --  224     No results found for: SED    Assessment: Suyapa Hodge is a 37 year old female s/p right direct anterior total hip arthroplasty on 5/30/2023 with Dr. Grubbs.     Today's plan  - Medical optimization for hyponatremia, pressures  - Continue physical therapy  - Okay to discharge home once medically optimized.    Postoperative plan:  WBAT with anterior hip precautions  Post-operative antibiotics   Post-operative XR; complete  DVT prophylaxis: ASA   Wound Care: Alginate, tegaderm to be changed PRN by ortho PRN  Pain control  PT/OT, Out of bed  Labs: Trend Hgb on PODs #1 & 2    Marcio Ruffin MD

## 2023-06-01 NOTE — CARE PLAN
VS:    O2: Sating >90% on RA. Lung sounds clear. Denies chest pain and SOB.   Output: Voids spontaneously and adequately in the hear 450 output   Last BM: Bowel sounds active x4. Passing flatus. LBM 5/29   Activity: Up with 1 assist, FWW, and gait belt.    Skin: Bruised on right thigh   Pain: Pain was managed with Tylenol, Oxycodone   CMS: A&Ox4. Denies T complain of  Baseline numbness in hip area   Dressing: Dressing to R hip C/D/I.   Diet: Regular. Appetite was good. . Denies N/V.    LDA: PIV to  Left and right  is S/L.    Equipment: IV pole, FWW, gait belt, and personal belongings. Call light within reach and uses appropriately.   Plan:  Overnight observation   Additional Info:

## 2023-06-06 ENCOUNTER — LAB (OUTPATIENT)
Dept: LAB | Facility: CLINIC | Age: 38
End: 2023-06-06
Payer: COMMERCIAL

## 2023-06-06 ENCOUNTER — MYC REFILL (OUTPATIENT)
Dept: FAMILY MEDICINE | Facility: CLINIC | Age: 38
End: 2023-06-06

## 2023-06-06 DIAGNOSIS — E87.1 HYPONATREMIA: ICD-10-CM

## 2023-06-06 DIAGNOSIS — E27.40 HYPOADRENALISM (H): ICD-10-CM

## 2023-06-06 DIAGNOSIS — Z96.641 STATUS POST TOTAL HIP REPLACEMENT, RIGHT: ICD-10-CM

## 2023-06-06 LAB
ANION GAP SERPL CALCULATED.3IONS-SCNC: 12 MMOL/L (ref 7–15)
BUN SERPL-MCNC: 15.3 MG/DL (ref 6–20)
CALCIUM SERPL-MCNC: 9.4 MG/DL (ref 8.6–10)
CHLORIDE SERPL-SCNC: 99 MMOL/L (ref 98–107)
CREAT SERPL-MCNC: 1.04 MG/DL (ref 0.51–0.95)
DEPRECATED HCO3 PLAS-SCNC: 24 MMOL/L (ref 22–29)
GFR SERPL CREATININE-BSD FRML MDRD: 70 ML/MIN/1.73M2
GLUCOSE SERPL-MCNC: 82 MG/DL (ref 70–99)
POTASSIUM SERPL-SCNC: 4.7 MMOL/L (ref 3.4–5.3)
SODIUM SERPL-SCNC: 135 MMOL/L (ref 136–145)

## 2023-06-06 PROCEDURE — 80048 BASIC METABOLIC PNL TOTAL CA: CPT

## 2023-06-06 PROCEDURE — 36415 COLL VENOUS BLD VENIPUNCTURE: CPT

## 2023-06-06 RX ORDER — OXYCODONE HYDROCHLORIDE 5 MG/1
5-10 TABLET ORAL EVERY 4 HOURS PRN
Qty: 26 TABLET | Refills: 0 | Status: SHIPPED | OUTPATIENT
Start: 2023-06-06 | End: 2023-09-07

## 2023-06-06 NOTE — TELEPHONE ENCOUNTER
"A call was placed to the patient for med refill. The follow was dicussed:      Post op pain management -   1) How often are you taking the oxycodone? How many tablets each time?  Taking 1 tablets twice daily  *refill not needed at this time     2) Can you rate your pain (0-10) before and after administration?  Before: 5  After: 2     3) How would you describe the pain (e.g., dull, sharp, achy, tingly, cramping, etc.)?  \"pain\"    4) Are you taking any other pain medications?  tylenol every 6 hours as needed moderate-severe pain  **refill not needed at this time   robaxin at night time  **refill not needed at this time       5) Are you adhering to the elevating 21 hours a day and icing for 20 minutes every hour?  Yes    - We discussed the importance of tapering the use of the narcotic medications. I said the most successful tapering strategy is to first, decrease the number of tablets you take to the minimum prescribed. Then, increase the amount of time between doses. I explained the bedtime dose can help with comfort during sleep and is typically the last dose to be discontinued after surgery. Patient agreed to work on tapering as able.      - Pharmacy was verified. I let the patient know the request for opoid medications go onto our PA so they may not be filled immediately and someone may be reaching out with further questions.     Gave phone number for clinic and after-hours line - Yes    - Call back number to clinic was given and patient had no further questions at this time.     "

## 2023-06-20 DIAGNOSIS — Z96.641 H/O TOTAL HIP ARTHROPLASTY, RIGHT: Primary | ICD-10-CM

## 2023-06-26 ENCOUNTER — OFFICE VISIT (OUTPATIENT)
Dept: ORTHOPEDICS | Facility: CLINIC | Age: 38
End: 2023-06-26
Payer: COMMERCIAL

## 2023-06-26 ENCOUNTER — ANCILLARY PROCEDURE (OUTPATIENT)
Dept: GENERAL RADIOLOGY | Facility: CLINIC | Age: 38
End: 2023-06-26
Attending: PHYSICIAN ASSISTANT
Payer: COMMERCIAL

## 2023-06-26 DIAGNOSIS — Z96.641 H/O TOTAL HIP ARTHROPLASTY, RIGHT: ICD-10-CM

## 2023-06-26 DIAGNOSIS — Z96.641 STATUS POST TOTAL HIP REPLACEMENT, RIGHT: Primary | ICD-10-CM

## 2023-06-26 PROCEDURE — 73502 X-RAY EXAM HIP UNI 2-3 VIEWS: CPT | Mod: GC | Performed by: RADIOLOGY

## 2023-06-26 PROCEDURE — 99024 POSTOP FOLLOW-UP VISIT: CPT | Performed by: PHYSICIAN ASSISTANT

## 2023-06-26 RX ORDER — CEPHALEXIN 500 MG/1
500 CAPSULE ORAL 3 TIMES DAILY
Qty: 30 CAPSULE | Refills: 0 | Status: SHIPPED | OUTPATIENT
Start: 2023-06-26 | End: 2023-07-06

## 2023-06-26 NOTE — LETTER
June 26, 2023    RE:  Suyapa Hodge                              [address] [address]            To whom it may concern:    Suyapa Hodge is under my professional care for Status post total hip replacement, right. She be excused from work until 7/30/23.      May return to work 7/31/23 with the following restrictions:  - 8 hours shifts only    They restrictions until 8/14/23 when she can return to work with full duties.      Sincerely,        Chan Sarmiento PA-C

## 2023-06-26 NOTE — PROGRESS NOTES
[facility]  Orthopaedic Surgery  by Chan Sarmiento PA-C    Suyapa Hodge MRN# [id number]    YOB: 1985     Background history:  DX:  1. Osteonecrosis of right femoral head  2. Chronic kidney disease stage III  3. ACTC hypersecretion  4. 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 History of total adrenalectomy  7. Diabetes insipidus  8. History of pituitary Cushing's syndrome  9. Hypothyroidism  10. Hypoadrenalism     TREATMENTS:  1. Total adrenalectomy  2. Partial pituitary resection  3. 5/30/2023, direct anterior right total hip arthroplasty, Dr. Grubbs, [facility]         Assessment and Plan:   Assessment:  37-year-old female is approximately 4 weeks status post DA right total hip arthroplasty.  Suture abscess noted today but overall recovering appropriately     Plan:  I extensively discussed my findings with the patient.  Minor suture abscess was noted today.  Patient was placed on Keflex for 10 days.  If she notes any increasing redness or purulence instructed to call.  Patient will continue to work with physical therapy on strengthening and gait training.  Posterior hip precautions were repeated and advised to remain in place until 3 months postoperatively.  Patient has completed the DVT prophylaxis 4 weeks postoperatively.  Antibiotics prior to dental procedures reviewed. All questions were answered.  Patient understands and agrees to the treatment plan as set forth.  We will follow-up with patient at the 1 year postoperative date with renewed radiographic imaging studies.     Chan Sarmiento PA-C  Physician Assistant   Oncology and Adult Reconstructive Surgery  Dept Orthopaedic Surgery, [facility]             History of Present Illness:   37 year old female today approximate 4 weeks status post direct anterior right total hip arthroplasty.  Overall pain is well controlled without medications.  She is ambulating with crutches.  She is taking aspirin for  DVT prophylaxis.  She states he has had some minor discharge from the incision with scabbing.  She denies any significant discharge or erythema.  She denies any fever, malaise, chest pain or shortness of breath.           Physical Exam:     EXAMINATION pertinent findings:   PSYCH: Pleasant, healthy-appearing, alert, oriented x3, cooperative. Normal mood and affect.  VITAL SIGNS: not currently breastfeeding..  Reviewed nursing intake notes.   There is no height or weight on file to calculate BMI.  RESP: non labored breathing   ABD: benign, soft, non-tender, no acute peritoneal findings  SKIN: grossly normal   LYMPHATIC: grossly normal, no adenopathy, no extremity edema  NEURO: grossly normal , no motor deficits  VASCULAR: satisfactory perfusion of all extremities   MUSCULOSKELETAL:          Right hip/groin: The incision demonstrates eschar formation with minor purulence.  No discharge with attempting to milk subcutaneous tissue.  No significant erythema or dehiscence noted.  Are soft nontender with negative Homans' sign    Right LE:              Thigh and leg compartments soft and compressible              +Quad/TA/GSC/FHL/EHL              SILT DP/SP/Marissa/Saph/Tib nerve distributions              Palpable dorsalis pedis pulse             Data:   All laboratory data reviewed  All imaging studies reviewed by me     XR AP pelvis lateral hip on 6/26/2023:  My interpretation: Status post right total hip arthroplasty arthroplasty.  Adequate sizing, fixation and orientation of components.  No signs of immediate postoperative complications.  Left hip osteonecrosis with sclerosis noted.  No collapse of left femoral head appreciated    DATA for DOCUMENTATION:         Past Medical History:     Patient Active Problem List   Diagnosis     Pituitary Cushing's syndrome (H)     Amenorrhea     Hypothalamic-adrenal dysfunction (H)     Pituitary adenoma (H)     History of benign pituitary tumor     Pituitary microadenoma (H)     Cushing  syndrome (H)     Cushing's disease (H)     Diabetes insipidus (H)     Tachycardia     Hx of total adrenalectomy (H)     Hypothyroidism     Hypoadrenalism (H)     Pituitary hypogonadism (H)     Estrogen deficiency     Abnormal coagulation profile     Premature menopause on hormone replacement therapy     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)     Avascular necrosis of femoral head, unspecified laterality (H)     History of colonic polyps     Family history of colon cancer     Breast hypertrophy in female     Cervical high risk HPV (human papillomavirus) test positive     ACTH hypersecretion (H)     Pituitary macroadenoma (H)     Chronic kidney disease, stage 3 (H)     Osteonecrosis (H)     Right hip pain     Past Medical History:   Diagnosis Date     Adrenal disorder (H)      Avascular necrosis of femur head, 2010     Cervical high risk HPV (human papillomavirus) test positive 10/2015 & 3/2019    +HR HPV (NOT 16/18)     Colon polyp      Cushing syndrome (H)     pituitary microadenoma     Diabetes insipidus (H)      Fatigue      History of colposcopy 10/2015    BEVERLEY 1     Hypercalcaemia      Hypertension      Medulloadrenal hyperfunction (H)      Pulmonary emboli (H) 01/2014     Renal disease      Thyroid disease        Also see scanned health assessment forms.       Past Surgical History:     Past Surgical History:   Procedure Laterality Date     ARTHROPLASTY, HIP, TOTAL, DIRECT ANTERIOR APPROACH, USING HANA TABLE Right 5/30/2023    Procedure: Direct Anterior Right Total Hip Arthroplasty;  Surgeon: Wiliam Grubbs MD;  Location: [facility] OR     BIOPSY  1/2010, 7/2013, 1/2014, 7/2021    After each surgery on pituitary     COLONOSCOPY       COLONOSCOPY WITH CO2 INSUFFLATION N/A 09/16/2021    Procedure: COLONOSCOPY, WITH CO2 INSUFFLATION;  Surgeon: Harman Pearl MD;  Location: [facility] OR     COLPOSCOPY VULVA, BIOPSY, COMBINED  10/28/2015    BEVERLEY 1, 6 month follow-up pap was normal     COLPOSCOPY,BX  CERVIX/ENDOCERV CURR  04/02/2019     INSERT DRAIN LUMBAR  01/20/2014    Procedure: INSERT DRAIN LUMBAR;;  Surgeon: Soham Aquino MD;  Location: [facility] OR     LAPAROSCOPIC ADRENALECTOMY  07/14/2014    Procedure: LAPAROSCOPIC ADRENALECTOMY;  Surgeon: Roni Nunn MD;  Location: [facility] OR     OPTICAL TRACKING SYSTEM ENDOSCOPIC RESECTION TUMOR CRANIAL  07/01/2013    Procedure: OPTICAL TRACKING SYSTEM ENDOSCOPIC RESECTION TUMOR CRANIAL;  Stealth Assisted Endoscopic Hypophysectomy ;  Surgeon: Soham Aquino MD;  Location: [facility] OR     OPTICAL TRACKING SYSTEM ENDOSCOPIC RESECTION TUMOR CRANIAL  01/20/2014    Procedure: OPTICAL TRACKING SYSTEM ENDOSCOPIC RESECTION TUMOR CRANIAL;  Stealth Assisted Endoscopic Transnasal Excision Of Pituitary Adenoma , Placement Of Lumbar Drain with c-arm;  Surgeon: Soham Aquino MD;  Location: [facility] OR     OPTICAL TRACKING SYSTEM ENDOSCOPIC RESECTION TUMOR CRANIAL N/A 07/22/2021    Procedure: stealth assisted Redo endoscopic, endonasal resection of pituitary tumor;  Surgeon: Mina Huffman MD;  Location: [facility] OR     removal of pituitary microademona       wisdom teeth extration              Social History:     Social History     Socioeconomic History     Marital status: Single     Spouse name: Not on file     Number of children: Not on file     Years of education: Not on file     Highest education level: Not on file   Occupational History     Not on file   Tobacco Use     Smoking status: Former     Packs/day: 0.50     Years: 3.00     Pack years: 1.50     Types: Cigarettes     Start date: 10/10/2005     Quit date: 1/2/2008     Years since quitting: 15.4     Smokeless tobacco: Never   Vaping Use     Vaping Use: Never used   Substance and Sexual Activity     Alcohol use: Yes     Comment: Average 2-3/week in summer     Drug use: Never     Sexual activity: Not Currently     Partners: Male     Birth control/protection: Condom, Pill, Post-menopausal   Other Topics  Concern     Parent/sibling w/ CABG, MI or angioplasty before 65F 55M? No   Social History Narrative     Not on file     Social Determinants of Health     Financial Resource Strain: Not on file   Food Insecurity: Not on file   Transportation Needs: Not on file   Physical Activity: Not on file   Stress: Not on file   Social Connections: Not on file   Intimate Partner Violence: Not on file   Housing Stability: Not on file            Family History:       Family History   Problem Relation Age of Onset     Hyperlipidemia Father      Obesity Father      Asthma Mother      Osteoporosis Mother      Obesity Mother      Allergies Sister      Obesity Sister      Anxiety Disorder Sister      Cancer Maternal Grandmother 45        colon cancer     Colon Cancer Maternal Grandmother      Cancer Maternal Grandfather         lung cancer-smoker     Other Cancer Maternal Grandfather         Lung - Smoker     Cancer Paternal Grandmother         lung cancer-smoker     Other Cancer Paternal Grandmother         Lung - Smoker     Cancer Paternal Grandfather         lung cancer     Other Cancer Paternal Grandfather         Lung     Depression Brother      Anxiety Disorder Brother      Mental Illness Brother         Psychosis     Anesthesia Reaction No family hx of      Cardiovascular No family hx of      Deep Vein Thrombosis (DVT) No family hx of             Medications:     Current Outpatient Medications   Medication Sig     acetaminophen (TYLENOL) 325 MG tablet Take 2 tablets (650 mg) by mouth every 4 hours as needed for other (mild pain)     aspirin 81 MG EC tablet Take 2 tablets (162 mg) by mouth daily     cabergoline (DOSTINEX) 0.5 MG tablet Take 0.5 tablets (0.25 mg) by mouth once a week (Patient taking differently: Take 0.25 mg by mouth once a week On Sundays)     Calcium carb-Vitamin D 500 mg Stebbins-200 units (OSCAL WITH D;OYSTER SHELL CALCIUM) 500-200 MG-UNIT per tablet Take 1 tablet by mouth daily     desmopressin (DDAVP) 0.1 MG  tablet Take 1 tablet (0.1 mg) by mouth 2 times daily (Patient taking differently: Take 0.1 mg by mouth daily)     drospirenone-ethinyl estradiol (HECTOR) 3-0.02 MG tablet Take 1 tablet by mouth daily     fludrocortisone (FLORINEF) 0.1 MG tablet Take 1 tablet (0.1 mg) by mouth daily     hydrocortisone (CORTEF) 10 MG tablet Take 1 tablet (10 mg) by mouth daily TAKE 1 TABLET EVERY MORNING, 0.5 TABLET IN THE EVENING PLUS SICK DAY SUPPLY AS DIRECTED. Take 10 mg twice daily for 3 days until 6/4 and then resume normal dosing.     hydrocortisone sodium succinate PF (SOLU-CORTEF) 100 MG injection Inject 2 mLs (100 mg) into the vein once as needed (use when you cannot tolerate po intake.) Dispense Act-O-Vial (Patient taking differently: Inject 100 mg into the muscle once as needed (use when you cannot tolerate po intake.) Dispense Act-O-Vial)     levothyroxine (SYNTHROID/LEVOTHROID) 75 MCG tablet TAKE 1 TABLET BY MOUTH ONCE DAILY 6 DAYS A WEEK AND TAKE NOTHING ON THE 7TH DAY EACH WEEK (Patient taking differently: Take 75 mcg by mouth See Admin Instructions TAKE 1 TABLET BY MOUTH ONCE DAILY 6 DAYS A WEEK AND TAKE NOTHING ON THE 7TH DAY EACH WEEK (does not take on Sundays))     methocarbamol (ROBAXIN) 500 MG tablet Take 1-2 tablets (500-1,000 mg) by mouth nightly as needed for muscle spasms     Multiple Vitamin (MULTI-VITAMIN DAILY PO) Take 1 tablet by mouth daily      oxyCODONE (ROXICODONE) 5 MG tablet Take 1-2 tablets (5-10 mg) by mouth every 4 hours as needed for moderate to severe pain     polyethylene glycol (MIRALAX) 17 g packet Take 17 g by mouth daily     senna-docusate (SENOKOT-S/PERICOLACE) 8.6-50 MG tablet Take 1-2 tablets by mouth 2 times daily Take while on oral narcotics to prevent or treat constipation.     sodium chloride (OCEAN) 0.65 % nasal spray Apply in both nostrils as needed for congestion     No current facility-administered medications for this visit.              Review of Systems:   A comprehensive 10  point review of systems (constitutional, ENT, cardiac, peripheral vascular, lymphatic, respiratory, GI, , Musculoskeletal, skin, Neurological) was performed and found to be negative except as described in this note.

## 2023-06-26 NOTE — NURSING NOTE
Reason For Visit:   Chief Complaint   Patient presents with     Surgical Followup     4 wk post-op right ZAHEER DOS 5/30 // pt reports that incision is still draining        LMP  (LMP Unknown)     Pain Assessment  Patient Currently in Pain: Denies (no pain per pt)  Primary Pain Location:  (right)      Bunny Dinh, CHIOMA

## 2023-06-26 NOTE — LETTER
6/26/2023         RE: Suyapa Hodge  [address]  [facility] [address]        Dear Colleague,    Thank you for referring your patient, Suyapa Hodge, to the [facility] [place]. Please see a copy of my visit note below.        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 Grubbs, [facility]         Assessment and Plan:   Assessment:  37-year-old female is approximately 4 weeks status post DA right total hip arthroplasty.  Suture abscess noted today but overall recovering appropriately     Plan:  I extensively discussed my findings with the patient.  Minor suture abscess was noted today.  Patient was placed on Keflex for 10 days.  If she notes any increasing redness or purulence instructed to call.  Patient will continue to work with physical therapy on strengthening and gait training.  Posterior hip precautions were repeated and advised to remain in place until 3 months postoperatively.  Patient has completed the DVT prophylaxis 4 weeks postoperatively.  Antibiotics prior to dental procedures reviewed. All questions were answered.  Patient understands and agrees to the treatment plan as set forth.  We will follow-up with patient at the 1 year postoperative date with renewed radiographic imaging studies.     Chan Sarmiento PA-C  Physician Assistant   Oncology and Adult Reconstructive Surgery  Dept Orthopaedic Surgery, [facility]             History of Present Illness:   37  year old female today approximate 4 weeks status post direct anterior right total hip arthroplasty.  Overall pain is well controlled without medications.  She is ambulating with crutches.  She is taking aspirin for DVT prophylaxis.  She states he has had some minor discharge from the incision with scabbing.  She denies any significant discharge or erythema.  She denies any fever, malaise, chest pain or shortness of breath.           Physical Exam:     EXAMINATION pertinent findings:   PSYCH: Pleasant, healthy-appearing, alert, oriented x3, cooperative. Normal mood and affect.  VITAL SIGNS: not currently breastfeeding..  Reviewed nursing intake notes.   There is no height or weight on file to calculate BMI.  RESP: non labored breathing   ABD: benign, soft, non-tender, no acute peritoneal findings  SKIN: grossly normal   LYMPHATIC: grossly normal, no adenopathy, no extremity edema  NEURO: grossly normal , no motor deficits  VASCULAR: satisfactory perfusion of all extremities   MUSCULOSKELETAL:          Right hip/groin: The incision demonstrates eschar formation with minor purulence.  No discharge with attempting to milk subcutaneous tissue.  No significant erythema or dehiscence noted.  Are soft nontender with negative Homans' sign    Right LE:              Thigh and leg compartments soft and compressible              +Quad/TA/GSC/FHL/EHL              SILT DP/SP/Marissa/Saph/Tib nerve distributions              Palpable dorsalis pedis pulse             Data:   All laboratory data reviewed  All imaging studies reviewed by me     XR AP pelvis lateral hip on 6/26/2023:  My interpretation: Status post right total hip arthroplasty arthroplasty.  Adequate sizing, fixation and orientation of components.  No signs of immediate postoperative complications.  Left hip osteonecrosis with sclerosis noted.  No collapse of left femoral head appreciated    DATA for DOCUMENTATION:         Past Medical History:     Patient Active Problem  List   Diagnosis    Pituitary Cushing's syndrome (H)    Amenorrhea    Hypothalamic-adrenal dysfunction (H)    Pituitary adenoma (H)    History of benign pituitary tumor    Pituitary microadenoma (H)    Cushing syndrome (H)    Cushing's disease (H)    Diabetes insipidus (H)    Tachycardia    Hx of total adrenalectomy (H)    Hypothyroidism    Hypoadrenalism (H)    Pituitary hypogonadism (H)    Estrogen deficiency    Abnormal coagulation profile    Premature menopause on hormone replacement therapy    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)    Avascular necrosis of femoral head, unspecified laterality (H)    History of colonic polyps    Family history of colon cancer    Breast hypertrophy in female    Cervical high risk HPV (human papillomavirus) test positive    ACTH hypersecretion (H)    Pituitary macroadenoma (H)    Chronic kidney disease, stage 3 (H)    Osteonecrosis (H)    Right hip pain     Past Medical History:   Diagnosis Date    Adrenal disorder (H)     Avascular necrosis of femur head, 2010    Cervical high risk HPV (human papillomavirus) test positive 10/2015 & 3/2019    +HR HPV (NOT 16/18)    Colon polyp     Cushing syndrome (H)     pituitary microadenoma    Diabetes insipidus (H)     Fatigue     History of colposcopy 10/2015    BEVERLEY 1    Hypercalcaemia     Hypertension     Medulloadrenal hyperfunction (H)     Pulmonary emboli (H) 01/2014    Renal disease     Thyroid disease        Also see scanned health assessment forms.       Past Surgical History:     Past Surgical History:   Procedure Laterality Date    ARTHROPLASTY, HIP, TOTAL, DIRECT ANTERIOR APPROACH, USING HANA TABLE Right 5/30/2023    Procedure: Direct Anterior Right Total Hip Arthroplasty;  Surgeon: Wiliam Grubbs MD;  Location: [facility] OR    BIOPSY  1/2010, 7/2013, 1/2014, 7/2021    After each surgery on pituitary    COLONOSCOPY      COLONOSCOPY WITH CO2 INSUFFLATION N/A 09/16/2021    Procedure: COLONOSCOPY, WITH CO2 INSUFFLATION;  Surgeon:  Harman Pearl MD;  Location: [facility] OR    COLPOSCOPY VULVA, BIOPSY, COMBINED  10/28/2015    BEVERLEY 1, 6 month follow-up pap was normal    COLPOSCOPY,BX CERVIX/ENDOCERV CURR  04/02/2019    INSERT DRAIN LUMBAR  01/20/2014    Procedure: INSERT DRAIN LUMBAR;;  Surgeon: Soham Aquino MD;  Location: [facility] OR    LAPAROSCOPIC ADRENALECTOMY  07/14/2014    Procedure: LAPAROSCOPIC ADRENALECTOMY;  Surgeon: Roni Nunn MD;  Location: [facility] OR    OPTICAL TRACKING SYSTEM ENDOSCOPIC RESECTION TUMOR CRANIAL  07/01/2013    Procedure: OPTICAL TRACKING SYSTEM ENDOSCOPIC RESECTION TUMOR CRANIAL;  Stealth Assisted Endoscopic Hypophysectomy ;  Surgeon: Soham Aquino MD;  Location: [facility] OR    OPTICAL TRACKING SYSTEM ENDOSCOPIC RESECTION TUMOR CRANIAL  01/20/2014    Procedure: OPTICAL TRACKING SYSTEM ENDOSCOPIC RESECTION TUMOR CRANIAL;  Stealth Assisted Endoscopic Transnasal Excision Of Pituitary Adenoma , Placement Of Lumbar Drain with c-arm;  Surgeon: Soham Aquino MD;  Location: [facility] OR    OPTICAL TRACKING SYSTEM ENDOSCOPIC RESECTION TUMOR CRANIAL N/A 07/22/2021    Procedure: stealth assisted Redo endoscopic, endonasal resection of pituitary tumor;  Surgeon: Mina Huffman MD;  Location: [facility] OR    removal of pituitary microademona      wisdom teeth extration              Social History:     Social History     Socioeconomic History    Marital status: Single     Spouse name: Not on file    Number of children: Not on file    Years of education: Not on file    Highest education level: Not on file   Occupational History    Not on file   Tobacco Use    Smoking status: Former     Packs/day: 0.50     Years: 3.00     Pack years: 1.50     Types: Cigarettes     Start date: 10/10/2005     Quit date: 1/2/2008     Years since quitting: 15.4    Smokeless tobacco: Never   Vaping Use    Vaping Use: Never used   Substance and Sexual Activity    Alcohol use: Yes     Comment: Average 2-3/week in summer    Drug  use: Never    Sexual activity: Not Currently     Partners: Male     Birth control/protection: Condom, Pill, Post-menopausal   Other Topics Concern    Parent/sibling w/ CABG, MI or angioplasty before 65F 55M? No   Social History Narrative    Not on file     Social Determinants of Health     Financial Resource Strain: Not on file   Food Insecurity: Not on file   Transportation Needs: Not on file   Physical Activity: Not on file   Stress: Not on file   Social Connections: Not on file   Intimate Partner Violence: Not on file   Housing Stability: Not on file            Family History:       Family History   Problem Relation Age of Onset    Hyperlipidemia Father     Obesity Father     Asthma Mother     Osteoporosis Mother     Obesity Mother     Allergies Sister     Obesity Sister     Anxiety Disorder Sister     Cancer Maternal Grandmother 45        colon cancer    Colon Cancer Maternal Grandmother     Cancer Maternal Grandfather         lung cancer-smoker    Other Cancer Maternal Grandfather         Lung - Smoker    Cancer Paternal Grandmother         lung cancer-smoker    Other Cancer Paternal Grandmother         Lung - Smoker    Cancer Paternal Grandfather         lung cancer    Other Cancer Paternal Grandfather         Lung    Depression Brother     Anxiety Disorder Brother     Mental Illness Brother         Psychosis    Anesthesia Reaction No family hx of     Cardiovascular No family hx of     Deep Vein Thrombosis (DVT) No family hx of             Medications:     Current Outpatient Medications   Medication Sig    acetaminophen (TYLENOL) 325 MG tablet Take 2 tablets (650 mg) by mouth every 4 hours as needed for other (mild pain)    aspirin 81 MG EC tablet Take 2 tablets (162 mg) by mouth daily    cabergoline (DOSTINEX) 0.5 MG tablet Take 0.5 tablets (0.25 mg) by mouth once a week (Patient taking differently: Take 0.25 mg by mouth once a week On Sundays)    Calcium carb-Vitamin D 500 mg [place]-200 units (OSCAL WITH  D;OYSTER SHELL CALCIUM) 500-200 MG-UNIT per tablet Take 1 tablet by mouth daily    desmopressin (DDAVP) 0.1 MG tablet Take 1 tablet (0.1 mg) by mouth 2 times daily (Patient taking differently: Take 0.1 mg by mouth daily)    drospirenone-ethinyl estradiol (HECTOR) 3-0.02 MG tablet Take 1 tablet by mouth daily    fludrocortisone (FLORINEF) 0.1 MG tablet Take 1 tablet (0.1 mg) by mouth daily    hydrocortisone (CORTEF) 10 MG tablet Take 1 tablet (10 mg) by mouth daily TAKE 1 TABLET EVERY MORNING, 0.5 TABLET IN THE EVENING PLUS SICK DAY SUPPLY AS DIRECTED. Take 10 mg twice daily for 3 days until 6/4 and then resume normal dosing.    hydrocortisone sodium succinate PF (SOLU-CORTEF) 100 MG injection Inject 2 mLs (100 mg) into the vein once as needed (use when you cannot tolerate po intake.) Dispense Act-O-Vial (Patient taking differently: Inject 100 mg into the muscle once as needed (use when you cannot tolerate po intake.) Dispense Act-O-Vial)    levothyroxine (SYNTHROID/LEVOTHROID) 75 MCG tablet TAKE 1 TABLET BY MOUTH ONCE DAILY 6 DAYS A WEEK AND TAKE NOTHING ON THE 7TH DAY EACH WEEK (Patient taking differently: Take 75 mcg by mouth See Admin Instructions TAKE 1 TABLET BY MOUTH ONCE DAILY 6 DAYS A WEEK AND TAKE NOTHING ON THE 7TH DAY EACH WEEK (does not take on Sundays))    methocarbamol (ROBAXIN) 500 MG tablet Take 1-2 tablets (500-1,000 mg) by mouth nightly as needed for muscle spasms    Multiple Vitamin (MULTI-VITAMIN DAILY PO) Take 1 tablet by mouth daily     oxyCODONE (ROXICODONE) 5 MG tablet Take 1-2 tablets (5-10 mg) by mouth every 4 hours as needed for moderate to severe pain    polyethylene glycol (MIRALAX) 17 g packet Take 17 g by mouth daily    senna-docusate (SENOKOT-S/PERICOLACE) 8.6-50 MG tablet Take 1-2 tablets by mouth 2 times daily Take while on oral narcotics to prevent or treat constipation.    sodium chloride (OCEAN) 0.65 % nasal spray Apply in both nostrils as needed for congestion     No current  facility-administered medications for this visit.              Review of Systems:   A comprehensive 10 point review of systems (constitutional, ENT, cardiac, peripheral vascular, lymphatic, respiratory, GI, , Musculoskeletal, skin, Neurological) was performed and found to be negative except as described in this note.           Chan Sarmiento PA-C

## 2023-07-05 ENCOUNTER — VIRTUAL VISIT (OUTPATIENT)
Dept: ENDOCRINOLOGY | Facility: CLINIC | Age: 38
End: 2023-07-05
Payer: COMMERCIAL

## 2023-07-05 DIAGNOSIS — E24.0 PITUITARY DEPENDENT CUSHING DISEASE (H): ICD-10-CM

## 2023-07-05 DIAGNOSIS — E23.0 HYPOPITUITARISM (H): ICD-10-CM

## 2023-07-05 DIAGNOSIS — E24.1: Primary | ICD-10-CM

## 2023-07-05 PROCEDURE — 99214 OFFICE O/P EST MOD 30 MIN: CPT | Mod: VID | Performed by: INTERNAL MEDICINE

## 2023-07-05 NOTE — PROGRESS NOTES
Virtual Visit Details      How would you like to obtain your AVS? Next Caller  For the video visit, send the invitation by: Text to cell phone: [phone]  Will anyone else be joining your video visit? No      Milla is a 36 year old who is being evaluated via a billable video visit.      How would you like to obtain your AVS? [email or web address]  If the video visit is dropped, the invitation should be resent by: Text to cell phone: [phone]   Will anyone else be joining your video visit? No    Type of service Video  Start: 2:45  pm  End: 3:05 pm  [place]      Originating Location (pt. Location): Home    Distant Location (provider location):  [facility] ENDOCRINOLOGY         -- Endocrinology follow up ---      Assessment:    # Cushing's disease  # Alexei syndrome post adrenalectomy   Cushing disease post TSS 3 times (#1 2010, #2 2013, #3 2014), and adrenalectomy in #4 7/2014, currently Alexei's symdrome with pituitary ACTH tumor regrowth, Worsening pigmentation with ACTH>1250, increased the lesion, therefore she underwent to TSS 4 th time, #5 July 2021 by Dr. Huffman   Pathology showed ACTH stained. Post op ACTH was 14 with siginificant improvement of pigmentation. Reviewed last MRI 10/2021 showed no residual tumor, 12/2022 no residual, no recurrence by MRI, but  ACTH elevated quickly and 5 month after the surgery ACTH incrased to 418. Octreotide was presscribed Mycapssa 20 mg daily to aim for prevent tumor recurrence (tissue SSTR2 expression positive.), however despite multiple efforts to her insurance company, it was not approved. We have spent significant time and efforts for this. Started cabergoline in 2/2023 with 0.25 mg once a week, no side effect.     - increase cabergoline from 0.25 mg weekly to 0.25 mg twice a week, then repeat ACTH    MRI 12/2022 no sign of recurrence    # adrenal insufficiency due to bilateral adrenalectomy    - continue current dose of hydrocortisone 10 mg am and florinef 0.1 mg daily,               Hypothyrodism     - continue LT4 75 mcg daily    Hypogonadism    - continue BCP (E2 20 mcg)     DDAVP  Taking minimum dose currently (100 mcg daily po). She can try to taper by herself.       RTC with me in 6 month      Total 35  minutes spent on the date of the encounter doing chart review, history and exam, reviewed MRI brain serially, reviewed pathology results documentation and further activities as noted above.    Adriana Wilkinson MD  Staff Physician  Endocrinology and Metabolism  [facility]  License: [id number]  Pager: [phone]    Interval History as of 7/5/2023 : Patient has been doing well. Medication compliance: excellent, tolerating to cabergoline as well . Skin darekned? But patient mentioned this is because she was outside.   Interval History as of 1/4/2023 : Patient has been doing well. Not noting any pigmentation. New event includes  : ACTH now increase to 418..  Interval History as of 6/1/2022 : Patient has been doing well.  Medication compliance good . New event includes: no significant medical event noted, still has some chronic fatigue+, she feels her skin tone became back to her baseline.  Interval History as of 2/23/2022 : Patient has been doing well. Medication compliance  excellent . New event includes L regular cycle, no pertinent medical event noted .  Interval History as of 8/23/2021 : Patient has been doing well. Underwent to 4th surgery 7/22/2021. Pigmentation of her dkin isn imroving, post op ACTH was 14.   Interval History as of 4/14/2021 : No HA, medication compliance good, no dizziness, no HA. Pigmentation gettign worse, lesion increasing in size.   History of Present Illness: Suyapa Hodge is a 34 year old female with a history of pituitary Cushing's syndrome, hypothalamic adrenal dysfunction and pituitary microadenoma who presents for follow up. She was last seen in clinic 14 months ago. She was diagnosed with pituitary microademoma serveral year ago which  "was suspected to cause her Cushing's disease. She had pituitary surgery x3 in 2013-14 and finally underwent bilateral adrenalectomy in July 2014. ACTH levels remained normal. In July 2017 ACTH levels were tested and was 271, which increased to 549 raising concern for recurrence vs missed diagnosis.  Chest abdomen and pelvis CT was normal. 8 mg dex suppression reduced levels to 118.     She is currently taking hydrocortisone BID (10 mg misses dose once per month), L-thyroxine 75 mcg tablets full tablet 6 days and desmopressin 0.1 mg in the morning. She denies that she gets up in the middle of the night to use the restroom. She reports that she goes to the eye doctor every 3 months to see if the tumor \"has moved to her optic nerve\".      She is on hormone replacement therapy (prometrium and vivelle patch). No history of uterine surgeries. She has a history of irregular periods but has been evaluated with GYN, per patient, and was not told specifics.     She is not taking the calcium supplement. She has a history of avascular necrosis but has been told that she is too young for a hip replacement. DXA showed low BMD for age.     No eye symptoms  No headache, change in vision, loss of peripheral vision  Complete ROS that were obtained were negative.     Active Medications:      cyclobenzaprine (FLEXERIL) 10 MG tablet, Take 1 tablet (10 mg) by mouth 3 times daily as needed for muscle spasms, Disp: 30 tablet, Rfl: 0     desmopressin (DDAVP) 0.1 MG tablet, Take 1 tablet (100 mcg) by mouth 2 times daily (1 TAB) MORNING AND AT BEDTIME, Disp: 180 tablet, Rfl: 5     estradiol (VIVELLE-DOT) 0.05 MG/24HR bi-weekly patch, Place 1 patch onto the skin twice a week, Disp: 27 patch, Rfl: 5     fludrocortisone (FLORINEF) 0.1 MG tablet, Take 0.5 tablets (0.05 mg) by mouth daily Please keep 10-21-19 clinic appt for further refills, Disp: 45 tablet, Rfl: 5     hydrocortisone (CORTEF) 10 MG tablet, 1 tab (10 mg) am 1/2 tab (5 mg) pm plus " sick day supply as directed, Disp: 150 tablet, Rfl: 5     levothyroxine (SYNTHROID/LEVOTHROID) 75 MCG tablet, One tablet day 6 days per wk and none on the 7th day each wk, Disp: 90 tablet, Rfl: 5     Multiple Vitamin (MULTI-VITAMIN DAILY PO), , Disp: , Rfl:      progesterone (PROMETRIUM) 100 MG capsule, Take 1 tablet daily, Disp: 90 capsule, Rfl: 1     triamcinolone (KENALOG) 0.1 % external cream, Apply topically 2 times daily As needed for flares, Disp: 80 g, Rfl: 1     Calcium carb-Vitamin D 500 mg [place]-200 units (OSCAL WITH D;OYSTER SHELL CALCIUM) 500-200 MG-UNIT per tablet, Take 1 tablet by mouth 2 times daily (with meals), Disp: , Rfl:      methocarbamol (ROBAXIN) 500 MG tablet, Take 1-2 tablets (500-1,000 mg) by mouth nightly as needed for muscle spasms (Patient not taking: Reported on 10/21/2019), Disp: 20 tablet, Rfl: 0     naproxen (NAPROSYN) 500 MG tablet, Take 1 tablet (500 mg) by mouth 2 times daily (with meals) (Patient not taking: Reported on 10/21/2019), Disp: 60 tablet, Rfl: 1      Allergies:   Patient has no known allergies.     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 resection   Avascular necrosis of femur head   Cervical high risk HPV   Colon polyp   Diabetes insipidus   Hypercalcemia   Hypertension   Pulmonary emboli   Hypothyroidism      Past Surgical History:  Lumbar drain- 01/2014   Adrenalectomy-07/2014   Tumor resection (hypophysectomy)- 07/2013   [place] teeth extracted     Family History:   Mother: asthma, osteoporosis, obesity   Father: hyperlipidemia, obesity   Sister: allergies, obesity   Maternal grandmother: colon cancer  Maternal grandfather: lung cancer   Paternal grandmother: lung cancer   Paternal grandfather: lung cancer      Social History:   The patient was alone   Smoking Status: former; 1/2 pack per day for 3 years; 12 years since quitting   Smokeless  Tobacco: never    Alcohol Use: yes; 3 beers per month   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 No masses, lesions, tenderness or abnormalities  Resporatory: non acute disctress, breathing normally  Skin: pigmentation on her face has been improving      Data:  TSH   Date Value Ref Range Status   01/12/2018 <0.01 (L) 0.40 - 4.00 mU/L Final   01/08/2016 <0.01 (L) 0.40 - 4.00 mU/L Final   09/25/2015 <0.01 (L) 0.40 - 4.00 mU/L Final   05/22/2015 <0.01 (L) 0.40 - 4.00 mU/L Final   03/13/2015 (L) 0.40 - 4.00 mU/L Final    <0.01  Effective 7/30/2014, the reference range for this assay has changed to reflect   new instrumentation/methodology.       T4 Free   Date Value Ref Range Status   10/08/2018 0.53 (L) 0.76 - 1.46 ng/dL Final   01/12/2018 0.90 0.76 - 1.46 ng/dL Final   10/04/2017 0.90 0.76 - 1.46 ng/dL Final   07/07/2017 0.98 0.76 - 1.46 ng/dL Final   12/30/2016 0.91 0.76 - 1.46 ng/dL Final     CBC RESULTS:   Recent Labs   Lab Test 07/15/14  0806   WBC 8.2   RBC 4.51   HGB 13.3   HCT 39.7   MCV 88   MCH 29.5   MCHC 33.5   RDW 13.7        Recent Labs   Lab Test 10/08/18  0802 01/12/18  0732    137   POTASSIUM 4.1 4.3   CHLORIDE 105 105   CO2 32 27   ANIONGAP 4 5   GLC 87 78   BUN 14 19   CR 1.10* 0.93   ELIEZER 9.3 9.2     MRI: I also personally reviewed brain MRI and pituitary lesion and interepret and expalined to the patient.     intraseller maybe small hypodense 3-4 mm(?) residual vs scar in my impression           MRI: I reviewed original images of 2/19/2021 ad explained to the patient.   Brain/ Pituitary MRI without and with contrast     History: 35 yo female pituitary Cushing disease follow up; Pituitary  dependent Cushing disease (H).     ICD-10: Pituitary dependent Cushing disease (H)     Comparison:  Multiple pituitary MRI exams since 12/3/2009, most recent  one from  3/16/2020.     Technique: Axial diffusion and FLAIR images of the whole brain  obtained without intravenous contrast. Sagittal T1 and T2-weighted,  coronal T2-weighted, coronal T1-weighted images with focus on the  sella were obtained without intravenous contrast. Post intravenous  contrast using gadolinium coronal and sagittal T1-weighted images were  obtained focused on the sella. Dynamic postcontrast coronal  T1-weighted images were also obtained.     Contrast: 7mL Gadavist IV     Findings:    Postsurgical changes of transsphenoidal approach pituitary  microadenoma resection. Hypoenhancing area within the left side of the  sella, extending to the left cavernous sinus, increased since  10/8/2018, measuring 9 x 7 x 10 mm, previously 6 x 6 x 4 mm.  Associated diffusion restriction. Abutment of the cavernous segment of  left internal carotid artery.     The pituitary stalk appears midline. The optic chiasm appears intact  and not displaced. The major cavernous carotid vascular flow-voids  appear patent.

## 2023-07-05 NOTE — LETTER
7/5/2023       RE: Suyapa Hodge  [address] [facility] [address]     Dear Colleague,    Thank you for referring your patient, Suyapa Hodge, to the [facility] [place] at [facility]. Please see a copy of my visit note below.    Virtual Visit Details      How would you like to obtain your AVS? [employer]  For the video visit, send the invitation by: Text to cell phone: [phone]  Will anyone else be joining your video visit? No      Milla is a 36 year old who is being evaluated via a billable video visit.      How would you like to obtain your AVS? [employer]  If the video visit is dropped, the invitation should be resent by: Text to cell phone: [phone]   Will anyone else be joining your video visit? No    Type of service Video  Start: 2:45  pm  End: 3:05 pm  [facility]      Originating Location (pt. Location): Home    Distant Location (provider location):  [facility] ENDOCRINOLOGY         -- Endocrinology follow up ---      Assessment:    # Cushing's disease  # Alexei syndrome post adrenalectomy   Cushing disease post TSS 3 times (#1 2010, #2 2013, #3 2014), and adrenalectomy in #4 7/2014, currently Alexei's symdrome with pituitary ACTH tumor regrowth, Worsening pigmentation with ACTH>1250, increased the lesion, therefore she underwent to TSS 4 th time, #5 July 2021 by Dr. Huffman   Pathology showed ACTH stained. Post op ACTH was 14 with siginificant improvement of pigmentation. Reviewed last MRI 10/2021 showed no residual tumor, 12/2022 no residual, no recurrence by MRI, but  ACTH elevated quickly and 5 month after the surgery ACTH incrased to 418. Octreotide was presscribed Mycapssa 20 mg daily to aim for prevent tumor recurrence (tissue SSTR2 expression positive.), however despite multiple efforts to her insurance company, it was not approved. We have spent significant time and efforts for this. Started cabergoline in 2/2023 with 0.25 mg  once a week, no side effect.     - increase cabergoline from 0.25 mg weekly to 0.25 mg twice a week, then repeat ACTH    MRI 12/2022 no sign of recurrence    # adrenal insufficiency due to bilateral adrenalectomy    - continue current dose of hydrocortisone 10 mg am and florinef 0.1 mg daily,              Hypothyrodism     - continue LT4 75 mcg daily    Hypogonadism    - continue BCP (E2 20 mcg)     DDAVP  Taking minimum dose currently (100 mcg daily po). She can try to taper by herself.       RTC with me in 6 month      Total 35  minutes spent on the date of the encounter doing chart review, history and exam, reviewed MRI brain serially, reviewed pathology results documentation and further activities as noted above.    Adriana Wilkinson MD  Staff Physician  Endocrinology and Metabolism  [facility]  License: [id number]  Pager: [phone]    Interval History as of 7/5/2023 : Patient has been doing well. Medication compliance: excellent, tolerating to cabergoline as well . Skin darekned? But patient mentioned this is because she was outside.   Interval History as of 1/4/2023 : Patient has been doing well. Not noting any pigmentation. New event includes  : ACTH now increase to 418..  Interval History as of 6/1/2022 : Patient has been doing well.  Medication compliance good . New event includes: no significant medical event noted, still has some chronic fatigue+, she feels her skin tone became back to her baseline.  Interval History as of 2/23/2022 : Patient has been doing well. Medication compliance  excellent . New event includes L regular cycle, no pertinent medical event noted .  Interval History as of 8/23/2021 : Patient has been doing well. Underwent to 4th surgery 7/22/2021. Pigmentation of her dkin isn imroving, post op ACTH was 14.   Interval History as of 4/14/2021 : No HA, medication compliance good, no dizziness, no HA. Pigmentation gettign worse, lesion increasing in size.   History of  "Present Illness: Suyapa Hodge is a 34 year old female with a history of pituitary Cushing's syndrome, hypothalamic adrenal dysfunction and pituitary microadenoma who presents for follow up. She was last seen in clinic 14 months ago. She was diagnosed with pituitary microademoma serveral year ago which was suspected to cause her Cushing's disease. She had pituitary surgery x3 in 2013-14 and finally underwent bilateral adrenalectomy in July 2014. ACTH levels remained normal. In July 2017 ACTH levels were tested and was 271, which increased to 549 raising concern for recurrence vs missed diagnosis.  Chest abdomen and pelvis CT was normal. 8 mg dex suppression reduced levels to 118.     She is currently taking hydrocortisone BID (10 mg misses dose once per month), L-thyroxine 75 mcg tablets full tablet 6 days and desmopressin 0.1 mg in the morning. She denies that she gets up in the middle of the night to use the restroom. She reports that she goes to the eye doctor every 3 months to see if the tumor \"has moved to her optic nerve\".      She is on hormone replacement therapy (prometrium and vivelle patch). No history of uterine surgeries. She has a history of irregular periods but has been evaluated with GYN, per patient, and was not told specifics.     She is not taking the calcium supplement. She has a history of avascular necrosis but has been told that she is too young for a hip replacement. DXA showed low BMD for age.     No eye symptoms  No headache, change in vision, loss of peripheral vision  Complete ROS that were obtained were negative.     Active Medications:     cyclobenzaprine (FLEXERIL) 10 MG tablet, Take 1 tablet (10 mg) by mouth 3 times daily as needed for muscle spasms, Disp: 30 tablet, Rfl: 0    desmopressin (DDAVP) 0.1 MG tablet, Take 1 tablet (100 mcg) by mouth 2 times daily (1 TAB) MORNING AND AT BEDTIME, Disp: 180 tablet, Rfl: 5    estradiol (VIVELLE-DOT) 0.05 MG/24HR bi-weekly patch, Place 1 " patch onto the skin twice a week, Disp: 27 patch, Rfl: 5    fludrocortisone (FLORINEF) 0.1 MG tablet, Take 0.5 tablets (0.05 mg) by mouth daily Please keep 10-21-19 clinic appt for further refills, Disp: 45 tablet, Rfl: 5    hydrocortisone (CORTEF) 10 MG tablet, 1 tab (10 mg) am 1/2 tab (5 mg) pm plus sick day supply as directed, Disp: 150 tablet, Rfl: 5    levothyroxine (SYNTHROID/LEVOTHROID) 75 MCG tablet, One tablet day 6 days per wk and none on the 7th day each wk, Disp: 90 tablet, Rfl: 5    Multiple Vitamin (MULTI-VITAMIN DAILY PO), , Disp: , Rfl:     progesterone (PROMETRIUM) 100 MG capsule, Take 1 tablet daily, Disp: 90 capsule, Rfl: 1    triamcinolone (KENALOG) 0.1 % external cream, Apply topically 2 times daily As needed for flares, Disp: 80 g, Rfl: 1    Calcium carb-Vitamin D 500 mg [employer]-200 units (OSCAL WITH D;OYSTER SHELL CALCIUM) 500-200 MG-UNIT per tablet, Take 1 tablet by mouth 2 times daily (with meals), Disp: , Rfl:     methocarbamol (ROBAXIN) 500 MG tablet, Take 1-2 tablets (500-1,000 mg) by mouth nightly as needed for muscle spasms (Patient not taking: Reported on 10/21/2019), Disp: 20 tablet, Rfl: 0    naproxen (NAPROSYN) 500 MG tablet, Take 1 tablet (500 mg) by mouth 2 times daily (with meals) (Patient not taking: Reported on 10/21/2019), Disp: 60 tablet, Rfl: 1      Allergies:   Patient has no known allergies.     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 resection   Avascular necrosis of femur head   Cervical high risk HPV   Colon polyp   Diabetes insipidus   Hypercalcemia   Hypertension   Pulmonary emboli   Hypothyroidism      Past Surgical History:  Lumbar drain- 01/2014   Adrenalectomy-07/2014   Tumor resection (hypophysectomy)- 07/2013   [place] teeth extracted     Family History:   Mother: asthma, osteoporosis, obesity   Father: hyperlipidemia, obesity   Sister:  allergies, obesity   Maternal grandmother: colon cancer  Maternal grandfather: lung cancer   Paternal grandmother: lung cancer   Paternal grandfather: lung cancer      Social History:   The patient was alone   Smoking Status: former; 1/2 pack per day for 3 years; 12 years since quitting   Smokeless Tobacco: never    Alcohol Use: yes; 3 beers per month   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 No masses, lesions, tenderness or abnormalities  Resporatory: non acute disctress, breathing normally  Skin: pigmentation on her face has been improving      Data:  TSH   Date Value Ref Range Status   01/12/2018 <0.01 (L) 0.40 - 4.00 mU/L Final   01/08/2016 <0.01 (L) 0.40 - 4.00 mU/L Final   09/25/2015 <0.01 (L) 0.40 - 4.00 mU/L Final   05/22/2015 <0.01 (L) 0.40 - 4.00 mU/L Final   03/13/2015 (L) 0.40 - 4.00 mU/L Final    <0.01  Effective 7/30/2014, the reference range for this assay has changed to reflect   new instrumentation/methodology.       T4 Free   Date Value Ref Range Status   10/08/2018 0.53 (L) 0.76 - 1.46 ng/dL Final   01/12/2018 0.90 0.76 - 1.46 ng/dL Final   10/04/2017 0.90 0.76 - 1.46 ng/dL Final   07/07/2017 0.98 0.76 - 1.46 ng/dL Final   12/30/2016 0.91 0.76 - 1.46 ng/dL Final     CBC RESULTS:   Recent Labs   Lab Test 07/15/14  0806   WBC 8.2   RBC 4.51   HGB 13.3   HCT 39.7   MCV 88   MCH 29.5   MCHC 33.5   RDW 13.7        Recent Labs   Lab Test 10/08/18  0802 01/12/18  0732    137   POTASSIUM 4.1 4.3   CHLORIDE 105 105   CO2 32 27   ANIONGAP 4 5   GLC 87 78   BUN 14 19   CR 1.10* 0.93   ELIEZER 9.3 9.2     MRI: I also personally reviewed brain MRI and pituitary lesion and interepret and expalined to the patient.     intraseller maybe small hypodense 3-4 mm(?) residual vs scar in my impression           MRI: I reviewed original images of 2/19/2021 ad explained to the  patient.   Brain/ Pituitary MRI without and with contrast     History: 33 yo female pituitary Cushing disease follow up; Pituitary  dependent Cushing disease (H).     ICD-10: Pituitary dependent Cushing disease (H)     Comparison:  Multiple pituitary MRI exams since 12/3/2009, most recent  one from 3/16/2020.     Technique: Axial diffusion and FLAIR images of the whole brain  obtained without intravenous contrast. Sagittal T1 and T2-weighted,  coronal T2-weighted, coronal T1-weighted images with focus on the  sella were obtained without intravenous contrast. Post intravenous  contrast using gadolinium coronal and sagittal T1-weighted images were  obtained focused on the sella. Dynamic postcontrast coronal  T1-weighted images were also obtained.     Contrast: 7mL Gadavist IV     Findings:    Postsurgical changes of transsphenoidal approach pituitary  microadenoma resection. Hypoenhancing area within the left side of the  sella, extending to the left cavernous sinus, increased since  10/8/2018, measuring 9 x 7 x 10 mm, previously 6 x 6 x 4 mm.  Associated diffusion restriction. Abutment of the cavernous segment of  left internal carotid artery.     The pituitary stalk appears midline. The optic chiasm appears intact  and not displaced. The major cavernous carotid vascular flow-voids  appear patent.

## 2023-07-15 ENCOUNTER — DOCUMENTATION ONLY (OUTPATIENT)
Dept: ORTHOPEDICS | Facility: CLINIC | Age: 38
End: 2023-07-15
Payer: COMMERCIAL

## 2023-07-15 NOTE — PROGRESS NOTES
Received Completed forms Yes   Faxed Forms Faxed To: [facility]  Fax Number: [phone]   Sent to [facility] (Date) 7/14/23

## 2023-08-07 ENCOUNTER — LAB (OUTPATIENT)
Dept: LAB | Facility: CLINIC | Age: 38
End: 2023-08-07
Payer: COMMERCIAL

## 2023-08-07 DIAGNOSIS — E24.1: ICD-10-CM

## 2023-08-07 DIAGNOSIS — E24.0 PITUITARY DEPENDENT CUSHING DISEASE (H): ICD-10-CM

## 2023-08-07 PROCEDURE — 82533 TOTAL CORTISOL: CPT

## 2023-08-07 PROCEDURE — 84146 ASSAY OF PROLACTIN: CPT

## 2023-08-07 PROCEDURE — 36415 COLL VENOUS BLD VENIPUNCTURE: CPT

## 2023-08-07 PROCEDURE — 82024 ASSAY OF ACTH: CPT

## 2023-08-08 LAB
ACTH PLAS-MCNC: 150 PG/ML
CORTIS SERPL-MCNC: 18.4 UG/DL
PROLACTIN SERPL 3RD IS-MCNC: 0 NG/ML (ref 5–23)

## 2023-08-11 ENCOUNTER — DOCUMENTATION ONLY (OUTPATIENT)
Dept: ORTHOPEDICS | Facility: CLINIC | Age: 38
End: 2023-08-11
Payer: COMMERCIAL

## 2023-08-11 NOTE — PROGRESS NOTES
Received Completed forms Yes   Faxed Forms Faxed To: [facility]  Fax Number: [phone]   Sent to [facility] (Date) 8/11/23

## 2023-08-28 NOTE — TELEPHONE ENCOUNTER
DESMOPRESSIN 0.1MG TABLETS      Last Written Prescription Date:  7-  Last Fill Quantity: 30,   # refills: 0  Last Office Visit : 2-  Future Office visit:  10-    Routing refill request to provider for review/approval because:  LARGE GAP IN THERAPY?      Kathleen M Doege RN        
Negative

## 2023-09-01 ENCOUNTER — TELEPHONE (OUTPATIENT)
Dept: FAMILY MEDICINE | Facility: CLINIC | Age: 38
End: 2023-09-01
Payer: COMMERCIAL

## 2023-09-01 NOTE — TELEPHONE ENCOUNTER
Hina ([employer]) calling, asking for specifics regarding patient's sepsis care related to PCP. Writer discussed that a consent form for communication would need to be completed with patient for health information to be shared.    Writer provided fax# for clinic.    CONOR Bower RN  [facility]

## 2023-09-05 ENCOUNTER — TELEPHONE (OUTPATIENT)
Dept: FAMILY MEDICINE | Facility: CLINIC | Age: 38
End: 2023-09-05
Payer: COMMERCIAL

## 2023-09-05 NOTE — TELEPHONE ENCOUNTER
Reason for Call:  Form, our goal is to have forms completed with 72 hours, however, some forms may require a visit or additional information.    Type of letter, form or note:  disability    Who is the form from?: Insurance comp [employer]    Where did the form come from: form was faxed in    What clinic location was the form placed at?: [facility]    Where the form was placed: Given to physician    What number is listed as a contact on the form?: [phone]           Call taken on 9/5/2023 at 11:23 AM by Giulia Orozco   [facility]

## 2023-09-06 NOTE — TELEPHONE ENCOUNTER
Anum with [facility] calling.  She started asking questions about any treatment for sepsis.  Asked her if these were the questions on the forms received from [facility].  She verified that they are   Explained that we received the form but patient has not been seen for a follow-up yet after recent hospitalization through a different health care system ([facility]).  Patient has appointment tomorrow with Dr. Saleh for a hospital follow-up   Explained that RN cannot complete the form with her over the phone, this will need to come from provider.  Anum verbalized understanding    Sherrill Armendariz RN  [facility]

## 2023-09-07 ENCOUNTER — OFFICE VISIT (OUTPATIENT)
Dept: FAMILY MEDICINE | Facility: CLINIC | Age: 38
End: 2023-09-07
Payer: COMMERCIAL

## 2023-09-07 VITALS
HEIGHT: 68 IN | RESPIRATION RATE: 18 BRPM | WEIGHT: 161 LBS | SYSTOLIC BLOOD PRESSURE: 108 MMHG | DIASTOLIC BLOOD PRESSURE: 74 MMHG | BODY MASS INDEX: 24.4 KG/M2 | HEART RATE: 69 BPM | OXYGEN SATURATION: 100 % | TEMPERATURE: 97.6 F

## 2023-09-07 DIAGNOSIS — Z86.19 HISTORY OF SEPSIS: Primary | ICD-10-CM

## 2023-09-07 DIAGNOSIS — E27.40 ADRENAL INSUFFICIENCY (H): ICD-10-CM

## 2023-09-07 DIAGNOSIS — Z87.01 HISTORY OF PNEUMONIA: ICD-10-CM

## 2023-09-07 DIAGNOSIS — D35.2 PITUITARY ADENOMA (H): ICD-10-CM

## 2023-09-07 DIAGNOSIS — N17.9 AKI (ACUTE KIDNEY INJURY) (H): ICD-10-CM

## 2023-09-07 LAB
ALBUMIN SERPL BCG-MCNC: 4.2 G/DL (ref 3.5–5.2)
ALP SERPL-CCNC: 73 U/L (ref 35–104)
ALT SERPL W P-5'-P-CCNC: 23 U/L (ref 0–50)
ANION GAP SERPL CALCULATED.3IONS-SCNC: 10 MMOL/L (ref 7–15)
AST SERPL W P-5'-P-CCNC: 48 U/L (ref 0–45)
BASOPHILS # BLD AUTO: 0 10E3/UL (ref 0–0.2)
BASOPHILS NFR BLD AUTO: 0 %
BILIRUB SERPL-MCNC: 0.4 MG/DL
BUN SERPL-MCNC: 19.9 MG/DL (ref 6–20)
CALCIUM SERPL-MCNC: 9.4 MG/DL (ref 8.6–10)
CHLORIDE SERPL-SCNC: 103 MMOL/L (ref 98–107)
CREAT SERPL-MCNC: 1.14 MG/DL (ref 0.51–0.95)
DEPRECATED HCO3 PLAS-SCNC: 23 MMOL/L (ref 22–29)
EGFRCR SERPLBLD CKD-EPI 2021: 62 ML/MIN/1.73M2
EOSINOPHIL # BLD AUTO: 0 10E3/UL (ref 0–0.7)
EOSINOPHIL NFR BLD AUTO: 0 %
ERYTHROCYTE [DISTWIDTH] IN BLOOD BY AUTOMATED COUNT: 13.1 % (ref 10–15)
GLUCOSE SERPL-MCNC: 74 MG/DL (ref 70–99)
HCT VFR BLD AUTO: 37.8 % (ref 35–47)
HGB BLD-MCNC: 12.5 G/DL (ref 11.7–15.7)
IMM GRANULOCYTES # BLD: 0 10E3/UL
IMM GRANULOCYTES NFR BLD: 0 %
LYMPHOCYTES # BLD AUTO: 1.3 10E3/UL (ref 0.8–5.3)
LYMPHOCYTES NFR BLD AUTO: 16 %
MAGNESIUM SERPL-MCNC: 2.3 MG/DL (ref 1.7–2.3)
MCH RBC QN AUTO: 29.6 PG (ref 26.5–33)
MCHC RBC AUTO-ENTMCNC: 33.1 G/DL (ref 31.5–36.5)
MCV RBC AUTO: 90 FL (ref 78–100)
MONOCYTES # BLD AUTO: 0.3 10E3/UL (ref 0–1.3)
MONOCYTES NFR BLD AUTO: 3 %
NEUTROPHILS # BLD AUTO: 6.5 10E3/UL (ref 1.6–8.3)
NEUTROPHILS NFR BLD AUTO: 81 %
PLATELET # BLD AUTO: 337 10E3/UL (ref 150–450)
POTASSIUM SERPL-SCNC: 4.5 MMOL/L (ref 3.4–5.3)
PROT SERPL-MCNC: 8.5 G/DL (ref 6.4–8.3)
RBC # BLD AUTO: 4.22 10E6/UL (ref 3.8–5.2)
SODIUM SERPL-SCNC: 136 MMOL/L (ref 136–145)
WBC # BLD AUTO: 8.1 10E3/UL (ref 4–11)

## 2023-09-07 PROCEDURE — 83735 ASSAY OF MAGNESIUM: CPT | Performed by: FAMILY MEDICINE

## 2023-09-07 PROCEDURE — 99214 OFFICE O/P EST MOD 30 MIN: CPT | Performed by: FAMILY MEDICINE

## 2023-09-07 PROCEDURE — 80053 COMPREHEN METABOLIC PANEL: CPT | Performed by: FAMILY MEDICINE

## 2023-09-07 PROCEDURE — 85025 COMPLETE CBC W/AUTO DIFF WBC: CPT | Performed by: FAMILY MEDICINE

## 2023-09-07 PROCEDURE — 36415 COLL VENOUS BLD VENIPUNCTURE: CPT | Performed by: FAMILY MEDICINE

## 2023-09-07 RX ORDER — CABERGOLINE 0.5 MG/1
TABLET ORAL
Qty: 12 TABLET | Refills: 3 | COMMUNITY
Start: 2023-09-07 | End: 2023-12-29

## 2023-09-07 RX ORDER — DESMOPRESSIN ACETATE 0.1 MG/1
TABLET ORAL
Qty: 180 TABLET | Refills: 3 | COMMUNITY
Start: 2023-09-07

## 2023-09-07 ASSESSMENT — PAIN SCALES - GENERAL: PAINLEVEL: NO PAIN (0)

## 2023-09-07 NOTE — PROGRESS NOTES
"      Subjective   Milla is a 38 year old, presenting for the following health issues:  Hospital F/U        9/7/2023     9:42 AM   Additional Questions   Roomed by Lucia Pineda       [facility]       Hospital Follow-up Visit:    Hospital/Nursing Home/IP Rehab Facility:  [facility]  Date of Admission: 08/26/2023  Date of Discharge: 08/30/2023  Reason(s) for Admission: Septic shock    Was your hospitalization related to COVID-19? No   Problems taking medications regularly:  None  Medication changes since discharge: None  Problems adhering to non-medication therapy:  None    Summary of hospitalization:  CareEverywhere information obtained and reviewed  Diagnostic Tests/Treatments reviewed.  Follow up needed: none  Other Healthcare Providers Involved in Patient s Care:         None  Update since discharge: improved.         Plan of care communicated with patient                   Review of Systems   Tired but better    Throat sore from intubation    Eating some but a lot less than usual    Drinking fluids fine    Bowel  and bladder okay     Done with antibiotics    No  fevers    RN at [employer]      Objective    /74 (BP Location: Left arm, Patient Position: Chair, Cuff Size: Adult Regular)   Pulse 69   Temp 97.6  F (36.4  C) (Temporal)   Resp 18   Ht 1.715 m (5' 7.5\")   Wt 73 kg (161 lb)   LMP  (LMP Unknown)   SpO2 100%   BMI 24.84 kg/m    Body mass index is 24.84 kg/m .  Physical Exam  Constitutional:       Appearance: She is well-developed.   HENT:      Head: Normocephalic and atraumatic.   Eyes:      Conjunctiva/sclera: Conjunctivae normal.   Neck:      Vascular: No carotid bruit.   Cardiovascular:      Rate and Rhythm: Normal rate and regular rhythm.      Heart sounds: Normal heart sounds.   Pulmonary:      Effort: Pulmonary effort is normal. No respiratory distress.      Breath sounds: Normal breath sounds.   Neurological:      Mental Status: She is alert and oriented to person, place, and time.      No " edema    Radials faint, symmetric     Reviewed hospital records in detail            ASSESSMENT / PLAN:  (Z86.19) History of sepsis  (primary encounter diagnosis)  Comment: check labs.  Patient slowly recovering.   Plan: Comprehensive metabolic panel, CBC with         Platelets & Differential, Magnesium        Completed three forms for patient.  See copies in chart.      (Z87.01) History of pneumonia  Comment: done with antibiotics   Plan: increase activity/ walking as able     (D35.2) Pituitary adenoma (H)  Comment: clarified med list.  Follow-up with endocrinology as planned   Plan: cabergoline (DOSTINEX) 0.5 MG tablet,         desmopressin (DDAVP) 0.1 MG tablet             (E27.40) Adrenal insufficiency (H)  Comment: as above   Plan: as above     (N17.9) MONIKA (acute kidney injury) (H)  Comment: encourge good fluid intake  Plan: recheck labs      Plan off work through 9-17  Return 9-18, 8 hour shifts for a week then a week later start the usual 12 hour shifts     Follow up with Raf as planned       I reviewed the patient's medications, allergies, medical history, family history, and social history.    Jermain Saleh MD

## 2023-09-08 NOTE — TELEPHONE ENCOUNTER
Appointment was yesterday. Will keep an eye out for form.    Ewa Joseph -    [facility]

## 2023-09-11 NOTE — TELEPHONE ENCOUNTER
From is in media. Appears to have been completed and faxed. Closing encounter.      Ewa Joseph -    [facility]

## 2023-09-15 ENCOUNTER — TELEPHONE (OUTPATIENT)
Dept: NEUROSURGERY | Facility: CLINIC | Age: 38
End: 2023-09-15
Payer: COMMERCIAL

## 2023-09-24 ENCOUNTER — HEALTH MAINTENANCE LETTER (OUTPATIENT)
Age: 38
End: 2023-09-24

## 2023-09-27 ASSESSMENT — ENCOUNTER SYMPTOMS
NAUSEA: 0
DIARRHEA: 0
CHILLS: 0
FREQUENCY: 0
FEVER: 0
HEMATOCHEZIA: 0
CONSTIPATION: 0
EYE PAIN: 0
ABDOMINAL PAIN: 0
HEADACHES: 0
DIZZINESS: 0
WEAKNESS: 0
COUGH: 0
HEMATURIA: 0
NERVOUS/ANXIOUS: 0
SHORTNESS OF BREATH: 0
BREAST MASS: 0
ARTHRALGIAS: 0
HEARTBURN: 0
MYALGIAS: 0
PARESTHESIAS: 0
DYSURIA: 0
PALPITATIONS: 0
JOINT SWELLING: 0
SORE THROAT: 0

## 2023-09-28 ENCOUNTER — OFFICE VISIT (OUTPATIENT)
Dept: FAMILY MEDICINE | Facility: CLINIC | Age: 38
End: 2023-09-28
Payer: COMMERCIAL

## 2023-09-28 VITALS
OXYGEN SATURATION: 98 % | SYSTOLIC BLOOD PRESSURE: 111 MMHG | BODY MASS INDEX: 24.71 KG/M2 | HEART RATE: 99 BPM | WEIGHT: 163 LBS | TEMPERATURE: 98.6 F | DIASTOLIC BLOOD PRESSURE: 76 MMHG | HEIGHT: 68 IN

## 2023-09-28 DIAGNOSIS — E24.0 PITUITARY CUSHING'S SYNDROME (H): ICD-10-CM

## 2023-09-28 DIAGNOSIS — E27.9 HYPOTHALAMIC-ADRENAL DYSFUNCTION (H): ICD-10-CM

## 2023-09-28 DIAGNOSIS — Z00.00 ROUTINE GENERAL MEDICAL EXAMINATION AT A HEALTH CARE FACILITY: Primary | ICD-10-CM

## 2023-09-28 DIAGNOSIS — N18.31 STAGE 3A CHRONIC KIDNEY DISEASE (H): ICD-10-CM

## 2023-09-28 DIAGNOSIS — R87.810 CERVICAL HIGH RISK HUMAN PAPILLOMAVIRUS (HPV) DNA TEST POSITIVE: ICD-10-CM

## 2023-09-28 DIAGNOSIS — Z12.4 CERVICAL CANCER SCREENING: ICD-10-CM

## 2023-09-28 DIAGNOSIS — E23.3 HYPOTHALAMIC-ADRENAL DYSFUNCTION (H): ICD-10-CM

## 2023-09-28 PROCEDURE — 90471 IMMUNIZATION ADMIN: CPT | Performed by: PHYSICIAN ASSISTANT

## 2023-09-28 PROCEDURE — 90472 IMMUNIZATION ADMIN EACH ADD: CPT | Performed by: PHYSICIAN ASSISTANT

## 2023-09-28 PROCEDURE — 90686 IIV4 VACC NO PRSV 0.5 ML IM: CPT | Performed by: PHYSICIAN ASSISTANT

## 2023-09-28 PROCEDURE — G0145 SCR C/V CYTO,THINLAYER,RESCR: HCPCS | Performed by: PHYSICIAN ASSISTANT

## 2023-09-28 PROCEDURE — 87624 HPV HI-RISK TYP POOLED RSLT: CPT | Performed by: PHYSICIAN ASSISTANT

## 2023-09-28 PROCEDURE — 99395 PREV VISIT EST AGE 18-39: CPT | Mod: 25 | Performed by: PHYSICIAN ASSISTANT

## 2023-09-28 PROCEDURE — 90715 TDAP VACCINE 7 YRS/> IM: CPT | Performed by: PHYSICIAN ASSISTANT

## 2023-09-28 ASSESSMENT — ENCOUNTER SYMPTOMS
BREAST MASS: 0
ARTHRALGIAS: 0
COUGH: 0
HEADACHES: 0
HEMATURIA: 0
ABDOMINAL PAIN: 0
PALPITATIONS: 0
NAUSEA: 0
SORE THROAT: 0
CHILLS: 0
FEVER: 0
HEARTBURN: 0
NERVOUS/ANXIOUS: 0
WEAKNESS: 0
DYSURIA: 0
CONSTIPATION: 0
DIARRHEA: 0
FREQUENCY: 0
MYALGIAS: 0
HEMATOCHEZIA: 0
EYE PAIN: 0
PARESTHESIAS: 0
DIZZINESS: 0
SHORTNESS OF BREATH: 0
JOINT SWELLING: 0

## 2023-09-28 ASSESSMENT — PAIN SCALES - GENERAL: PAINLEVEL: NO PAIN (0)

## 2023-09-28 NOTE — PROGRESS NOTES
SUBJECTIVE:   CC: Milla is an 38 year old who presents for preventive health visit.       9/28/2023     2:52 PM   Additional Questions   Roomed by cam   Accompanied by none         9/28/2023     2:52 PM   Patient Reported Additional Medications   Patient reports taking the following new medications none       Healthy Habits:     Getting at least 3 servings of Calcium per day:  NO    Bi-annual eye exam:  NO    Dental care twice a year:  Yes    Sleep apnea or symptoms of sleep apnea:  None    Diet:  Regular (no restrictions)    Frequency of exercise:  None    Taking medications regularly:  Yes    Barriers to taking medications:  None    Medication side effects:  Not applicable    Additional concerns today:  No        S/p hospitalization, septic shock Aug 2023  Back to work. Feeling fine.     CKD - has had GFR 40s, 50s. When she is good about hydration it increases to 60+. Saw nephrology December 2022 at [facility]. No need to follow up at that time.               Social History     Tobacco Use    Smoking status: Former     Packs/day: 0.50     Years: 3.00     Pack years: 1.50     Types: Cigarettes     Start date: 10/10/2005     Quit date: 1/2/2008     Years since quitting: 15.7    Smokeless tobacco: Never   Substance Use Topics    Alcohol use: Yes     Comment: Average 2-3/week in summer             9/27/2023     4:30 PM   Alcohol Use   Prescreen: >3 drinks/day or >7 drinks/week? No     Reviewed orders with patient.  Reviewed health maintenance and updated orders accordingly - Yes  BP Readings from Last 3 Encounters:   09/28/23 111/76   09/07/23 108/74   06/01/23 100/48    Wt Readings from Last 3 Encounters:   09/28/23 73.9 kg (163 lb)   09/07/23 73 kg (161 lb)   05/30/23 73.2 kg (161 lb 6 oz)                    Breast Cancer Screening:    FHS-7:       6/25/2021     9:04 PM 8/5/2022     8:39 AM 5/8/2023     6:16 AM 9/27/2023     4:33 PM   Breast CA Risk Assessment (FHS-7)   Did any of your first-degree relatives have  breast or ovarian cancer? No No No No   Did any of your relatives have bilateral breast cancer? No No No No   Did any man in your family have breast cancer? No No No No   Did any woman in your family have breast and ovarian cancer? No No No No   Did any woman in your family have breast cancer before age 50 y? No No No No   Do you have 2 or more relatives with breast and/or ovarian cancer? No No No No   Do you have 2 or more relatives with breast and/or bowel cancer? No No No No       Patient under 40 years of age: Routine Mammogram Screening not recommended.   Pertinent mammograms are reviewed under the imaging tab.    History of abnormal Pap smear: YES - updated in Problem List and Health Maintenance accordingly      Latest Ref Rng & Units 7/8/2020     9:39 AM 7/8/2020     9:15 AM 3/14/2019     3:56 PM   PAP / HPV   PAP (Historical)  NIL   NIL    HPV 16 DNA NEG^Negative  Negative     HPV 18 DNA NEG^Negative  Negative     Other HR HPV NEG^Negative  Negative       Reviewed and updated as needed this visit by clinical staff    Allergies  Meds              Reviewed and updated as needed this visit by Provider                     Review of Systems   Constitutional:  Negative for chills and fever.   HENT:  Negative for congestion, ear pain, hearing loss and sore throat.    Eyes:  Negative for pain and visual disturbance.   Respiratory:  Negative for cough and shortness of breath.    Cardiovascular:  Negative for chest pain, palpitations and peripheral edema.   Gastrointestinal:  Negative for abdominal pain, constipation, diarrhea, heartburn, hematochezia and nausea.   Breasts:  Negative for tenderness, breast mass and discharge.   Genitourinary:  Positive for vaginal bleeding. Negative for dysuria, frequency, genital sores, hematuria, pelvic pain, urgency and vaginal discharge.   Musculoskeletal:  Negative for arthralgias, joint swelling and myalgias.   Skin:  Negative for rash.   Neurological:  Negative for dizziness,  "weakness, headaches and paresthesias.   Psychiatric/Behavioral:  Negative for mood changes. The patient is not nervous/anxious.           OBJECTIVE:   /76 (BP Location: Right arm, Patient Position: Sitting, Cuff Size: Adult Regular)   Pulse 99   Temp 98.6  F (37  C) (Oral)   Ht 1.715 m (5' 7.5\")   Wt 73.9 kg (163 lb)   LMP 09/25/2023   SpO2 98%   BMI 25.15 kg/m    Physical Exam  GENERAL: healthy, alert and no distress  EYES: Eyes grossly normal to inspection, PERRL and conjunctivae and sclerae normal  HENT: ear canals and TM's normal, nose and mouth without ulcers or lesions  NECK: no adenopathy, no asymmetry, masses, or scars and thyroid normal to palpation  RESP: lungs clear to auscultation - no rales, rhonchi or wheezes  CV: regular rate and rhythm, normal S1 S2, no S3 or S4, no murmur, click or rub, no peripheral edema and peripheral pulses strong  ABDOMEN: soft, nontender, no hepatosplenomegaly, no masses and bowel sounds normal   (female): normal female external genitalia, normal urethral meatus, vaginal mucosa pink, moist, well rugated, and normal cervix/adnexa/uterus without masses or discharge  MS: no gross musculoskeletal defects noted, no edema  SKIN: no suspicious lesions or rashes  NEURO: Normal strength and tone, mentation intact and speech normal  PSYCH: mentation appears normal, affect normal/bright    Diagnostic Test Results:  Labs reviewed in Epic    ASSESSMENT/PLAN:   1. Routine general medical examination at a health care facility  Well adult.    2. Hypothalamic-adrenal dysfunction (H)  3. Pituitary Cushing's syndrome (H)  Managed by endocrinology.    5. Stage 3a chronic kidney disease (H)  Has been stable. Continue to monitor. Will recheck at her next lab visit for endo.  - UA Macroscopic with reflex to Microscopic and Culture; Future  - Albumin Random Urine Quantitative with Creat Ratio; Future  - Basic metabolic panel  (Ca, Cl, CO2, Creat, Gluc, K, Na, BUN); Future    4. " Cervical high risk human papillomavirus (HPV) DNA test positive  6. Cervical cancer screening  Screen. + HPV 2019,  - Pap Screen with HPV - recommended age 30 - 65 years           COUNSELING:  Reviewed preventive health counseling, as reflected in patient instructions        She reports that she quit smoking about 15 years ago. Her smoking use included cigarettes. She started smoking about 17 years ago. She has a 1.50 pack-year smoking history. She has never used smokeless tobacco.          CRESCENCIO [facility]

## 2023-09-28 NOTE — NURSING NOTE
Prior to immunization administration, verified patients identity using patient s name and date of birth. Please see Immunization Activity for additional information.     Screening Questionnaire for Adult Immunization    Are you sick today?   No   Do you have allergies to medications, food, a vaccine component or latex?   Yes   Have you ever had a serious reaction after receiving a vaccination?   No   Do you have a long-term health problem with heart, lung, kidney, or metabolic disease (e.g., diabetes), asthma, a blood disorder, no spleen, complement component deficiency, a cochlear implant, or a spinal fluid leak?  Are you on long-term aspirin therapy?   No   Do you have cancer, leukemia, HIV/AIDS, or any other immune system problem?   No   Do you have a parent, brother, or sister with an immune system problem?   No   In the past 3 months, have you taken medications that affect  your immune system, such as prednisone, other steroids, or anticancer drugs; drugs for the treatment of rheumatoid arthritis, Crohn s disease, or psoriasis; or have you had radiation treatments?   No   Have you had a seizure, or a brain or other nervous system problem?   No   During the past year, have you received a transfusion of blood or blood    products, or been given immune (gamma) globulin or antiviral drug?   No   For women: Are you pregnant or is there a chance you could become       pregnant during the next month?   No   Have you received any vaccinations in the past 4 weeks?   No     Immunization questionnaire was positive for at least one answer.  Notified Ashley Carbone PA-C.      Patient instructed to remain in clinic for 15 minutes afterwards, and to report any adverse reactions.     Screening performed by Sallie Olvera CMA on 9/28/2023 at 3:25 PM.

## 2023-10-02 LAB
BKR LAB AP GYN ADEQUACY: NORMAL
BKR LAB AP GYN INTERPRETATION: NORMAL
BKR LAB AP HPV REFLEX: NORMAL
BKR LAB AP LMP: NORMAL
BKR LAB AP PREVIOUS ABNORMAL: NORMAL
PATH REPORT.COMMENTS IMP SPEC: NORMAL
PATH REPORT.COMMENTS IMP SPEC: NORMAL
PATH REPORT.RELEVANT HX SPEC: NORMAL

## 2023-10-05 LAB
HUMAN PAPILLOMA VIRUS 16 DNA: NEGATIVE
HUMAN PAPILLOMA VIRUS 18 DNA: NEGATIVE
HUMAN PAPILLOMA VIRUS FINAL DIAGNOSIS: NORMAL
HUMAN PAPILLOMA VIRUS OTHER HR: NEGATIVE

## 2023-11-10 ENCOUNTER — ANCILLARY PROCEDURE (OUTPATIENT)
Dept: MRI IMAGING | Facility: CLINIC | Age: 38
End: 2023-11-10
Attending: NEUROLOGICAL SURGERY
Payer: COMMERCIAL

## 2023-11-10 DIAGNOSIS — E24.0 CUSHING'S DISEASE (H): ICD-10-CM

## 2023-11-10 DIAGNOSIS — E24.1: ICD-10-CM

## 2023-11-10 PROCEDURE — 70553 MRI BRAIN STEM W/O & W/DYE: CPT | Performed by: RADIOLOGY

## 2023-11-10 PROCEDURE — A9585 GADOBUTROL INJECTION: HCPCS | Mod: JZ | Performed by: RADIOLOGY

## 2023-11-10 RX ORDER — GADOBUTROL 604.72 MG/ML
7.5 INJECTION INTRAVENOUS ONCE
Status: COMPLETED | OUTPATIENT
Start: 2023-11-10 | End: 2023-11-10

## 2023-11-10 RX ADMIN — GADOBUTROL 7.5 ML: 604.72 INJECTION INTRAVENOUS at 16:29

## 2023-11-17 ENCOUNTER — VIRTUAL VISIT (OUTPATIENT)
Dept: NEUROSURGERY | Facility: CLINIC | Age: 38
End: 2023-11-17
Payer: COMMERCIAL

## 2023-11-17 VITALS — HEIGHT: 68 IN | BODY MASS INDEX: 24.71 KG/M2 | WEIGHT: 163 LBS

## 2023-11-17 DIAGNOSIS — E24.0 CUSHING'S DISEASE (H): Primary | ICD-10-CM

## 2023-11-17 DIAGNOSIS — D35.2 PITUITARY ADENOMA (H): ICD-10-CM

## 2023-11-17 PROCEDURE — 98966 PH1 ASSMT&MGMT NQHP 5-10: CPT | Performed by: PHYSICIAN ASSISTANT

## 2023-11-17 ASSESSMENT — PAIN SCALES - GENERAL: PAINLEVEL: NO PAIN (0)

## 2023-11-17 NOTE — NURSING NOTE
Is the patient currently in the state of [place]? YES    Visit mode:TELEPHONE    If the visit is dropped, the patient can be reconnected by: TELEPHONE VISIT: Phone number:   Telephone Information:   Mobile [phone]     (If patient encounters technical issues they should call [phone])    How would you like to obtain your AVS? MyChart    Are changes needed to the allergy or medication list? No    Reason for visit: Follow Up    Adina GIANG

## 2023-11-17 NOTE — PATIENT INSTRUCTIONS
Follow up with Dr. Huffman in 1 year, with MRI prior to appointment.    Please reach out sooner with any new concerns.

## 2023-11-17 NOTE — PROGRESS NOTES
"Virtual Visit Details    Type of service:  Telephone Visit   Phone call duration: 5 minutes       [facility]  Department of Neurosurgery  Center for Skull Base and Pituitary Surgery    Name: Suyapa Hodge  MRN: [id number]  Age: 38 year old  : 2023      Chief Complaint:   Cushing's disease s/p multiple transphenoidal resections of a pituitary adenoma (last 2021, Nathanael/Eulalio), follow up visit    History of Present Illness:   Suyapa Hodge is a 38 year old female with a history of Cushing's disease with multiple pituitary adenoma resections, Alexei syndrome s/p adrenalectomy (), avascular necrosis of the right hip s/p ZAHEER (2023), and history of PE (, provoked) who is seen today by telephone visit for annual follow up. She underwent a redo resection as above with Gil Huffman and Eulalio. She last met with Dr. Huffman 2022 at which time her imaging findings were stable and she was feeling well. Today she reports that she is doing well and has not had new worrisome symptoms. She was unfortunately hospitalized in August for sepsis secondary to aspiration pneumonia; she also developed adrenal crisis. She is back on DDAVP once daily and Cortef 10 mg daily along with cabergoline 0.25 mg twice per week. She has an upcoming Endocrinology appointment with Dr. Wilkinson in 2024.      Review of Systems:   Pertinent items are noted in HPI or as in patient entered ROS below, remainder of complete ROS is negative.     Physical Exam:   Ht 1.715 m (5' 7.52\")   Wt 73.9 kg (163 lb)   LMP 2023   BMI 25.14 kg/m     Exam today is limited due to telephone visit. Speech is normal. Answers questions appropriately.    Imaging:  We reviewed her MRI from 11/10/2023 which shows no recurrent hypoenhancement or mass. Stable postop changes noted.      Assessment:  Cushing's disease s/p multiple transphenoidal resections of a pituitary adenoma (last 2021, Jennifer), " follow up visit    Plan:  We reviewed her MRI findings by phone today which are favorable. She'll see Dr. Wilkinson as planned and we'll be in touch if anything additional is needed. Otherwise will plan for follow up in 1 year for surveillance and she'll keep us informed of new concerns prior to that time.        Katharine William PA-C  Department of Neurosurgery

## 2023-11-27 ENCOUNTER — TELEPHONE (OUTPATIENT)
Dept: ORTHOPEDICS | Facility: CLINIC | Age: 38
End: 2023-11-27
Payer: COMMERCIAL

## 2023-11-27 DIAGNOSIS — Z96.641 STATUS POST TOTAL HIP REPLACEMENT, RIGHT: Primary | ICD-10-CM

## 2023-11-27 RX ORDER — AMOXICILLIN 500 MG/1
TABLET, FILM COATED ORAL
Qty: 4 TABLET | Refills: 4 | Status: SHIPPED | OUTPATIENT
Start: 2023-11-27 | End: 2024-01-31

## 2023-11-27 NOTE — TELEPHONE ENCOUNTER
[facility] Call Center    Phone Message    May a detailed message be left on voicemail: yes     Reason for Call: Other: Milla Girard is having a dental cleaning and she was told by the dental office that she will need an anti-biotic and her appt is on Thursday 11/30/23, please send it to [facility] in [place]. Thank you, Aissatou     Action Taken: Other: CSC    Travel Screening: Not Applicable

## 2023-11-28 ENCOUNTER — PATIENT OUTREACH (OUTPATIENT)
Dept: GASTROENTEROLOGY | Facility: CLINIC | Age: 38
End: 2023-11-28
Payer: COMMERCIAL

## 2023-12-29 ENCOUNTER — MYC REFILL (OUTPATIENT)
Dept: FAMILY MEDICINE | Facility: CLINIC | Age: 38
End: 2023-12-29
Payer: COMMERCIAL

## 2023-12-29 ENCOUNTER — MYC REFILL (OUTPATIENT)
Dept: ENDOCRINOLOGY | Facility: CLINIC | Age: 38
End: 2023-12-29
Payer: COMMERCIAL

## 2023-12-29 DIAGNOSIS — D35.2 PITUITARY ADENOMA (H): ICD-10-CM

## 2023-12-29 DIAGNOSIS — E23.0 FEMALE HYPOGONADOTROPIC HYPOGONADISM (H): ICD-10-CM

## 2023-12-29 RX ORDER — DROSPIRENONE AND ETHINYL ESTRADIOL 0.02-3(28)
1 KIT ORAL DAILY
Qty: 84 TABLET | Refills: 3 | Status: SHIPPED | OUTPATIENT
Start: 2023-12-29

## 2023-12-29 RX ORDER — CABERGOLINE 0.5 MG/1
TABLET ORAL
Qty: 12 TABLET | Refills: 3 | Status: SHIPPED | OUTPATIENT
Start: 2023-12-29

## 2024-01-17 ENCOUNTER — VIRTUAL VISIT (OUTPATIENT)
Dept: ENDOCRINOLOGY | Facility: CLINIC | Age: 39
End: 2024-01-17
Attending: INTERNAL MEDICINE
Payer: COMMERCIAL

## 2024-01-17 DIAGNOSIS — E24.1: Primary | ICD-10-CM

## 2024-01-17 DIAGNOSIS — E27.40 ADRENAL INSUFFICIENCY (H): ICD-10-CM

## 2024-01-17 PROCEDURE — 99214 OFFICE O/P EST MOD 30 MIN: CPT | Mod: 95 | Performed by: INTERNAL MEDICINE

## 2024-01-17 RX ORDER — HYDROCORTISONE SODIUM SUCCINATE 100 MG/2ML
100 INJECTION, POWDER, FOR SOLUTION INTRAMUSCULAR; INTRAVENOUS
Qty: 4 ML | Refills: 5 | Status: SHIPPED | OUTPATIENT
Start: 2024-01-17

## 2024-01-17 ASSESSMENT — PAIN SCALES - GENERAL: PAINLEVEL: NO PAIN (0)

## 2024-01-17 NOTE — LETTER
1/17/2024       RE: Suyapa Hodge  [address] [facility] [address]     Dear Colleague,    Thank you for referring your patient, Suyapa Hodge, to the [facility] [place] at [facility]. Please see a copy of my visit note below.    Virtual Visit Details    How would you like to obtain your AVS? MyChart  If the video visit is dropped, the invitation should be resent by: Text to cell phone: [phone]   Will anyone else be joining your video visit? No    Type of service Video  Start: 9:36 am  End: 10:05 am  Amwell      Originating Location (pt. Location): Home    Distant Location (provider location):  [facility] ENDOCRINOLOGY         -- Endocrinology follow up ---      Assessment:    # Cushing's disease  # Alexei syndrome post adrenalectomy   Cushing disease post TSS 3 times (#1 2010, #2 2013, #3 2014), and adrenalectomy in #4 7/2014, currently Alexei's symdrome with pituitary ACTH tumor regrowth, Worsening pigmentation with ACTH>1250, increased the lesion, therefore she underwent to TSS 4 th time, #5 July 2021 by Dr. Huffman   Pathology showed ACTH stained. Post op ACTH was 14 with siginificant improvement of pigmentation. Reviewed last MRI 10/2021 showed no residual tumor, 12/2022 no residual, no recurrence by MRI, but  ACTH elevated quickly and 5 month after the surgery ACTH incrased to 418. Octreotide was presscribed Mycapssa 20 mg daily to aim for prevent tumor recurrence (tissue SSTR2 expression positive.), however despite multiple efforts to her insurance company, it was not approved. We have spent significant time and efforts for this. Started cabergoline in 2/2023 with 0.25 mg once a week, no side effect.     - Reviewed MRI 11/2023, no change,  possible very subtle left cavernous sinus lesion, still possibly there but no change. Radiology reading was no residual    - continue cabergoline 0.25 mg twice a week    - repeat ACTH every 6  month     # adrenal insufficiency due to bilateral adrenalectomy    - continue current dose of hydrocortisone 10 mg am and florinef 0.1 mg daily,     - solcortef emergency kit (she had an ICU stay in 8/2023 for PNA and adrenal insufficiency)       Hypothyrodism     - continue LT4 75 mcg daily (6 days a week)    Hypogonadism    - continue BCP (E2 20 mcg)     DDAVP  She was able to stop 6 months ago and has been doing well.       RTC with me in 1 year      Total 35  minutes spent on the date of the encounter doing chart review, history and exam, reviewed MRI brain serially, reviewed pathology results documentation and further activities as noted above.    Adriana Wilkinson MD  Staff Physician  Endocrinology and Metabolism  [facility]  License: [id number]  Pager: [phone]      Interval History as of 1/17/2024 : Patient has been doing well. Medication compliance excellent  . New event includes  : she had an episode of PNA and adrenal insufficiency and ICU stay in 8/2023.   Interval History as of 7/5/2023 : Patient has been doing well. Medication compliance: excellent, tolerating to cabergoline as well . Skin darekned? But patient mentioned this is because she was outside.   Interval History as of 1/4/2023 : Patient has been doing well. Not noting any pigmentation. New event includes  : ACTH now increase to 418..  Interval History as of 6/1/2022 : Patient has been doing well.  Medication compliance good . New event includes: no significant medical event noted, still has some chronic fatigue+, she feels her skin tone became back to her baseline.  Interval History as of 2/23/2022 : Patient has been doing well. Medication compliance  excellent . New event includes L regular cycle, no pertinent medical event noted .  Interval History as of 8/23/2021 : Patient has been doing well. Underwent to 4th surgery 7/22/2021. Pigmentation of her dkin isn imroving, post op ACTH was 14.   Interval History as of 4/14/2021  ": No HA, medication compliance good, no dizziness, no HA. Pigmentation gettign worse, lesion increasing in size.   History of Present Illness: Suyapa Hodge is a 34 year old female with a history of pituitary Cushing's syndrome, hypothalamic adrenal dysfunction and pituitary microadenoma who presents for follow up. She was last seen in clinic 14 months ago. She was diagnosed with pituitary microademoma serveral year ago which was suspected to cause her Cushing's disease. She had pituitary surgery x3 in 2013-14 and finally underwent bilateral adrenalectomy in July 2014. ACTH levels remained normal. In July 2017 ACTH levels were tested and was 271, which increased to 549 raising concern for recurrence vs missed diagnosis.  Chest abdomen and pelvis CT was normal. 8 mg dex suppression reduced levels to 118.     She is currently taking hydrocortisone BID (10 mg misses dose once per month), L-thyroxine 75 mcg tablets full tablet 6 days and desmopressin 0.1 mg in the morning. She denies that she gets up in the middle of the night to use the restroom. She reports that she goes to the eye doctor every 3 months to see if the tumor \"has moved to her optic nerve\".      She is on hormone replacement therapy (prometrium and vivelle patch). No history of uterine surgeries. She has a history of irregular periods but has been evaluated with GYN, per patient, and was not told specifics.     She is not taking the calcium supplement. She has a history of avascular necrosis but has been told that she is too young for a hip replacement. DXA showed low BMD for age.     No eye symptoms  No headache, change in vision, loss of peripheral vision  Complete ROS that were obtained were negative.     Active Medications:     cyclobenzaprine (FLEXERIL) 10 MG tablet, Take 1 tablet (10 mg) by mouth 3 times daily as needed for muscle spasms, Disp: 30 tablet, Rfl: 0    desmopressin (DDAVP) 0.1 MG tablet, Take 1 tablet (100 mcg) by mouth 2 times " daily (1 TAB) MORNING AND AT BEDTIME, Disp: 180 tablet, Rfl: 5    estradiol (VIVELLE-DOT) 0.05 MG/24HR bi-weekly patch, Place 1 patch onto the skin twice a week, Disp: 27 patch, Rfl: 5    fludrocortisone (FLORINEF) 0.1 MG tablet, Take 0.5 tablets (0.05 mg) by mouth daily Please keep 10-21-19 clinic appt for further refills, Disp: 45 tablet, Rfl: 5    hydrocortisone (CORTEF) 10 MG tablet, 1 tab (10 mg) am 1/2 tab (5 mg) pm plus sick day supply as directed, Disp: 150 tablet, Rfl: 5    levothyroxine (SYNTHROID/LEVOTHROID) 75 MCG tablet, One tablet day 6 days per wk and none on the 7th day each wk, Disp: 90 tablet, Rfl: 5    Multiple Vitamin (MULTI-VITAMIN DAILY PO), , Disp: , Rfl:     progesterone (PROMETRIUM) 100 MG capsule, Take 1 tablet daily, Disp: 90 capsule, Rfl: 1    triamcinolone (KENALOG) 0.1 % external cream, Apply topically 2 times daily As needed for flares, Disp: 80 g, Rfl: 1    Calcium carb-Vitamin D 500 mg [place]-200 units (OSCAL WITH D;OYSTER SHELL CALCIUM) 500-200 MG-UNIT per tablet, Take 1 tablet by mouth 2 times daily (with meals), Disp: , Rfl:     methocarbamol (ROBAXIN) 500 MG tablet, Take 1-2 tablets (500-1,000 mg) by mouth nightly as needed for muscle spasms (Patient not taking: Reported on 10/21/2019), Disp: 20 tablet, Rfl: 0    naproxen (NAPROSYN) 500 MG tablet, Take 1 tablet (500 mg) by mouth 2 times daily (with meals) (Patient not taking: Reported on 10/21/2019), Disp: 60 tablet, Rfl: 1      Allergies:   Patient has no known allergies.     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 resection   Avascular necrosis of femur head   Cervical high risk HPV   Colon polyp   Diabetes insipidus   Hypercalcemia   Hypertension   Pulmonary emboli   Hypothyroidism      Past Surgical History:  Lumbar drain- 01/2014   Adrenalectomy-07/2014   Tumor resection (hypophysectomy)- 07/2013    [place] teeth extracted     Family History:   Mother: asthma, osteoporosis, obesity   Father: hyperlipidemia, obesity   Sister: allergies, obesity   Maternal grandmother: colon cancer  Maternal grandfather: lung cancer   Paternal grandmother: lung cancer   Paternal grandfather: lung cancer      Social History:   The patient was alone   Smoking Status: former; 1/2 pack per day for 3 years; 12 years since quitting   Smokeless Tobacco: never    Alcohol Use: yes; 3 beers per month   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 No masses, lesions, tenderness or abnormalities  Resporatory: non acute disctress, breathing normally  Skin: pigmentation on her face has been improving      Data:  TSH   Date Value Ref Range Status   01/12/2018 <0.01 (L) 0.40 - 4.00 mU/L Final   01/08/2016 <0.01 (L) 0.40 - 4.00 mU/L Final   09/25/2015 <0.01 (L) 0.40 - 4.00 mU/L Final   05/22/2015 <0.01 (L) 0.40 - 4.00 mU/L Final   03/13/2015 (L) 0.40 - 4.00 mU/L Final    <0.01  Effective 7/30/2014, the reference range for this assay has changed to reflect   new instrumentation/methodology.       T4 Free   Date Value Ref Range Status   10/08/2018 0.53 (L) 0.76 - 1.46 ng/dL Final   01/12/2018 0.90 0.76 - 1.46 ng/dL Final   10/04/2017 0.90 0.76 - 1.46 ng/dL Final   07/07/2017 0.98 0.76 - 1.46 ng/dL Final   12/30/2016 0.91 0.76 - 1.46 ng/dL Final     CBC RESULTS:   Recent Labs   Lab Test 07/15/14  0806   WBC 8.2   RBC 4.51   HGB 13.3   HCT 39.7   MCV 88   MCH 29.5   MCHC 33.5   RDW 13.7        Recent Labs   Lab Test 10/08/18  0802 01/12/18  0732    137   POTASSIUM 4.1 4.3   CHLORIDE 105 105   CO2 32 27   ANIONGAP 4 5   GLC 87 78   BUN 14 19   CR 1.10* 0.93   ELIEZER 9.3 9.2     MRI: I also personally reviewed brain MRI and pituitary lesion and interepret and expalined to the patient.     intraseller maybe small  hypodense 3-4 mm(?) residual vs scar in my impression           MRI: I reviewed original images of 2/19/2021 ad explained to the patient.   Brain/ Pituitary MRI without and with contrast     History: 33 yo female pituitary Cushing disease follow up; Pituitary  dependent Cushing disease (H).     ICD-10: Pituitary dependent Cushing disease (H)     Comparison:  Multiple pituitary MRI exams since 12/3/2009, most recent  one from 3/16/2020.     Technique: Axial diffusion and FLAIR images of the whole brain  obtained without intravenous contrast. Sagittal T1 and T2-weighted,  coronal T2-weighted, coronal T1-weighted images with focus on the  sella were obtained without intravenous contrast. Post intravenous  contrast using gadolinium coronal and sagittal T1-weighted images were  obtained focused on the sella. Dynamic postcontrast coronal  T1-weighted images were also obtained.     Contrast: 7mL Gadavist IV     Findings:    Postsurgical changes of transsphenoidal approach pituitary  microadenoma resection. Hypoenhancing area within the left side of the  sella, extending to the left cavernous sinus, increased since  10/8/2018, measuring 9 x 7 x 10 mm, previously 6 x 6 x 4 mm.  Associated diffusion restriction. Abutment of the cavernous segment of  left internal carotid artery.     The pituitary stalk appears midline. The optic chiasm appears intact  and not displaced. The major cavernous carotid vascular flow-voids  appear patent.              Adriana Wilkinson MD

## 2024-01-17 NOTE — PROGRESS NOTES
Virtual Visit Details    How would you like to obtain your AVS? MyChart  If the video visit is dropped, the invitation should be resent by: Text to cell phone: [phone]   Will anyone else be joining your video visit? No    Type of service Video  Start: 9:36 am  End: 10:05 am  Amwell      Originating Location (pt. Location): Home    Distant Location (provider location):  [facility] ENDOCRINOLOGY         -- Endocrinology follow up ---      Assessment:    # Cushing's disease  # Alexei syndrome post adrenalectomy   Cushing disease post TSS 3 times (#1 2010, #2 2013, #3 2014), and adrenalectomy in #4 7/2014, currently Alexei's symdrome with pituitary ACTH tumor regrowth, Worsening pigmentation with ACTH>1250, increased the lesion, therefore she underwent to TSS 4 th time, #5 July 2021 by Dr. Huffman   Pathology showed ACTH stained. Post op ACTH was 14 with siginificant improvement of pigmentation. Reviewed last MRI 10/2021 showed no residual tumor, 12/2022 no residual, no recurrence by MRI, but  ACTH elevated quickly and 5 month after the surgery ACTH incrased to 418. Octreotide was presscribed Mycapssa 20 mg daily to aim for prevent tumor recurrence (tissue SSTR2 expression positive.), however despite multiple efforts to her insurance company, it was not approved. We have spent significant time and efforts for this. Started cabergoline in 2/2023 with 0.25 mg once a week, no side effect.     - Reviewed MRI 11/2023, no change,  possible very subtle left cavernous sinus lesion, still possibly there but no change. Radiology reading was no residual    - continue cabergoline 0.25 mg twice a week    - repeat ACTH every 6 month     # adrenal insufficiency due to bilateral adrenalectomy    - continue current dose of hydrocortisone 10 mg am and florinef 0.1 mg daily,     - solcortef emergency kit (she had an ICU stay in 8/2023 for PNA and adrenal insufficiency)       Hypothyrodism     - continue LT4 75 mcg daily (6 days a  week)    Hypogonadism    - continue BCP (E2 20 mcg)     DDAVP  She was able to stop 6 months ago and has been doing well.       RTC with me in 1 year      Total 35  minutes spent on the date of the encounter doing chart review, history and exam, reviewed MRI brain serially, reviewed pathology results documentation and further activities as noted above.    Adriana Wilkinson MD  Staff Physician  Endocrinology and Metabolism  [facility]  License: [id number]  Pager: [phone]      Interval History as of 1/17/2024 : Patient has been doing well. Medication compliance excellent  . New event includes  : she had an episode of PNA and adrenal insufficiency and ICU stay in 8/2023.   Interval History as of 7/5/2023 : Patient has been doing well. Medication compliance: excellent, tolerating to cabergoline as well . Skin darekned? But patient mentioned this is because she was outside.   Interval History as of 1/4/2023 : Patient has been doing well. Not noting any pigmentation. New event includes  : ACTH now increase to 418..  Interval History as of 6/1/2022 : Patient has been doing well.  Medication compliance good . New event includes: no significant medical event noted, still has some chronic fatigue+, she feels her skin tone became back to her baseline.  Interval History as of 2/23/2022 : Patient has been doing well. Medication compliance  excellent . New event includes L regular cycle, no pertinent medical event noted .  Interval History as of 8/23/2021 : Patient has been doing well. Underwent to 4th surgery 7/22/2021. Pigmentation of her dkin isn imroving, post op ACTH was 14.   Interval History as of 4/14/2021 : No HA, medication compliance good, no dizziness, no HA. Pigmentation gettign worse, lesion increasing in size.   History of Present Illness: Suyapa Hodge is a 34 year old female with a history of pituitary Cushing's syndrome, hypothalamic adrenal dysfunction and pituitary microadenoma who  "presents for follow up. She was last seen in clinic 14 months ago. She was diagnosed with pituitary microademoma serveral year ago which was suspected to cause her Cushing's disease. She had pituitary surgery x3 in 2013-14 and finally underwent bilateral adrenalectomy in July 2014. ACTH levels remained normal. In July 2017 ACTH levels were tested and was 271, which increased to 549 raising concern for recurrence vs missed diagnosis.  Chest abdomen and pelvis CT was normal. 8 mg dex suppression reduced levels to 118.     She is currently taking hydrocortisone BID (10 mg misses dose once per month), L-thyroxine 75 mcg tablets full tablet 6 days and desmopressin 0.1 mg in the morning. She denies that she gets up in the middle of the night to use the restroom. She reports that she goes to the eye doctor every 3 months to see if the tumor \"has moved to her optic nerve\".      She is on hormone replacement therapy (prometrium and vivelle patch). No history of uterine surgeries. She has a history of irregular periods but has been evaluated with GYN, per patient, and was not told specifics.     She is not taking the calcium supplement. She has a history of avascular necrosis but has been told that she is too young for a hip replacement. DXA showed low BMD for age.     No eye symptoms  No headache, change in vision, loss of peripheral vision  Complete ROS that were obtained were negative.     Active Medications:     cyclobenzaprine (FLEXERIL) 10 MG tablet, Take 1 tablet (10 mg) by mouth 3 times daily as needed for muscle spasms, Disp: 30 tablet, Rfl: 0    desmopressin (DDAVP) 0.1 MG tablet, Take 1 tablet (100 mcg) by mouth 2 times daily (1 TAB) MORNING AND AT BEDTIME, Disp: 180 tablet, Rfl: 5    estradiol (VIVELLE-DOT) 0.05 MG/24HR bi-weekly patch, Place 1 patch onto the skin twice a week, Disp: 27 patch, Rfl: 5    fludrocortisone (FLORINEF) 0.1 MG tablet, Take 0.5 tablets (0.05 mg) by mouth daily Please keep 10-21-19 clinic " appt for further refills, Disp: 45 tablet, Rfl: 5    hydrocortisone (CORTEF) 10 MG tablet, 1 tab (10 mg) am 1/2 tab (5 mg) pm plus sick day supply as directed, Disp: 150 tablet, Rfl: 5    levothyroxine (SYNTHROID/LEVOTHROID) 75 MCG tablet, One tablet day 6 days per wk and none on the 7th day each wk, Disp: 90 tablet, Rfl: 5    Multiple Vitamin (MULTI-VITAMIN DAILY PO), , Disp: , Rfl:     progesterone (PROMETRIUM) 100 MG capsule, Take 1 tablet daily, Disp: 90 capsule, Rfl: 1    triamcinolone (KENALOG) 0.1 % external cream, Apply topically 2 times daily As needed for flares, Disp: 80 g, Rfl: 1    Calcium carb-Vitamin D 500 mg [place]-200 units (OSCAL WITH D;OYSTER SHELL CALCIUM) 500-200 MG-UNIT per tablet, Take 1 tablet by mouth 2 times daily (with meals), Disp: , Rfl:     methocarbamol (ROBAXIN) 500 MG tablet, Take 1-2 tablets (500-1,000 mg) by mouth nightly as needed for muscle spasms (Patient not taking: Reported on 10/21/2019), Disp: 20 tablet, Rfl: 0    naproxen (NAPROSYN) 500 MG tablet, Take 1 tablet (500 mg) by mouth 2 times daily (with meals) (Patient not taking: Reported on 10/21/2019), Disp: 60 tablet, Rfl: 1      Allergies:   Patient has no known allergies.     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 resection   Avascular necrosis of femur head   Cervical high risk HPV   Colon polyp   Diabetes insipidus   Hypercalcemia   Hypertension   Pulmonary emboli   Hypothyroidism      Past Surgical History:  Lumbar drain- 01/2014   Adrenalectomy-07/2014   Tumor resection (hypophysectomy)- 07/2013   [place] teeth extracted     Family History:   Mother: asthma, osteoporosis, obesity   Father: hyperlipidemia, obesity   Sister: allergies, obesity   Maternal grandmother: colon cancer  Maternal grandfather: lung cancer   Paternal grandmother: lung cancer   Paternal grandfather: lung cancer      Social  History:   The patient was alone   Smoking Status: former; 1/2 pack per day for 3 years; 12 years since quitting   Smokeless Tobacco: never    Alcohol Use: yes; 3 beers per month   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 No masses, lesions, tenderness or abnormalities  Resporatory: non acute disctress, breathing normally  Skin: pigmentation on her face has been improving      Data:  TSH   Date Value Ref Range Status   01/12/2018 <0.01 (L) 0.40 - 4.00 mU/L Final   01/08/2016 <0.01 (L) 0.40 - 4.00 mU/L Final   09/25/2015 <0.01 (L) 0.40 - 4.00 mU/L Final   05/22/2015 <0.01 (L) 0.40 - 4.00 mU/L Final   03/13/2015 (L) 0.40 - 4.00 mU/L Final    <0.01  Effective 7/30/2014, the reference range for this assay has changed to reflect   new instrumentation/methodology.       T4 Free   Date Value Ref Range Status   10/08/2018 0.53 (L) 0.76 - 1.46 ng/dL Final   01/12/2018 0.90 0.76 - 1.46 ng/dL Final   10/04/2017 0.90 0.76 - 1.46 ng/dL Final   07/07/2017 0.98 0.76 - 1.46 ng/dL Final   12/30/2016 0.91 0.76 - 1.46 ng/dL Final     CBC RESULTS:   Recent Labs   Lab Test 07/15/14  0806   WBC 8.2   RBC 4.51   HGB 13.3   HCT 39.7   MCV 88   MCH 29.5   MCHC 33.5   RDW 13.7        Recent Labs   Lab Test 10/08/18  0802 01/12/18  0732    137   POTASSIUM 4.1 4.3   CHLORIDE 105 105   CO2 32 27   ANIONGAP 4 5   GLC 87 78   BUN 14 19   CR 1.10* 0.93   ELIEZER 9.3 9.2     MRI: I also personally reviewed brain MRI and pituitary lesion and interepret and expalined to the patient.     intraseller maybe small hypodense 3-4 mm(?) residual vs scar in my impression           MRI: I reviewed original images of 2/19/2021 ad explained to the patient.   Brain/ Pituitary MRI without and with contrast     History: 35 yo female pituitary Cushing disease follow up; Pituitary  dependent Cushing disease (H).     ICD-10:  Pituitary dependent Cushing disease (H)     Comparison:  Multiple pituitary MRI exams since 12/3/2009, most recent  one from 3/16/2020.     Technique: Axial diffusion and FLAIR images of the whole brain  obtained without intravenous contrast. Sagittal T1 and T2-weighted,  coronal T2-weighted, coronal T1-weighted images with focus on the  sella were obtained without intravenous contrast. Post intravenous  contrast using gadolinium coronal and sagittal T1-weighted images were  obtained focused on the sella. Dynamic postcontrast coronal  T1-weighted images were also obtained.     Contrast: 7mL Gadavist IV     Findings:    Postsurgical changes of transsphenoidal approach pituitary  microadenoma resection. Hypoenhancing area within the left side of the  sella, extending to the left cavernous sinus, increased since  10/8/2018, measuring 9 x 7 x 10 mm, previously 6 x 6 x 4 mm.  Associated diffusion restriction. Abutment of the cavernous segment of  left internal carotid artery.     The pituitary stalk appears midline. The optic chiasm appears intact  and not displaced. The major cavernous carotid vascular flow-voids  appear patent.

## 2024-01-17 NOTE — NURSING NOTE
Is the patient currently in the state of [place]? YES    Visit mode:VIDEO    If the visit is dropped, the patient can be reconnected by: VIDEO VISIT: Text to cell phone:   Telephone Information:   Mobile [phone]       Will anyone else be joining the visit? NO  (If patient encounters technical issues they should call [phone])    How would you like to obtain your AVS? MyChart    Are changes needed to the allergy or medication list? No    Reason for visit: RECHNOEMY GIANG

## 2024-01-22 NOTE — TELEPHONE ENCOUNTER
PREVISIT INFORMATION                                                    Suyapa Craft Ayesha scheduled for future visit at [facility] specialty clinics.    Patient is scheduled to see Dr. Butcher  on 5/15/18  Reason for visit: consult abdominoplasty   Referring provider   Has patient seen previous specialist?   Medical Records:  Available in chart.  Patient was previously seen at a [place] or [facility] facility.    REVIEW                                                      New patient packet mailed to patient: No  Medication reconciliation complete: N/A      Current Outpatient Prescriptions   Medication Sig Dispense Refill     Calcium carb-Vitamin D 500 mg [place]-200 units (OSCAL WITH D;OYSTER SHELL CALCIUM) 500-200 MG-UNIT per tablet Take 1 tablet by mouth 2 times daily (with meals)       desmopressin (DDAVP) 0.1 MG tablet Take 1 tablet (100 mcg) by mouth 2 times daily (1 TAB) MORNING AND AT BEDTIME 180 tablet 3     estradiol (VIVELLE-DOT) 0.05 MG/24HR BIW patch Place 1 patch onto the skin twice a week 27 patch 3     fludrocortisone (FLORINEF) 0.1 MG tablet Take 0.5 tablets (0.05 mg) by mouth daily 45 tablet 3     hydrocortisone (CORTEF) 10 MG tablet 1 tab (10 mg) am 1/2 tab (5 mg) pm plus sick day supply as directed 150 tablet 3     levothyroxine (SYNTHROID/LEVOTHROID) 75 MCG tablet One tablet day 6 days per wk and none on the 7th day each wk 90 tablet 3     Multiple Vitamin (MULTI-VITAMIN DAILY PO)        progesterone (PROMETRIUM) 100 MG capsule Take 1 tablet daily 90 capsule 3       Allergies: Review of patient's allergies indicates no known allergies.        PLAN/FOLLOW-UP NEEDED                                                      Previsit review complete.  Patient will see provider at future scheduled appointment.     Patient Reminders Given:  Please, make sure you bring an updated list of your medications.   If you are having a procedure, please, present 15 minutes early.  If  [FreeTextEntry1] :   I have reviewed the patient's medical records and diagnostic images at time of this office consultation and have made the following recommendation: 1.	  I, JUSTIN Awan, personally performed the evaluation and management (E/M) services for this established patient who follow up today with an existing condition.  That E/M includes conducting the examination, assessing all new/exacerbated/existing conditions, and establishing a plan of care.  Today, my ACP, HANANE CohenC, was here to observe my evaluation and management services for this existing condition to be followed going forward. you need to cancel or reschedule,please call [phone].    Traci Roberts RN

## 2024-01-31 DIAGNOSIS — E03.8 OTHER SPECIFIED HYPOTHYROIDISM: ICD-10-CM

## 2024-01-31 DIAGNOSIS — E27.1 ADRENAL INSUFFICIENCY, PRIMARY (H): ICD-10-CM

## 2024-01-31 DIAGNOSIS — E27.40 ADRENAL INSUFFICIENCY (H): ICD-10-CM

## 2024-01-31 DIAGNOSIS — Z96.641 STATUS POST TOTAL HIP REPLACEMENT, RIGHT: ICD-10-CM

## 2024-01-31 DIAGNOSIS — M87.059 AVASCULAR NECROSIS OF FEMORAL HEAD, UNSPECIFIED LATERALITY (H): ICD-10-CM

## 2024-01-31 RX ORDER — AMOXICILLIN 500 MG/1
TABLET, FILM COATED ORAL
Qty: 4 TABLET | Refills: 4 | Status: SHIPPED | OUTPATIENT
Start: 2024-01-31

## 2024-01-31 RX ORDER — LEVOTHYROXINE SODIUM 75 UG/1
TABLET ORAL
Qty: 90 TABLET | Refills: 3 | Status: SHIPPED | OUTPATIENT
Start: 2024-01-31

## 2024-01-31 RX ORDER — HYDROCORTISONE 10 MG/1
TABLET ORAL
Qty: 120 TABLET | Refills: 3 | Status: SHIPPED | OUTPATIENT
Start: 2024-01-31

## 2024-01-31 RX ORDER — FLUDROCORTISONE ACETATE 0.1 MG/1
0.1 TABLET ORAL DAILY
Qty: 90 TABLET | Refills: 2 | Status: SHIPPED | OUTPATIENT
Start: 2024-01-31

## 2024-01-31 NOTE — TELEPHONE ENCOUNTER
We have tried transferring Milla's medications from [facility] pharmacy and have had no response from them. Would is be possible to send us a new prescription? Milla would like to use our pharmacy in the future.  Thank You,  Yamini Gonzales [facility] [place]

## 2024-01-31 NOTE — TELEPHONE ENCOUNTER
We have tried transferring Milla's medications from [facility] pharmacy and have had no response from them. Would is be possible to send us a new prescription? Milla would like to use our pharmacy in the future.  Thank You,  Yamini Gonzales [facility] [place]

## 2024-01-31 NOTE — TELEPHONE ENCOUNTER
We have tried transferring Milla's medications from [facility] pharmacy and have had no response from them. Would is be possible to send us a new prescription? Milla would like to use our pharmacy in the future.  Thank You,  Yamini Gonzales [facility] [place]

## 2024-02-01 RX ORDER — METHOCARBAMOL 500 MG/1
500-1000 TABLET, FILM COATED ORAL
Qty: 20 TABLET | Refills: 2 | Status: SHIPPED | OUTPATIENT
Start: 2024-02-01

## 2024-04-03 ENCOUNTER — LAB (OUTPATIENT)
Dept: LAB | Facility: CLINIC | Age: 39
End: 2024-04-03
Payer: COMMERCIAL

## 2024-04-03 DIAGNOSIS — E03.9 HYPOTHYROIDISM: ICD-10-CM

## 2024-04-03 DIAGNOSIS — Z13.220 SCREENING FOR HYPERLIPIDEMIA: ICD-10-CM

## 2024-04-03 DIAGNOSIS — E27.40 ADRENAL INSUFFICIENCY (H): ICD-10-CM

## 2024-04-03 DIAGNOSIS — N18.31 STAGE 3A CHRONIC KIDNEY DISEASE (H): ICD-10-CM

## 2024-04-03 LAB
ALBUMIN UR-MCNC: NEGATIVE MG/DL
ANION GAP SERPL CALCULATED.3IONS-SCNC: 11 MMOL/L (ref 7–15)
APPEARANCE UR: ABNORMAL
BILIRUB UR QL STRIP: NEGATIVE
BUN SERPL-MCNC: 20.1 MG/DL (ref 6–20)
CALCIUM SERPL-MCNC: 9.7 MG/DL (ref 8.6–10)
CHLORIDE SERPL-SCNC: 96 MMOL/L (ref 98–107)
CHOLEST SERPL-MCNC: 226 MG/DL
COLOR UR AUTO: YELLOW
CREAT SERPL-MCNC: 1.2 MG/DL (ref 0.51–0.95)
CREAT UR-MCNC: 65.7 MG/DL
DEPRECATED HCO3 PLAS-SCNC: 22 MMOL/L (ref 22–29)
EGFRCR SERPLBLD CKD-EPI 2021: 59 ML/MIN/1.73M2
FASTING STATUS PATIENT QL REPORTED: YES
GLUCOSE SERPL-MCNC: 80 MG/DL (ref 70–99)
GLUCOSE UR STRIP-MCNC: NEGATIVE MG/DL
HDLC SERPL-MCNC: 46 MG/DL
HGB UR QL STRIP: NEGATIVE
KETONES UR STRIP-MCNC: NEGATIVE MG/DL
LDLC SERPL CALC-MCNC: 130 MG/DL
LEUKOCYTE ESTERASE UR QL STRIP: ABNORMAL
MICROALBUMIN UR-MCNC: <12 MG/L
MICROALBUMIN/CREAT UR: NORMAL MG/G{CREAT}
NITRATE UR QL: NEGATIVE
NONHDLC SERPL-MCNC: 180 MG/DL
PH UR STRIP: 5.5 [PH] (ref 5–7)
POTASSIUM SERPL-SCNC: 4.1 MMOL/L (ref 3.4–5.3)
RBC #/AREA URNS AUTO: ABNORMAL /HPF
SODIUM SERPL-SCNC: 129 MMOL/L (ref 135–145)
SP GR UR STRIP: 1.01 (ref 1–1.03)
SQUAMOUS #/AREA URNS AUTO: ABNORMAL /LPF
TRIGL SERPL-MCNC: 252 MG/DL
TSH SERPL DL<=0.005 MIU/L-ACNC: 0.38 UIU/ML (ref 0.3–4.2)
UROBILINOGEN UR STRIP-ACNC: 0.2 E.U./DL
WBC #/AREA URNS AUTO: ABNORMAL /HPF

## 2024-04-03 PROCEDURE — 36415 COLL VENOUS BLD VENIPUNCTURE: CPT

## 2024-04-03 PROCEDURE — 80061 LIPID PANEL: CPT

## 2024-04-03 PROCEDURE — 80048 BASIC METABOLIC PNL TOTAL CA: CPT

## 2024-04-03 PROCEDURE — 82570 ASSAY OF URINE CREATININE: CPT

## 2024-04-03 PROCEDURE — 82043 UR ALBUMIN QUANTITATIVE: CPT

## 2024-04-03 PROCEDURE — 81001 URINALYSIS AUTO W/SCOPE: CPT

## 2024-04-03 PROCEDURE — 82024 ASSAY OF ACTH: CPT

## 2024-04-03 PROCEDURE — 84443 ASSAY THYROID STIM HORMONE: CPT

## 2024-04-04 LAB — ACTH PLAS-MCNC: 415 PG/ML

## 2024-05-06 NOTE — PROGRESS NOTES
[facility] [place]  [address]  [place] [address]  Phone: [phone]  Primary Provider: Rodri - [facility] [place]  Pre-op Performing Provider: CHAMP YOUNG      PREOPERATIVE EVALUATION:  Today's date: 5/11/2023    Suyapa Hodge is a 37 year old female who presents for a preoperative evaluation.      5/11/2023    10:50 AM   Additional Questions   Roomed by wallace     Surgical Information:  Surgery/Procedure: Direct anterior right total hip arthroplasty  Surgery Location: [facility]  Surgeon: Dr. Grubbs  Surgery Date: 5/30/23  Time of Surgery: tbd  Where patient plans to recover: At home with family  Fax number for surgical facility: Note does not need to be faxed, will be available electronically in Epic.    Assessment & Plan     The proposed surgical procedure is considered INTERMEDIATE risk.    Preop general physical exam  - Basic metabolic panel  (Ca, Cl, CO2, Creat, Gluc, K, Na, BUN); Future  - CBC with platelets; Future  - Basic metabolic panel  (Ca, Cl, CO2, Creat, Gluc, K, Na, BUN)  - CBC with platelets    Avascular necrosis of femoral head, unspecified laterality (H)  - methocarbamol (ROBAXIN) 500 MG tablet; Take 1-2 tablets (500-1,000 mg) by mouth nightly as needed for muscle spasms            - No identified additional risk factors other than previously addressed    Antiplatelet or Anticoagulation Medication Instructions:   - Patient is on no antiplatelet or anticoagulation medications.    Additional Medication Instructions:  Patient is to take all scheduled medications on the day of surgery    RECOMMENDATION:  APPROVAL GIVEN to proceed with proposed procedure, without further diagnostic evaluation.            Subjective     HPI related to upcoming procedure: Patient has avascular necrosis of the femoral head secondary to steroid use for Cushing's disease. She will undergo ZAHEER for management.         5/8/2023     6:14 AM   Preop Questions   1. Have you ever  had a heart attack or stroke? No   2. Have you ever had surgery on your heart or blood vessels, such as a stent placement, a coronary artery bypass, or surgery on an artery in your head, neck, heart, or legs? No   3. Do you have chest pain with activity? No   4. Do you have a history of  heart failure? No   5. Do you currently have a cold, bronchitis or symptoms of other infection? No   6. Do you have a cough, shortness of breath, or wheezing? No   7. Do you or anyone in your family have previous history of blood clots? YES - h/o PE 01/2014 following a surgery   8. Do you or does anyone in your family have a serious bleeding problem such as prolonged bleeding following surgeries or cuts? No   9. Have you ever had problems with anemia or been told to take iron pills? No   10. Have you had any abnormal blood loss such as black, tarry or bloody stools, or abnormal vaginal bleeding? No   11. Have you ever had a blood transfusion? No   12. Are you willing to have a blood transfusion if it is medically needed before, during, or after your surgery? Yes   13. Have you or any of your relatives ever had problems with anesthesia? No   14. Do you have sleep apnea, excessive snoring or daytime drowsiness? No   15. Do you have any artifical heart valves or other implanted medical devices like a pacemaker, defibrillator, or continuous glucose monitor? No   16. Do you have artificial joints? No   17. Are you allergic to latex? No   18. Is there any chance that you may be pregnant? No       Health Care Directive:  Patient has a Health Care Directive on file      Preoperative Review of :   reviewed - no record of controlled substances prescribed.      Status of Chronic Conditions:  See problem list for active medical problems.  Problems all longstanding and stable, except as noted/documented.  See ROS for pertinent symptoms related to these conditions.      Review of Systems  CONSTITUTIONAL: NEGATIVE for fever, chills, change  in weight  INTEGUMENTARY/SKIN: NEGATIVE for worrisome rashes, moles or lesions  EYES: NEGATIVE for vision changes or irritation  ENT/MOUTH: NEGATIVE for ear, mouth and throat problems  RESP: NEGATIVE for significant cough or SOB  CV: NEGATIVE for chest pain, palpitations or peripheral edema  GI: NEGATIVE for nausea, abdominal pain, heartburn, or change in bowel habits  : NEGATIVE for frequency, dysuria, or hematuria  MUSCULOSKELETAL: NEGATIVE for significant arthralgias or myalgia  NEURO: NEGATIVE for weakness, dizziness or paresthesias  ENDOCRINE: NEGATIVE for temperature intolerance, skin/hair changes  HEME: NEGATIVE for bleeding problems  PSYCHIATRIC: NEGATIVE for changes in mood or affect    Patient Active Problem List    Diagnosis Date Noted     Chronic kidney disease, stage 3 (H) 07/01/2021     Priority: Medium     ACTH hypersecretion (H) 06/15/2021     Priority: Medium     Added automatically from request for surgery [id number]       Pituitary macroadenoma (H) 06/15/2021     Priority: Medium     Added automatically from request for surgery [id number]       Cervical high risk HPV (human papillomavirus) test positive 03/22/2019     Priority: Medium     9/11/2015 Pap: LSIL, +HR HPV (not 16/18)  10/2015 [place]: BEVERLEY 1  5/13/2016 Pap only: NIL   >> all above results found in Care Everywhere  3/14/2019 Pap: NIL, +HR HPV (not 16/18) - Plan colp.  4/2/19 colp. bx and ecc WNL. Plan: cotest 1 year  4/9/20 COVID 19 interim plan updated to: Plan unchanged. (jodi) CCT Tracking.  7/8/20 NIL Pap, Neg HPV. Plan cotest in 3 years.   7/20/20 MyChart result note sent.        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)  10/14/2015     Priority: Medium     Premature menopause on hormone replacement therapy 07/02/2015     Priority: Medium     Estrogen deficiency 07/01/2015     Priority: Medium     Abnormal coagulation profile 07/01/2015     Priority: Medium     Pituitary hypogonadism (H) 03/18/2015     Priority: Medium     Hx of total adrenalectomy (H) 10/15/2014     Priority: Medium     Hypothyroidism 10/15/2014     Priority: Medium     Hypoadrenalism (H) 10/15/2014     Priority: Medium     Tachycardia 06/16/2014     Priority: Medium     Diabetes insipidus (H) 02/03/2014     Priority: Medium     Cushing's disease (H) 01/20/2014     Priority: Medium     Cushing syndrome (H) 10/24/2013     Priority: Medium     Pituitary microadenoma (H) 10/16/2013     Priority: Medium     History of benign pituitary tumor 07/15/2013     Priority: Medium     Pituitary adenoma (H) 07/01/2013     Priority: Medium     Hypothalamic-adrenal dysfunction (H) 04/05/2010     Priority: Medium     Pituitary Cushing's syndrome (H) 02/08/2010     Priority: Medium     Amenorrhea 02/08/2010     Priority: Medium      Past Medical History:   Diagnosis Date     Adrenal disorder (H)      Avascular necrosis of femur head, 2010     Cervical high risk HPV (human papillomavirus) test positive 10/2015 & 3/2019    +HR HPV (NOT 16/18)     Colon polyp      Cushing syndrome (H)     pituitary microadenoma     Diabetes insipidus (H)      Fatigue      History of colposcopy 10/2015    BEVERLEY 1     Hypercalcaemia      Hypertension      Medulloadrenal hyperfunction (H)      Pulmonary emboli (H) 01/2014     Renal disease      Thyroid disease      Past Surgical History:   Procedure Laterality Date     BIOPSY  1/2010, 7/2013, 1/2014, 7/2021    After each surgery on pituitary     COLONOSCOPY       COLONOSCOPY WITH CO2 INSUFFLATION N/A 09/16/2021    Procedure: COLONOSCOPY, WITH CO2 INSUFFLATION;  Surgeon: Harman Pearl MD;  Location: [facility] OR     COLPOSCOPY VULVA, BIOPSY, COMBINED   10/28/2015    BEVERLEY 1, 6 month follow-up pap was normal     COLPOSCOPY,BX CERVIX/ENDOCERV CURR  04/02/2019     INSERT DRAIN LUMBAR  01/20/2014    Procedure: INSERT DRAIN LUMBAR;;  Surgeon: Soham Aquino MD;  Location: [facility] OR     LAPAROSCOPIC ADRENALECTOMY  07/14/2014    Procedure: LAPAROSCOPIC ADRENALECTOMY;  Surgeon: Roni Nunn MD;  Location: [facility] OR     OPTICAL TRACKING SYSTEM ENDOSCOPIC RESECTION TUMOR CRANIAL  07/01/2013    Procedure: OPTICAL TRACKING SYSTEM ENDOSCOPIC RESECTION TUMOR CRANIAL;  Stealth Assisted Endoscopic Hypophysectomy ;  Surgeon: Soham Aquino MD;  Location: [facility] OR     OPTICAL TRACKING SYSTEM ENDOSCOPIC RESECTION TUMOR CRANIAL  01/20/2014    Procedure: OPTICAL TRACKING SYSTEM ENDOSCOPIC RESECTION TUMOR CRANIAL;  Stealth Assisted Endoscopic Transnasal Excision Of Pituitary Adenoma , Placement Of Lumbar Drain with c-arm;  Surgeon: Soham Aquino MD;  Location: [facility] OR     OPTICAL TRACKING SYSTEM ENDOSCOPIC RESECTION TUMOR CRANIAL N/A 07/22/2021    Procedure: stealth assisted Redo endoscopic, endonasal resection of pituitary tumor;  Surgeon: Mina Huffman MD;  Location: [facility] OR     removal of pituitary microademona       wisdom teeth extration       Current Outpatient Medications   Medication Sig Dispense Refill     cabergoline (DOSTINEX) 0.5 MG tablet Take 0.5 tablets (0.25 mg) by mouth once a week 12 tablet 3     Calcium carb-Vitamin D 500 mg [place]-200 units (OSCAL WITH D;OYSTER SHELL CALCIUM) 500-200 MG-UNIT per tablet Take 1 tablet by mouth 2 times daily (with meals)       desmopressin (DDAVP) 0.1 MG tablet Take 1 tablet (100 mcg) by mouth 2 times daily 180 tablet 3     drospirenone-ethinyl estradiol (HECTOR) 3-0.02 MG tablet Take 1 tablet by mouth daily 84 tablet 2     fludrocortisone (FLORINEF) 0.1 MG tablet Take 1 tablet (0.1 mg) by mouth daily 90 tablet 2     hydrocortisone (CORTEF) 10 MG tablet TAKE 1 TABLET EVERY MORNING, 0.5 TABLET IN THE EVENING PLUS  SICK DAY SUPPLY AS DIRECTED 150 tablet 5     hydrocortisone sodium succinate PF (SOLU-CORTEF) 100 MG injection Inject 2 mLs (100 mg) into the vein once as needed (use when you cannot tolerate po intake.) Dispense Act-O-Vial 2 mL 3     levothyroxine (SYNTHROID/LEVOTHROID) 75 MCG tablet TAKE 1 TABLET BY MOUTH ONCE DAILY 6 DAYS A WEEK AND TAKE NOTHING ON THE 7TH DAY EACH WEEK 90 tablet 3     methocarbamol (ROBAXIN) 500 MG tablet Take 1-2 tablets (500-1,000 mg) by mouth nightly as needed for muscle spasms 20 tablet 2     Multiple Vitamin (MULTI-VITAMIN DAILY PO) Take 1 tablet by mouth daily        sodium chloride (OCEAN) 0.65 % nasal spray Apply in both nostrils as needed for congestion 30 mL 0       Allergies   Allergen Reactions     Labetalol      Racing heart feeling, panic attack feeling.         Social History     Tobacco Use     Smoking status: Former     Packs/day: 0.50     Years: 3.00     Pack years: 1.50     Types: Cigarettes     Start date: 10/10/2005     Quit date: 1/2/2008     Years since quitting: 15.3     Smokeless tobacco: Never   Vaping Use     Vaping status: Never Used   Substance Use Topics     Alcohol use: Yes     Comment: Average 2-3/week in summer       History   Drug Use Unknown         Objective     /75 (BP Location: Right arm, Patient Position: Sitting, Cuff Size: Adult Regular)   Pulse 104   Temp 98.3  F (36.8  C) (Tympanic)   Wt 72.6 kg (160 lb)   LMP  (LMP Unknown)   SpO2 98%   BMI 25.44 kg/m      Physical Exam    GENERAL APPEARANCE: healthy, alert and no distress     EYES: EOMI, PERRL     HENT: ear canals and TM's normal and nose and mouth without ulcers or lesions     NECK: no adenopathy, no asymmetry, masses, or scars and thyroid normal to palpation     RESP: lungs clear to auscultation - no rales, rhonchi or wheezes     CV: regular rates and rhythm, normal S1 S2, no S3 or S4 and no murmur, click or rub     ABDOMEN:  soft, nontender, no HSM or masses and bowel sounds normal      MS: extremities normal- no gross deformities noted, no evidence of inflammation in joints, FROM in all extremities.     SKIN: no suspicious lesions or rashes     NEURO: Normal strength and tone, sensory exam grossly normal, mentation intact and speech normal     PSYCH: mentation appears normal. and affect normal/bright     LYMPHATICS: No cervical adenopathy    Recent Labs   Lab Test 12/08/22  1122 03/29/22  0852 07/24/21  0559 07/23/21  1358 07/23/21  0506   HGB 12.9  --  9.2*   < > 9.3*   PLT  --   --  208  --  161    130* 141   < > 145*  145*   POTASSIUM 4.3 4.9  --    < > 3.2*   CR 1.11* 1.59*  --    < > 0.75    < > = values in this interval not displayed.        Diagnostics:  Recent Results (from the past 24 hour(s))   Basic metabolic panel  (Ca, Cl, CO2, Creat, Gluc, K, Na, BUN)    Collection Time: 05/11/23 11:51 AM   Result Value Ref Range    Sodium 133 (L) 136 - 145 mmol/L    Potassium 4.6 3.4 - 5.3 mmol/L    Chloride 99 98 - 107 mmol/L    Carbon Dioxide (CO2) 21 (L) 22 - 29 mmol/L    Anion Gap 13 7 - 15 mmol/L    Urea Nitrogen 16.6 6.0 - 20.0 mg/dL    Creatinine 1.24 (H) 0.51 - 0.95 mg/dL    Calcium 10.1 (H) 8.6 - 10.0 mg/dL    Glucose 103 (H) 70 - 99 mg/dL    GFR Estimate 57 (L) >60 mL/min/1.73m2   CBC with platelets    Collection Time: 05/11/23 11:51 AM   Result Value Ref Range    WBC Count 5.1 4.0 - 11.0 10e3/uL    RBC Count 4.72 3.80 - 5.20 10e6/uL    Hemoglobin 14.6 11.7 - 15.7 g/dL    Hematocrit 40.0 35.0 - 47.0 %    MCV 85 78 - 100 fL    MCH 30.9 26.5 - 33.0 pg    MCHC 36.5 31.5 - 36.5 g/dL    RDW 12.7 10.0 - 15.0 %    Platelet Count 327 150 - 450 10e3/uL      No EKG required, no history of coronary heart disease, significant arrhythmia, peripheral arterial disease or other structural heart disease.    Revised Cardiac Risk Index (RCRI):  The patient has the following serious cardiovascular risks for perioperative complications:   - No serious cardiac risks = 0 points     RCRI Interpretation: 0  points: Class I (very low risk - 0.4% complication rate)           Signed Electronically by: Ashley Carbone PA-C  Copy of this evaluation report is provided to requesting physician.       Plan: Rec SPF 30+ daily. Detail Level: Zone Render In Strict Bullet Format?: Yes

## 2024-05-24 DIAGNOSIS — Z96.641 STATUS POST TOTAL HIP REPLACEMENT, RIGHT: Primary | ICD-10-CM

## 2024-05-24 NOTE — PROGRESS NOTES
[facility]  Orthopaedic Surgery  by Chan Sarmiento PA-C    Suyapa Hodge MRN# [id number]    YOB: 1985     Background history:  DX:  Osteonecrosis of right femoral head, ARCO 4  ONFH L femoral head ARCO 2  Chronic kidney disease stage III  ACTC hypersecretion  Pituitary macro adenoma  Hypopituitary is him after adenoma resection  History of total adrenalectomy  Diabetes insipidus  History of pituitary Cushing's syndrome  Hypothyroidism  Hypoadrenalism     TREATMENTS:  Total adrenalectomy  Partial pituitary resection  5/30/2023, direct anterior right total hip arthroplasty, Dr. Grubbs, [facility]    Azalea returns for recheck 1 year after undergoing direct anterior right total of arthroplasty.  She is quite happy with the outcome of her surgery and has no pain whatsoever.  She feels she walks normally and that her leg lengths are symmetric and equal.  She has no limitations.  She is crutches for approximately 1 week after surgery and then combination of a crutch and a cane for another week thereafter.  Presently she walks independently.    She notes no pain involving her left hip or groin area.    Examination:  Full range of motion right hip, pain-free   Full range of motion left hip, pain-free  pelvis level on standing  Gait is normal  Incision well-healed.    Radiographs demonstrate implant satisfactory condition and placement with no evidence of complication.    Impression and plan:  Excellent functional outcome after right total of arthroplasty.  Interval follow-up x-ray in 5 years time for routine check.  Left femoral head osteonecrosis Arco stage II, less than 50% of femoral head involvement, exclusively anterior location and present for at least 5 years based upon previous 2019 x-ray.  Given the small size, atypical location of exclusively in the anterior zone and not in the weightbearing area, she has a low to moderate risk of eventual subchondral fracture.  As there is variable benefit to  undergoing core compression with marrow derived concentrate grafting, I have not advised any surgical intervention at the present time.  If she should develop pain or discomfort in the groin or hip area on either side of asked her to return for follow-up examination.    Wiliam Grubbs MD [facility] Family [facility]  Oncology and Adult Reconstructive Surgery  Dept Orthopaedic Surgery, [facility]  [phone] office, [phone] pager  [email or web address]    Total combined visit time and work time before and after clinic visit on encounter date = 20 min

## 2024-06-03 ENCOUNTER — OFFICE VISIT (OUTPATIENT)
Dept: ORTHOPEDICS | Facility: CLINIC | Age: 39
End: 2024-06-03
Payer: COMMERCIAL

## 2024-06-03 ENCOUNTER — ANCILLARY PROCEDURE (OUTPATIENT)
Dept: GENERAL RADIOLOGY | Facility: CLINIC | Age: 39
End: 2024-06-03
Attending: ORTHOPAEDIC SURGERY
Payer: COMMERCIAL

## 2024-06-03 DIAGNOSIS — M87.9 OSTEONECROSIS (H): Primary | ICD-10-CM

## 2024-06-03 DIAGNOSIS — Z96.641 STATUS POST TOTAL HIP REPLACEMENT, RIGHT: ICD-10-CM

## 2024-06-03 PROCEDURE — 99213 OFFICE O/P EST LOW 20 MIN: CPT | Performed by: ORTHOPAEDIC SURGERY

## 2024-06-03 PROCEDURE — 73502 X-RAY EXAM HIP UNI 2-3 VIEWS: CPT | Mod: RT | Performed by: RADIOLOGY

## 2024-06-03 ASSESSMENT — HOOS JR: HOOS JR TOTAL INTERVAL SCORE: 100

## 2024-06-03 NOTE — LETTER
6/3/2024         RE: Suyapa Hodge  [address] [address]        Dear Colleague,    Thank you for referring your patient, Suyapa Hodge, to the [facility] [place]. Please see a copy of my visit note below.        [facility]  Orthopaedic Surgery  by Chan Sarmiento PA-C    Suyapa Hodge MRN# [id number]    YOB: 1985     Background history:  DX:  Osteonecrosis of right femoral head, ARCO 4  ONFH L femoral head ARCO 2  Chronic kidney disease stage III  ACTC hypersecretion  Pituitary macro adenoma  Hypopituitary is him after adenoma resection  History of total adrenalectomy  Diabetes insipidus  History of pituitary Cushing's syndrome  Hypothyroidism  Hypoadrenalism     TREATMENTS:  Total adrenalectomy  Partial pituitary resection  5/30/2023, direct anterior right total hip arthroplasty, Dr. Grubbs, [facility]    Azalea returns for recheck 1 year after undergoing direct anterior right total of arthroplasty.  She is quite happy with the outcome of her surgery and has no pain whatsoever.  She feels she walks normally and that her leg lengths are symmetric and equal.  She has no limitations.  She is crutches for approximately 1 week after surgery and then combination of a crutch and a cane for another week thereafter.  Presently she walks independently.    She notes no pain involving her left hip or groin area.    Examination:  Full range of motion right hip, pain-free   Full range of motion left hip, pain-free  pelvis level on standing  Gait is normal  Incision well-healed.    Radiographs demonstrate implant satisfactory condition and placement with no evidence of complication.    Impression and plan:  Excellent functional outcome after right total of arthroplasty.  Interval follow-up x-ray in 5 years time for routine check.  Left femoral head osteonecrosis Arco stage II, less than 50% of femoral head involvement, exclusively anterior location and present for at least  5 years based upon previous 2019 x-ray.  Given the small size, atypical location of exclusively in the anterior zone and not in the weightbearing area, she has a low to moderate risk of eventual subchondral fracture.  As there is variable benefit to undergoing core compression with marrow derived concentrate grafting, I have not advised any surgical intervention at the present time.  If she should develop pain or discomfort in the groin or hip area on either side of asked her to return for follow-up examination.    Wiliam Grubbs MD  [facility] Family Professor  Oncology and Adult Reconstructive Surgery  Dept Orthopaedic Surgery, [facility]  [phone] office, [phone] pager  [email or web address]    Total combined visit time and work time before and after clinic visit on encounter date = 20 min

## 2024-09-08 ENCOUNTER — HEALTH MAINTENANCE LETTER (OUTPATIENT)
Age: 39
End: 2024-09-08

## 2024-10-07 ENCOUNTER — OFFICE VISIT (OUTPATIENT)
Dept: FAMILY MEDICINE | Facility: CLINIC | Age: 39
End: 2024-10-07
Payer: COMMERCIAL

## 2024-10-07 VITALS
RESPIRATION RATE: 18 BRPM | TEMPERATURE: 97.3 F | BODY MASS INDEX: 27.69 KG/M2 | HEIGHT: 67 IN | WEIGHT: 176.4 LBS | HEART RATE: 86 BPM | OXYGEN SATURATION: 100 % | SYSTOLIC BLOOD PRESSURE: 104 MMHG | DIASTOLIC BLOOD PRESSURE: 69 MMHG

## 2024-10-07 DIAGNOSIS — E27.2 ADRENAL CRISIS (H): Primary | ICD-10-CM

## 2024-10-07 DIAGNOSIS — E03.9 HYPOTHYROIDISM, UNSPECIFIED TYPE: ICD-10-CM

## 2024-10-07 DIAGNOSIS — E87.1 HYPONATREMIA: ICD-10-CM

## 2024-10-07 DIAGNOSIS — D64.9 LOW HEMOGLOBIN: ICD-10-CM

## 2024-10-07 DIAGNOSIS — N17.9 AKI (ACUTE KIDNEY INJURY) (H): ICD-10-CM

## 2024-10-07 DIAGNOSIS — K80.30 CALCULUS OF BILE DUCT WITH CHOLANGITIS WITHOUT OBSTRUCTION, UNSPECIFIED CHOLANGITIS ACUITY: ICD-10-CM

## 2024-10-07 DIAGNOSIS — K22.89 ESOPHAGEAL THICKENING: ICD-10-CM

## 2024-10-07 DIAGNOSIS — E23.2 DIABETES INSIPIDUS (H): ICD-10-CM

## 2024-10-07 LAB — HGB BLD-MCNC: 11.6 G/DL (ref 11.7–15.7)

## 2024-10-07 PROCEDURE — 36415 COLL VENOUS BLD VENIPUNCTURE: CPT | Performed by: NURSE PRACTITIONER

## 2024-10-07 PROCEDURE — 85018 HEMOGLOBIN: CPT | Performed by: NURSE PRACTITIONER

## 2024-10-07 PROCEDURE — 99214 OFFICE O/P EST MOD 30 MIN: CPT | Performed by: NURSE PRACTITIONER

## 2024-10-07 ASSESSMENT — PAIN SCALES - GENERAL: PAINLEVEL: NO PAIN (0)

## 2024-10-07 NOTE — PATIENT INSTRUCTIONS
- Continue hydrocortisone and fludrocortisone as per previous regimen.  - Continue Synthroid; check TSH in follow-up.  - Continue DDAVP for diabetes insipidus.  - Schedule follow-up with primary care and Endocrinology  physician for ongoing management.  - I preferred you  to GI outpatient for evaluation of esophageal findings.

## 2024-10-07 NOTE — PROGRESS NOTES
Assessment & Plan     (E27.2) Adrenal crisis (H)  (primary encounter diagnosis)  Comment:Resolved with IV hydrocortisone and fluids; patient stable now.  PLAN:   - Continue hydrocortisone and fludrocortisone as per previous regimen.  -Schedule follow-up  to your Endocrinology to reassess adrenal function and medication efficacy.    (K22.89) Circumferential Esophageal Wall Thickening  Comment:denies GERD symptoms.  Plan: Adult GI  Referral - Consult Only    (D64.9) Low hemoglobin  Comment:  HGB is 11.6 today and up from 9.4 three days ago.  Plan: Hemoglobin  - Monitor hemoglobin levels closely      (K80.30) Calculus of bile duct with cholangitis without obstruction, unspecified cholangitis acuity  Comment: asymptomatic  PLAN:  - Consider liver enzyme tests to monitor liver function and biliary status.  - Educated the patient on signs of cholangitis and when to seek medical attention.     (N17.9) MONIKA (acute kidney injury) (H)  Comment:  resolved   PLAN:   - Continue to monitor renal function with routine labs every 3-6 months.  - Encouraged hydration and discuss dietary modifications to support kidney health.    (E23.2) Diabetes insipidus (H)  Comment:The patient will continue taking DDAVP as prescribed.  PLAN:   -Continue DDAVP for diabetes insipidus.    (E87.1) Hyponatremia  Comment:Resolved  PLAN:   - Continue to monitor sodium levels as part of routine lab work.  - Educated the patient on dietary sodium intake and signs of electrolyte imbalance.      (E03.9) Hypothyroidism, unspecified type  Comment:  Free T4 is low at 0.53 and TSH was low at 0.07 three days ago. Patient will see her PCP sometime next week and is advised to remind her PCP to recheck since this was checked 3 days ago.  PLAN:   - Continue home Synthroid as prescribed.  - TSH to be checked at the next PCP visit.  - Educated the patient on the importance of regular thyroid monitoring and adherence to medication.          MED REC  "REQUIRED  Post Medication Reconciliation Status:     BMI  Estimated body mass index is 27.63 kg/m  as calculated from the following:    Height as of this encounter: 1.702 m (5' 7\").    Weight as of this encounter: 80 kg (176 lb 6.4 oz).   Weight management plan: Discussed healthy diet and exercise guidelines              Johnathan Loyola is a 39 year old, presenting for the following health issues:  Hospital F/U (Patient was seen at [facility] on 10/02/2024 for Adrenal Crisis.)        10/7/2024     2:48 PM   Additional Questions   Roomed by Lynn Trinidad MA   Accompanied by Self     HPI         Hospital Follow-up Visit:    Hospital/Nursing Home/IP Rehab Facility:  [facility]  Date of Admission: 10/02/2024  Date of Discharge: 10/02/2024  Reason(s) for Admission: Adrenal Crisis/Septic shock  Was the patient in the ICU or did the patient experience delirium during hospitalization?  No  Do you have any other stressors you would like to discuss with your provider? No    Problems taking medications regularly:  None  Medication changes since discharge: None  Problems adhering to non-medication therapy:  None        Suyapa Hodge is  a 39-year-old female with a complex medical history that includes pituitary adenoma with Cushing's disease, Alexei syndrome, secondary adrenal insufficiency, hypothyroidism, diabetes insipidus, avascular necrosis of the right hip, and a history of pulmonary embolism. She was recently discharged from the hospital after presenting with an adrenal insufficiency crisis. During her hospitalization, she was also found to have cholelithiasis, hyponatremia, acute kidney injury, and circumferential esophageal wall thickening. At this time, the patient denies any symptoms and reports feeling well.    Summary of hospitalization:  CareEverywhere information obtained and reviewed  Diagnostic Tests/Treatments reviewed.  Follow up needed: none  Other Healthcare Providers Involved in Patient s Care:         " "None  Update since discharge: worsened.         Plan of care communicated with patient                   Review of Systems  Constitutional, HEENT, cardiovascular, pulmonary, GI, , musculoskeletal, neuro, skin, endocrine and psych systems are negative, except as otherwise noted.      Objective    /69 (BP Location: Right arm, Patient Position: Chair, Cuff Size: Adult Regular)   Pulse 86   Temp 97.3  F (36.3  C) (Temporal)   Resp 18   Ht 1.702 m (5' 7\")   Wt 80 kg (176 lb 6.4 oz)   LMP  (LMP Unknown)   SpO2 100%   BMI 27.63 kg/m    Body mass index is 27.63 kg/m .  Physical Exam   GENERAL: alert and no distress  NECK: no adenopathy, no asymmetry, masses, or scars  RESP: lungs clear to auscultation - no rales, rhonchi or wheezes  CV: regular rate and rhythm, normal S1 S2, no S3 or S4, no murmur, click or rub, no peripheral edema  ABDOMEN: soft, nontender, no hepatosplenomegaly, no masses and bowel sounds normal  MS: no gross musculoskeletal defects noted, no edema  SKIN: no suspicious lesions or rashes  NEURO: Normal strength and tone, mentation intact and speech normal  PSYCH: mentation appears normal, affect normal/bright            Signed Electronically by: SOHEILA Ashby CNP    "

## 2024-10-08 ENCOUNTER — TELEPHONE (OUTPATIENT)
Dept: GASTROENTEROLOGY | Facility: CLINIC | Age: 39
End: 2024-10-08
Payer: COMMERCIAL

## 2024-10-08 NOTE — TELEPHONE ENCOUNTER
[facility] Call Center    Phone Message    May a detailed message be left on voicemail: Yes    Reason for Call: Other: Patient is currently scheduled on 11/21/2024, as visit type New GI Urgent. This is outside the expected timeline for this referral. Patient has been added to the waitlist.      Action Taken: Message routed to:  Other: GI REFERRAL TRIAGE POOL     Travel Screening: Not Applicable

## 2024-10-12 ENCOUNTER — TELEPHONE (OUTPATIENT)
Dept: NEUROSURGERY | Facility: CLINIC | Age: 39
End: 2024-10-12
Payer: COMMERCIAL

## 2024-10-12 SDOH — HEALTH STABILITY: PHYSICAL HEALTH: ON AVERAGE, HOW MANY DAYS PER WEEK DO YOU ENGAGE IN MODERATE TO STRENUOUS EXERCISE (LIKE A BRISK WALK)?: 3 DAYS

## 2024-10-12 SDOH — HEALTH STABILITY: PHYSICAL HEALTH: ON AVERAGE, HOW MANY MINUTES DO YOU ENGAGE IN EXERCISE AT THIS LEVEL?: 30 MIN

## 2024-10-12 ASSESSMENT — SOCIAL DETERMINANTS OF HEALTH (SDOH): HOW OFTEN DO YOU GET TOGETHER WITH FRIENDS OR RELATIVES?: ONCE A WEEK

## 2024-10-12 NOTE — TELEPHONE ENCOUNTER
Left Voicemail (1st Attempt) and Sent Mychart (1st Attempt) for the patient to call back and schedule the following:    Location: [facility]  Provider: Nathanael William  Appointment type: Return  Appointment mode: any  Return date: 11/17/24    Specialty phone number: [phone]    Is Imaging Needed: Yes - MRI scheduled 11/14/24  Imaging Phone Number to provide to patient: n/a    Additional Notes: 1 yr follow up, MRI sched 11/14

## 2024-10-15 ENCOUNTER — OFFICE VISIT (OUTPATIENT)
Dept: FAMILY MEDICINE | Facility: CLINIC | Age: 39
End: 2024-10-15
Payer: COMMERCIAL

## 2024-10-15 VITALS
RESPIRATION RATE: 16 BRPM | DIASTOLIC BLOOD PRESSURE: 71 MMHG | WEIGHT: 171.4 LBS | BODY MASS INDEX: 26.9 KG/M2 | TEMPERATURE: 98 F | SYSTOLIC BLOOD PRESSURE: 113 MMHG | OXYGEN SATURATION: 98 % | HEIGHT: 67 IN | HEART RATE: 94 BPM

## 2024-10-15 DIAGNOSIS — E78.5 DYSLIPIDEMIA: ICD-10-CM

## 2024-10-15 DIAGNOSIS — Z00.00 ROUTINE GENERAL MEDICAL EXAMINATION AT A HEALTH CARE FACILITY: Primary | ICD-10-CM

## 2024-10-15 DIAGNOSIS — E03.9 HYPOTHYROIDISM, UNSPECIFIED TYPE: ICD-10-CM

## 2024-10-15 DIAGNOSIS — E24.0 PITUITARY CUSHING'S SYNDROME (H): ICD-10-CM

## 2024-10-15 DIAGNOSIS — N18.31 STAGE 3A CHRONIC KIDNEY DISEASE (H): ICD-10-CM

## 2024-10-15 PROCEDURE — 90471 IMMUNIZATION ADMIN: CPT | Performed by: PHYSICIAN ASSISTANT

## 2024-10-15 PROCEDURE — 99396 PREV VISIT EST AGE 40-64: CPT | Mod: 57 | Performed by: PHYSICIAN ASSISTANT

## 2024-10-15 PROCEDURE — 99213 OFFICE O/P EST LOW 20 MIN: CPT | Mod: 25 | Performed by: PHYSICIAN ASSISTANT

## 2024-10-15 PROCEDURE — 90656 IIV3 VACC NO PRSV 0.5 ML IM: CPT | Performed by: PHYSICIAN ASSISTANT

## 2024-10-15 NOTE — PROGRESS NOTES
Preventive Care Visit  [facility] DAVE Carbone PA-C, Family Medicine  Oct 15, 2024      Assessment & Plan     Routine general medical examination at a health care facility  Complex patient history. Feels well overall, no concerns today. Given flu shot today.     Dyslipidemia  Return for fasting lipid panel. Previously elevated, not on medication.   - Lipid panel reflex to direct LDL Fasting; Future    Hypothyroidism, unspecified type  Managed by endocrinology. Seeing them within the next two months.     Pituitary Cushing's syndrome (H)  Managed by neurology. Scheduling appointment in one month.     Stage 3a chronic kidney disease (H)  Stable. Labs improved after last hospital stay. Encourage plenty of fluids.             Counseling  Appropriate preventive services were addressed with this patient via screening, questionnaire, or discussion as appropriate for fall prevention, nutrition, physical activity, Tobacco-use cessation, social engagement, weight loss and cognition.  Checklist reviewing preventive services available has been given to the patient.  Reviewed patient's diet, addressing concerns and/or questions.   She is at risk for lack of exercise and has been provided with information to increase physical activity for the benefit of her well-being.           Subjective   Milla is a 39 year old, presenting for the following:  Physical        10/15/2024     1:12 PM   Additional Questions   Roomed by An V.         10/15/2024     1:12 PM   Patient Reported Additional Medications   Patient reports taking the following new medications none        Health Care Directive  Patient has a Health Care Directive on file  Advance care planning document is on file and is current.    HPI    Hospitalized 10/2/24-10/4/24 with GI infection and unable to keep medication down due to vomiting. Reports feeling back to baseline since being discharged.     Had some headaches recently - improved with  chiropractic care.     Continuous OCP.     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                10/12/2024   General Health   How would you rate your overall physical health? Good   Feel stress (tense, anxious, or unable to sleep) Not at all            10/12/2024   Nutrition   Three or more servings of calcium each day? (!) NO   Diet: Regular (no restrictions)   How many servings of fruit and vegetables per day? (!) 0-1   How many sweetened beverages each day? 0-1            10/12/2024   Exercise   Days per week of moderate/strenous exercise 3 days   Average minutes spent exercising at this level 30 min            10/12/2024   Social Factors   Frequency of gathering with friends or relatives Once a week   Worry food won't last until get money to buy more No   Food not last or not have enough money for food? No   Do you have housing? (Housing is defined as stable permanent housing and does not include staying ouside in a car, in a tent, in an abandoned building, in an overnight shelter, or couch-surfing.) Yes   Are you worried about losing your housing? No   Lack of transportation? No   Unable to get utilities (heat,electricity)? No            10/12/2024   Dental   Dentist two times every year? Yes                 Today's PHQ-2 Score:       2024     9:13 AM   PHQ-2 (  [employer])   Q1: Little interest or pleasure in doing things 0   Q2: Feeling down, depressed or hopeless 0   PHQ-2 Score 0           10/12/2024   Substance Use   Alcohol more than 3/day or more than 7/wk No   Do you use any other substances recreationally? No        Social History     Tobacco Use    Smoking status: Former     Current packs/day: 0.00     Average packs/day: 0.5 packs/day for 3.0 years (1.5 ttl pk-yrs)     Types: Cigarettes     Start date: 10/10/2005     Quit date: 2008     Years since quittin.7    Smokeless tobacco: Never   Vaping Use    Vaping status: Never Used   Substance Use Topics    Alcohol use: Yes     Comment:  "Average 2-3/week in summer    Drug use: Never           9/27/2023   LAST FHS-7 RESULTS   1st degree relative breast or ovarian cancer No   Any relative bilateral breast cancer No   Any male have breast cancer No   Any ONE woman have BOTH breast AND ovarian cancer No   Any woman with breast cancer before 50yrs No   2 or more relatives with breast AND/OR ovarian cancer No   2 or more relatives with breast AND/OR bowel cancer No           Mammogram Screening - Mammogram every 1-2 years updated in Health Maintenance based on mutual decision making        10/12/2024   STI Screening   New sexual partner(s) since last STI/HIV test? No        History of abnormal Pap smear: No - age 30- 64 PAP with HPV every 5 years recommended        Latest Ref Rng & Units 9/28/2023     3:11 PM 7/8/2020     9:39 AM 7/8/2020     9:15 AM   PAP / HPV   PAP  Negative for Intraepithelial Lesion or Malignancy (NILM)      PAP (Historical)   NIL     HPV 16 DNA Negative Negative   Negative    HPV 18 DNA Negative Negative   Negative    Other HR HPV Negative Negative   Negative      ASCVD Risk   The 10-year ASCVD risk score (Soledad JACKSON, et al., 2019) is: 0.8%    Values used to calculate the score:      Age: 39 years      Sex: Female      Is Non- : No      Diabetic: No      Tobacco smoker: No      Systolic Blood Pressure: 113 mmHg      Is BP treated: No      HDL Cholesterol: 46 mg/dL      Total Cholesterol: 226 mg/dL        10/12/2024   Contraception/Family Planning   Questions about contraception or family planning No           Reviewed and updated as needed this visit by Provider                          Review of Systems  Constitutional, HEENT, cardiovascular, pulmonary, GI, , musculoskeletal, neuro, skin, endocrine and psych systems are negative, except as otherwise noted.     Objective    Exam  /71   Pulse 94   Temp 98  F (36.7  C) (Temporal)   Resp 16   Ht 1.71 m (5' 7.32\")   Wt 77.7 kg (171 lb 6.4 oz) " "  LMP  (LMP Unknown)   SpO2 98%   BMI 26.59 kg/m     Estimated body mass index is 26.59 kg/m  as calculated from the following:    Height as of this encounter: 1.71 m (5' 7.32\").    Weight as of this encounter: 77.7 kg (171 lb 6.4 oz).    Physical Exam  Vitals reviewed.   Constitutional:       General: She is not in acute distress.     Appearance: Normal appearance.   HENT:      Head: Normocephalic.      Right Ear: Tympanic membrane, ear canal and external ear normal.      Left Ear: Tympanic membrane, ear canal and external ear normal.      Nose: Nose normal.      Mouth/Throat:      Mouth: Mucous membranes are moist.      Pharynx: Oropharynx is clear. No oropharyngeal exudate or posterior oropharyngeal erythema.   Eyes:      Extraocular Movements: Extraocular movements intact.      Conjunctiva/sclera: Conjunctivae normal.      Pupils: Pupils are equal, round, and reactive to light.   Cardiovascular:      Rate and Rhythm: Normal rate and regular rhythm.      Heart sounds: Normal heart sounds. No murmur heard.  Pulmonary:      Effort: Pulmonary effort is normal.      Breath sounds: Normal breath sounds. No wheezing, rhonchi or rales.   Abdominal:      General: Bowel sounds are normal.      Palpations: Abdomen is soft.      Tenderness: There is no abdominal tenderness.   Lymphadenopathy:      Cervical: No cervical adenopathy.   Skin:     General: Skin is warm and dry.   Neurological:      General: No focal deficit present.      Mental Status: She is alert and oriented to person, place, and time.      Gait: Gait normal.   Psychiatric:         Mood and Affect: Mood normal.         Behavior: Behavior normal.       LUIS Flynn   Signed Electronically by: Ashley Carbone PA-C    "

## 2024-10-15 NOTE — TELEPHONE ENCOUNTER
Clinic Navigator sent a [employer] message to inform the Pt that Pt is on the wait list for a sooner appointment. Clinic Navigator will reach out to the Pt via phone call or [email or web address] when a sooner appointment becomes available.

## 2024-10-17 NOTE — TELEPHONE ENCOUNTER
Patient confirmed scheduled appointment:  Date: 11/20  Time: 830  Visit type: return  Provider: ZO William  Location: [facility]  Testing/imaging: MRI 11/14  Additional notes: 1 yr follow up, MRI prior

## 2024-10-21 ENCOUNTER — ANCILLARY PROCEDURE (OUTPATIENT)
Dept: MAMMOGRAPHY | Facility: CLINIC | Age: 39
End: 2024-10-21
Attending: PHYSICIAN ASSISTANT
Payer: COMMERCIAL

## 2024-10-21 DIAGNOSIS — Z12.31 VISIT FOR SCREENING MAMMOGRAM: ICD-10-CM

## 2024-10-21 PROCEDURE — 77063 BREAST TOMOSYNTHESIS BI: CPT | Mod: TC | Performed by: RADIOLOGY

## 2024-10-21 PROCEDURE — 77067 SCR MAMMO BI INCL CAD: CPT | Mod: TC | Performed by: RADIOLOGY

## 2024-11-14 ENCOUNTER — ANCILLARY PROCEDURE (OUTPATIENT)
Dept: MRI IMAGING | Facility: CLINIC | Age: 39
End: 2024-11-14
Attending: PHYSICIAN ASSISTANT
Payer: COMMERCIAL

## 2024-11-14 DIAGNOSIS — E24.0 CUSHING'S DISEASE (H): ICD-10-CM

## 2024-11-14 DIAGNOSIS — D35.2 PITUITARY ADENOMA (H): ICD-10-CM

## 2024-11-14 PROCEDURE — 70553 MRI BRAIN STEM W/O & W/DYE: CPT | Performed by: RADIOLOGY

## 2024-11-14 PROCEDURE — A9585 GADOBUTROL INJECTION: HCPCS | Performed by: RADIOLOGY

## 2024-11-14 RX ORDER — GADOBUTROL 604.72 MG/ML
7.5 INJECTION INTRAVENOUS ONCE
Status: COMPLETED | OUTPATIENT
Start: 2024-11-14 | End: 2024-11-14

## 2024-11-14 RX ADMIN — GADOBUTROL 7.5 ML: 604.72 INJECTION INTRAVENOUS at 10:16

## 2024-11-20 ENCOUNTER — OFFICE VISIT (OUTPATIENT)
Dept: NEUROSURGERY | Facility: CLINIC | Age: 39
End: 2024-11-20
Payer: COMMERCIAL

## 2024-11-20 VITALS
DIASTOLIC BLOOD PRESSURE: 75 MMHG | RESPIRATION RATE: 16 BRPM | OXYGEN SATURATION: 96 % | SYSTOLIC BLOOD PRESSURE: 110 MMHG | HEART RATE: 85 BPM

## 2024-11-20 DIAGNOSIS — D35.2 PITUITARY ADENOMA (H): Primary | ICD-10-CM

## 2024-11-20 DIAGNOSIS — E24.0 CUSHING'S DISEASE (H): ICD-10-CM

## 2024-11-20 ASSESSMENT — PAIN SCALES - GENERAL: PAINLEVEL_OUTOF10: NO PAIN (0)

## 2024-11-20 NOTE — PROGRESS NOTES
[facility]  Department of Neurosurgery  Center for Skull Base and Pituitary Surgery    Name: Suyapa Hodge  MRN: [id number]  Age: 39 year old  : 2024      Chief Complaint:   Cushing's disease s/p multiple transphenoidal resections of a pituitary adenoma (last 2021, Nathanael/Eulalio), follow up visit     History of Present Illness:   Suyapa Hodge is a 39 year old female with a history of Cushing's disease with multiple pituitary adenoma resections, Alexei syndrome s/p adrenalectomy (), avascular necrosis of the right hip s/p ZAHEER (2023), and history of PE (, provoked) who is seen today for annual follow up. She underwent a redo resection as above in . At our last appointment 1 year ago she was feeling well without new concerns. Today she reports feeling quite well. She was admitted in early October with adrenal crisis secondary to nausea, vomiting, and diarrhea. She feels things are back on track. She will have 6 month ACTH testing per Dr. Wilkinson upcoming. She has no new complaints, able to take daily medications without difficulty at this time. 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     Review of Systems:   Pertinent items are noted in HPI or as in patient entered ROS below, remainder of complete ROS is negative.     Physical Exam:   /75 (BP Location: Right arm, Patient Position: Sitting)   Pulse 85   Resp 16   LMP  (LMP Unknown)   SpO2 96%   General: No acute distress.    Eyes: Conjunctivae are normal.  MSK: Moves all extremities.  No obvious deformity.  Neuro: The patient is fully oriented. Speech is normal. Facial nerve function is normal, rated as a [place]ackmann 1. Gait is normal.   Psych: Normal mood and affect. Behavior is normal.      Imaging:  We reviewed the MRI done 2024 and compared it to previous. There is no obvious recurrent adenoma. There is hypoenhancement in the  left cavernous sinus that appears more medial than previous. Will watch closely on follow up.     Assessment:  Cushing's disease s/p multiple transphenoidal resections of a pituitary adenoma (last 7/22/2021, Nathanael/Eulalio), follow up visit     Plan:  We reviewed the MRI done recently and compared this to previous. Will also review with Dr. Huffman and Dr. Wilkinson. We'll plan follow up in 1 year tentatively, and she will contact us sooner with new concerns.       Katharine William PA-C  Department of Neurosurgery

## 2024-11-20 NOTE — PROGRESS NOTES
GI CLINIC VISIT    CC/REFERRING MD:  Marshal Pradhan      ASSESSMENT/PLAN:    #esophageal thickening  Patient had a CT scan in 2024, after she had nausea and vomiting - this showed circumferential thickening of the esophagus. At this time, will plan for EGD for direct visualization. She denies any nausea or vomiting currently, denies abdominal pain, or any GERD symptoms. Of note does have evidence of anemia with Hgb of 11.6 - following with PCP.    --schedule EGD.     #intermittent soft stools  Patient reports a long history of alternating between softer stools and constipation. Has had previous colonoscopies that have otherwise been normal. Patient does not want any further work up of this.       Colorectal cancer screenin    RTC PRN    Thank you for this consultation.  It was a pleasure to participate in the care of this patient; please contact us with any further questions.       30 minutes spent on the date of the encounter doing chart review, review of outside records, review of test results, patient visit, and documentation    This note was created with voice recognition software, and while reviewed for accuracy, typos may remain.    Daisy Kumar PA-C  Division of Gastroenterology, Hepatology and Nutrition  [facility]  38 y/o F with past medical history of pituitary adenoma with Cushing's disease status post transsphenoidal resection in , ,  and , Alexei syndrome status post adrenalectomy (), subsequent secondary adrenal insufficiency (on hydrocortisone and fludrocortisone), hypothyroidism, diabetes insipidus on DDAVP, avascular necrosis of the right hip status post total hip arthroplasty (2023), history of pulmonary embolism (, provoked by adrenalectomy surgery). presents for evaluation of esophageal thickening seen on CT done in 2024.     Patient developed nausea and vomiting in early October as well as  diarrhea and was seen in the ED, and subsequently admitted for adrenal crisis in the setting of dehydration.     She denies any heartburn symptoms, but will have hiccups once or twice a week. Denies excessive belching. Denies abdominal pain. She reports normal appetite. Denies any nausea or vomiting currently.     In regards to her bowel movements, they can be a bit variable - some days can be soft other times can be more formed, ongoing for many years. Denies BRBPR. At times can have up to 4-5 bowel movements in a day, stools are generally softer in nature when she has multiple Bms in a day. Does not take a fiber supplement. Has struggled with constipation. She will take senna if needed.    Denies NSAIDs or Tylenol. Denies use of OTC herbal supplements/weight loss products.      Denies use of ETOH and tobacco products. No recreational drug use.     Grandmother  of colon cancer at age 46 - patient started getting colonoscopies in her 20's, and she gets one every 5 years. Next due in . Denies family hx IBD/celiac disease.     ROS:    No fevers or chills  No weight loss  No shortness of breath or wheezing  No chest pain or pressure  No odynophagia or dysphagia  No BRBPR, hematochezia, melena  No anxiety or depression    PROBLEM LIST  Patient Active Problem List    Diagnosis Date Noted    Osteonecrosis (H) 2023     Priority: Medium    Right hip pain 2023     Priority: Medium    Chronic kidney disease, stage 3 (H) 2021     Priority: Medium    ACTH hypersecretion (H) 06/15/2021     Priority: Medium     Added automatically from request for surgery [id number]      Pituitary macroadenoma (H) 06/15/2021     Priority: Medium     Added automatically from request for surgery [id number]      Cervical high risk HPV (human papillomavirus) test positive 2019     Priority: Medium     9/11/15 LSIL, +HR HPV (not 16/18)  10/2015 [place] BEVERLEY 1  16 Pap only NIL   >> all above results found in Care  Everywhere  3/14/2019 NIL, +HR HPV (not 16/18) - Plan colp.  4/2/19 Colpo bx and ECC WNL. Plan: cotest 1 year  4/9/20 COVID 19 interim plan updated to: Plan unchanged. (jodi) CCT Tracking.  7/8/20 NIL Pap, Neg HPV. Plan cotest in 3 years.   9/28/23 NIL pap, neg HPV. 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) 10/14/2015     Priority: Medium    Premature menopause on hormone replacement therapy 07/02/2015     Priority: Medium    Estrogen deficiency 07/01/2015     Priority: Medium    Abnormal coagulation profile 07/01/2015     Priority: Medium    Pituitary hypogonadism (H) 03/18/2015     Priority: Medium    Hx of total adrenalectomy (H) 10/15/2014     Priority: Medium    Hypothyroidism 10/15/2014     Priority: Medium    Hypoadrenalism (H) 10/15/2014     Priority: Medium    Tachycardia 06/16/2014     Priority: Medium    Diabetes insipidus (H) 02/03/2014     Priority: Medium    Cushing's disease (H) 01/20/2014     Priority: Medium    Cushing syndrome (H) 10/24/2013     Priority: Medium    Pituitary microadenoma (H) 10/16/2013     Priority: Medium    History of benign pituitary tumor 07/15/2013     Priority: Medium    Pituitary adenoma (H) 07/01/2013     Priority: Medium    Hypothalamic-adrenal dysfunction (H) 04/05/2010     Priority: Medium    Pituitary Cushing's syndrome (H) 02/08/2010     Priority: Medium    Amenorrhea 02/08/2010     Priority: Medium       PERTINENT PAST MEDICAL HISTORY:    Past Medical History:   Diagnosis Date    Adrenal disorder (H)     Avascular necrosis of femur head, 2010    Cervical high risk HPV (human papillomavirus) test positive 10/2015 & 3/2019     +HR HPV (NOT 16/18)    Colon polyp     Cushing syndrome (H)     pituitary microadenoma    Diabetes insipidus (H)     Fatigue     History of colposcopy 10/2015    BEVERLEY 1    Hypercalcaemia     Hypertension     Medulloadrenal hyperfunction (H)     Pulmonary emboli (H) 01/2014    Renal disease     Thyroid disease        PREVIOUS SURGERIES:    Past Surgical History:   Procedure Laterality Date    ARTHROPLASTY, HIP, TOTAL, DIRECT ANTERIOR APPROACH, USING HANA TABLE Right 05/30/2023    Procedure: Direct Anterior Right Total Hip Arthroplasty;  Surgeon: Wiliam Grubbs MD;  Location: [facility] OR    BIOPSY  1/2010, 7/2013, 1/2014, 7/2021    After each surgery on pituitary    COLONOSCOPY      COLONOSCOPY WITH CO2 INSUFFLATION N/A 09/16/2021    Procedure: COLONOSCOPY, WITH CO2 INSUFFLATION;  Surgeon: Harman Pearl MD;  Location: [facility] OR    COLPOSCOPY VULVA, BIOPSY, COMBINED  10/28/2015    BEVERLEY 1, 6 month follow-up pap was normal    COLPOSCOPY,BX CERVIX/ENDOCERV CURR  04/02/2019    INSERT DRAIN LUMBAR  01/20/2014    Procedure: INSERT DRAIN LUMBAR;;  Surgeon: Soham Aquino MD;  Location: [facility] OR    LAPAROSCOPIC ADRENALECTOMY  07/14/2014    Procedure: LAPAROSCOPIC ADRENALECTOMY;  Surgeon: Roni Nunn MD;  Location: [facility] OR    OPTICAL TRACKING SYSTEM ENDOSCOPIC RESECTION TUMOR CRANIAL  07/01/2013    Procedure: OPTICAL TRACKING SYSTEM ENDOSCOPIC RESECTION TUMOR CRANIAL;  Stealth Assisted Endoscopic Hypophysectomy ;  Surgeon: Soham Aquino MD;  Location: [facility] OR    OPTICAL TRACKING SYSTEM ENDOSCOPIC RESECTION TUMOR CRANIAL  01/20/2014    Procedure: OPTICAL TRACKING SYSTEM ENDOSCOPIC RESECTION TUMOR CRANIAL;  Stealth Assisted Endoscopic Transnasal Excision Of Pituitary Adenoma , Placement Of Lumbar Drain with c-arm;  Surgeon: Soham Aquino MD;  Location: [facility] OR    OPTICAL TRACKING SYSTEM ENDOSCOPIC RESECTION TUMOR CRANIAL N/A 07/22/2021    Procedure: stealth assisted Redo endoscopic, endonasal resection  of pituitary tumor;  Surgeon: Mina Huffman MD;  Location: [facility] OR    ORTHOPEDIC SURGERY  5/2023    removal of pituitary microademona      wisdom teeth extration         PREVIOUS ENDOSCOPY:  See chart.    ALLERGIES:     Allergies   Allergen Reactions    Labetalol      Racing heart feeling, panic attack feeling.        PERTINENT MEDICATIONS:    Current Outpatient Medications:     acetaminophen (TYLENOL) 325 MG tablet, Take 2 tablets (650 mg) by mouth every 4 hours as needed for other (mild pain), Disp: 100 tablet, Rfl: 0    amoxicillin (AMOXIL) 500 MG tablet, Take 4 tablets 1 hour before dentist appointment or procedure., Disp: 4 tablet, Rfl: 4    cabergoline (DOSTINEX) 0.5 MG tablet, 0.25 twice per week, Disp: 12 tablet, Rfl: 3    Calcium carb-Vitamin D 500 mg [place]-200 units (OSCAL WITH D;OYSTER SHELL CALCIUM) 500-200 MG-UNIT per tablet, Take 1 tablet by mouth daily, Disp: , Rfl:     desmopressin (DDAVP) 0.1 MG tablet, Taking daily, Disp: 180 tablet, Rfl: 3    drospirenone-ethinyl estradiol (HECTOR) 3-0.02 MG tablet, TAKE 1 TABLET BY MOUTH DAILY, Disp: 84 tablet, Rfl: 3    drospirenone-ethinyl estradiol (HECTOR) 3-0.02 MG tablet, Take 1 tablet by mouth daily, Disp: 84 tablet, Rfl: 3    fludrocortisone (FLORINEF) 0.1 MG tablet, Take 1 tablet (0.1 mg) by mouth daily, Disp: 90 tablet, Rfl: 2    hydrocortisone (CORTEF) 10 MG tablet, TAKE 1 TABLET EVERY MORNING,  PLUS SICK DAY SUPPLY AS DIRECTED. Take 10 mg twice daily for 3 days  then resume normal dosing., Disp: 120 tablet, Rfl: 3    hydrocortisone sodium succinate PF (SOLU-CORTEF) injection, Inject 2 mLs (100 mg) into the vein once as needed (use when you cannot tolerate po intake.) Dispense Act-O-Vial, Disp: 4 mL, Rfl: 5    levothyroxine (SYNTHROID/LEVOTHROID) 75 MCG tablet, TAKE 1 TABLET BY MOUTH ONCE DAILY 6 DAYS A WEEK AND TAKE NOTHING ON THE 7TH DAY EACH WEEK, Disp: 90 tablet, Rfl: 3    methocarbamol (ROBAXIN) 500 MG tablet, Take 1-2 tablets (500-1,000 mg)  by mouth nightly as needed for muscle spasms, Disp: 20 tablet, Rfl: 2    Multiple Vitamin (MULTI-VITAMIN DAILY PO), Take 1 tablet by mouth daily , Disp: , Rfl:     sodium chloride (OCEAN) 0.65 % nasal spray, Apply in both nostrils as needed for congestion, Disp: 30 mL, Rfl: 0    SOCIAL HISTORY:    Social History     Socioeconomic History    Marital status: Single     Spouse name: Not on file    Number of children: Not on file    Years of education: Not on file    Highest education level: Not on file   Occupational History    Not on file   Tobacco Use    Smoking status: Former     Current packs/day: 0.00     Average packs/day: 0.5 packs/day for 3.0 years (1.5 ttl pk-yrs)     Types: Cigarettes     Start date: 10/10/2005     Quit date: 2008     Years since quittin.8    Smokeless tobacco: Never   Vaping Use    Vaping status: Never Used   Substance and Sexual Activity    Alcohol use: Yes     Comment: Average 2-3/week in summer    Drug use: Never    Sexual activity: Not Currently     Partners: Male     Birth control/protection: Condom, Pill, Post-menopausal   Other Topics Concern    Parent/sibling w/ CABG, MI or angioplasty before 65F 55M? No   Social History Narrative    Not on file     Social Drivers of Health     Financial Resource Strain: Low Risk  (10/12/2024)    Financial Resource Strain     Within the past 12 months, have you or your family members you live with been unable to get utilities (heat, electricity) when it was really needed?: No   Food Insecurity: Low Risk  (10/12/2024)    Food Insecurity     Within the past 12 months, did you worry that your food would run out before you got money to buy more?: No     Within the past 12 months, did the food you bought just not last and you didn t have money to get more?: No   Transportation Needs: Low Risk  (10/12/2024)    Transportation Needs     Within the past 12 months, has lack of transportation kept you from medical appointments, getting your medicines,  non-medical meetings or appointments, work, or from getting things that you need?: No   Physical Activity: Insufficiently Active (10/12/2024)    Exercise Vital Sign     Days of Exercise per Week: 3 days     Minutes of Exercise per Session: 30 min   Stress: No Stress Concern Present (10/12/2024)    [employer] [place] [employer] - Occupational Stress Questionnaire     Feeling of Stress : Not at all   Social Connections: Unknown (10/12/2024)    Social Connection and Isolation Panel [NHANES]     Frequency of Communication with Friends and Family: Not on file     Frequency of Social Gatherings with Friends and Family: Once a week     Attends [employer] Services: Not on file     Active Member of Clubs or Organizations: Not on file     Attends Club or Organization Meetings: Not on file     Marital Status: Not on file   Interpersonal Safety: Low Risk  (10/15/2024)    Interpersonal Safety     Do you feel physically and emotionally safe where you currently live?: Yes     Within the past 12 months, have you been hit, slapped, kicked or otherwise physically hurt by someone?: No     Within the past 12 months, have you been humiliated or emotionally abused in other ways by your partner or ex-partner?: No   Housing Stability: Low Risk  (10/12/2024)    Housing Stability     Do you have housing? : Yes     Are you worried about losing your housing?: No       FAMILY HISTORY:  FH of CRC: grandmother  FH of IBD: none  Family History   Problem Relation Age of Onset    Hyperlipidemia Father     Obesity Father     Asthma Mother     Osteoporosis Mother     Obesity Mother     Allergies Sister     Obesity Sister     Anxiety Disorder Sister     Cancer Maternal Grandmother 45        colon cancer    Colon Cancer Maternal Grandmother     Cancer Maternal Grandfather         lung cancer-smoker    Other Cancer Maternal Grandfather         Lung - Smoker    Cancer Paternal Grandmother         lung cancer-smoker    Other Cancer Paternal  Grandmother         Lung - Smoker    Cancer Paternal Grandfather         lung cancer    Other Cancer Paternal Grandfather         Lung    Depression Brother     Anxiety Disorder Brother     Mental Illness Brother         Psychosis    Anesthesia Reaction No family hx of     Cardiovascular No family hx of     Deep Vein Thrombosis (DVT) No family hx of        Past/family/social history reviewed and no changes    PHYSICAL EXAMINATION:  Constitutional: aaox3, cooperative, pleasant, not dyspneic/diaphoretic, no acute distress  Vitals reviewed: /72   Pulse 87   Wt 75.3 kg (166 lb)   LMP  (LMP Unknown)   SpO2 100%   BMI 25.75 kg/m    Wt:   Wt Readings from Last 2 Encounters:   10/15/24 77.7 kg (171 lb 6.4 oz)   10/07/24 80 kg (176 lb 6.4 oz)      Eyes: Sclera anicteric/injected  Respiratory: Unlabored breathing  Skin: warm, perfused, no jaundice  Psych: Normal affect  MSK: Normal gait      PERTINENT STUDIES:    Office Visit on 10/07/2024   Component Date Value Ref Range Status    Hemoglobin 10/07/2024 11.6 (L)  11.7 - 15.7 g/dL Final

## 2024-11-21 ENCOUNTER — OFFICE VISIT (OUTPATIENT)
Dept: GASTROENTEROLOGY | Facility: CLINIC | Age: 39
End: 2024-11-21
Attending: NURSE PRACTITIONER
Payer: COMMERCIAL

## 2024-11-21 VITALS
SYSTOLIC BLOOD PRESSURE: 105 MMHG | BODY MASS INDEX: 25.75 KG/M2 | WEIGHT: 166 LBS | DIASTOLIC BLOOD PRESSURE: 72 MMHG | HEART RATE: 87 BPM | OXYGEN SATURATION: 100 %

## 2024-11-21 DIAGNOSIS — K22.89 ESOPHAGEAL THICKENING: ICD-10-CM

## 2024-11-21 ASSESSMENT — PAIN SCALES - GENERAL: PAINLEVEL_OUTOF10: NO PAIN (0)

## 2024-11-21 NOTE — LETTER
2024      Suyapa Hodge  [address]  [place] [address]      Dear Colleague,    Thank you for referring your patient, Suyapa Hodge, to the [facility]. Please see a copy of my visit note below.    GI CLINIC VISIT    CC/REFERRING MD:  Marshal Pradhan      ASSESSMENT/PLAN:    #esophageal thickening  Patient had a CT scan in 2024, after she had nausea and vomiting - this showed circumferential thickening of the esophagus. At this time, will plan for EGD for direct visualization. She denies any nausea or vomiting currently, denies abdominal pain, or any GERD symptoms. Of note does have evidence of anemia with Hgb of 11.6 - following with PCP.    --schedule EGD.     #intermittent soft stools  Patient reports a long history of alternating between softer stools and constipation. Has had previous colonoscopies that have otherwise been normal. Patient does not want any further work up of this.       Colorectal cancer screenin    RTC PRN    Thank you for this consultation.  It was a pleasure to participate in the care of this patient; please contact us with any further questions.       30 minutes spent on the date of the encounter doing chart review, review of outside records, review of test results, patient visit, and documentation    This note was created with voice recognition software, and while reviewed for accuracy, typos may remain.    Daisy Kumar PA-C  Division of Gastroenterology, Hepatology and Nutrition  [facility] [place]      HPI  38 y/o F with past medical history of pituitary adenoma with Cushing's disease status post transsphenoidal resection in , ,  and , Alexei syndrome status post adrenalectomy (), subsequent secondary adrenal insufficiency (on hydrocortisone and fludrocortisone), hypothyroidism, diabetes insipidus on DDAVP, avascular necrosis of the right hip status post total hip arthroplasty  (2023), history of pulmonary embolism (, provoked by adrenalectomy surgery). presents for evaluation of esophageal thickening seen on CT done in 2024.     Patient developed nausea and vomiting in early October as well as diarrhea and was seen in the ED, and subsequently admitted for adrenal crisis in the setting of dehydration.     She denies any heartburn symptoms, but will have hiccups once or twice a week. Denies excessive belching. Denies abdominal pain. She reports normal appetite. Denies any nausea or vomiting currently.     In regards to her bowel movements, they can be a bit variable - some days can be soft other times can be more formed, ongoing for many years. Denies BRBPR. At times can have up to 4-5 bowel movements in a day, stools are generally softer in nature when she has multiple Bms in a day. Does not take a fiber supplement. Has struggled with constipation. She will take senna if needed.    Denies NSAIDs or Tylenol. Denies use of OTC herbal supplements/weight loss products.      Denies use of ETOH and tobacco products. No recreational drug use.     Grandmother  of colon cancer at age 46 - patient started getting colonoscopies in her 20's, and she gets one every 5 years. Next due in . Denies family hx IBD/celiac disease.     ROS:    No fevers or chills  No weight loss  No shortness of breath or wheezing  No chest pain or pressure  No odynophagia or dysphagia  No BRBPR, hematochezia, melena  No anxiety or depression    PROBLEM LIST  Patient Active Problem List    Diagnosis Date Noted     Osteonecrosis (H) 2023     Priority: Medium     Right hip pain 2023     Priority: Medium     Chronic kidney disease, stage 3 (H) 2021     Priority: Medium     ACTH hypersecretion (H) 06/15/2021     Priority: Medium     Added automatically from request for surgery [id number]       Pituitary macroadenoma (H) 06/15/2021     Priority: Medium     Added automatically from request for  surgery [id number]       Cervical high risk HPV (human papillomavirus) test positive 03/22/2019     Priority: Medium     9/11/15 LSIL, +HR HPV (not 16/18)  10/2015 [place] BEVERLEY 1  5/13/16 Pap only NIL   >> all above results found in Care Everywhere  3/14/2019 NIL, +HR HPV (not 16/18) - Plan colp.  4/2/19 Colpo bx and ECC WNL. Plan: cotest 1 year  4/9/20 COVID 19 interim plan updated to: Plan unchanged. (jodi) CCT Tracking.  7/8/20 NIL Pap, Neg HPV. Plan cotest in 3 years.   9/28/23 NIL pap, neg HPV. 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) 10/14/2015     Priority: Medium     Premature menopause on hormone replacement therapy 07/02/2015     Priority: Medium     Estrogen deficiency 07/01/2015     Priority: Medium     Abnormal coagulation profile 07/01/2015     Priority: Medium     Pituitary hypogonadism (H) 03/18/2015     Priority: Medium     Hx of total adrenalectomy (H) 10/15/2014     Priority: Medium     Hypothyroidism 10/15/2014     Priority: Medium     Hypoadrenalism (H) 10/15/2014     Priority: Medium     Tachycardia 06/16/2014     Priority: Medium     Diabetes insipidus (H) 02/03/2014     Priority: Medium     Cushing's disease (H) 01/20/2014     Priority: Medium     Cushing syndrome (H) 10/24/2013     Priority: Medium     Pituitary microadenoma (H) 10/16/2013     Priority: Medium     History of benign pituitary tumor 07/15/2013     Priority: Medium     Pituitary adenoma (H) 07/01/2013     Priority: Medium     Hypothalamic-adrenal dysfunction (H) 04/05/2010     Priority: Medium     Pituitary Cushing's syndrome (H) 02/08/2010     Priority: Medium     Amenorrhea 02/08/2010      Priority: Medium       PERTINENT PAST MEDICAL HISTORY:    Past Medical History:   Diagnosis Date     Adrenal disorder (H)      Avascular necrosis of femur head, 2010     Cervical high risk HPV (human papillomavirus) test positive 10/2015 & 3/2019    +HR HPV (NOT 16/18)     Colon polyp      Cushing syndrome (H)     pituitary microadenoma     Diabetes insipidus (H)      Fatigue      History of colposcopy 10/2015    BEVERLYE 1     Hypercalcaemia      Hypertension      Medulloadrenal hyperfunction (H)      Pulmonary emboli (H) 01/2014     Renal disease      Thyroid disease        PREVIOUS SURGERIES:    Past Surgical History:   Procedure Laterality Date     ARTHROPLASTY, HIP, TOTAL, DIRECT ANTERIOR APPROACH, USING HANA TABLE Right 05/30/2023    Procedure: Direct Anterior Right Total Hip Arthroplasty;  Surgeon: Wiliam Grubbs MD;  Location: [facility] OR     BIOPSY  1/2010, 7/2013, 1/2014, 7/2021    After each surgery on pituitary     COLONOSCOPY       COLONOSCOPY WITH CO2 INSUFFLATION N/A 09/16/2021    Procedure: COLONOSCOPY, WITH CO2 INSUFFLATION;  Surgeon: Harman Pearl MD;  Location: [facility] OR     COLPOSCOPY VULVA, BIOPSY, COMBINED  10/28/2015    BEVERLEY 1, 6 month follow-up pap was normal     COLPOSCOPY,BX CERVIX/ENDOCERV CURR  04/02/2019     INSERT DRAIN LUMBAR  01/20/2014    Procedure: INSERT DRAIN LUMBAR;;  Surgeon: Soham Aquino MD;  Location: [facility] OR     LAPAROSCOPIC ADRENALECTOMY  07/14/2014    Procedure: LAPAROSCOPIC ADRENALECTOMY;  Surgeon: Roni Nunn MD;  Location: [facility] OR     OPTICAL TRACKING SYSTEM ENDOSCOPIC RESECTION TUMOR CRANIAL  07/01/2013    Procedure: OPTICAL TRACKING SYSTEM ENDOSCOPIC RESECTION TUMOR CRANIAL;  Stealth Assisted Endoscopic Hypophysectomy ;  Surgeon: Soham Aquino MD;  Location: [facility] OR     OPTICAL TRACKING SYSTEM ENDOSCOPIC RESECTION TUMOR CRANIAL  01/20/2014    Procedure: OPTICAL TRACKING SYSTEM ENDOSCOPIC RESECTION TUMOR CRANIAL;  Stealth Assisted Endoscopic  Transnasal Excision Of Pituitary Adenoma , Placement Of Lumbar Drain with c-arm;  Surgeon: Soham Aquino MD;  Location:  OR     OPTICAL TRACKING SYSTEM ENDOSCOPIC RESECTION TUMOR CRANIAL N/A 07/22/2021    Procedure: stealth assisted Redo endoscopic, endonasal resection of pituitary tumor;  Surgeon: Mina Huffman MD;  Location:  OR     ORTHOPEDIC SURGERY  5/2023     removal of pituitary microademona       wisdom teeth extration         PREVIOUS ENDOSCOPY:  See chart.    ALLERGIES:     Allergies   Allergen Reactions     Labetalol      Racing heart feeling, panic attack feeling.        PERTINENT MEDICATIONS:    Current Outpatient Medications:      acetaminophen (TYLENOL) 325 MG tablet, Take 2 tablets (650 mg) by mouth every 4 hours as needed for other (mild pain), Disp: 100 tablet, Rfl: 0     amoxicillin (AMOXIL) 500 MG tablet, Take 4 tablets 1 hour before dentist appointment or procedure., Disp: 4 tablet, Rfl: 4     cabergoline (DOSTINEX) 0.5 MG tablet, 0.25 twice per week, Disp: 12 tablet, Rfl: 3     Calcium carb-Vitamin D 500 mg [place]-200 units (OSCAL WITH D;OYSTER SHELL CALCIUM) 500-200 MG-UNIT per tablet, Take 1 tablet by mouth daily, Disp: , Rfl:      desmopressin (DDAVP) 0.1 MG tablet, Taking daily, Disp: 180 tablet, Rfl: 3     drospirenone-ethinyl estradiol (HECTOR) 3-0.02 MG tablet, TAKE 1 TABLET BY MOUTH DAILY, Disp: 84 tablet, Rfl: 3     drospirenone-ethinyl estradiol (HECTOR) 3-0.02 MG tablet, Take 1 tablet by mouth daily, Disp: 84 tablet, Rfl: 3     fludrocortisone (FLORINEF) 0.1 MG tablet, Take 1 tablet (0.1 mg) by mouth daily, Disp: 90 tablet, Rfl: 2     hydrocortisone (CORTEF) 10 MG tablet, TAKE 1 TABLET EVERY MORNING,  PLUS SICK DAY SUPPLY AS DIRECTED. Take 10 mg twice daily for 3 days  then resume normal dosing., Disp: 120 tablet, Rfl: 3     hydrocortisone sodium succinate PF (SOLU-CORTEF) injection, Inject 2 mLs (100 mg) into the vein once as needed (use when you cannot tolerate  po intake.) Dispense Act-O-Vial, Disp: 4 mL, Rfl: 5     levothyroxine (SYNTHROID/LEVOTHROID) 75 MCG tablet, TAKE 1 TABLET BY MOUTH ONCE DAILY 6 DAYS A WEEK AND TAKE NOTHING ON THE 7TH DAY EACH WEEK, Disp: 90 tablet, Rfl: 3     methocarbamol (ROBAXIN) 500 MG tablet, Take 1-2 tablets (500-1,000 mg) by mouth nightly as needed for muscle spasms, Disp: 20 tablet, Rfl: 2     Multiple Vitamin (MULTI-VITAMIN DAILY PO), Take 1 tablet by mouth daily , Disp: , Rfl:      sodium chloride (OCEAN) 0.65 % nasal spray, Apply in both nostrils as needed for congestion, Disp: 30 mL, Rfl: 0    SOCIAL HISTORY:    Social History     Socioeconomic History     Marital status: Single     Spouse name: Not on file     Number of children: Not on file     Years of education: Not on file     Highest education level: Not on file   Occupational History     Not on file   Tobacco Use     Smoking status: Former     Current packs/day: 0.00     Average packs/day: 0.5 packs/day for 3.0 years (1.5 ttl pk-yrs)     Types: Cigarettes     Start date: 10/10/2005     Quit date: 2008     Years since quittin.8     Smokeless tobacco: Never   Vaping Use     Vaping status: Never Used   Substance and Sexual Activity     Alcohol use: Yes     Comment: Average 2-3/week in summer     Drug use: Never     Sexual activity: Not Currently     Partners: Male     Birth control/protection: Condom, Pill, Post-menopausal   Other Topics Concern     Parent/sibling w/ CABG, MI or angioplasty before 65F 55M? No   Social History Narrative     Not on file     Social Drivers of Health     Financial Resource Strain: Low Risk  (10/12/2024)    Financial Resource Strain      Within the past 12 months, have you or your family members you live with been unable to get utilities (heat, electricity) when it was really needed?: No   Food Insecurity: Low Risk  (10/12/2024)    Food Insecurity      Within the past 12 months, did you worry that your food would run out before you got money to  buy more?: No      Within the past 12 months, did the food you bought just not last and you didn t have money to get more?: No   Transportation Needs: Low Risk  (10/12/2024)    Transportation Needs      Within the past 12 months, has lack of transportation kept you from medical appointments, getting your medicines, non-medical meetings or appointments, work, or from getting things that you need?: No   Physical Activity: Insufficiently Active (10/12/2024)    Exercise Vital Sign      Days of Exercise per Week: 3 days      Minutes of Exercise per Session: 30 min   Stress: No Stress Concern Present (10/12/2024)    [place] [employer] - Occupational Stress Questionnaire      Feeling of Stress : Not at all   Social Connections: Unknown (10/12/2024)    Social Connection and Isolation Panel [NHANES]      Frequency of Communication with Friends and Family: Not on file      Frequency of Social Gatherings with Friends and Family: Once a week      Attends [employer] Services: Not on file      Active Member of Clubs or Organizations: Not on file      Attends Club or Organization Meetings: Not on file      Marital Status: Not on file   Interpersonal Safety: Low Risk  (10/15/2024)    Interpersonal Safety      Do you feel physically and emotionally safe where you currently live?: Yes      Within the past 12 months, have you been hit, slapped, kicked or otherwise physically hurt by someone?: No      Within the past 12 months, have you been humiliated or emotionally abused in other ways by your partner or ex-partner?: No   Housing Stability: Low Risk  (10/12/2024)    Housing Stability      Do you have housing? : Yes      Are you worried about losing your housing?: No       FAMILY HISTORY:  FH of CRC: grandmother  FH of IBD: none  Family History   Problem Relation Age of Onset     Hyperlipidemia Father      Obesity Father      Asthma Mother      Osteoporosis Mother      Obesity Mother      Allergies Sister       Obesity Sister      Anxiety Disorder Sister      Cancer Maternal Grandmother 45        colon cancer     Colon Cancer Maternal Grandmother      Cancer Maternal Grandfather         lung cancer-smoker     Other Cancer Maternal Grandfather         Lung - Smoker     Cancer Paternal Grandmother         lung cancer-smoker     Other Cancer Paternal Grandmother         Lung - Smoker     Cancer Paternal Grandfather         lung cancer     Other Cancer Paternal Grandfather         Lung     Depression Brother      Anxiety Disorder Brother      Mental Illness Brother         Psychosis     Anesthesia Reaction No family hx of      Cardiovascular No family hx of      Deep Vein Thrombosis (DVT) No family hx of        Past/family/social history reviewed and no changes    PHYSICAL EXAMINATION:  Constitutional: aaox3, cooperative, pleasant, not dyspneic/diaphoretic, no acute distress  Vitals reviewed: /72   Pulse 87   Wt 75.3 kg (166 lb)   LMP  (LMP Unknown)   SpO2 100%   BMI 25.75 kg/m    Wt:   Wt Readings from Last 2 Encounters:   10/15/24 77.7 kg (171 lb 6.4 oz)   10/07/24 80 kg (176 lb 6.4 oz)      Eyes: Sclera anicteric/injected  Respiratory: Unlabored breathing  Skin: warm, perfused, no jaundice  Psych: Normal affect  MSK: Normal gait      PERTINENT STUDIES:    Office Visit on 10/07/2024   Component Date Value Ref Range Status     Hemoglobin 10/07/2024 11.6 (L)  11.7 - 15.7 g/dL Final     Again, thank you for allowing me to participate in the care of your patient.        Sincerely,        Daisy Kumar PA-C

## 2024-11-21 NOTE — PATIENT INSTRUCTIONS
It was a pleasure meeting with you today and discussing your healthcare plan. Below is a summary of what we covered:    --please schedule upper endoscopy.    Based on results, can follow up as needed.       Please see below for any additional questions and scheduling guidelines.    Sign up for Localo: Localo patient portal serves as a secure platform for accessing your medical records from the [facility]. Additionally, Localo facilitates easy, timely, and secure messaging with your care team. If you have not signed up, you may do so by using the provided code or calling [phone].    Coordinating your care after your visit:  There are multiple options for scheduling your follow-up care based on your provider's recommendation.    How do I schedule a follow-up clinic appointment:   After your appointment, you may receive scheduling assistance with the Clinic Coordinators by having a seat in the waiting room and a Clinic Coordinator will call you up to schedule.  Virtual visits or after you leave the clinic:  Your provider has placed a follow-up order in the Localo portal for scheduling your return appointment. A member of the scheduling team will contact you to schedule.  Localo Scheduling: Timely scheduling through Localo is advised to ensure appointment availability.   Call to schedule: You may schedule your follow-up appointment(s) by calling [phone], option 1.    How do I schedule my endoscopy or colonoscopy procedure:  If a procedure, such as a colonoscopy or upper endoscopy was ordered by your provider, the scheduling team will contact you to schedule this procedure. Or you may choose to call to schedule at   [phone], option 2.  Please allow 20-30 minutes when scheduling a procedure.    How do I get my blood work done? To get your blood work done, you need to schedule a lab appointment at an [facility]. There are multiple ways to schedule:   At the clinic: The  Clinic Coordinator you meet after your visit can help you schedule a lab appointment.   [facility] scheduling: [employer] offers online lab scheduling at all [facility] laboratory locations.   Call to schedule: You can call [phone] to schedule your lab appointment.    How do I schedule my imaging study: To schedule imaging studies, such as CT scans, ultrasounds, MRIs, or X-rays, contact Imaging Services at [phone].    How do I schedule a referral to another doctor: If your provider recommended a referral to another specialist(s), the referral order was placed by your provider. You will receive a phone call to schedule this referral, or you may choose to call the number attached to the referral to self-schedule.    For Post-Visit Question(s):  For any inquiries following today's visit:  Please utilize [employer] messaging and allow 48 hours for reply or contact the Call Center during normal business hours at [phone], option 3.  For Emergent After-hours questions, contact the On-Call GI Fellow through the [facility]  at [phone].  In addition, you may contact your Nurse directly using the provided contact information.    Test Results:  Test results will be accessible via [employer] in compliance with the 21st Century Cures Act. This means that your results will be available to you at the same time as your provider. Often you may see your results before your provider does. Results are reviewed by staff within two weeks with communication follow-up. Results may be released in the patient portal prior to your care team review.    Prescription Refill(s):  Medication prescribed by your provider will be addressed during your visit. For future refills, please coordinate with your pharmacy. If you have not had a recent clinic visit or routine labs, for your safety, your provider may not be able to refill your prescription.

## 2024-11-21 NOTE — NURSING NOTE
Chief Complaint   Patient presents with    New Patient     She requests these members of her care team be copied on today's visit information:  PCP: Ashley Carbone    Referring Provider:  SOHEILA Ashby CNP  [address]  [place] [id number]    Vitals:    11/21/24 0900   BP: 105/72   Pulse: 87   SpO2: 100%   Weight: 75.3 kg (166 lb)     Body mass index is 25.75 kg/m .    Medications were reconciled.    Lucina Gardner

## 2024-11-27 ENCOUNTER — TELEPHONE (OUTPATIENT)
Dept: GASTROENTEROLOGY | Facility: CLINIC | Age: 39
End: 2024-11-27
Payer: COMMERCIAL

## 2024-11-27 NOTE — TELEPHONE ENCOUNTER
Pre assessment completed for upcoming procedure.   (Please see previous telephone encounter notes for complete details)    Patient  returned call.       Procedure details:    Arrival time and facility location reviewed.    Pre op exam needed? No.    Designated  policy reviewed. Instructed to have someone stay 6  hours post procedure.       Medication review:    Medications reviewed. Please see supporting documentation below. Holding recommendations discussed (if applicable).       Prep for procedure:     Procedure prep instructions reviewed.        Any additional information needed:  N/A      Patient  verbalized understanding and had no questions or concerns at this time.      Cynthia Marks RN  Endoscopy Procedure Pre Assessment   [phone] option 2

## 2024-11-27 NOTE — TELEPHONE ENCOUNTER
Pre visit planning completed.      Procedure details:    Patient scheduled for Upper endoscopy (EGD) on 12/11/2024.     Approximate arrival time: 1145. Procedure time 1230.   *Ensure patient is aware that endoscopy team will be calling about 2 days prior to procedure date to confirm arrival time as this may change.     Facility location: [facility]; [address], [place] [address]. Check in location: 2nd Floor at Surgery desk.    Sedation type: Conscious sedation     Pre op exam needed? No.    Indication for procedure:   Diagnosis   Esophageal thickening         Chart review:     Electronic implanted devices? No    Recent diagnosis of diverticulitis within the last 6 weeks? No      Medication review:    Diabetic? No    Anticoagulants? No    Weight loss medication/injectable? No GLP-1 medication per patient's medication list. Nursing to verify with pre-assessment call.    Other medication HOLDING recommendations:  N/A      Prep for procedure:     Bowel prep recommendation: N/A    Prep instructions sent via [place] Brown RN  Endoscopy Procedure Pre Assessment   [phone] option 2

## 2024-11-27 NOTE — TELEPHONE ENCOUNTER
First Attempt to contact patient in order to complete pre assessment questions.     No answer. Left message to return call to [phone] option 2    Callback required communication sent via [employer].      Ioana Brown RN  Endoscopy Procedure Pre Assessment

## 2024-12-09 ENCOUNTER — TELEPHONE (OUTPATIENT)
Dept: GASTROENTEROLOGY | Facility: CLINIC | Age: 39
End: 2024-12-09
Payer: COMMERCIAL

## 2024-12-09 RX ORDER — ONDANSETRON 2 MG/ML
4 INJECTION INTRAMUSCULAR; INTRAVENOUS EVERY 6 HOURS PRN
Status: CANCELLED | OUTPATIENT
Start: 2024-12-09

## 2024-12-09 RX ORDER — NALOXONE HYDROCHLORIDE 0.4 MG/ML
0.4 INJECTION, SOLUTION INTRAMUSCULAR; INTRAVENOUS; SUBCUTANEOUS
Status: CANCELLED | OUTPATIENT
Start: 2024-12-09

## 2024-12-09 RX ORDER — NALOXONE HYDROCHLORIDE 0.4 MG/ML
0.2 INJECTION, SOLUTION INTRAMUSCULAR; INTRAVENOUS; SUBCUTANEOUS
Status: CANCELLED | OUTPATIENT
Start: 2024-12-09

## 2024-12-09 RX ORDER — ONDANSETRON 4 MG/1
4 TABLET, ORALLY DISINTEGRATING ORAL EVERY 6 HOURS PRN
Status: CANCELLED | OUTPATIENT
Start: 2024-12-09

## 2024-12-09 RX ORDER — PROCHLORPERAZINE MALEATE 10 MG
10 TABLET ORAL EVERY 6 HOURS PRN
Status: CANCELLED | OUTPATIENT
Start: 2024-12-09

## 2024-12-09 RX ORDER — FLUMAZENIL 0.1 MG/ML
0.2 INJECTION, SOLUTION INTRAVENOUS
Status: CANCELLED | OUTPATIENT
Start: 2024-12-09 | End: 2024-12-10

## 2024-12-11 ENCOUNTER — HOSPITAL ENCOUNTER (OUTPATIENT)
Facility: AMBULATORY SURGERY CENTER | Age: 39
Discharge: HOME | End: 2024-12-11
Attending: INTERNAL MEDICINE
Payer: COMMERCIAL

## 2024-12-11 VITALS
RESPIRATION RATE: 16 BRPM | OXYGEN SATURATION: 93 % | SYSTOLIC BLOOD PRESSURE: 91 MMHG | DIASTOLIC BLOOD PRESSURE: 58 MMHG | HEART RATE: 78 BPM | TEMPERATURE: 97.7 F

## 2024-12-11 LAB — UPPER GI ENDOSCOPY: NORMAL

## 2024-12-11 RX ORDER — LIDOCAINE 40 MG/G
CREAM TOPICAL
Status: DISCONTINUED | OUTPATIENT
Start: 2024-12-11 | End: 2024-12-12 | Stop reason: HOSPADM

## 2024-12-11 RX ORDER — FENTANYL CITRATE 50 UG/ML
INJECTION, SOLUTION INTRAMUSCULAR; INTRAVENOUS PRN
Status: DISCONTINUED | OUTPATIENT
Start: 2024-12-11 | End: 2024-12-11 | Stop reason: HOSPADM

## 2024-12-11 RX ORDER — ONDANSETRON 2 MG/ML
4 INJECTION INTRAMUSCULAR; INTRAVENOUS
Status: DISCONTINUED | OUTPATIENT
Start: 2024-12-11 | End: 2024-12-12 | Stop reason: HOSPADM

## 2024-12-11 NOTE — H&P
ENDOSCOPY PRE-SEDATION H&P FOR OUTPATIENT PROCEDURES    Suyapa Hodge  [id number]  1985    Procedure: EGD    Pre-procedure diagnosis: esophageal thickening on imaging    Past medical history:   Past Medical History:   Diagnosis Date    Adrenal disorder (H)     Avascular necrosis of femur head, 2010    Cervical high risk HPV (human papillomavirus) test positive 10/2015 & 3/2019    +HR HPV (NOT 16/18)    Colon polyp     Cushing syndrome (H)     pituitary microadenoma    Diabetes insipidus (H)     Fatigue     History of colposcopy 10/2015    BEVERLEY 1    Hypercalcaemia     Hypertension     Medulloadrenal hyperfunction (H)     Pulmonary emboli (H) 01/2014    Renal disease     Thyroid disease        Past surgical history:   Past Surgical History:   Procedure Laterality Date    ARTHROPLASTY, HIP, TOTAL, DIRECT ANTERIOR APPROACH, USING HANA TABLE Right 05/30/2023    Procedure: Direct Anterior Right Total Hip Arthroplasty;  Surgeon: Wiliam Grubbs MD;  Location: [facility] OR    BIOPSY  1/2010, 7/2013, 1/2014, 7/2021    After each surgery on pituitary    COLONOSCOPY      COLONOSCOPY WITH CO2 INSUFFLATION N/A 09/16/2021    Procedure: COLONOSCOPY, WITH CO2 INSUFFLATION;  Surgeon: Harman Pearl MD;  Location: [facility] OR    COLPOSCOPY VULVA, BIOPSY, COMBINED  10/28/2015    BEVERLEY 1, 6 month follow-up pap was normal    COLPOSCOPY,BX CERVIX/ENDOCERV CURR  04/02/2019    INSERT DRAIN LUMBAR  01/20/2014    Procedure: INSERT DRAIN LUMBAR;;  Surgeon: Soham Aquino MD;  [facility]: [facility]    LAPAROSCOPIC ADRENALECTOMY  07/14/2014    Procedure: LAPAROSCOPIC ADRENALECTOMY;  Surgeon: Roni Nunn MD;  [facility]: [facility]    OPTICAL TRACKING SYSTEM ENDOSCOPIC RESECTION TUMOR CRANIAL  07/01/2013    Procedure: OPTICAL TRACKING SYSTEM ENDOSCOPIC RESECTION TUMOR CRANIAL;  Stealth Assisted Endoscopic Hypophysectomy ;  Surgeon: Soham Aquino MD;  Location: [facility] OR    OPTICAL TRACKING SYSTEM ENDOSCOPIC RESECTION TUMOR CRANIAL   01/20/2014    Procedure: OPTICAL TRACKING SYSTEM ENDOSCOPIC RESECTION TUMOR CRANIAL;  Stealth Assisted Endoscopic Transnasal Excision Of Pituitary Adenoma , Placement Of Lumbar Drain with c-arm;  Surgeon: Soham Aquino MD;  Location:  OR    OPTICAL TRACKING SYSTEM ENDOSCOPIC RESECTION TUMOR CRANIAL N/A 07/22/2021    Procedure: stealth assisted Redo endoscopic, endonasal resection of pituitary tumor;  Surgeon: Mina Huffman MD;  Location:  OR    ORTHOPEDIC SURGERY  5/2023    removal of pituitary microademona      wisdom teeth extration         Current Outpatient Medications   Medication Sig Dispense Refill    acetaminophen (TYLENOL) 325 MG tablet Take 2 tablets (650 mg) by mouth every 4 hours as needed for other (mild pain) 100 tablet 0    desmopressin (DDAVP) 0.1 MG tablet Taking daily 180 tablet 3    drospirenone-ethinyl estradiol (HECTOR) 3-0.02 MG tablet TAKE 1 TABLET BY MOUTH DAILY 84 tablet 3    drospirenone-ethinyl estradiol (HECTOR) 3-0.02 MG tablet Take 1 tablet by mouth daily 84 tablet 3    fludrocortisone (FLORINEF) 0.1 MG tablet Take 1 tablet (0.1 mg) by mouth daily 90 tablet 2    hydrocortisone (CORTEF) 10 MG tablet TAKE 1 TABLET EVERY MORNING,  PLUS SICK DAY SUPPLY AS DIRECTED. Take 10 mg twice daily for 3 days  then resume normal dosing. 120 tablet 3    levothyroxine (SYNTHROID/LEVOTHROID) 75 MCG tablet TAKE 1 TABLET BY MOUTH ONCE DAILY 6 DAYS A WEEK AND TAKE NOTHING ON THE 7TH DAY EACH WEEK 90 tablet 3    methocarbamol (ROBAXIN) 500 MG tablet Take 1-2 tablets (500-1,000 mg) by mouth nightly as needed for muscle spasms 20 tablet 2    amoxicillin (AMOXIL) 500 MG tablet Take 4 tablets 1 hour before dentist appointment or procedure. 4 tablet 4    cabergoline (DOSTINEX) 0.5 MG tablet 0.25 twice per week 12 tablet 3    Calcium carb-Vitamin D 500 mg [place]-200 units (OSCAL WITH D;OYSTER SHELL CALCIUM) 500-200 MG-UNIT per tablet Take 1 tablet by mouth daily      hydrocortisone sodium  succinate PF (SOLU-CORTEF) injection Inject 2 mLs (100 mg) into the vein once as needed (use when you cannot tolerate po intake.) Dispense Act-O-Vial 4 mL 5    Multiple Vitamin (MULTI-VITAMIN DAILY PO) Take 1 tablet by mouth daily       sodium chloride (OCEAN) 0.65 % nasal spray Apply in both nostrils as needed for congestion 30 mL 0     Current Facility-Administered Medications   Medication Dose Route Frequency Provider Last Rate Last Admin    lidocaine (LMX4) kit   Topical Q1H PRN Harman Pearl MD        lidocaine 1 % 0.1-1 mL  0.1-1 mL Other Q1H PRN Harman Pearl MD        ondansetron (ZOFRAN) injection 4 mg  4 mg Intravenous Once PRN Harman Pearl MD        sodium chloride (PF) 0.9% PF flush 3 mL  3 mL Intracatheter Q8H Harman Pearl MD        sodium chloride (PF) 0.9% PF flush 3 mL  3 mL Intracatheter q1 min prn Harman Pearl MD           Allergies   Allergen Reactions    Labetalol      Racing heart feeling, panic attack feeling.        History of Anesthesia/Sedation Problems: no    PHYSICAL EXAMINATION:  Constitutional: aaox3, cooperative, pleasant  Vitals reviewed: /69   Temp (!) 96.6  F (35.9  C)   Resp 16   LMP  (LMP Unknown)   SpO2 95%   Wt:   Wt Readings from Last 2 Encounters:   11/21/24 75.3 kg (166 lb)   10/15/24 77.7 kg (171 lb 6.4 oz)      Eyes: Sclera anicteric/injected  Ears/nose/mouth/throat: Normal oropharynx without ulcers or exudate, mucus membranes moist, hearing intact  Neck: supple, thyroid normal size  CV: No edema  Respiratory: Unlabored breathing  Lymph: No submandibular, supraclavicular or inguinal lymphadenopathy  Abd: Nondistended, no masses, nontender  Skin: warm, perfused, no jaundice  Psych: Normal affect  MSK: normal movement on limited exam.    ASA Score: See Provation note    Assessment/Plan:     The patient is an appropriate candidate to receive sedation.    Informed consent was discussed with the  patient/family, including the risks, benefits, potential complications and any alternative options associated with sedation.    Patient assessment completed just prior to sedation and while under constant observation by the provider. Condition determined to be adequate for proceeding with sedation.    The specific risks for the procedure were discussed with the patient at the time of informed consent and include but are not limited to perforation which could require surgery, missing significant neoplasm or lesion, hemorrhage and adverse sedative complication.      Harman Pearl MD

## 2024-12-16 LAB
PATH REPORT.ADDENDUM SPEC: NORMAL
PATH REPORT.COMMENTS IMP SPEC: NORMAL
PATH REPORT.COMMENTS IMP SPEC: NORMAL
PATH REPORT.FINAL DX SPEC: NORMAL
PATH REPORT.GROSS SPEC: NORMAL
PATH REPORT.MICROSCOPIC SPEC OTHER STN: NORMAL
PATH REPORT.RELEVANT HX SPEC: NORMAL
PHOTO IMAGE: NORMAL

## 2024-12-17 ENCOUNTER — LAB (OUTPATIENT)
Dept: LAB | Facility: CLINIC | Age: 39
End: 2024-12-17
Payer: COMMERCIAL

## 2024-12-17 DIAGNOSIS — E27.40 ADRENAL INSUFFICIENCY (H): ICD-10-CM

## 2024-12-17 DIAGNOSIS — E78.5 DYSLIPIDEMIA: ICD-10-CM

## 2024-12-17 LAB
CHOLEST SERPL-MCNC: 234 MG/DL
FASTING STATUS PATIENT QL REPORTED: YES
HDLC SERPL-MCNC: 60 MG/DL
LDLC SERPL CALC-MCNC: 129 MG/DL
NONHDLC SERPL-MCNC: 174 MG/DL
TRIGL SERPL-MCNC: 226 MG/DL

## 2024-12-17 PROCEDURE — 82024 ASSAY OF ACTH: CPT

## 2024-12-17 PROCEDURE — 36415 COLL VENOUS BLD VENIPUNCTURE: CPT

## 2024-12-17 PROCEDURE — 80061 LIPID PANEL: CPT

## 2024-12-18 LAB — ACTH PLAS-MCNC: 1028 PG/ML

## 2024-12-19 NOTE — RESULT ENCOUNTER NOTE
Dear Suyapa     Here is your results. ACTH high, how are you doing recently?    Please call nursing line, if you are [facility] patient at [phone], if you are [facility] patient at [phone],  if you have any questions.    Please take care  Adriana Wilkinson MD

## 2024-12-20 DIAGNOSIS — E27.1 ADRENAL INSUFFICIENCY, PRIMARY (H): ICD-10-CM

## 2024-12-24 ENCOUNTER — TELEPHONE (OUTPATIENT)
Dept: ENDOCRINOLOGY | Facility: CLINIC | Age: 39
End: 2024-12-24
Payer: COMMERCIAL

## 2024-12-24 RX ORDER — FLUDROCORTISONE ACETATE 0.1 MG/1
0.1 TABLET ORAL DAILY
Qty: 90 TABLET | Refills: 0 | Status: SHIPPED | OUTPATIENT
Start: 2024-12-24

## 2024-12-24 NOTE — TELEPHONE ENCOUNTER
fludrocortisone (FLORINEF) 0.1 MG tablet   Disp-90 tablet, R-2   Start: 01/31/2024 1/17/2024  [facility] Adriana Mejia MD  Endocrinology, Diabetes, and Metabolism    RTC with me in 1 year    Scheduling has been notified to contact the pt for appointment.

## 2024-12-24 NOTE — TELEPHONE ENCOUNTER
Left Voicemail (1st Attempt) and Sent Mychart (1st Attempt) for the patient to call back and schedule the following:    Appointment type: RETURN ENDOCRINE  Provider: Adriana Wilkinson MD  Return date: on or around 01/17/25  Specialty phone number: [phone]  Additional appointment(s) needed: N/A  Additonal Notes: NELIM, Lizzy Jensen, RN to Clinic [id number] Regarding result: fludrocortisone (FLORINEF) 0.1 MG tablet   LJ      12/24/24  9:34 AM    :kristian Wilkinson  [facility] 1/17/24.  Due for 1 year appt. Thanks.    Roselia Hess on 12/24/2024 at 9:53 AM

## 2024-12-26 NOTE — TELEPHONE ENCOUNTER
Patient confirmed scheduled appointment:  Date: 01/22  Time: 9:00  Visit type: return endo  Provider: Jaja  Location: [facility]  Testing/imaging:   Additional notes: Spoke with patient and scheduled soonest available appointment    Lizzy Hernández, RN to Clinic [id number] Regarding result: fludrocortisone (FLORINEF) 0.1 MG tablet   LJ      12/24/24  9:34 AM    :kristian Wilkinson  [facility] 1/17/24.  Due for 1 year appt. Thanks.    Betzaida Maldonado on 12/26/2024 at 11:44 AM

## 2025-01-21 ENCOUNTER — TELEPHONE (OUTPATIENT)
Dept: ENDOCRINOLOGY | Facility: CLINIC | Age: 40
End: 2025-01-21
Payer: COMMERCIAL

## 2025-01-21 NOTE — TELEPHONE ENCOUNTER
Patient confirmed scheduled appointment:  Date: 2/17   Time: 9 am   Visit type: return endocrine   Provider: Jaja   Location: [facility] virtual   Testing/imaging:   Additional notes: Spoke to pt and re-gwyn appt on 1/22 to re-gwyn day on 2/17 pt agreed to virtual visit.     Krysten Hassan on 1/21/2025 at 3:07 PM

## 2025-01-30 NOTE — PROVIDER NOTIFICATION
Patient is hypotensive, asymptomatic. BP:  80/41 P: 83. Patient Sodium level is 126. Provider (Dr. Singh) notified.      [FreeTextEntry1] :  I, Morgan Corderotoriin, acted solely as a scribe for Dr. George Myles M.D. on this date 01/30/2025.    All medical record entries made by the Scribe were at my, Dr. George Myles M.D., direction and personally dictated by me on 01/30/2025. I have reviewed the chart and agree that the record accurately reflects my personal performance of the history, physical exam, assessment and plan. I have also personally directed, reviewed, and agreed with the chart.

## 2025-02-17 ENCOUNTER — VIRTUAL VISIT (OUTPATIENT)
Dept: ENDOCRINOLOGY | Facility: CLINIC | Age: 40
End: 2025-02-17
Payer: COMMERCIAL

## 2025-02-17 DIAGNOSIS — E24.1 NELSON'S SYNDROME: Primary | ICD-10-CM

## 2025-02-17 PROCEDURE — 98006 SYNCH AUDIO-VIDEO EST MOD 30: CPT | Performed by: INTERNAL MEDICINE

## 2025-02-17 ASSESSMENT — PAIN SCALES - GENERAL: PAINLEVEL_OUTOF10: NO PAIN (0)

## 2025-02-17 NOTE — LETTER
2/17/2025       RE: Suyapa Hodge  [address] [place] [place] [address]     Dear Colleague,    Thank you for referring your patient, Suyapa Hodge, to the [facility] [place] at [facility]. Please see a copy of my visit note below.    Virtual Visit Details    How would you like to obtain your AVS? MyChart  If the video visit is dropped, the invitation should be resent by: Text to cell phone: [phone]   Will anyone else be joining your video visit? No    Type of service Video  Start: 9:36 am  End: 10:05 am  Amwell      Originating Location (pt. Location): Home    Distant Location (provider location):  [facility] ENDOCRINOLOGY         -- Endocrinology follow up ---      Assessment:    # Cushing's disease  # Alexei syndrome post adrenalectomy   Cushing disease post TSS 3 times (#1 2010, #2 2013, #3 2014), and adrenalectomy in #4 7/2014, currently Alexei's symdrome with pituitary ACTH tumor regrowth, Worsening pigmentation with ACTH>1250, increased the lesion, therefore she underwent to TSS 4 th time, #5 July 2021 by Dr. Huffman   Pathology showed ACTH stained. Post op ACTH was 14 with siginificant improvement of pigmentation. Reviewed last MRI 10/2021 showed no residual tumor, 12/2022 no residual, no recurrence by MRI, but  ACTH elevated quickly and 5 month after the surgery ACTH incrased to 418. Octreotide was presscribed Mycapssa 20 mg daily to aim for prevent tumor recurrence (tissue SSTR2 expression positive.), however despite multiple efforts to her insurance company, it was not approved. We have spent significant time and efforts for this. Started cabergoline in 2/2023 with 0.25 mg once a week, no side effect.     - MRI 11/2024 may have small hypoenhanced area just above the left carotid artery and one episode of high ACTH 1000, I will discuss with Dr. Huffman, if there is feasibility/benefit for RT    - Reviewed MRI 11/2023, no change,   possible very subtle left cavernous sinus lesion, still possibly there but no change. 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        Hypothyrodism     - continue LT4 75 mcg daily (6 days a week)    Hypogonadism    - continue BCP (E2 20 mcg)           RTC with me in 1 year      Total 30 minutes spent on the date of the encounter doing chart review, history and exam, reviewed MRI brain serially, reviewed pathology results documentation and further activities as noted above.    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  Medication compliance excellent. Most recent ACTH increased from 400 to 1000 in 12/2024.  Interval History as of 1/17/2024 : Patient has been doing well. Medication compliance excellent  . New event includes  : she had an episode of PNA and adrenal insufficiency and ICU stay in 8/2023.   Interval History as of 7/5/2023 : Patient has been doing well. Medication compliance: excellent, tolerating to cabergoline as well . Skin darekned? But patient mentioned this is because she was outside.   Interval History as of 1/4/2023 : Patient has been doing well. Not noting any pigmentation. New event includes  : ACTH now increase to 418..  Interval History as of 6/1/2022 : Patient has been doing well.  Medication compliance good . New event includes: no significant medical event noted, still  "has some chronic fatigue+, she feels her skin tone became back to her baseline.  Interval History as of 2/23/2022 : Patient has been doing well. Medication compliance  excellent . New event includes L regular cycle, no pertinent medical event noted .  Interval History as of 8/23/2021 : Patient has been doing well. Underwent to 4th surgery 7/22/2021. Pigmentation of her dkin isn imroving, post op ACTH was 14.   Interval History as of 4/14/2021 : No HA, medication compliance good, no dizziness, no HA. Pigmentation gettign worse, lesion increasing in size.   History of Present Illness: Suyapa Hodge is a 34 year old female with a history of pituitary Cushing's syndrome, hypothalamic adrenal dysfunction and pituitary microadenoma who presents for follow up. She was last seen in clinic 14 months ago. She was diagnosed with pituitary microademoma serveral year ago which was suspected to cause her Cushing's disease. She had pituitary surgery x3 in 2013-14 and finally underwent bilateral adrenalectomy in July 2014. ACTH levels remained normal. In July 2017 ACTH levels were tested and was 271, which increased to 549 raising concern for recurrence vs missed diagnosis.  Chest abdomen and pelvis CT was normal. 8 mg dex suppression reduced levels to 118.     She is currently taking hydrocortisone BID (10 mg misses dose once per month), L-thyroxine 75 mcg tablets full tablet 6 days and desmopressin 0.1 mg in the morning. She denies that she gets up in the middle of the night to use the restroom. She reports that she goes to the eye doctor every 3 months to see if the tumor \"has moved to her optic nerve\".      She is on hormone replacement therapy (prometrium and vivelle patch). No history of uterine surgeries. She has a history of irregular periods but has been evaluated with GYN, per patient, and was not told specifics.     She is not taking the calcium supplement. She has a history of avascular necrosis but has been told " that she is too young for a hip replacement. DXA showed low BMD for age.     No eye symptoms  No headache, change in vision, loss of peripheral vision  Complete ROS that were obtained were negative.     Active Medications:      cyclobenzaprine (FLEXERIL) 10 MG tablet, Take 1 tablet (10 mg) by mouth 3 times daily as needed for muscle spasms, Disp: 30 tablet, Rfl: 0     desmopressin (DDAVP) 0.1 MG tablet, Take 1 tablet (100 mcg) by mouth 2 times daily (1 TAB) MORNING AND AT BEDTIME, Disp: 180 tablet, Rfl: 5     estradiol (VIVELLE-DOT) 0.05 MG/24HR bi-weekly patch, Place 1 patch onto the skin twice a week, Disp: 27 patch, Rfl: 5     fludrocortisone (FLORINEF) 0.1 MG tablet, Take 0.5 tablets (0.05 mg) by mouth daily Please keep 10-21-19 clinic appt for further refills, Disp: 45 tablet, Rfl: 5     hydrocortisone (CORTEF) 10 MG tablet, 1 tab (10 mg) am 1/2 tab (5 mg) pm plus sick day supply as directed, Disp: 150 tablet, Rfl: 5     levothyroxine (SYNTHROID/LEVOTHROID) 75 MCG tablet, One tablet day 6 days per wk and none on the 7th day each wk, Disp: 90 tablet, Rfl: 5     Multiple Vitamin (MULTI-VITAMIN DAILY PO), , Disp: , Rfl:      progesterone (PROMETRIUM) 100 MG capsule, Take 1 tablet daily, Disp: 90 capsule, Rfl: 1     triamcinolone (KENALOG) 0.1 % external cream, Apply topically 2 times daily As needed for flares, Disp: 80 g, Rfl: 1     Calcium carb-Vitamin D 500 mg [place]-200 units (OSCAL WITH D;OYSTER SHELL CALCIUM) 500-200 MG-UNIT per tablet, Take 1 tablet by mouth 2 times daily (with meals), Disp: , Rfl:      methocarbamol (ROBAXIN) 500 MG tablet, Take 1-2 tablets (500-1,000 mg) by mouth nightly as needed for muscle spasms (Patient not taking: Reported on 10/21/2019), Disp: 20 tablet, Rfl: 0     naproxen (NAPROSYN) 500 MG tablet, Take 1 tablet (500 mg) by mouth 2 times daily (with meals) (Patient not taking: Reported on 10/21/2019), Disp: 60 tablet, Rfl: 1      Allergies:   Patient has no known allergies.     Past  Medical History:  Pituitary Cushing's syndrome   Hypothalamic adrenal dysfunction   Pituitary microadenoma   Estrogen deficiency   Hypopituitarism after adenoma resection   Avascular necrosis of femur head   Cervical high risk HPV   Colon polyp   Diabetes insipidus   Hypercalcemia   Hypertension   Pulmonary emboli   Hypothyroidism      Past Surgical History:  Lumbar drain- 01/2014   Adrenalectomy-07/2014   Tumor resection (hypophysectomy)- 07/2013   [place] teeth extracted     Family History:   Mother: asthma, osteoporosis, obesity   Father: hyperlipidemia, obesity   Sister: allergies, obesity   Maternal grandmother: colon cancer  Maternal grandfather: lung cancer   Paternal grandmother: lung cancer   Paternal grandfather: lung cancer      Social History:   The patient was alone   Smoking Status: former; 1/2 pack per day for 3 years; 12 years since quitting   Smokeless Tobacco: never    Alcohol Use: yes; 3 beers per month   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 No masses, lesions, tenderness or abnormalities  Resporatory: non acute disctress, breathing normally  Skin: pigmentation on her face has been improving      Data:  TSH   Date Value Ref Range Status   01/12/2018 <0.01 (L) 0.40 - 4.00 mU/L Final   01/08/2016 <0.01 (L) 0.40 - 4.00 mU/L Final   09/25/2015 <0.01 (L) 0.40 - 4.00 mU/L Final   05/22/2015 <0.01 (L) 0.40 - 4.00 mU/L Final   03/13/2015 (L) 0.40 - 4.00 mU/L Final    <0.01  Effective 7/30/2014, the reference range for this assay has changed to reflect   new instrumentation/methodology.       T4 Free   Date Value Ref Range Status   10/08/2018 0.53 (L) 0.76 - 1.46 ng/dL Final   01/12/2018 0.90 0.76 - 1.46 ng/dL Final   10/04/2017 0.90 0.76 - 1.46 ng/dL Final   07/07/2017 0.98 0.76 - 1.46 ng/dL Final   12/30/2016 0.91 0.76 - 1.46 ng/dL Final     CBC RESULTS:   Recent Labs   Lab  Test 07/15/14  0806   WBC 8.2   RBC 4.51   HGB 13.3   HCT 39.7   MCV 88   MCH 29.5   MCHC 33.5   RDW 13.7        Recent Labs   Lab Test 10/08/18  0802 01/12/18  0732    137   POTASSIUM 4.1 4.3   CHLORIDE 105 105   CO2 32 27   ANIONGAP 4 5   GLC 87 78   BUN 14 19   CR 1.10* 0.93   ELIEZER 9.3 9.2     MRI: I also personally reviewed brain MRI and pituitary lesion and interepret and expalined to the patient.     intraseller maybe small hypodense 3-4 mm(?) residual vs scar in my impression           MRI: I reviewed original images of 2/19/2021 ad explained to the patient.   Brain/ Pituitary MRI without and with contrast     History: 35 yo female pituitary Cushing disease follow up; Pituitary  dependent Cushing disease (H).     ICD-10: Pituitary dependent Cushing disease (H)     Comparison:  Multiple pituitary MRI exams since 12/3/2009, most recent  one from 3/16/2020.     Technique: Axial diffusion and FLAIR images of the whole brain  obtained without intravenous contrast. Sagittal T1 and T2-weighted,  coronal T2-weighted, coronal T1-weighted images with focus on the  sella were obtained without intravenous contrast. Post intravenous  contrast using gadolinium coronal and sagittal T1-weighted images were  obtained focused on the sella. Dynamic postcontrast coronal  T1-weighted images were also obtained.     Contrast: 7mL Gadavist IV     Findings:    Postsurgical changes of transsphenoidal approach pituitary  microadenoma resection. Hypoenhancing area within the left side of the  sella, extending to the left cavernous sinus, increased since  10/8/2018, measuring 9 x 7 x 10 mm, previously 6 x 6 x 4 mm.  Associated diffusion restriction. Abutment of the cavernous segment of  left internal carotid artery.     The pituitary stalk appears midline. The optic chiasm appears intact  and not displaced. The major cavernous carotid vascular flow-voids  appear patent.                  Again, thank you for allowing me to  participate in the care of your patient.      Sincerely,    Adriana Wilkinson MD

## 2025-02-17 NOTE — PROGRESS NOTES
Virtual Visit Details    How would you like to obtain your AVS? [employer]  If the video visit is dropped, the invitation should be resent by: Text to cell phone: [phone]   Will anyone else be joining your video visit? No    Type of service Video  Start: 9:36 am  End: 10:05 am  Amwell      Originating Location (pt. Location): Home    Distant Location (provider location):  [facility] ENDOCRINOLOGY         -- Endocrinology follow up ---      Assessment:    # Cushing's disease  # Alexei syndrome post adrenalectomy   Cushing disease post TSS 3 times (#1 2010, #2 2013, #3 2014), and adrenalectomy in #4 7/2014, currently Alexei's symdrome with pituitary ACTH tumor regrowth, Worsening pigmentation with ACTH>1250, increased the lesion, therefore she underwent to TSS 4 th time, #5 July 2021 by Dr. Huffman   Pathology showed ACTH stained. Post op ACTH was 14 with siginificant improvement of pigmentation. Reviewed last MRI 10/2021 showed no residual tumor, 12/2022 no residual, no recurrence by MRI, but  ACTH elevated quickly and 5 month after the surgery ACTH incrased to 418. Octreotide was presscribed Mycapssa 20 mg daily to aim for prevent tumor recurrence (tissue SSTR2 expression positive.), however despite multiple efforts to her insurance company, it was not approved. We have spent significant time and efforts for this. Started cabergoline in 2/2023 with 0.25 mg once a week, no side effect.     - MRI 11/2024 may have small hypoenhanced area just above the left carotid artery and one episode of high ACTH 1000, I will discuss with Dr. Huffman, if there is feasibility/benefit for RT    - Reviewed MRI 11/2023, no change,  possible very subtle left cavernous sinus lesion, still possibly there but no change. 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        Hypothyrodism     - continue LT4 75 mcg daily (6 days a week)    Hypogonadism    - continue BCP (E2 20 mcg)           RTC with me in 1 year      Total 30 minutes spent on the date of the encounter doing chart review, history and exam, reviewed MRI brain serially, reviewed pathology results documentation and further activities as noted above.    Adriana Wilkinson MD  Staff Physician  Endocrinology and Metabolism  [facility]  License: [id number]  Pager: [phone]    Interval History as of 2/17/2025 : Patient has been doing well, working night shift (she is RN) at [facility] [employer] ICU unit.  Medication compliance excellent. Most recent ACTH increased from 400 to 1000 in 12/2024.  Interval History as of 1/17/2024 : Patient has been doing well. Medication compliance excellent  . New event includes  : she had an episode of PNA and adrenal insufficiency and ICU stay in 8/2023.   Interval History as of 7/5/2023 : Patient has been doing well. Medication compliance: excellent, tolerating to cabergoline as well . Skin darekned? But patient mentioned this is because she was outside.   Interval History as of 1/4/2023 : Patient has been doing well. Not noting any pigmentation. New event includes  : ACTH now increase to 418..  Interval History as of 6/1/2022 : Patient has been doing well.  Medication compliance good . New event includes: no significant medical event noted, still has some chronic fatigue+, she feels her skin tone became back to her baseline.  Interval History as of 2/23/2022 : Patient has been doing well. Medication compliance  excellent . New event includes L regular cycle, no pertinent medical event noted .  Interval History as of 8/23/2021 : Patient has been doing  "well. Underwent to 4th surgery 7/22/2021. Pigmentation of her dkin isn imroving, post op ACTH was 14.   Interval History as of 4/14/2021 : No HA, medication compliance good, no dizziness, no HA. Pigmentation gettign worse, lesion increasing in size.   History of Present Illness: Suyapa Hodge is a 34 year old female with a history of pituitary Cushing's syndrome, hypothalamic adrenal dysfunction and pituitary microadenoma who presents for follow up. She was last seen in clinic 14 months ago. She was diagnosed with pituitary microademoma serveral year ago which was suspected to cause her Cushing's disease. She had pituitary surgery x3 in 2013-14 and finally underwent bilateral adrenalectomy in July 2014. ACTH levels remained normal. In July 2017 ACTH levels were tested and was 271, which increased to 549 raising concern for recurrence vs missed diagnosis.  Chest abdomen and pelvis CT was normal. 8 mg dex suppression reduced levels to 118.     She is currently taking hydrocortisone BID (10 mg misses dose once per month), L-thyroxine 75 mcg tablets full tablet 6 days and desmopressin 0.1 mg in the morning. She denies that she gets up in the middle of the night to use the restroom. She reports that she goes to the eye doctor every 3 months to see if the tumor \"has moved to her optic nerve\".      She is on hormone replacement therapy (prometrium and vivelle patch). No history of uterine surgeries. She has a history of irregular periods but has been evaluated with GYN, per patient, and was not told specifics.     She is not taking the calcium supplement. She has a history of avascular necrosis but has been told that she is too young for a hip replacement. DXA showed low BMD for age.     No eye symptoms  No headache, change in vision, loss of peripheral vision  Complete ROS that were obtained were negative.     Active Medications:     cyclobenzaprine (FLEXERIL) 10 MG tablet, Take 1 tablet (10 mg) by mouth 3 times " daily as needed for muscle spasms, Disp: 30 tablet, Rfl: 0    desmopressin (DDAVP) 0.1 MG tablet, Take 1 tablet (100 mcg) by mouth 2 times daily (1 TAB) MORNING AND AT BEDTIME, Disp: 180 tablet, Rfl: 5    estradiol (VIVELLE-DOT) 0.05 MG/24HR bi-weekly patch, Place 1 patch onto the skin twice a week, Disp: 27 patch, Rfl: 5    fludrocortisone (FLORINEF) 0.1 MG tablet, Take 0.5 tablets (0.05 mg) by mouth daily Please keep 10-21-19 clinic appt for further refills, Disp: 45 tablet, Rfl: 5    hydrocortisone (CORTEF) 10 MG tablet, 1 tab (10 mg) am 1/2 tab (5 mg) pm plus sick day supply as directed, Disp: 150 tablet, Rfl: 5    levothyroxine (SYNTHROID/LEVOTHROID) 75 MCG tablet, One tablet day 6 days per wk and none on the 7th day each wk, Disp: 90 tablet, Rfl: 5    Multiple Vitamin (MULTI-VITAMIN DAILY PO), , Disp: , Rfl:     progesterone (PROMETRIUM) 100 MG capsule, Take 1 tablet daily, Disp: 90 capsule, Rfl: 1    triamcinolone (KENALOG) 0.1 % external cream, Apply topically 2 times daily As needed for flares, Disp: 80 g, Rfl: 1    Calcium carb-Vitamin D 500 mg Eyak-200 units (OSCAL WITH D;OYSTER SHELL CALCIUM) 500-200 MG-UNIT per tablet, Take 1 tablet by mouth 2 times daily (with meals), Disp: , Rfl:     methocarbamol (ROBAXIN) 500 MG tablet, Take 1-2 tablets (500-1,000 mg) by mouth nightly as needed for muscle spasms (Patient not taking: Reported on 10/21/2019), Disp: 20 tablet, Rfl: 0    naproxen (NAPROSYN) 500 MG tablet, Take 1 tablet (500 mg) by mouth 2 times daily (with meals) (Patient not taking: Reported on 10/21/2019), Disp: 60 tablet, Rfl: 1      Allergies:   Patient has no known allergies.     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 resection   Avascular necrosis of femur head   Cervical high risk HPV   Colon polyp   Diabetes insipidus   Hypercalcemia   Hypertension   Pulmonary emboli    Hypothyroidism      Past Surgical History:  Lumbar drain- 01/2014   Adrenalectomy-07/2014   Tumor resection (hypophysectomy)- 07/2013   [place] teeth extracted     Family History:   Mother: asthma, osteoporosis, obesity   Father: hyperlipidemia, obesity   Sister: allergies, obesity   Maternal grandmother: colon cancer  Maternal grandfather: lung cancer   Paternal grandmother: lung cancer   Paternal grandfather: lung cancer      Social History:   The patient was alone   Smoking Status: former; 1/2 pack per day for 3 years; 12 years since quitting   Smokeless Tobacco: never    Alcohol Use: yes; 3 beers per month   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 No masses, lesions, tenderness or abnormalities  Resporatory: non acute disctress, breathing normally  Skin: pigmentation on her face has been improving      Data:  TSH   Date Value Ref Range Status   01/12/2018 <0.01 (L) 0.40 - 4.00 mU/L Final   01/08/2016 <0.01 (L) 0.40 - 4.00 mU/L Final   09/25/2015 <0.01 (L) 0.40 - 4.00 mU/L Final   05/22/2015 <0.01 (L) 0.40 - 4.00 mU/L Final   03/13/2015 (L) 0.40 - 4.00 mU/L Final    <0.01  Effective 7/30/2014, the reference range for this assay has changed to reflect   new instrumentation/methodology.       T4 Free   Date Value Ref Range Status   10/08/2018 0.53 (L) 0.76 - 1.46 ng/dL Final   01/12/2018 0.90 0.76 - 1.46 ng/dL Final   10/04/2017 0.90 0.76 - 1.46 ng/dL Final   07/07/2017 0.98 0.76 - 1.46 ng/dL Final   12/30/2016 0.91 0.76 - 1.46 ng/dL Final     CBC RESULTS:   Recent Labs   Lab Test 07/15/14  0806   WBC 8.2   RBC 4.51   HGB 13.3   HCT 39.7   MCV 88   MCH 29.5   MCHC 33.5   RDW 13.7        Recent Labs   Lab Test 10/08/18  0802 01/12/18  0732    137   POTASSIUM 4.1 4.3   CHLORIDE 105 105   CO2 32 27   ANIONGAP 4 5   GLC 87 78   BUN 14 19   CR 1.10* 0.93   ELIEZER 9.3 9.2     MRI: I  also personally reviewed brain MRI and pituitary lesion and interepret and expalined to the patient.     intraseller maybe small hypodense 3-4 mm(?) residual vs scar in my impression           MRI: I reviewed original images of 2/19/2021 ad explained to the patient.   Brain/ Pituitary MRI without and with contrast     History: 35 yo female pituitary Cushing disease follow up; Pituitary  dependent Cushing disease (H).     ICD-10: Pituitary dependent Cushing disease (H)     Comparison:  Multiple pituitary MRI exams since 12/3/2009, most recent  one from 3/16/2020.     Technique: Axial diffusion and FLAIR images of the whole brain  obtained without intravenous contrast. Sagittal T1 and T2-weighted,  coronal T2-weighted, coronal T1-weighted images with focus on the  sella were obtained without intravenous contrast. Post intravenous  contrast using gadolinium coronal and sagittal T1-weighted images were  obtained focused on the sella. Dynamic postcontrast coronal  T1-weighted images were also obtained.     Contrast: 7mL Gadavist IV     Findings:    Postsurgical changes of transsphenoidal approach pituitary  microadenoma resection. Hypoenhancing area within the left side of the  sella, extending to the left cavernous sinus, increased since  10/8/2018, measuring 9 x 7 x 10 mm, previously 6 x 6 x 4 mm.  Associated diffusion restriction. Abutment of the cavernous segment of  left internal carotid artery.     The pituitary stalk appears midline. The optic chiasm appears intact  and not displaced. The major cavernous carotid vascular flow-voids  appear patent.

## 2025-04-10 ENCOUNTER — MYC REFILL (OUTPATIENT)
Dept: FAMILY MEDICINE | Facility: CLINIC | Age: 40
End: 2025-04-10
Payer: COMMERCIAL

## 2025-04-10 DIAGNOSIS — M87.059 AVASCULAR NECROSIS OF FEMORAL HEAD, UNSPECIFIED LATERALITY (H): ICD-10-CM

## 2025-04-10 DIAGNOSIS — D35.2 PITUITARY ADENOMA (H): ICD-10-CM

## 2025-04-10 RX ORDER — METHOCARBAMOL 500 MG/1
500-1000 TABLET, FILM COATED ORAL
Qty: 20 TABLET | Refills: 0 | Status: SHIPPED | OUTPATIENT
Start: 2025-04-10

## 2025-04-10 RX ORDER — CABERGOLINE 0.5 MG/1
TABLET ORAL
Qty: 12 TABLET | Refills: 0 | Status: SHIPPED | OUTPATIENT
Start: 2025-04-10

## 2025-04-11 ENCOUNTER — TELEPHONE (OUTPATIENT)
Dept: ENDOCRINOLOGY | Facility: CLINIC | Age: 40
End: 2025-04-11
Payer: COMMERCIAL

## 2025-04-11 DIAGNOSIS — E24.0 CUSHING'S DISEASE (H): Primary | ICD-10-CM

## 2025-04-11 NOTE — TELEPHONE ENCOUNTER
Could you send a prescription for syringes/needle for administering solu cortef?   Thank you  Estefany Stearns, [facility]  [address] [place] [address]  [place] [id number]  [phone]

## 2025-05-15 ENCOUNTER — LAB (OUTPATIENT)
Dept: LAB | Facility: CLINIC | Age: 40
End: 2025-05-15
Payer: COMMERCIAL

## 2025-05-15 DIAGNOSIS — E24.1 NELSON'S SYNDROME: ICD-10-CM

## 2025-05-15 DIAGNOSIS — E03.9 HYPOTHYROIDISM: ICD-10-CM

## 2025-05-15 DIAGNOSIS — N18.30 CHRONIC KIDNEY DISEASE, STAGE 3 (H): ICD-10-CM

## 2025-05-15 LAB
ANION GAP SERPL CALCULATED.3IONS-SCNC: 12 MMOL/L (ref 7–15)
BUN SERPL-MCNC: 12.1 MG/DL (ref 6–20)
CALCIUM SERPL-MCNC: 9.3 MG/DL (ref 8.8–10.4)
CHLORIDE SERPL-SCNC: 101 MMOL/L (ref 98–107)
CREAT SERPL-MCNC: 1.09 MG/DL (ref 0.51–0.95)
CREAT UR-MCNC: 138 MG/DL
EGFRCR SERPLBLD CKD-EPI 2021: 66 ML/MIN/1.73M2
GLUCOSE SERPL-MCNC: 88 MG/DL (ref 70–99)
HCO3 SERPL-SCNC: 21 MMOL/L (ref 22–29)
MICROALBUMIN UR-MCNC: <12 MG/L
MICROALBUMIN/CREAT UR: NORMAL MG/G{CREAT}
POTASSIUM SERPL-SCNC: 4.4 MMOL/L (ref 3.4–5.3)
SODIUM SERPL-SCNC: 134 MMOL/L (ref 135–145)
T4 FREE SERPL-MCNC: 0.3 NG/DL (ref 0.9–1.7)
TSH SERPL DL<=0.005 MIU/L-ACNC: 0.26 UIU/ML (ref 0.3–4.2)

## 2025-05-16 ENCOUNTER — RESULTS FOLLOW-UP (OUTPATIENT)
Dept: FAMILY MEDICINE | Facility: CLINIC | Age: 40
End: 2025-05-16

## 2025-05-19 ENCOUNTER — MYC MEDICAL ADVICE (OUTPATIENT)
Dept: ENDOCRINOLOGY | Facility: CLINIC | Age: 40
End: 2025-05-19
Payer: COMMERCIAL

## 2025-05-19 DIAGNOSIS — E03.8 OTHER SPECIFIED HYPOTHYROIDISM: Primary | ICD-10-CM

## 2025-05-20 ENCOUNTER — TELEPHONE (OUTPATIENT)
Dept: ENDOCRINOLOGY | Facility: CLINIC | Age: 40
End: 2025-05-20
Payer: COMMERCIAL

## 2025-05-20 RX ORDER — LEVOTHYROXINE SODIUM 88 UG/1
88 TABLET ORAL DAILY
Qty: 90 TABLET | Refills: 3 | Status: SHIPPED | OUTPATIENT
Start: 2025-05-20

## 2025-05-20 NOTE — TELEPHONE ENCOUNTER
An important message from Dr Wilkinson  (Newest Message First)             Adriana Wilkinson MD to Me  (Selected Message)        5/19/25  3:24 PM  88 mcg to [facility] now  Me to Adriana Wilkinson MD   DM      5/19/25  1:04 PM  Pt reports missing doses. Would you still like to increase to 88mcg daily? Or have her take it as ordered for 2-3 months and re-test?   Milla Hodge to [facility] (supporting You)

## 2025-05-20 NOTE — TELEPHONE ENCOUNTER
MTM referral from: [place] clinic visit (referral by provider)    MTM referral outreach attempt #2 on May 20, 2025 at 12:39 PM      Outcome: Patient not reachable after several attempts, routed to Pharmacist Team/Provider as an [employer] Message Sent    [facility]

## 2025-06-03 ENCOUNTER — PATIENT OUTREACH (OUTPATIENT)
Dept: ONCOLOGY | Facility: CLINIC | Age: 40
End: 2025-06-03
Payer: COMMERCIAL

## 2025-06-03 NOTE — PROGRESS NOTES
New Patient Oncology Nurse Navigator Note     Referring provider:     Adriana Wilkinson MD        Referring Clinic/Organization: [facility] Endocrinology      Referred to (specialty:) Radiation Oncology     Requested provider (if applicable): Dr. Hermosillo      Date Referral Received: Elizabeth 3, 2025     Evaluation for:  E24.0 (ICD-10-CM) - Pituitary-dependent Cushing's disease (H)      Clinical History (per Nurse review of records provided):      Patient seen on 2/17/25 by Dr. Wilkinson in Endocrinology for Cushing's Disease. See not booked in chart.     Per IB messages:     From: Jayla Hermosillo MD PhD  Sent: 3/31/2025   1:28 PM CDT  To: Adriana Wilkinson MD  Subject: RE: Cushing patient left cavenoue sinus          Hi Adriana,    I would say if no additional surgery is possible which I suspect and you don't have any medical therapy for her, it would be reasonable to consider RT.  Because of how close it is to the chiasm, we'd need to do conventional so 5.5 weeks of treatment.  I see the spot you are talking about.    In general, if it's far enough away from the optics (like 3mm) then we usually are able to proceed with radiosurgery so less time investment, better low dose RT side effect profile for pts so then I would say good to get them in early. Once we have to do conventional then most of the time probably ok to delay until needed depending on the hormone what you think the risks are of the hypersecretion.- ACTH probably more interested in trying to decrease that, agre? And probably risks are competing with RT's low chance for neurocog impairment and rare malignancy risk among other things. I try to keep the dose outside of the post op bed as low as I can but it isn't zero,but you probably saw from patient's you shared with Clint most people do ok.    I would be happy to see her and chat about it or if you were thinking after the next follow up - just let me know!  Jayla    ----- Message -----  From: Adriana Wilkinson,  MD  Sent: 2/17/2025  10:28 AM CDT  To: Jayla Hermosillo MD PhD  Subject: Cushing patient left cavenoue sinus              Hi Jayla    When you have a chance (non urgent) could you look at MRI and tell me if there is any possibility for RT treatment to hypoenhanced area? (Slightly growing I think, and ACTH increased from 400 to 1000 supporting this lesion possible).    Also, for my learning, could you please tell me, in general RT is better to proceed early stage of tumor growth or does not need early point and better to wait until really needed?    Thank you very much and have a wonderful week! -Adriana       Records Location: Care Everywhere, Media, and See Bookmarked material     Records Needed: NA     Additional testing needed prior to consult: NA    Payor: [place] [employer] / Plan: [facility] CHOICE / Product Type: Indemnity /     Elizabeth 3, 2025  Referral Received and reviewed.   Sent to NPS to schedule.     Josseline DIAZN, RN, OCN  Oncology Nurse Navigator   [facility]  Cancer Care Service Line   New Patient Hem/Onc Scheduling / Referrals: [phone] (fax: [phone] )

## 2025-06-07 DIAGNOSIS — D35.2 PITUITARY ADENOMA (H): ICD-10-CM

## 2025-06-08 RX ORDER — CABERGOLINE 0.5 MG/1
TABLET ORAL
Qty: 12 TABLET | Refills: 0 | Status: SHIPPED | OUTPATIENT
Start: 2025-06-08

## 2025-06-18 NOTE — TELEPHONE ENCOUNTER
"MEDICAL RECORDS REQUEST   Radiation Oncology  [address] [place] [address]  Fax: [phone]          FUTURE VISIT INFORMATION                                                   Suyapa Hodge, : 1985 scheduled for future visit at [facility] Radiation Oncology    RECORDS REQUESTED FOR VISIT                                                     BRAIN STATUS DETAILS   OFFICE NOTE from referring provider Epic 25:\" Dr. Adriana Wilkinson   OFFICE NOTE from neuro/neuro surg Epic 24: Katharine William PA-C  11/15/22: Dr. Adán Huffman   OPERATIVE/BIOPSY REPORTS [facility] 21: resection of pituitary tumor   1/18/10: Transnasal endoscopic excision of pituitary microadenoma    MEDICATION LIST [facility]    LABS     PATHOLOGY REPORTS Report in Epic 21: [id number]   1/18/10:     ANYTHING RELATED TO DIAGNOSIS Epic Most recent 5/15/25   IMAGING (NEED IMAGES & REPORT)     MRI PACS 24-12/3/09: MR Brain  22-3/16/20: MR Pituitary   PREVIOUS RADIATION     WHAT HEALTHCARE FACILITY Epic [facility]   RADIATION ONCOLOGIST NOTES [facility] 3/6/14: Dr. Suni Saldivar  (Consult Only)   RADIATION TREATMENT SUMMARY NOTES     RADIATION TREATMENT PLAN     DVH = DOSE VOLUME HISTOGRAM     DICOM CD (TX PLANNING CT, TX PLAN, STRUCTURE SET) *If no DICOM avail ask for Jimmie     *[facility] and [facility] Radiation On[facility] patients send to [facility] with ATTN: JAMIE/Elo Dosi/Physics    *only [facility] patient's, use [facility]       "

## 2025-07-08 ENCOUNTER — ANCILLARY PROCEDURE (OUTPATIENT)
Dept: GENERAL RADIOLOGY | Facility: CLINIC | Age: 40
End: 2025-07-08
Attending: PHYSICIAN ASSISTANT
Payer: COMMERCIAL

## 2025-07-08 ENCOUNTER — OFFICE VISIT (OUTPATIENT)
Dept: FAMILY MEDICINE | Facility: CLINIC | Age: 40
End: 2025-07-08
Payer: COMMERCIAL

## 2025-07-08 VITALS
RESPIRATION RATE: 16 BRPM | WEIGHT: 173 LBS | HEART RATE: 89 BPM | DIASTOLIC BLOOD PRESSURE: 80 MMHG | SYSTOLIC BLOOD PRESSURE: 114 MMHG | HEIGHT: 67 IN | OXYGEN SATURATION: 99 % | TEMPERATURE: 97.3 F | BODY MASS INDEX: 27.15 KG/M2

## 2025-07-08 DIAGNOSIS — M25.532 LEFT WRIST PAIN: Primary | ICD-10-CM

## 2025-07-08 DIAGNOSIS — N18.30 STAGE 3 CHRONIC KIDNEY DISEASE, UNSPECIFIED WHETHER STAGE 3A OR 3B CKD (H): ICD-10-CM

## 2025-07-08 DIAGNOSIS — M25.532 LEFT WRIST PAIN: ICD-10-CM

## 2025-07-08 PROCEDURE — G2211 COMPLEX E/M VISIT ADD ON: HCPCS | Performed by: PHYSICIAN ASSISTANT

## 2025-07-08 PROCEDURE — 73110 X-RAY EXAM OF WRIST: CPT | Mod: TC | Performed by: STUDENT IN AN ORGANIZED HEALTH CARE EDUCATION/TRAINING PROGRAM

## 2025-07-08 PROCEDURE — 3079F DIAST BP 80-89 MM HG: CPT | Performed by: PHYSICIAN ASSISTANT

## 2025-07-08 PROCEDURE — 99214 OFFICE O/P EST MOD 30 MIN: CPT | Performed by: PHYSICIAN ASSISTANT

## 2025-07-08 PROCEDURE — 3074F SYST BP LT 130 MM HG: CPT | Performed by: PHYSICIAN ASSISTANT

## 2025-07-08 NOTE — PROGRESS NOTES
"  Assessment & Plan     Left wrist pain  X-ray showed no abnormalities or signs of arthritis. Suggested wearing a brace as much as possible while sleeping, and during the day if possible. Can remove it to shower and wash hands. Try to avoid over doing any physical activity and rest the wrist. Will get an MRI without contrast of the left wrist to look for tendonitis, ganglion cyst since the pain has been occurring since November.  - XR Wrist Left Navicular GE 3  Views; Future  - MR Wrist Left w/o Contrast; Future  - Wrist/Arm/Hand Bracing Supplies Order Wrist Brace; Left; non-thumb spica    Chronic kidney disease, stage 3 (H)  Informed to avoid using NSAIDS for wrist pain due to CKD and should use Tylenol. Encourage to get plenty of fluids.       The longitudinal plan of care for the diagnosis(es)/condition(s) as documented were addressed during this visit. Due to the added complexity in care, I will continue to support Milla in the subsequent management and with ongoing continuity of care.    The  Poly BOLIVAR acted as a scribe and the encounter documented above was completely performed by myself and the documentation reflects the work I have performed today.         BMI  Estimated body mass index is 27.22 kg/m  as calculated from the following:    Height as of this encounter: 1.698 m (5' 6.85\").    Weight as of this encounter: 78.5 kg (173 lb).             Subjective   Milla is a 39 year old, presenting for the following health issues:  Wrist Pain (Left wrist pain off and on since Nov 2024, pain and swelling )      7/8/2025     8:37 AM   Additional Questions   Roomed by An V.         7/8/2025     8:37 AM   Patient Reported Additional Medications   Patient reports taking the following new medications none     History of Present Illness       Reason for visit:  L wrist pain  Symptoms include:  Pain on the back radial side of the wrist  Symptom intensity:  Moderate  Symptom progression:  Staying the " "same  Had these symptoms before:  Yes  Has tried/received treatment for these symptoms:  No  What makes it worse:  Lift/carrying things, pushing  What makes it better:  Rest   She is taking medications regularly.      Pain and inflammation started in the left wrist on the dorsal side in November. Pain occurs when she is moving the wrist into flexion and pushing and grabbing things. The pain will radiate to the 1st and 2nd finger. No trauma that she is aware of caused the pain.     Pain History:  When did you first notice your pain? Nov 2024   Have you seen anyone else for your pain? No  How has your pain affected your ability to work? Not applicable  Where in your body do you have pain? Musculoskeletal problem/pain  Onset/Duration: off and on since Nov 2024  Description  Location: wrist - left  Joint Swelling: YES  Redness: No  Pain: YES  Warmth: No  Intensity:  moderate  Progression of Symptoms:  same  Accompanying signs and symptoms:   Fevers: No  Numbness/tingling/weakness: No  History  Trauma to the area: No  Recent illness:  No  Previous similar problem: No  Previous evaluation:  No  Precipitating or alleviating factors:  Aggravating factors include: lifting, overuse, and movement of the wrist   Therapies tried and outcome: rest/inactivity              Objective    /80   Pulse 89   Temp 97.3  F (36.3  C) (Temporal)   Resp 16   Ht 1.698 m (5' 6.85\")   Wt 78.5 kg (173 lb)   LMP  (LMP Unknown)   SpO2 99%   BMI 27.22 kg/m    Body mass index is 27.22 kg/m .  Physical Exam  Musculoskeletal:      Right wrist: Normal.      Left wrist: Swelling, tenderness and bony tenderness present. No deformity or snuff box tenderness. Normal range of motion.      Right hand: Normal. Normal range of motion. Normal strength.      Left hand: Normal. Normal range of motion. Normal strength.        Arms:       Comments: Pain is focal to one area on the dorsal lateral side of the wrist.     Finkelstein test was negative  "                    Signed Electronically by: Ashley Carbone PA-C

## 2025-07-14 NOTE — PROGRESS NOTES
[facility]   RADIATION ONCOLOGY INITIAL CONSULTATION NOTE    Patient Name: Suyapa Hodge  Requesting Physician: Dr. Jaja JUAN  MRN: [id number]  : 1985  Neurosurgeon: Dr. 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    History Of Present Illness:   Suyapa underwent three pituitary resections between  and  for Cushing s disease, followed by bilateral adrenalectomy in July?. ACTH levels initially normalized but increased to 271 in  and then to 549? despite normal body CT and partial suppression on high-dose dexamethasone.     In  she had a fourth transsphenoidal surgery for Alexei s syndrome, lowering ACTH to 14?pg/mL and improving hyperpigmentation; however, levels climbed again to 418 five months later. Surveillance MRIs in late  and  showed no overt recurrent tumor, though a subtle lesion near the left cavernous sinus was noted.     Cabergoline was initiated in . By December?, ACTH had increased to 1000 and a  MRI revealed a small hypoenhanced area above the left carotid. She remains on hydrocortisone, fludrocortisone, levothyroxine, desmopressin, cabergoline, and sex-hormone replacement, with consideration now for radiotherapy for further local control.    Of note, patient has hx of osteonecrosis of the hip requiring hip replacement, with long-term steroid use.    Pituitary adenoma just prior to  resection      Most recent MRI [subtle hypo enhancing  area]      Current systemic therapy: None  Anti-epileptic: None  Steroids: Hydrocortisone/fludrocortisone    Brain ROS:  Headaches-   Denies  Seizures- Denies  Nausea/vomiting-  Denies  Changes in cognition/behavior-  Denies  Speech-  Denies  Vision/hearing changes- Denies  Gait symptoms or imbalance-  Denies  Other focal neuro deficits-  Denies    Spine ROS:    Back pain-  Denies  Radicular pain-  Denies  Motor weakness-  Denies  Numbness/tingling-  Denies  Saddle anaesthesia-   Denies  Bowel/bladder dysfunction-   Denies    Of note, she has skin pigmentation that is easily visible on her L face, also has fatigue, which is cyclical for her.    Past Medical History:   Past Medical History:   Diagnosis Date    Adrenal disorder     Avascular necrosis of femur head, 2010    Cervical high risk HPV (human papillomavirus) test positive 10/2015 & 3/2019    +HR HPV (NOT 16/18)    Colon polyp     Cushing syndrome     pituitary microadenoma    Diabetes insipidus     Fatigue     History of colposcopy 10/2015    BEVERLEY 1    Hypercalcaemia     Hypertension     Hypothyroidism 1/20/14    Due to complete pituitary resection    Medulloadrenal hyperfunction     Pituitary adenoma (H)     Pulmonary emboli (H) 01/2014    Renal disease     Thyroid disease        Past Surgical History:   Past Surgical History:   Procedure Laterality Date    ARTHROPLASTY, HIP, TOTAL, DIRECT ANTERIOR APPROACH, USING HANA TABLE Right 05/30/2023    Procedure: Direct Anterior Right Total Hip Arthroplasty;  Surgeon: Wiliam Grubbs MD;  Location: [facility] OR    BIOPSY  1/2010, 7/2013, 1/2014, 7/2021    After each surgery on pituitary    COLONOSCOPY      Started receiving in 2004    COLONOSCOPY WITH CO2 INSUFFLATION N/A 09/16/2021    Procedure: COLONOSCOPY, WITH CO2 INSUFFLATION;  Surgeon: Harman Pearl MD;  Location: [facility] OR    COLPOSCOPY VULVA, BIOPSY, COMBINED  10/28/2015    BEVERLEY 1, 6 month follow-up pap was normal    COLPOSCOPY,BX CERVIX/ENDOCERV CURR   04/02/2019    COMBINED ESOPHAGOSCOPY, GASTROSCOPY, DUODENOSCOPY (EGD) WITH CO2 INSUFFLATION N/A 12/11/2024    Procedure: Combined Esophagoscopy, Gastroscopy, Duodenoscopy (Egd) With Co2 Insufflation;  Surgeon: Harman Pearl MD;  Location: [facility] OR    ESOPHAGOSCOPY, GASTROSCOPY, DUODENOSCOPY (EGD), COMBINED N/A 12/11/2024    Procedure: ESOPHAGOGASTRODUODENOSCOPY, WITH BIOPSY;  Surgeon: Harman Pearl MD;  Location: [facility]    INSERT DRAIN LUMBAR  01/20/2014    Procedure: INSERT DRAIN LUMBAR;;  Surgeon: Soham Aquino MD;  Location: [facility]    LAPAROSCOPIC ADRENALECTOMY  07/14/2014    Procedure: LAPAROSCOPIC ADRENALECTOMY;  Surgeon: Roni Nunn MD;  Location: [facility]    OPTICAL TRACKING SYSTEM ENDOSCOPIC RESECTION TUMOR CRANIAL  07/01/2013    Procedure: OPTICAL TRACKING SYSTEM ENDOSCOPIC RESECTION TUMOR CRANIAL;  Stealth Assisted Endoscopic Hypophysectomy ;  Surgeon: Soham Aquino MD;  Location: [facility]    OPTICAL TRACKING SYSTEM ENDOSCOPIC RESECTION TUMOR CRANIAL  01/20/2014    Procedure: OPTICAL TRACKING SYSTEM ENDOSCOPIC RESECTION TUMOR CRANIAL;  Stealth Assisted Endoscopic Transnasal Excision Of Pituitary Adenoma , Placement Of Lumbar Drain with c-arm;  Surgeon: Soham Aquino MD;  Location: [facility]    OPTICAL TRACKING SYSTEM ENDOSCOPIC RESECTION TUMOR CRANIAL N/A 07/22/2021    Procedure: stealth assisted Redo endoscopic, endonasal resection of pituitary tumor;  Surgeon: Mina Huffman MD;  Location: [facility]    ORTHOPEDIC SURGERY  5/2023    DE PELVIS/HIP JOINT SURGERY UNLISTED  5/30/23    DE STOMACH SURGERY PROCEDURE UNLISTED  7/14/14    removal of pituitary microademona      wisdom teeth extration         Past Radiation History: The patient denies any history of prior radiation therapy or exposure to ionizing radiation.  The patient denies a history of lupus, scleroderma, connective tissue disorders and collagen vascular disease.  They have not had any  other malignancy or chemotherapy.    Allergies:   Allergies   Allergen Reactions    Labetalol Anxiety     Racing heart feeling, panic attack feeling.       Medications:   Current Outpatient Medications   Medication Sig Dispense Refill    acetaminophen (TYLENOL) 325 MG tablet Take 2 tablets (650 mg) by mouth every 4 hours as needed for other (mild pain) 100 tablet 0    cabergoline (DOSTINEX) 0.5 MG tablet TAKE ONE-HALF TABLET BY MOUTH TWICE A WEEK 12 tablet 0    drospirenone-ethinyl estradiol (HECTOR) 3-0.02 MG tablet Take 1 tablet by mouth daily. 84 tablet 3    fludrocortisone (FLORINEF) 0.1 MG tablet Take 1 tablet (0.1 mg) by mouth daily. Call clinic to schedule follow up appointment. IMPORTANT 90 tablet 0    hydrocortisone (CORTEF) 10 MG tablet TAKE 1 TABLET EVERY MORNING,  PLUS SICK DAY SUPPLY AS DIRECTED. Take 10 mg twice daily for 3 days  then resume normal dosing. 120 tablet 3    hydrocortisone sodium succinate PF (SOLU-CORTEF) 100 mg/2 mL injection Inject 2 mLs (100 mg) into the vein once as needed (use when you cannot tolerate po intake.). Dispense Act-O-Vial 4 mL 5    levothyroxine (SYNTHROID/LEVOTHROID) 88 MCG tablet Take 1 tablet (88 mcg) by mouth daily. 90 tablet 3    methocarbamol (ROBAXIN) 500 MG tablet Take 1-2 tablets (500-1,000 mg) by mouth nightly as needed for muscle spasms. 20 tablet 0    Multiple Vitamin (MULTI-VITAMIN DAILY PO) Take 1 tablet by mouth daily       sodium chloride (OCEAN) 0.65 % nasal spray Apply in both nostrils as needed for congestion 30 mL 0    amoxicillin (AMOXIL) 500 MG tablet Take 4 tablets 1 hour before dentist appointment or procedure. (Patient not taking: Reported on 7/17/2025) 4 tablet 4    Calcium carb-Vitamin D 500 mg Hop[place]-200 units (OSCAL WITH D;OYSTER SHELL CALCIUM) 500-200 MG-UNIT per tablet Take 1 tablet by mouth daily (Patient not taking: Reported on 7/17/2025)      desmopressin (DDAVP) 0.1 MG tablet Taking daily (Patient not taking: Reported on 7/17/2025) 180  "tablet 3    drospirenone-ethinyl estradiol (HECTOR) 3-0.02 MG tablet TAKE 1 TABLET BY MOUTH DAILY (Patient not taking: Reported on 7/17/2025) 84 tablet 3    Pasireotide (SIGNIFOR) 0.6 MG/ML SOLN Inject 1 mL (0.6 mg) subcutaneously 2 times daily. (Patient not taking: Reported on 7/17/2025) 1 mL 5    syringe/needle, disp, 23G X 1\" 3 ML MISC Use with Solu-Cortef for IM injection to prevent adrenal crisis. Proceed to ER for assessment after use. 2 each 3     No current facility-administered medications for this visit.       Family History:   Family History   Problem Relation Age of Onset    Asthma Mother     Osteoporosis Mother     Obesity Mother     Hyperlipidemia Father     Obesity Father     Allergies Sister     Obesity Sister     Anxiety Disorder Sister     Depression Brother     Anxiety Disorder Brother     Mental Illness Brother         Psychosis    Cancer Maternal Grandmother 45        colon cancer    Colon Cancer Maternal Grandmother     Cancer Maternal Grandfather         lung cancer-smoker    Other Cancer Maternal Grandfather         Lung - Smoker    Cancer Paternal Grandmother         lung cancer-smoker    Other Cancer Paternal Grandmother         Lung - Smoker    Cancer Paternal Grandfather         lung cancer    Other Cancer Paternal Grandfather         Lung    Breast Cancer Paternal Aunt     Prostate Cancer Maternal Uncle     Anesthesia Reaction No family hx of     Cardiovascular No family hx of     Deep Vein Thrombosis (DVT) No family hx of        Social History:   Social History     Socioeconomic History    Marital status: Single     Spouse name: Not on file    Number of children: Not on file    Years of education: Not on file    Highest education level: Not on file   Occupational History    Not on file   Tobacco Use    Smoking status: Former     Current packs/day: 0.00     Average packs/day: 0.5 packs/day for 3.0 years (1.5 ttl pk-yrs)     Types: Cigarettes     Start date: 10/10/2005     Quit date: " 2008     Years since quittin.5    Smokeless tobacco: Never   Vaping Use    Vaping status: Never Used   Substance and Sexual Activity    Alcohol use: Yes     Comment: Average 2-3/week in summer    Drug use: Never    Sexual activity: Not Currently     Partners: Male     Birth control/protection: Condom, Pill, Post-menopausal   Other Topics Concern    Parent/sibling w/ CABG, MI or angioplasty before 65F 55M? No   Social History Narrative    Not on file     Social Drivers of Health     Financial Resource Strain: Low Risk  (10/12/2024)    Financial Resource Strain     Within the past 12 months, have you or your family members you live with been unable to get utilities (heat, electricity) when it was really needed?: No   Food Insecurity: Low Risk  (10/12/2024)    Food Insecurity     Within the past 12 months, did you worry that your food would run out before you got money to buy more?: No     Within the past 12 months, did the food you bought just not last and you didn t have money to get more?: No   Transportation Needs: Low Risk  (10/12/2024)    Transportation Needs     Within the past 12 months, has lack of transportation kept you from medical appointments, getting your medicines, non-medical meetings or appointments, work, or from getting things that you need?: No   Physical Activity: Insufficiently Active (10/12/2024)    Exercise Vital Sign     Days of Exercise per Week: 3 days     Minutes of Exercise per Session: 30 min   Stress: No Stress Concern Present (10/12/2024)    [employer] - Occupational Stress Questionnaire     Feeling of Stress : Not at all   Social Connections: Unknown (10/12/2024)    Social Connection and Isolation Panel [NHANES]     Frequency of Communication with Friends and Family: Not on file     Frequency of Social Gatherings with Friends and Family: Once a week     Attends Alevism Services: Not on file     Active Member of Clubs or Organizations: Not on  file     Attends Club or Organization Meetings: Not on file     Marital Status: Not on file   Interpersonal Safety: Low Risk  (7/8/2025)    Interpersonal Safety     Do you feel physically and emotionally safe where you currently live?: Yes     Within the past 12 months, have you been hit, slapped, kicked or otherwise physically hurt by someone?: No     Within the past 12 months, have you been humiliated or emotionally abused in other ways by your partner or ex-partner?: No   Housing Stability: Low Risk  (10/12/2024)    Housing Stability     Do you have housing? : Yes     Are you worried about losing your housing?: No       Review of Systems: A complete 12 system review was negative except as noted in the HPI above.     Karnofsky Performance Status: 90  ECOG Performance Status: 1    Vital Signs:  BP 89/68 (BP Location: Left arm, Patient Position: Sitting, Cuff Size: Adult Large)   Pulse 98   Wt 78.9 kg (174 lb)   LMP  (LMP Unknown)   SpO2 99%   BMI 27.37 kg/m  .    Physical Exam:    General: NAD, patient well appearing.  Extremities: No acute findings. No edema   Skin: color, texture and turgor wnl. No rashes and lesions.  MS: AAO x 3, appropriately interactive, normal affect, speech fluent  CN: CN II-XII intact  Motor: Full strength bilaterally  Sensation: Normal sensation to LT intact bilaterally upper and lower extremities  Gait: Natural gait intact.    Pathology:   Case Report   Surgical Pathology Report                         Case: [id number]                                   Authorizing Provider:  Mina Huffman       Collected:           07/22/2021 05:58 PM                                  MD Sony                                                                   Ordering Location:      [facility] OR                 Received:            07/23/2021 08:51 AM           Pathologist:           Cj Yost MD                                                         Specimens:   A) - Pituitary Gland,  pituitary tumor                                                                B) - Pituitary Gland, tumor vs scar                                                        Addendum 2   Immunostain performed at [facility] shows the neoplastic cells are positive for SSTR2.   Addendum electronically signed by Cj Yost MD on 4/7/2022 at 1258 CDT   Addendum   Immunohistochemical stains show that tumor cells are positive for ACTH, negative for GH, Prolactin, FSH and LH.         Comments:   This is an appended report. These results have been appended to a previously final verified report.      Addendum electronically signed by Cj Yost MD on 7/30/2021 at 1802 CDT   Final Diagnosis   A. Pituitary Gland, pituitary tumor, excision:  - Pituitary adenoma. See comment.     B. Pituitary Gland, tumor vs scar, excision:  - Pituitary adenoma.        Last CBC with Diff:  WBC   Date Value Ref Range Status   07/15/2014 8.2 4.0 - 11.0 10e9/L Final     WBC Count   Date Value Ref Range Status   09/07/2023 8.1 4.0 - 11.0 10e3/uL Final     RBC Count   Date Value Ref Range Status   09/07/2023 4.22 3.80 - 5.20 10e6/uL Final   07/15/2014 4.51 3.8 - 5.2 10e12/L Final     Hemoglobin   Date Value Ref Range Status   10/07/2024 11.6 (L) 11.7 - 15.7 g/dL Final   07/15/2014 13.3 11.7 - 15.7 g/dL Final     Hematocrit   Date Value Ref Range Status   09/07/2023 37.8 35.0 - 47.0 % Final   07/15/2014 39.7 35.0 - 47.0 % Final     MCV   Date Value Ref Range Status   09/07/2023 90 78 - 100 fL Final   07/15/2014 88 78 - 100 fl Final     MCH   Date Value Ref Range Status   09/07/2023 29.6 26.5 - 33.0 pg Final   07/15/2014 29.5 26.5 - 33.0 pg Final     MCHC   Date Value Ref Range Status   09/07/2023 33.1 31.5 - 36.5 g/dL Final   07/15/2014 33.5 31.5 - 36.5 g/dL Final     RDW   Date Value Ref Range Status   09/07/2023 13.1 10.0 - 15.0 % Final   07/15/2014 13.7 10.0 - 15.0 % Final     Platelet Count   Date Value Ref Range Status   09/07/2023  337 150 - 450 10e3/uL Final   07/15/2014 246 150 - 450 10e9/L Final     Diff Method   Date Value Ref Range Status   01/18/2010 Automated Method  Final     % Neutrophils   Date Value Ref Range Status   09/07/2023 81 % Final   01/18/2010 82 (H) 40 - 75 % Final     % Lymphocytes   Date Value Ref Range Status   09/07/2023 16 % Final   01/18/2010 10 (L) 20 - 48 % Final     % Monocytes   Date Value Ref Range Status   09/07/2023 3 % Final   01/18/2010 7 0 - 12 % Final     % Eosinophils   Date Value Ref Range Status   09/07/2023 0 % Final   01/18/2010 1 0 - 6 % Final     % Basophils   Date Value Ref Range Status   09/07/2023 0 % Final   01/18/2010 0 0 - 2 % Final     Absolute Neutrophil   Date Value Ref Range Status   01/18/2010 6.9 1.6 - 8.3 10e9/L Final     Absolute Neutrophils   Date Value Ref Range Status   09/07/2023 6.5 1.6 - 8.3 10e3/uL Final     Absolute Lymphocytes   Date Value Ref Range Status   09/07/2023 1.3 0.8 - 5.3 10e3/uL Final   01/18/2010 0.9 0.8 - 5.3 10e9/L Final     Absolute Monocytes   Date Value Ref Range Status   09/07/2023 0.3 0.0 - 1.3 10e3/uL Final   01/18/2010 0.6 0.0 - 1.3 10e9/L Final        Last Comprehensive Metabolic Panel:  Sodium   Date Value Ref Range Status   05/15/2025 134 (L) 135 - 145 mmol/L Final   04/09/2021 137 133 - 144 mmol/L Final     Potassium   Date Value Ref Range Status   05/15/2025 4.4 3.4 - 5.3 mmol/L Final   12/08/2022 4.3 3.4 - 5.3 mmol/L Final   04/09/2021 3.6 3.4 - 5.3 mmol/L Final     Chloride   Date Value Ref Range Status   05/15/2025 101 98 - 107 mmol/L Final   12/08/2022 108 94 - 109 mmol/L Final   04/09/2021 105 94 - 109 mmol/L Final     Carbon Dioxide   Date Value Ref Range Status   04/09/2021 31 20 - 32 mmol/L Final     Carbon Dioxide (CO2)   Date Value Ref Range Status   05/15/2025 21 (L) 22 - 29 mmol/L Final   12/08/2022 26 20 - 32 mmol/L Final     Anion Gap   Date Value Ref Range Status   05/15/2025 12 7 - 15 mmol/L Final   12/08/2022 5 3 - 14 mmol/L Final    04/09/2021 1 (L) 3 - 14 mmol/L Final     Glucose   Date Value Ref Range Status   05/15/2025 88 70 - 99 mg/dL Final   12/08/2022 88 70 - 99 mg/dL Final   04/09/2021 86 70 - 99 mg/dL Final     Comment:     Non Fasting     GLUCOSE BY METER POCT   Date Value Ref Range Status   06/01/2023 112 (H) 70 - 99 mg/dL Final     Urea Nitrogen   Date Value Ref Range Status   05/15/2025 12.1 6.0 - 20.0 mg/dL Final   12/08/2022 19 7 - 30 mg/dL Final   04/09/2021 19 7 - 30 mg/dL Final     Creatinine   Date Value Ref Range Status   05/15/2025 1.09 (H) 0.51 - 0.95 mg/dL Final   04/09/2021 1.18 (H) 0.52 - 1.04 mg/dL Final     GFR Estimate   Date Value Ref Range Status   05/15/2025 66 >60 mL/min/1.73m2 Final     Comment:     eGFR calculated using 2021 CKD-EPI equation.   04/09/2021 59 (L) >60 mL/min/[1.73_m2] Final     Comment:     Non  GFR Calc  Starting 12/18/2018, serum creatinine based estimated GFR (eGFR) will be   calculated using the Chronic Kidney Disease Epidemiology Collaboration   (CKD-EPI) equation.       Calcium   Date Value Ref Range Status   05/15/2025 9.3 8.8 - 10.4 mg/dL Final   04/09/2021 8.6 8.5 - 10.1 mg/dL Final        Radiology:  Brain/ Pituitary MRI without and with contrast     History: Cushing's disease, pituitary adenoma s/p 4 resections, annual  follow up please evaluate for recurrent tumor; Cushing's disease (H);  Pituitary adenoma (H).  ICD-10: Cushing's disease (H); Pituitary adenoma (H)     Comparison:  11/10/2023      Technique: Axial diffusion and FLAIR images of the whole brain  obtained without intravenous contrast. Sagittal T1 and T2-weighted,  coronal T2-weighted, coronal T1-weighted images with focus on the  sella were obtained without intravenous contrast. Post intravenous  contrast using gadolinium coronal and sagittal T1-weighted images were  obtained focused on the sella. Dynamic postcontrast coronal  T1-weighted images were also obtained.     Contrast: 7.5mL Gadavist      Findings:    Stable postoperative changes of endoscopic transsphenoidal pituitary  mass resection. There is no evidence for abnormal enhancement to  suggest recurrent mass. The pituitary stalk appears midline. The optic  chiasm appears intact and not displaced.  The major cavernous carotid vascular flow-voids appear patent.       FLAIR images through the whole brain are unremarkable, and demonstrate  no mass effect, midline shift, or significant enlargement of the  ventricles.                                                                      Impression: Stable postoperative changes of endoscopic transsphenoidal  resection of pituitary tumor with no MRI evidence for recurrence.    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 She has persistently high ACTH levels, has had 4 prior surgeries and is unlikely to be offered a 5th surgery, especially given very subtle lesion on MRI.    Given the patient's prior adrenal resections, we will need to review with Dr. Wilkinson what the physiologic consequences of persistently high ACTH levels will be for Ms. Loyola, besides hyperpigmentation. This is outside our area of expertise, but understanding these consequences could impact the risk/benefit ratio of treating Ms. Loyola with RT.    We note a 2021 review article: Front.  Endocrinol., 18 August 2021, Sec. Translational and Clinical Endocrinology, Volume 12 - 2021  [email or web address], that discussed possible extra-adrenal impacts of ACTH    We did however, discuss that growth of residual pituitary adenoma could lead to physical pressure on the optic chiasm and optic nerves, which could then lead to visual symptoms. We do expect that her pituitary resection site will eventually require treatment with 28 fractions of IMRT, to a dose of 5040 cGy. Tentatively, we will review the case with Dr. Wilkinson before finalizing RT decision. We also note that the patient is going to start an ACTH specific medication with Dr. Wilkinson, which may help in controlling this patient's ACTH secretion pituitary adenoma [signifor]. Thus, we will avoid RT until further progress is made regarding medication decision plus her next interval MRI scan.    We had a detailed discussion with Suyapa Hodge regarding the role of radiation therapy for the treatment of pituitary adenomas. We discussed the logistics, timing, risks, benefits, and side effects associated with radiation therapy, which would likely be delivered 5 days per week for approximately 6 weeks. The patient was encouraged to contact us with questions or concerns should they arise in the interim. All questions were answered to the patients's satisfaction, and they were provided with our contact information should any questions or concerns arise. We will follow-up with the patient after discussion with Dr. Wilkinson and pt's next interval MRI scan.    Starla Bauer MD PGY4    Referring Provider:  Adriana Wilkinson MD  [address] [place] [id number]     on direct patient care including self review of records, labs, and radiographic studies, as well as, direct face-to-face interaction with the patient and coordination of care with other providers was 60 minutes.        Jayla Hermosillo MD, PhD     Department of Radiation Oncology  [facility]       Referring Provider:  Adriana Wilkinson MD  [address] [place] [id number]

## 2025-07-17 ENCOUNTER — PRE VISIT (OUTPATIENT)
Dept: RADIATION ONCOLOGY | Facility: CLINIC | Age: 40
End: 2025-07-17
Payer: COMMERCIAL

## 2025-07-17 ENCOUNTER — OFFICE VISIT (OUTPATIENT)
Dept: RADIATION ONCOLOGY | Facility: CLINIC | Age: 40
End: 2025-07-17
Attending: STUDENT IN AN ORGANIZED HEALTH CARE EDUCATION/TRAINING PROGRAM
Payer: COMMERCIAL

## 2025-07-17 VITALS
DIASTOLIC BLOOD PRESSURE: 68 MMHG | SYSTOLIC BLOOD PRESSURE: 89 MMHG | BODY MASS INDEX: 27.37 KG/M2 | HEART RATE: 98 BPM | WEIGHT: 174 LBS | OXYGEN SATURATION: 99 %

## 2025-07-17 DIAGNOSIS — D35.2 PITUITARY ADENOMA (H): Primary | ICD-10-CM

## 2025-07-17 PROCEDURE — 3074F SYST BP LT 130 MM HG: CPT | Performed by: STUDENT IN AN ORGANIZED HEALTH CARE EDUCATION/TRAINING PROGRAM

## 2025-07-17 PROCEDURE — 99205 OFFICE O/P NEW HI 60 MIN: CPT | Mod: GC | Performed by: STUDENT IN AN ORGANIZED HEALTH CARE EDUCATION/TRAINING PROGRAM

## 2025-07-17 PROCEDURE — 1126F AMNT PAIN NOTED NONE PRSNT: CPT | Performed by: STUDENT IN AN ORGANIZED HEALTH CARE EDUCATION/TRAINING PROGRAM

## 2025-07-17 PROCEDURE — 3078F DIAST BP <80 MM HG: CPT | Performed by: STUDENT IN AN ORGANIZED HEALTH CARE EDUCATION/TRAINING PROGRAM

## 2025-07-17 ASSESSMENT — PAIN SCALES - GENERAL: PAINLEVEL_OUTOF10: NO PAIN (0)

## 2025-07-17 NOTE — PROGRESS NOTES
"Considerations for radiation treatment   Pregnancy status: Female with documented history of menopause    Implanted Cardiac Devices: No   Any previous radiation therapy: No    Oncology Rooming Note    2025 1:49 PM   Suyapa Hodge is a 39 year old female who presents for:    Chief Complaint   Patient presents with    Oncology Clinic Visit     New consult for radiation therapy     Initial Vitals: BP 89/68 (BP Location: Left arm, Patient Position: Sitting, Cuff Size: Adult Large)   Pulse 98   Wt 78.9 kg (174 lb)   LMP  (LMP Unknown)   SpO2 99%   BMI 27.37 kg/m   Estimated body mass index is 27.37 kg/m  as calculated from the following:    Height as of 25: 1.698 m (5' 6.85\").    Weight as of this encounter: 78.9 kg (174 lb). Body surface area is 1.93 meters squared.  No Pain (0) Comment: Data Unavailable   No LMP recorded (lmp unknown). Patient is postmenopausal.  Allergies reviewed: Yes  Medications reviewed: Yes    Medications: Medication refills not needed today.  Pharmacy name entered into [facility]:    [id number] [place] - [address] AT [id number] - [facility] [address] [facility] [address] AT [facility] [place]  [place] - [address] [facility]    PHQ9:  Did this patient require a PHQ9?: No      Clinical concerns: Fatigue not relieved by rest, annoying.  Dr Bauer was notified.    Krysten Faye RN  Radiation Oncology     [address] [address]     Contact  Clinic : [phone]  Clinic Nurse Desk: [phone]  [facility]iation Therapists/[phone]  After Hours (On-Call): [phone]            "

## 2025-07-17 NOTE — LETTER
2025      Suyapa Hodge  [address] [facility] [address]      Dear Colleague,    Thank you for referring your patient, Suyapa Hodge, to the [facility] RADIATION ONCOLOGY. Please see a copy of my visit note below.    [facility]   RADIATION ONCOLOGY INITIAL CONSULTATION NOTE    Patient Name: Suyapa Hodge  Requesting Physician: Dr. Jaja JUAN  MRN: [id number]  : 1985  Neurosurgeon: Dr. 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    History Of Present Illness:   Suyapa underwent three pituitary resections between  and  for Cushing s disease, followed by bilateral adrenalectomy in July?. ACTH levels initially normalized but increased to 271 in July? and then to 549? despite normal body CT and partial suppression on high-dose dexamethasone.     In  she had a fourth transsphenoidal surgery for Alexei s syndrome, lowering ACTH to 14?pg/mL and improving hyperpigmentation; however, levels climbed again to 418 five months later. Surveillance MRIs in late  and  showed no overt recurrent tumor, though a subtle lesion near the left cavernous sinus was noted.     Cabergoline was initiated in . By , ACTH had increased to 1000 and a  MRI revealed a small hypoenhanced area above the left carotid. She remains on hydrocortisone, fludrocortisone, levothyroxine, desmopressin, cabergoline, and sex-hormone replacement, with consideration now for  radiotherapy for further local control.    Of note, patient has hx of osteonecrosis of the hip requiring hip replacement, with long-term steroid use.    Pituitary adenoma just prior to 2021 resection      Most recent MRI [subtle hypo enhancing area]      Anti-epileptic: None  Steroids: Hydrocortisone/fludrocortisone    Brain ROS:  Headaches-   Denies  Seizures- Denies  Nausea/vomiting-  Denies  Changes in cognition/behavior-  Denies  Speech-  Denies  Vision/hearing changes- Denies  Gait symptoms or imbalance-  Denies  Other focal neuro deficits-  Denies    Spine ROS:    Back pain-  Denies  Radicular pain-  Denies  Motor weakness-  Denies  Numbness/tingling-  Denies  Saddle anaesthesia-   Denies  Bowel/bladder dysfunction-   Denies    Of note, she has skin pigmentation that is easily visible on her L face, also has fatigue, which is cyclical for her.    Past Medical History:   Past Medical History:   Diagnosis Date     Adrenal disorder      Avascular necrosis of femur head, 2010     Cervical high risk HPV (human papillomavirus) test positive 10/2015 & 3/2019    +HR HPV (NOT 16/18)     Colon polyp      Cushing syndrome     pituitary microadenoma     Diabetes insipidus      Fatigue      History of colposcopy 10/2015    BEVERLEY 1     Hypercalcaemia      Hypertension      Hypothyroidism 1/20/14    Due to complete pituitary resection     Medulloadrenal hyperfunction      Pituitary adenoma (H)      Pulmonary emboli (H) 01/2014     Renal disease      Thyroid disease        Past Surgical History:   Past Surgical History:   Procedure Laterality Date     ARTHROPLASTY, HIP, TOTAL, DIRECT ANTERIOR APPROACH, USING HANA TABLE Right 05/30/2023    Procedure: Direct Anterior Right Total Hip Arthroplasty;  Surgeon: Wiliam Grubbs MD;  Location: [facility] OR     BIOPSY  1/2010, 7/2013, 1/2014, 7/2021    After each surgery on pituitary     COLONOSCOPY      Started receiving in 2004     COLONOSCOPY WITH CO2 INSUFFLATION N/A 09/16/2021     Procedure: COLONOSCOPY, WITH CO2 INSUFFLATION;  Surgeon: Harman Pearl MD;  Location: [facility] OR     COLPOSCOPY VULVA, BIOPSY, COMBINED  10/28/2015    BEVERLEY 1, 6 month follow-up pap was normal     COLPOSCOPY,BX CERVIX/ENDOCERV CURR  04/02/2019     COMBINED ESOPHAGOSCOPY, GASTROSCOPY, DUODENOSCOPY (EGD) WITH CO2 INSUFFLATION N/A 12/11/2024    Procedure: Combined Esophagoscopy, Gastroscopy, Duodenoscopy (Egd) With Co2 Insufflation;  Surgeon: Harman Pearl MD;  Location: [facility] OR     ESOPHAGOSCOPY, GASTROSCOPY, DUODENOSCOPY (EGD), COMBINED N/A 12/11/2024    Procedure: ESOPHAGOGASTRODUODENOSCOPY, WITH BIOPSY;  Surgeon: Harman Pearl MD;  Location: [facility]     INSERT DRAIN LUMBAR  01/20/2014    Procedure: INSERT DRAIN LUMBAR;;  Surgeon: Soham Aquino MD;  Location: [facility]     LAPAROSCOPIC ADRENALECTOMY  07/14/2014    Procedure: LAPAROSCOPIC ADRENALECTOMY;  Surgeon: Roni Nunn MD;  Location: [facility] OR     OPTICAL TRACKING SYSTEM ENDOSCOPIC RESECTION TUMOR CRANIAL  07/01/2013    Procedure: OPTICAL TRACKING SYSTEM ENDOSCOPIC RESECTION TUMOR CRANIAL;  Stealth Assisted Endoscopic Hypophysectomy ;  Surgeon: Soham Aquino MD;  Location: [facility] OR     OPTICAL TRACKING SYSTEM ENDOSCOPIC RESECTION TUMOR CRANIAL  01/20/2014    Procedure: OPTICAL TRACKING SYSTEM ENDOSCOPIC RESECTION TUMOR CRANIAL;  Stealth Assisted Endoscopic Transnasal Excision Of Pituitary Adenoma , Placement Of Lumbar Drain with c-arm;  Surgeon: Soham Aquino MD;  Location: [facility]     OPTICAL TRACKING SYSTEM ENDOSCOPIC RESECTION TUMOR CRANIAL N/A 07/22/2021    Procedure: stealth assisted Redo endoscopic, endonasal resection of pituitary tumor;  Surgeon: Mina Huffman MD;  Location: [facility]     ORTHOPEDIC SURGERY  5/2023     [place] PELVIS/HIP JOINT SURGERY UNLISTED  5/30/23     [place] STOMACH SURGERY PROCEDURE UNLISTED  7/14/14     removal of pituitary microademona       wisdom teeth extration          Past Radiation History: The patient denies any history of prior radiation therapy or exposure to ionizing radiation.  The patient denies a history of lupus, scleroderma, connective tissue disorders and collagen vascular disease.  They have not had any other malignancy or chemotherapy.    Allergies:   Allergies   Allergen Reactions     Labetalol Anxiety     Racing heart feeling, panic attack feeling.       Medications:   Current Outpatient Medications   Medication Sig Dispense Refill     acetaminophen (TYLENOL) 325 MG tablet Take 2 tablets (650 mg) by mouth every 4 hours as needed for other (mild pain) 100 tablet 0     cabergoline (DOSTINEX) 0.5 MG tablet TAKE ONE-HALF TABLET BY MOUTH TWICE A WEEK 12 tablet 0     drospirenone-ethinyl estradiol (HECTOR) 3-0.02 MG tablet Take 1 tablet by mouth daily. 84 tablet 3     fludrocortisone (FLORINEF) 0.1 MG tablet Take 1 tablet (0.1 mg) by mouth daily. Call clinic to schedule follow up appointment. IMPORTANT 90 tablet 0     hydrocortisone (CORTEF) 10 MG tablet TAKE 1 TABLET EVERY MORNING,  PLUS SICK DAY SUPPLY AS DIRECTED. Take 10 mg twice daily for 3 days  then resume normal dosing. 120 tablet 3     hydrocortisone sodium succinate PF (SOLU-CORTEF) 100 mg/2 mL injection Inject 2 mLs (100 mg) into the vein once as needed (use when you cannot tolerate po intake.). Dispense Act-O-Vial 4 mL 5     levothyroxine (SYNTHROID/LEVOTHROID) 88 MCG tablet Take 1 tablet (88 mcg) by mouth daily. 90 tablet 3     methocarbamol (ROBAXIN) 500 MG tablet Take 1-2 tablets (500-1,000 mg) by mouth nightly as needed for muscle spasms. 20 tablet 0     Multiple Vitamin (MULTI-VITAMIN DAILY PO) Take 1 tablet by mouth daily        sodium chloride (OCEAN) 0.65 % nasal spray Apply in both nostrils as needed for congestion 30 mL 0     amoxicillin (AMOXIL) 500 MG tablet Take 4 tablets 1 hour before dentist appointment or procedure. (Patient not taking: Reported on 7/17/2025) 4 tablet 4     Calcium  "carb-Vitamin D 500 mg [place]-200 units (OSCAL WITH D;OYSTER SHELL CALCIUM) 500-200 MG-UNIT per tablet Take 1 tablet by mouth daily (Patient not taking: Reported on 7/17/2025)       desmopressin (DDAVP) 0.1 MG tablet Taking daily (Patient not taking: Reported on 7/17/2025) 180 tablet 3     drospirenone-ethinyl estradiol (HECTOR) 3-0.02 MG tablet TAKE 1 TABLET BY MOUTH DAILY (Patient not taking: Reported on 7/17/2025) 84 tablet 3     Pasireotide (SIGNIFOR) 0.6 MG/ML SOLN Inject 1 mL (0.6 mg) subcutaneously 2 times daily. (Patient not taking: Reported on 7/17/2025) 1 mL 5     syringe/needle, disp, 23G X 1\" 3 ML MISC Use with Solu-Cortef for IM injection to prevent adrenal crisis. Proceed to ER for assessment after use. 2 each 3     No current facility-administered medications for this visit.       Family History:   Family History   Problem Relation Age of Onset     Asthma Mother      Osteoporosis Mother      Obesity Mother      Hyperlipidemia Father      Obesity Father      Allergies Sister      Obesity Sister      Anxiety Disorder Sister      Depression Brother      Anxiety Disorder Brother      Mental Illness Brother         Psychosis     Cancer Maternal Grandmother 45        colon cancer     Colon Cancer Maternal Grandmother      Cancer Maternal Grandfather         lung cancer-smoker     Other Cancer Maternal Grandfather         Lung - Smoker     Cancer Paternal Grandmother         lung cancer-smoker     Other Cancer Paternal Grandmother         Lung - Smoker     Cancer Paternal Grandfather         lung cancer     Other Cancer Paternal Grandfather         Lung     Breast Cancer Paternal Aunt      Prostate Cancer Maternal Uncle      Anesthesia Reaction No family hx of      Cardiovascular No family hx of      Deep Vein Thrombosis (DVT) No family hx of        Social History:   Social History     Socioeconomic History     Marital status: Single     Spouse name: Not on file     Number of children: Not on file     Years of " education: Not on file     Highest education level: Not on file   Occupational History     Not on file   Tobacco Use     Smoking status: Former     Current packs/day: 0.00     Average packs/day: 0.5 packs/day for 3.0 years (1.5 ttl pk-yrs)     Types: Cigarettes     Start date: 10/10/2005     Quit date: 2008     Years since quittin.5     Smokeless tobacco: Never   Vaping Use     Vaping status: Never Used   Substance and Sexual Activity     Alcohol use: Yes     Comment: Average 2-3/week in summer     Drug use: Never     Sexual activity: Not Currently     Partners: Male     Birth control/protection: Condom, Pill, Post-menopausal   Other Topics Concern     Parent/sibling w/ CABG, MI or angioplasty before 65F 55M? No   Social History Narrative     Not on file     Social Drivers of Health     Financial Resource Strain: Low Risk  (10/12/2024)    Financial Resource Strain      Within the past 12 months, have you or your family members you live with been unable to get utilities (heat, electricity) when it was really needed?: No   Food Insecurity: Low Risk  (10/12/2024)    Food Insecurity      Within the past 12 months, did you worry that your food would run out before you got money to buy more?: No      Within the past 12 months, did the food you bought just not last and you didn t have money to get more?: No   Transportation Needs: Low Risk  (10/12/2024)    Transportation Needs      Within the past 12 months, has lack of transportation kept you from medical appointments, getting your medicines, non-medical meetings or appointments, work, or from getting things that you need?: No   Physical Activity: Insufficiently Active (10/12/2024)    Exercise Vital Sign      Days of Exercise per Week: 3 days      Minutes of Exercise per Session: 30 min   Stress: No Stress Concern Present (10/12/2024)    [employer] - Occupational Stress Questionnaire      Feeling of Stress : Not at all   Social  Connections: Unknown (10/12/2024)    Social Connection and Isolation Panel [NHANES]      Frequency of Communication with Friends and Family: Not on file      Frequency of Social Gatherings with Friends and Family: Once a week      Attends Sabianism Services: Not on file      Active Member of Clubs or Organizations: Not on file      Attends Club or Organization Meetings: Not on file      Marital Status: Not on file   Interpersonal Safety: Low Risk  (7/8/2025)    Interpersonal Safety      Do you feel physically and emotionally safe where you currently live?: Yes      Within the past 12 months, have you been hit, slapped, kicked or otherwise physically hurt by someone?: No      Within the past 12 months, have you been humiliated or emotionally abused in other ways by your partner or ex-partner?: No   Housing Stability: Low Risk  (10/12/2024)    Housing Stability      Do you have housing? : Yes      Are you worried about losing your housing?: No       Review of Systems: A complete 12 system review was negative except as noted in the HPI above.     Karnofsky Performance Status: 90  ECOG Performance Status: 1    Vital Signs:  BP 89/68 (BP Location: Left arm, Patient Position: Sitting, Cuff Size: Adult Large)   Pulse 98   Wt 78.9 kg (174 lb)   LMP  (LMP Unknown)   SpO2 99%   BMI 27.37 kg/m  .    Physical Exam:    General: NAD, patient well appearing.  Extremities: No acute findings. No edema   Skin: color, texture and turgor wnl. No rashes and lesions.hyperpigmentation noted.   MS: AAO x 3, appropriately interactive, normal affect, speech fluent  CN: CN II-XII intact  Motor: Full strength bilaterally  Sensation: Normal sensation to LT intact bilaterally upper and lower extremities  Gait: Natural gait intact.    Pathology:   Case Report   Surgical Pathology Report                         Case: [id number]                                   Authorizing Provider:  Mina Huffman       Collected:           07/22/2021  05:58 PM                                  MD Sony                                                                   Ordering Location:      MAIN OR                 Received:            07/23/2021 08:51 AM           Pathologist:           Cj Yost MD                                                         Specimens:   A) - Pituitary Gland, pituitary tumor                                                                B) - Pituitary Gland, tumor vs scar                                                        Addendum 2   Immunostain performed at [facility] shows the neoplastic cells are positive for SSTR2.   Addendum electronically signed by Cj Yost MD on 4/7/2022 at 1258 CDT   Addendum   Immunohistochemical stains show that tumor cells are positive for ACTH, negative for GH, Prolactin, FSH and LH.         Comments:   This is an appended report. These results have been appended to a previously final verified report.      Addendum electronically signed by Cj Yost MD on 7/30/2021 at 1802 CDT   Final Diagnosis   A. Pituitary Gland, pituitary tumor, excision:  - Pituitary adenoma. See comment.     B. Pituitary Gland, tumor vs scar, excision:  - Pituitary adenoma.        Last CBC with Diff:  WBC   Date Value Ref Range Status   07/15/2014 8.2 4.0 - 11.0 10e9/L Final     WBC Count   Date Value Ref Range Status   09/07/2023 8.1 4.0 - 11.0 10e3/uL Final     RBC Count   Date Value Ref Range Status   09/07/2023 4.22 3.80 - 5.20 10e6/uL Final   07/15/2014 4.51 3.8 - 5.2 10e12/L Final     Hemoglobin   Date Value Ref Range Status   10/07/2024 11.6 (L) 11.7 - 15.7 g/dL Final   07/15/2014 13.3 11.7 - 15.7 g/dL Final     Hematocrit   Date Value Ref Range Status   09/07/2023 37.8 35.0 - 47.0 % Final   07/15/2014 39.7 35.0 - 47.0 % Final     MCV   Date Value Ref Range Status   09/07/2023 90 78 - 100 fL Final   07/15/2014 88 78 - 100 fl Final     MCH   Date Value Ref Range Status   09/07/2023 29.6  26.5 - 33.0 pg Final   07/15/2014 29.5 26.5 - 33.0 pg Final     MCHC   Date Value Ref Range Status   09/07/2023 33.1 31.5 - 36.5 g/dL Final   07/15/2014 33.5 31.5 - 36.5 g/dL Final     RDW   Date Value Ref Range Status   09/07/2023 13.1 10.0 - 15.0 % Final   07/15/2014 13.7 10.0 - 15.0 % Final     Platelet Count   Date Value Ref Range Status   09/07/2023 337 150 - 450 10e3/uL Final   07/15/2014 246 150 - 450 10e9/L Final     Diff Method   Date Value Ref Range Status   01/18/2010 Automated Method  Final     % Neutrophils   Date Value Ref Range Status   09/07/2023 81 % Final   01/18/2010 82 (H) 40 - 75 % Final     % Lymphocytes   Date Value Ref Range Status   09/07/2023 16 % Final   01/18/2010 10 (L) 20 - 48 % Final     % Monocytes   Date Value Ref Range Status   09/07/2023 3 % Final   01/18/2010 7 0 - 12 % Final     % Eosinophils   Date Value Ref Range Status   09/07/2023 0 % Final   01/18/2010 1 0 - 6 % Final     % Basophils   Date Value Ref Range Status   09/07/2023 0 % Final   01/18/2010 0 0 - 2 % Final     Absolute Neutrophil   Date Value Ref Range Status   01/18/2010 6.9 1.6 - 8.3 10e9/L Final     Absolute Neutrophils   Date Value Ref Range Status   09/07/2023 6.5 1.6 - 8.3 10e3/uL Final     Absolute Lymphocytes   Date Value Ref Range Status   09/07/2023 1.3 0.8 - 5.3 10e3/uL Final   01/18/2010 0.9 0.8 - 5.3 10e9/L Final     Absolute Monocytes   Date Value Ref Range Status   09/07/2023 0.3 0.0 - 1.3 10e3/uL Final   01/18/2010 0.6 0.0 - 1.3 10e9/L Final        Last Comprehensive Metabolic Panel:  Sodium   Date Value Ref Range Status   05/15/2025 134 (L) 135 - 145 mmol/L Final   04/09/2021 137 133 - 144 mmol/L Final     Potassium   Date Value Ref Range Status   05/15/2025 4.4 3.4 - 5.3 mmol/L Final   12/08/2022 4.3 3.4 - 5.3 mmol/L Final   04/09/2021 3.6 3.4 - 5.3 mmol/L Final     Chloride   Date Value Ref Range Status   05/15/2025 101 98 - 107 mmol/L Final   12/08/2022 108 94 - 109 mmol/L Final   04/09/2021 105  94 - 109 mmol/L Final     Carbon Dioxide   Date Value Ref Range Status   04/09/2021 31 20 - 32 mmol/L Final     Carbon Dioxide (CO2)   Date Value Ref Range Status   05/15/2025 21 (L) 22 - 29 mmol/L Final   12/08/2022 26 20 - 32 mmol/L Final     Anion Gap   Date Value Ref Range Status   05/15/2025 12 7 - 15 mmol/L Final   12/08/2022 5 3 - 14 mmol/L Final   04/09/2021 1 (L) 3 - 14 mmol/L Final     Glucose   Date Value Ref Range Status   05/15/2025 88 70 - 99 mg/dL Final   12/08/2022 88 70 - 99 mg/dL Final   04/09/2021 86 70 - 99 mg/dL Final     Comment:     Non Fasting     GLUCOSE BY METER POCT   Date Value Ref Range Status   06/01/2023 112 (H) 70 - 99 mg/dL Final     Urea Nitrogen   Date Value Ref Range Status   05/15/2025 12.1 6.0 - 20.0 mg/dL Final   12/08/2022 19 7 - 30 mg/dL Final   04/09/2021 19 7 - 30 mg/dL Final     Creatinine   Date Value Ref Range Status   05/15/2025 1.09 (H) 0.51 - 0.95 mg/dL Final   04/09/2021 1.18 (H) 0.52 - 1.04 mg/dL Final     GFR Estimate   Date Value Ref Range Status   05/15/2025 66 >60 mL/min/1.73m2 Final     Comment:     eGFR calculated using 2021 CKD-EPI equation.   04/09/2021 59 (L) >60 mL/min/[1.73_m2] Final     Comment:     Non  GFR Calc  Starting 12/18/2018, serum creatinine based estimated GFR (eGFR) will be   calculated using the Chronic Kidney Disease Epidemiology Collaboration   (CKD-EPI) equation.       Calcium   Date Value Ref Range Status   05/15/2025 9.3 8.8 - 10.4 mg/dL Final   04/09/2021 8.6 8.5 - 10.1 mg/dL Final        Radiology:  Brain/ Pituitary MRI without and with contrast     History: Cushing's disease, pituitary adenoma s/p 4 resections, annual  follow up please evaluate for recurrent tumor; Cushing's disease (H);  Pituitary adenoma (H).  ICD-10: Cushing's disease (H); Pituitary adenoma (H)     Comparison:  11/10/2023      Technique: Axial diffusion and FLAIR images of the whole brain  obtained without intravenous contrast. Sagittal T1 and  T2-weighted,  coronal T2-weighted, coronal T1-weighted images with focus on the  sella were obtained without intravenous contrast. Post intravenous  contrast using gadolinium coronal and sagittal T1-weighted images were  obtained focused on the sella. Dynamic postcontrast coronal  T1-weighted images were also obtained.     Contrast: 7.5mL Gadavist     Findings:    Stable postoperative changes of endoscopic transsphenoidal pituitary  mass resection. There is no evidence for abnormal enhancement to  suggest recurrent mass. The pituitary stalk appears midline. The optic  chiasm appears intact and not displaced.  The major cavernous carotid vascular flow-voids appear patent.       FLAIR images through the whole brain are unremarkable, and demonstrate  no mass effect, midline shift, or significant enlargement of the  ventricles.                                                                     Impression: Stable postoperative changes of endoscopic transsphenoidal  resection of pituitary tumor with no MRI evidence for recurrence.    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 She has persistently high ACTH levels.    Given the patient's prior adrenal resections, we will need to review with Dr. Wilkinson what the physiologic consequences of persistently high ACTH levels  will be for Ms. Loyola, besides hyperpigmentation. This is outside our area of expertise, but understanding these consequences could impact the risk/benefit ratio of treating Ms. Looyla with RT.    We note a 2021 review article: Front. Endocrinol., 18 August 2021, Sec. Translational and Clinical Endocrinology, Volume 12 - 2021  [email or web address], that discussed possible extra-adrenal impacts of ACTH. In our discussions with Dr. Wilkinson, other than hyperpigmentation, these additional issues have not been proven to manifest clinically for patients to date.     We did however, discuss that growth of residual pituitary adenoma could lead to physical pressure on the optic chiasm and optic nerves, which could then lead to visual symptoms. We do expect that her pituitary resection site could eventually require treatment with 28 fractions of IMRT, to a dose of 5040 cGy. We also note that the patient is going to start an ACTH specific medication with Dr. Wilkinson, which may help in controlling this patient's ACTH secreting pituitary adenoma. Thus, we recommend deferring RT at this time.     We had a detailed discussion with Suyapa Hodge regarding the role of radiation therapy for the treatment of pituitary adenomas, should she need this in the future. We discussed the logistics, timing, risks, benefits, and side effects associated with radiation therapy, which would likely be delivered 5 days per week for approximately 6 weeks. The patient was encouraged to contact us with questions or concerns should they arise in the interim. All questions were answered to the patients's satisfaction, and they were provided with our contact information should any questions or concerns arise. We will follow-up with the patient after discussion with Dr. Wilkinson.    Starla Bauer MD PGY4    A medical resident participated in the care of this patient and in the preparation of this note. I have verified and edited this note.  "I personally performed key elements of the physical exam and medical decision making for this patient.  I agree with the assessment and plan of care as documented in the note above.      Milla can continue to follow with Dr. Wilkinson and see us as needed in the future, should RT need to be considered.     The overall time I spent on direct patient care including self review of records, labs, and radiographic studies, as well as, direct face-to-face interaction with the patient and coordination of care with other providers was 60 minutes.        Jayla Hermosillo MD, PhD     Department of Radiation Oncology  [facility]       Referring Provider:  Adriana Wilkinson MD  [address] [place] [id number]      Considerations for radiation treatment   Pregnancy status: Female with documented history of menopause    Implanted Cardiac Devices: No   Any previous radiation therapy: No    Oncology Rooming Note    July 17, 2025 1:49 PM   Suyapa Hodge is a 39 year old female who presents for:    Chief Complaint   Patient presents with     Oncology Clinic Visit     New consult for radiation therapy     Initial Vitals: BP 89/68 (BP Location: Left arm, Patient Position: Sitting, Cuff Size: Adult Large)   Pulse 98   Wt 78.9 kg (174 lb)   LMP  (LMP Unknown)   SpO2 99%   BMI 27.37 kg/m   Estimated body mass index is 27.37 kg/m  as calculated from the following:    Height as of 7/8/25: 1.698 m (5' 6.85\").    Weight as of this encounter: 78.9 kg (174 lb). Body surface area is 1.93 meters squared.  No Pain (0) Comment: Data Unavailable   No LMP recorded (lmp unknown). Patient is postmenopausal.  Allergies reviewed: Yes  Medications reviewed: Yes    Medications: Medication refills not needed today.  Pharmacy name entered into [employer]:    [id number] - [place] - [address] AT [facility] & [place]  [id number] - [place] - [address] AT [facility] [address]  [facility] [place], [place] - [address] [place] [address]    PHQ9:  Did this patient require a PHQ9?: No      Clinical concerns: Fatigue not relieved by rest, annoying.  Dr Bauer was notified.    Krysten Faye, RN  Radiation Oncology     [address] [address]     Contact  Clinic : [phone]  Clinic Nurse Desk: [phone]  [facility]/[phone]  After Hours (On-Call): [phone]              Again, thank you for allowing me to participate in the care of your patient.        Sincerely,        Jayla Hermosillo MD PhD    Electronically signed

## 2025-07-19 NOTE — PROGRESS NOTES
Radiation Oncology Consultation Note    Name: Suyapa Hodge MRN: [id number]   : 1985   Neuro-oncologist: Dr. Wilkinson  Date of Service: 2025     Referring: Dr. Wilkinson  Neurosurgeon: Dr. 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    Oncologic History:       Chemotherapy History: No  Radiation History: No  Pregnant: No, postmenopausal  Autoimmune/Connective Tissue Disorder History: No  Inflammatory Bowel Disease History: No  Claustrophobia: No  Implanted Cardiac Devices: No   -Make   -Model:   -Serial Number:    Current systemic therapy: N/A  Anti-epileptic: N/A  Steroids: Hydrocortisone 10 mg p.o. daily, fludrocortisone 0.1 mg p.o. daily    Subjective:  Ms. Milla Hodge is an amicable 39-year-old female who first started experiencing cushingoid-like symptoms back in .  Subsequent imaging studies including an MRI brain revealed a pituitary microadenoma that was subsequently resected.  Her symptoms were further controlled until , when she started experiencing bouts of depression.  Additional imaging at the time revealed possible residual pituitary tissue which was subsequently resected.  The following year, her ACTH levels remained elevated, which prompted a third surgery in addition to a bilateral adrenalectomy.  ACTH levels were monitored continuously and had initially decreased but increased again in  despite partial suppression on high-dose dexamethasone. By 2021, surveillance imaging further revealed a macroadenoma which was resected via a  transsphenoidal approach, successfully lowering ACTH to 14 PG/mL. 5 months later however, ACTH levels were noted to be 418.      Surveillance imaging between 2027-5168 were unremarkable and no recurrence was evident on any imaging except for a subtle left cavernous sinus lesion seen in 2023.  Cabergoline was started that same year but by December 2024, ACTH levels had increased to 1000 and a November 2024 MRI revealed a small hypoenhancing area above the left carotid.  As of May 2025, her ACTH levels remain above 1000.    Today, Ms. Hodge feels well overall despite continuous fatigue and increased hyperpigmentation, mostly affecting her face. Albeit her extensive endocrinopathies, she remains in good spirits.      Review of Systems   A 12-point review of systems was performed. Pertinent findings noted in HPI.  Brain ROS  Headaches: Denies  Seizures: Denies  Nausea/vomiting: Denies  Changes in cognition/behavior: Denies  Speech: Denies  Vision/hearing changes: Denies  Gait symptoms or imbalance: Denies  Other focal neuro deficits: Fatigue    Spine ROS  Back pain: Denies  Radicular pain: Denies  Motor weakness: Denies  Numbness/tingling: Denies  Saddle anaesthesia: Denies  Bowel/bladder dysfunction: Denies    Past Medical History:   Past Medical History:   Diagnosis Date    Adrenal disorder     Avascular necrosis of femur head, 2010    Cervical high risk HPV (human papillomavirus) test positive 10/2015 & 3/2019    +HR HPV (NOT 16/18)    Colon polyp     Cushing syndrome     pituitary microadenoma    Diabetes insipidus     Fatigue     History of colposcopy 10/2015    BEVERLEY 1    Hypercalcaemia     Hypertension     Hypothyroidism 1/20/14    Due to complete pituitary resection    Medulloadrenal hyperfunction     Pituitary adenoma (H)     Pulmonary emboli (H) 01/2014    Renal disease     Thyroid disease        Past Surgical History:   Past Surgical History:   Procedure Laterality Date    ARTHROPLASTY, HIP, TOTAL, DIRECT  ANTERIOR APPROACH, USING HANA TABLE Right 05/30/2023    Procedure: Direct Anterior Right Total Hip Arthroplasty;  Surgeon: Wiliam Grubbs MD;  Location: [facility]    BIOPSY  1/2010, 7/2013, 1/2014, 7/2021    After each surgery on pituitary    COLONOSCOPY      Started receiving in 2004    COLONOSCOPY WITH CO2 INSUFFLATION N/A 09/16/2021    Procedure: COLONOSCOPY, WITH CO2 INSUFFLATION;  Surgeon: Harman Pearl MD;  Location: [facility] OR    COLPOSCOPY VULVA, BIOPSY, COMBINED  10/28/2015    BEVERLEY 1, 6 month follow-up pap was normal    COLPOSCOPY,BX CERVIX/ENDOCERV CURR  04/02/2019    COMBINED ESOPHAGOSCOPY, GASTROSCOPY, DUODENOSCOPY (EGD) WITH CO2 INSUFFLATION N/A 12/11/2024    Procedure: Combined Esophagoscopy, Gastroscopy, Duodenoscopy (Egd) With Co2 Insufflation;  Surgeon: Harman Pearl MD;  Location: [facility]    ESOPHAGOSCOPY, GASTROSCOPY, DUODENOSCOPY (EGD), COMBINED N/A 12/11/2024    Procedure: ESOPHAGOGASTRODUODENOSCOPY, WITH BIOPSY;  Surgeon: Harman Pearl MD;  Location: [facility]    INSERT DRAIN LUMBAR  01/20/2014    Procedure: INSERT DRAIN LUMBAR;;  Surgeon: Soham Aquino MD;  Location: [facility]    LAPAROSCOPIC ADRENALECTOMY  07/14/2014    Procedure: LAPAROSCOPIC ADRENALECTOMY;  Surgeon: Roni Nunn MD;  Location: [facility]    OPTICAL TRACKING SYSTEM ENDOSCOPIC RESECTION TUMOR CRANIAL  07/01/2013    Procedure: OPTICAL TRACKING SYSTEM ENDOSCOPIC RESECTION TUMOR CRANIAL;  Stealth Assisted Endoscopic Hypophysectomy ;  Surgeon: Soham Aquino MD;  Location: [facility]    OPTICAL TRACKING SYSTEM ENDOSCOPIC RESECTION TUMOR CRANIAL  01/20/2014    Procedure: OPTICAL TRACKING SYSTEM ENDOSCOPIC RESECTION TUMOR CRANIAL;  Stealth Assisted Endoscopic Transnasal Excision Of Pituitary Adenoma , Placement Of Lumbar Drain with c-arm;  Surgeon: Soham Aquino MD;  Location: [facility]    OPTICAL TRACKING SYSTEM ENDOSCOPIC RESECTION TUMOR CRANIAL N/A 07/22/2021    Procedure: stealth  assisted Redo endoscopic, endonasal resection of pituitary tumor;  Surgeon: Mina Huffman MD;  Location: [facility]    ORTHOPEDIC SURGERY  5/2023    NM PELVIS/HIP JOINT SURGERY UNLISTED  5/30/23    NM STOMACH SURGERY PROCEDURE UNLISTED  7/14/14    removal of pituitary microademona      wisdom teeth extration         Past Radiation History: The patient denies any history of prior radiation therapy or exposure to ionizing radiation.  The patient denies a history of lupus, scleroderma, connective tissue disorders and collagen vascular disease.  They have not had any other malignancy or chemotherapy.    Allergies:   Allergies   Allergen Reactions    Labetalol Anxiety     Racing heart feeling, panic attack feeling.       Medications:   Current Outpatient Medications   Medication Sig Dispense Refill    acetaminophen (TYLENOL) 325 MG tablet Take 2 tablets (650 mg) by mouth every 4 hours as needed for other (mild pain) 100 tablet 0    cabergoline (DOSTINEX) 0.5 MG tablet TAKE ONE-HALF TABLET BY MOUTH TWICE A WEEK 12 tablet 0    drospirenone-ethinyl estradiol (HECTOR) 3-0.02 MG tablet Take 1 tablet by mouth daily. 84 tablet 3    fludrocortisone (FLORINEF) 0.1 MG tablet Take 1 tablet (0.1 mg) by mouth daily. Call clinic to schedule follow up appointment. IMPORTANT 90 tablet 0    hydrocortisone (CORTEF) 10 MG tablet TAKE 1 TABLET EVERY MORNING,  PLUS SICK DAY SUPPLY AS DIRECTED. Take 10 mg twice daily for 3 days  then resume normal dosing. 120 tablet 3    hydrocortisone sodium succinate PF (SOLU-CORTEF) 100 mg/2 mL injection Inject 2 mLs (100 mg) into the vein once as needed (use when you cannot tolerate po intake.). Dispense Act-O-Vial 4 mL 5    levothyroxine (SYNTHROID/LEVOTHROID) 88 MCG tablet Take 1 tablet (88 mcg) by mouth daily. 90 tablet 3    methocarbamol (ROBAXIN) 500 MG tablet Take 1-2 tablets (500-1,000 mg) by mouth nightly as needed for muscle spasms. 20 tablet 0    Multiple Vitamin (MULTI-VITAMIN DAILY PO)  "Take 1 tablet by mouth daily       sodium chloride (OCEAN) 0.65 % nasal spray Apply in both nostrils as needed for congestion 30 mL 0    amoxicillin (AMOXIL) 500 MG tablet Take 4 tablets 1 hour before dentist appointment or procedure. (Patient not taking: Reported on 7/17/2025) 4 tablet 4    Calcium carb-Vitamin D 500 mg [place]-200 units (OSCAL WITH D;OYSTER SHELL CALCIUM) 500-200 MG-UNIT per tablet Take 1 tablet by mouth daily (Patient not taking: Reported on 7/17/2025)      desmopressin (DDAVP) 0.1 MG tablet Taking daily (Patient not taking: Reported on 7/17/2025) 180 tablet 3    drospirenone-ethinyl estradiol (HECTOR) 3-0.02 MG tablet TAKE 1 TABLET BY MOUTH DAILY (Patient not taking: Reported on 7/17/2025) 84 tablet 3    Pasireotide (SIGNIFOR) 0.6 MG/ML SOLN Inject 1 mL (0.6 mg) subcutaneously 2 times daily. (Patient not taking: Reported on 7/17/2025) 1 mL 5    syringe/needle, disp, 23G X 1\" 3 ML MISC Use with Solu-Cortef for IM injection to prevent adrenal crisis. Proceed to ER for assessment after use. 2 each 3     No current facility-administered medications for this visit.       Family History:   Family History   Problem Relation Age of Onset    Asthma Mother     Osteoporosis Mother     Obesity Mother     Hyperlipidemia Father     Obesity Father     Allergies Sister     Obesity Sister     Anxiety Disorder Sister     Depression Brother     Anxiety Disorder Brother     Mental Illness Brother         Psychosis    Cancer Maternal Grandmother 45        colon cancer    Colon Cancer Maternal Grandmother     Cancer Maternal Grandfather         lung cancer-smoker    Other Cancer Maternal Grandfather         Lung - Smoker    Cancer Paternal Grandmother         lung cancer-smoker    Other Cancer Paternal Grandmother         Lung - Smoker    Cancer Paternal Grandfather         lung cancer    Other Cancer Paternal Grandfather         Lung    Breast Cancer Paternal Aunt     Prostate Cancer Maternal Uncle     Anesthesia Reaction " No family hx of     Cardiovascular No family hx of     Deep Vein Thrombosis (DVT) No family hx of        Social History:   Social History     Socioeconomic History    Marital status: Single     Spouse name: Not on file    Number of children: Not on file    Years of education: Not on file    Highest education level: Not on file   Occupational History    Not on file   Tobacco Use    Smoking status: Former     Current packs/day: 0.00     Average packs/day: 0.5 packs/day for 3.0 years (1.5 ttl pk-yrs)     Types: Cigarettes     Start date: 10/10/2005     Quit date: 2008     Years since quittin.5    Smokeless tobacco: Never   Vaping Use    Vaping status: Never Used   Substance and Sexual Activity    Alcohol use: Yes     Comment: Average 2-3/week in summer    Drug use: Never    Sexual activity: Not Currently     Partners: Male     Birth control/protection: Condom, Pill, Post-menopausal   Other Topics Concern    Parent/sibling w/ CABG, MI or angioplasty before 65F 55M? No   Social History Narrative    Not on file     Social Drivers of Health     Financial Resource Strain: Low Risk  (10/12/2024)    Financial Resource Strain     Within the past 12 months, have you or your family members you live with been unable to get utilities (heat, electricity) when it was really needed?: No   Food Insecurity: Low Risk  (10/12/2024)    Food Insecurity     Within the past 12 months, did you worry that your food would run out before you got money to buy more?: No     Within the past 12 months, did the food you bought just not last and you didn t have money to get more?: No   Transportation Needs: Low Risk  (10/12/2024)    Transportation Needs     Within the past 12 months, has lack of transportation kept you from medical appointments, getting your medicines, non-medical meetings or appointments, work, or from getting things that you need?: No   Physical Activity: Insufficiently Active (10/12/2024)    Exercise Vital Sign     Days of  Exercise per Week: 3 days     Minutes of Exercise per Session: 30 min   Stress: No Stress Concern Present (10/12/2024)    [employer] - Occupational Stress Questionnaire     Feeling of Stress : Not at all   Social Connections: Unknown (10/12/2024)    Social Connection and Isolation Panel [NHANES]     Frequency of Communication with Friends and Family: Not on file     Frequency of Social Gatherings with Friends and Family: Once a week     Attends Muslim Services: Not on file     Active Member of Clubs or Organizations: Not on file     Attends Club or Organization Meetings: Not on file     Marital Status: Not on file   Interpersonal Safety: Low Risk  (7/8/2025)    Interpersonal Safety     Do you feel physically and emotionally safe where you currently live?: Yes     Within the past 12 months, have you been hit, slapped, kicked or otherwise physically hurt by someone?: No     Within the past 12 months, have you been humiliated or emotionally abused in other ways by your partner or ex-partner?: No   Housing Stability: Low Risk  (10/12/2024)    Housing Stability     Do you have housing? : Yes     Are you worried about losing your housing?: No       Physical Exam   ECOG Performance Status: 90  Karnofsky Performance Status: 1      Vitals:  BP 89/68 (BP Location: Left arm, Patient Position: Sitting, Cuff Size: Adult Large)   Pulse 98   Wt 78.9 kg (174 lb)   LMP  (LMP Unknown)   SpO2 99%   BMI 27.37 kg/m    Gen: Alert, in NAD  Head: NC/AT  Eyes: PERRL, EOMI, sclera anicteric  Ears: No external auricular lesions  Nose/sinus: No rhinorrhea or epistaxis  Pulm: No wheezing, stridor or respiratory distress  CV: Extremities are warm and well-perfused, no cyanosis, no pedal edema  Musculoskeletal: Normal bulk and tone  Skin: Normal color and turgor. No rashes or lesions. Hyperpigmentation of the face  Abdominal: Normal bowel sounds, soft, nontender, no masses  Oral cavity/oropharynx: MMM, no  visible oral cavity lesions, FOM and BOT are soft to palpation  Neck: Full ROM, supple, no palpable adenopathy    Neuro:   MS: AAO x 3, appropriately interactive, normal affect, speech fluent  CN: II,III -- PERRLA, no visual field cut;   III,IV,VI -- EOMI, no ptosis;   V -- sensation intact to LT/PP; masseter function intact  VII -- no facial weakness/droop;   VIII -- hears finger rub equally bilaterally;   IX,X -- voice normal, palate elevates symmetrically,  XI -- SCM/trapezii 5/5 strength bilaterally;   XII -- tongue protrudes midline, no atrophy or fasciculation.  Motor: Normal tone, no tremor.               Delt     Bicep   Tricep  FinAb  Ilopso  Hams  Quad   Gastr  Tib Ant EHL  R  5  5  5  5  5  N/A  5  5  5  N/A    L  5  5  5  5  5  N/A  5  5  5  N/A   Sensation: Normal sensation to LT intact bilaterally upper and lower extremities  Coordination: Not tested  Gait: Natural gait intact.  Tandem gait and Romberg not tested  DTRs: Not tested    0-No movement. 1-Muscle contrac. 2-Moves on bed. 3-Antigrav. 4-Mildly weak. 5-Full.       Imaging/Path/Labs   Imaging: As above  Path: As above  Last CBC with Diff:  WBC   Date Value Ref Range Status   07/15/2014 8.2 4.0 - 11.0 10e9/L Final     WBC Count   Date Value Ref Range Status   09/07/2023 8.1 4.0 - 11.0 10e3/uL Final     RBC Count   Date Value Ref Range Status   09/07/2023 4.22 3.80 - 5.20 10e6/uL Final   07/15/2014 4.51 3.8 - 5.2 10e12/L Final     Hemoglobin   Date Value Ref Range Status   10/07/2024 11.6 (L) 11.7 - 15.7 g/dL Final   07/15/2014 13.3 11.7 - 15.7 g/dL Final     Hematocrit   Date Value Ref Range Status   09/07/2023 37.8 35.0 - 47.0 % Final   07/15/2014 39.7 35.0 - 47.0 % Final     MCV   Date Value Ref Range Status   09/07/2023 90 78 - 100 fL Final   07/15/2014 88 78 - 100 fl Final     MCH   Date Value Ref Range Status   09/07/2023 29.6 26.5 - 33.0 pg Final   07/15/2014 29.5 26.5 - 33.0 pg Final     MCHC   Date Value Ref Range Status   09/07/2023 33.1  31.5 - 36.5 g/dL Final   07/15/2014 33.5 31.5 - 36.5 g/dL Final     RDW   Date Value Ref Range Status   09/07/2023 13.1 10.0 - 15.0 % Final   07/15/2014 13.7 10.0 - 15.0 % Final     Platelet Count   Date Value Ref Range Status   09/07/2023 337 150 - 450 10e3/uL Final   07/15/2014 246 150 - 450 10e9/L Final     Diff Method   Date Value Ref Range Status   01/18/2010 Automated Method  Final     % Neutrophils   Date Value Ref Range Status   09/07/2023 81 % Final   01/18/2010 82 (H) 40 - 75 % Final     % Lymphocytes   Date Value Ref Range Status   09/07/2023 16 % Final   01/18/2010 10 (L) 20 - 48 % Final     % Monocytes   Date Value Ref Range Status   09/07/2023 3 % Final   01/18/2010 7 0 - 12 % Final     % Eosinophils   Date Value Ref Range Status   09/07/2023 0 % Final   01/18/2010 1 0 - 6 % Final     % Basophils   Date Value Ref Range Status   09/07/2023 0 % Final   01/18/2010 0 0 - 2 % Final     Absolute Neutrophil   Date Value Ref Range Status   01/18/2010 6.9 1.6 - 8.3 10e9/L Final     Absolute Neutrophils   Date Value Ref Range Status   09/07/2023 6.5 1.6 - 8.3 10e3/uL Final     Absolute Lymphocytes   Date Value Ref Range Status   09/07/2023 1.3 0.8 - 5.3 10e3/uL Final   01/18/2010 0.9 0.8 - 5.3 10e9/L Final     Absolute Monocytes   Date Value Ref Range Status   09/07/2023 0.3 0.0 - 1.3 10e3/uL Final   01/18/2010 0.6 0.0 - 1.3 10e9/L Final      Last Comprehensive Metabolic Panel:  Sodium   Date Value Ref Range Status   05/15/2025 134 (L) 135 - 145 mmol/L Final   04/09/2021 137 133 - 144 mmol/L Final     Potassium   Date Value Ref Range Status   05/15/2025 4.4 3.4 - 5.3 mmol/L Final   12/08/2022 4.3 3.4 - 5.3 mmol/L Final   04/09/2021 3.6 3.4 - 5.3 mmol/L Final     Chloride   Date Value Ref Range Status   05/15/2025 101 98 - 107 mmol/L Final   12/08/2022 108 94 - 109 mmol/L Final   04/09/2021 105 94 - 109 mmol/L Final     Carbon Dioxide   Date Value Ref Range Status   04/09/2021 31 20 - 32 mmol/L Final     Carbon  Dioxide (CO2)   Date Value Ref Range Status   05/15/2025 21 (L) 22 - 29 mmol/L Final   12/08/2022 26 20 - 32 mmol/L Final     Anion Gap   Date Value Ref Range Status   05/15/2025 12 7 - 15 mmol/L Final   12/08/2022 5 3 - 14 mmol/L Final   04/09/2021 1 (L) 3 - 14 mmol/L Final     Glucose   Date Value Ref Range Status   05/15/2025 88 70 - 99 mg/dL Final   12/08/2022 88 70 - 99 mg/dL Final   04/09/2021 86 70 - 99 mg/dL Final     Comment:     Non Fasting     GLUCOSE BY METER POCT   Date Value Ref Range Status   06/01/2023 112 (H) 70 - 99 mg/dL Final     Urea Nitrogen   Date Value Ref Range Status   05/15/2025 12.1 6.0 - 20.0 mg/dL Final   12/08/2022 19 7 - 30 mg/dL Final   04/09/2021 19 7 - 30 mg/dL Final     Creatinine   Date Value Ref Range Status   05/15/2025 1.09 (H) 0.51 - 0.95 mg/dL Final   04/09/2021 1.18 (H) 0.52 - 1.04 mg/dL Final     GFR Estimate   Date Value Ref Range Status   05/15/2025 66 >60 mL/min/1.73m2 Final     Comment:     eGFR calculated using 2021 CKD-EPI equation.   04/09/2021 59 (L) >60 mL/min/[1.73_m2] Final     Comment:     Non  GFR Calc  Starting 12/18/2018, serum creatinine based estimated GFR (eGFR) will be   calculated using the Chronic Kidney Disease Epidemiology Collaboration   (CKD-EPI) equation.       Calcium   Date Value Ref Range Status   05/15/2025 9.3 8.8 - 10.4 mg/dL Final   04/09/2021 8.6 8.5 - 10.1 mg/dL Final        Radiation Oncology Pre-Treatment Evaluation:   Prior RT: No  Pacemaker: No  Child-bearing potential (Yes/No): No  Pain: No Pain (0)0/10  Pain plan: N/A  Intent of treatment: (currative/palliative): N/A   Side Effects of Radiation: We discussed in detail the management of treatment side effects    Assessment/Plan   Milla Hodge is a 39-year-old female with a history of Cushing's disease, hypothyroidism, hypothalamic adrenal dysfunction, pituitary macroadenoma, diabetes insipidus, osteonecrosis of the left hip, and Alexei syndrome s/p pituitary resection  x 4 and bilateral adrenalectomy who presents for consideration of radiotherapy given her persistently elevated ACTH levels (1089-May 2025).  Accordingly, her visit with our department is warranted as the discrepancy between an unremarkable MRI and surgically confirmed residual tumor limits her status as an ideal candidate for further resection.     However, the role of radiotherapy to treat secretory pituitary tumors in a patient who has undergone bilateral adrenalectomy is not well-delineated in the literature and makes it difficult to appropriately ascertain the risks of radiation treatment within this context. Accordingly, we would like to refer Ms. Hodge to Dr. Wilkinson in order to better understand the physiologic consequences of unopposed ACTH apart from hyperpigmentation as this will enhance shared decision making and greatly demarcate radiotherapy as a potential treatment modality.  At this time, we are deferring radiotherapy until this is completed.    It is important to note that although Ms. Hodge does not report any neurocognitive symptoms.today, her subtle lesion may eventually grow in size and lead to compression of the optic chiasm. We thus anticipate the need for radiotherapy in the future.    A myriad of radiotherapy techniques have been accepted as reasonable options for both secretory and nonsecretory pituitary adenomas.  SRS is usually preferred due to faster time to hormone normalization and patient convenience.  However, the location of the residual tumor limits SRS due to anatomical constraints and dosing requirements.  Accordingly, we recommend fractionated RT 50.4 Jo in 28 fractions via IMRT for targeting optimization while minimizing dose to OARs.  This rationale is in part influenced by Carmen et al. 2008 who treated 33 patients with Cushing's disease and 5 with Alexei syndrome (all 5 underwent bilateral adrenalectomy).  All 5 patients with Alexei syndrome experienced CR at an average of  18 months posttreatment albeit 52% developed new pituitary deficits requiring replacement hormones.    Plan    Our team had a thorough discussion with Ms. Hodge where we explained the importance of meeting with Dr. Wilkinson in the hopes of better delineating the role of her unopposed ACTH secretion before considering radiotherapy.  We further explained the role of radiotherapy in the treatment of pituitary adenomas as well as the logistics, timing, risks, and benefits of this treatment option.  The patient was agreeable to this aforementioned plan and will follow-up with us after meeting with neuroendocrinology.  She was further advised to reach out to us with any potential inquiries or concerns.    Patient was seen and discussed with my attending physician, Dr. Hermosillo.    Milton Moreno [facility] [place] [facility] [place]  Department of Radiation Oncology  [facility]  Phone: [phone]

## 2025-07-22 ENCOUNTER — ANCILLARY PROCEDURE (OUTPATIENT)
Dept: MRI IMAGING | Facility: CLINIC | Age: 40
End: 2025-07-22
Attending: PHYSICIAN ASSISTANT
Payer: COMMERCIAL

## 2025-07-22 DIAGNOSIS — M25.532 LEFT WRIST PAIN: ICD-10-CM

## 2025-07-22 PROCEDURE — 73221 MRI JOINT UPR EXTREM W/O DYE: CPT | Mod: LT | Performed by: RADIOLOGY

## 2025-07-24 ENCOUNTER — PATIENT OUTREACH (OUTPATIENT)
Dept: CARE COORDINATION | Facility: CLINIC | Age: 40
End: 2025-07-24
Payer: COMMERCIAL

## 2025-07-24 DIAGNOSIS — E24.0 PITUITARY DEPENDENT CUSHING DISEASE (H): Primary | ICD-10-CM

## 2025-07-28 ENCOUNTER — PATIENT OUTREACH (OUTPATIENT)
Dept: CARE COORDINATION | Facility: CLINIC | Age: 40
End: 2025-07-28
Payer: COMMERCIAL

## 2025-07-29 ENCOUNTER — TELEPHONE (OUTPATIENT)
Dept: ENDOCRINOLOGY | Facility: CLINIC | Age: 40
End: 2025-07-29
Payer: COMMERCIAL

## 2025-07-29 NOTE — TELEPHONE ENCOUNTER
DIAGNOSIS:   Complex tear of triangular fibrocartilage of left wrist, initial encounter [S63.592A]  - Primary   APPOINTMENT DATE: 08/06/2025   NOTES STATUS DETAILS   OFFICE NOTE from referring provider Internal 07/08/2025 -   Ashley Carbone PA-C  Family Medicine   MRI Internal 07/22/2025 - LEFT WRIST

## 2025-07-29 NOTE — TELEPHONE ENCOUNTER
MTM referral from: [facility] visit (referral by provider)    MTM referral outreach attempt #2 on July 29, 2025 at 11:17 AM      Outcome: Patient not reachable after several attempts, routed to Pharmacist Team/Provider as an FYI    Use hbc for the carrier/Plan on the flowsheet      [employer] Interactive Message Sent    Marlys Vu  MTM         Please see [employer] CSH[employer] message-unable to reach x 2

## 2025-08-06 ENCOUNTER — OFFICE VISIT (OUTPATIENT)
Dept: ORTHOPEDICS | Facility: CLINIC | Age: 40
End: 2025-08-06
Attending: PHYSICIAN ASSISTANT
Payer: COMMERCIAL

## 2025-08-06 ENCOUNTER — PRE VISIT (OUTPATIENT)
Dept: ORTHOPEDICS | Facility: CLINIC | Age: 40
End: 2025-08-06

## 2025-08-06 VITALS — WEIGHT: 174 LBS | HEIGHT: 66 IN | BODY MASS INDEX: 27.97 KG/M2

## 2025-08-06 DIAGNOSIS — S63.592A COMPLEX TEAR OF TRIANGULAR FIBROCARTILAGE OF LEFT WRIST, INITIAL ENCOUNTER: ICD-10-CM

## 2025-08-06 PROCEDURE — 99203 OFFICE O/P NEW LOW 30 MIN: CPT | Performed by: PHYSICIAN ASSISTANT

## 2025-08-20 ENCOUNTER — TELEPHONE (OUTPATIENT)
Dept: RADIATION ONCOLOGY | Facility: CLINIC | Age: 40
End: 2025-08-20
Payer: COMMERCIAL

## (undated) DEVICE — ESU PENCIL W/COATED BLADE E2450H

## (undated) DEVICE — LINEN TOWEL PACK X5 5464

## (undated) DEVICE — DRAPE EYE SHEET 8441

## (undated) DEVICE — TUBING SUCTION MEDI-VAC SOFT 3/16"X20' N520A

## (undated) DEVICE — BUR STRK ROUND DIAMOND 4.0MM COARSE 8450-012-040DC

## (undated) DEVICE — PROBE DOPPLER MICRO MIZUHO

## (undated) DEVICE — PREP DURAPREP 26ML APL 8630

## (undated) DEVICE — DRSG TEGADERM ALGINATE AG 4X5" 90303

## (undated) DEVICE — CATH TRAY FOLEY SURESTEP 16FR W/TMP PRB STLK LATEX A319416AM

## (undated) DEVICE — SU ETHIBOND 0 CT-1 CR 8X18" CX21D

## (undated) DEVICE — SU SILK 2-0 TIE 12X30" A305H

## (undated) DEVICE — SLEEVE IRRIGATION MIS 13.0CM 5/PKG 5407-010-950

## (undated) DEVICE — DRSG GAUZE 4X4" TRAY 6939

## (undated) DEVICE — ENDO SHEATH STORZ SHARPSITE ENDOSCRUB 0DEG 4MM 1912000

## (undated) DEVICE — DRAPE IOBAN INCISE 23X17" 6650EZ

## (undated) DEVICE — PACK TOTAL HIP W/U DRAPE RIVERSIDE LATEX FREE

## (undated) DEVICE — DRAPE POUCH IRR 1016

## (undated) DEVICE — LINEN TOWEL PACK X6 WHITE 5487

## (undated) DEVICE — IOM SUPPLIES/CASE FEE

## (undated) DEVICE — DRSG TEGADERM 4X10" 1627

## (undated) DEVICE — BLADE SAW SAGITTAL STRK 18X90X0.89MM  6118-089-090

## (undated) DEVICE — ENDO INSERT ESU BIPOLAR FCP STORZ VERTICAL CLOSING 28164FGL

## (undated) DEVICE — SYR 03ML LL W/O NDL 309657

## (undated) DEVICE — BUR STRK ROUND DIAMOND 3.0MM COARSE 8450-012-030DC

## (undated) DEVICE — SU ETHIBOND 1 CT-1 30" X425H

## (undated) DEVICE — SOL NACL 0.9% IRRIG 1000ML BOTTLE 2F7124

## (undated) DEVICE — TRAY PREP DRY SKIN SCRUB 067

## (undated) DEVICE — SUCTION MANIFOLD NEPTUNE 2 SYS 4 PORT 0702-020-000

## (undated) DEVICE — SU VICRYL 2-0 CT 36" J957H

## (undated) DEVICE — DRAPE IOBAN ISOLATION VERTICAL 6619

## (undated) DEVICE — PAD CHUX UNDERPAD 23X24" 7136

## (undated) DEVICE — SPONGE COTTONOID 1/2X1/2" 20-04S

## (undated) DEVICE — SU VICRYL 0 CT-1 27" J340H

## (undated) DEVICE — ESU ELEC NDL 6" COATED/INSULATED E1465-6

## (undated) DEVICE — SOL WATER IRRIG 1000ML BOTTLE 2F7114

## (undated) DEVICE — SUCTION TIP YANKAUER STR K87

## (undated) DEVICE — BLADE KNIFE SURG 10 371110

## (undated) DEVICE — DRAPE POUCH INSTRUMENT 1018

## (undated) DEVICE — DRAPE SHEET MED 44X70" 9355

## (undated) DEVICE — SURGICEL HEMOSTAT 4X8" 1952

## (undated) DEVICE — ESU GROUND PAD ADULT W/CORD E7507

## (undated) DEVICE — RX BACITRACIN OINTMENT 0.9G 1/32OZ CUR001109

## (undated) DEVICE — PREP POVIDONE-IODINE 7.5% SCRUB 4OZ BOTTLE MDS093945

## (undated) DEVICE — EYE PREP BETADINE 5% SOLUTION 30ML 0065-0411-30

## (undated) DEVICE — Device

## (undated) DEVICE — LINEN TOWEL PACK X30 5481

## (undated) DEVICE — DRAPE C-ARM W/STRAPS 42X72" 07-CA104

## (undated) DEVICE — GLOVE BIOGEL PI MICRO SZ 7.0 48570

## (undated) DEVICE — NDL 25GA 2"  8881200441

## (undated) DEVICE — BLADE SAW RECIP STRK 70X12.5X1.2MM 0277-096-281

## (undated) DEVICE — APPLICATOR EXTENDED TIP DURASEAL 8CM 205108

## (undated) DEVICE — PACK NEURO MINOR UMMC SNE32MNMU4

## (undated) DEVICE — LINEN GOWN X4 5410

## (undated) DEVICE — KIT ENDO FIRST STEP DISINFECTANT 200ML W/POUCH EP-4

## (undated) DEVICE — BLADE KNIFE SURG 11 371111

## (undated) DEVICE — ESU SUCTION CAUTERY 10FR FOOT CONTROL E2505-10FR

## (undated) DEVICE — PREP CHLORAPREP 26ML TINTED ORANGE  260815

## (undated) DEVICE — ESU MINI EPIDURAL VEIN SEALER AQUAMANTIS 3.4MM 23-314-1

## (undated) DEVICE — KIT PATIENT CARE HANA PROFX W/BOOT LINER SUPINE 6851

## (undated) DEVICE — SPONGE SURGIFOAM 100 1974

## (undated) DEVICE — LINEN GOWN OVERSIZE 5408

## (undated) DEVICE — SU DERMABOND ADVANCED .7ML DNX12

## (undated) DEVICE — TUBING STRYKER IRRIGATION CASSETTE 5400-050-001

## (undated) DEVICE — ENDO SHEATH STORZ SHARPSITE ENDOSCRUB 30DEG 4MM 1912010

## (undated) DEVICE — SPONGE SURGIFOAM 01GM POWDER 1978

## (undated) DEVICE — BUR STRK ROUND 3MM 8450-010-030

## (undated) DEVICE — PIN SKULL MAYFIELD ADULT TITANIUM 3/PK A1120

## (undated) DEVICE — SU SILK 2-0 SH 30" K833H

## (undated) DEVICE — SU VICRYL 0 CTX 36" J370H

## (undated) DEVICE — LINEN BACK PACK 5440

## (undated) DEVICE — MARKER SPHERES PASSIVE MEDT PACK 5 8801075

## (undated) DEVICE — GOWN IMPERVIOUS SPECIALTY XLG/XLONG 32474

## (undated) DEVICE — GLOVE PROTEXIS MICRO 7.0  2D73PM70

## (undated) DEVICE — SPONGE COTTONOID 1/2X3" 20-07S

## (undated) DEVICE — DRSG TELFA 3X8" 1238

## (undated) DEVICE — PACK UNIVERSAL SPLIT 29131

## (undated) DEVICE — LINEN DRAPE 54X72" 5467

## (undated) DEVICE — COVER CAMERA VIDEO LASER 9X96" 04-CC227

## (undated) DEVICE — DECANTER VIAL 2006S

## (undated) DEVICE — ESU GROUND PAD UNIVERSAL W/O CORD

## (undated) DEVICE — GOWN XLG DISP 9545

## (undated) DEVICE — ESU ELEC BLADE 6" COATED E1450-6

## (undated) DEVICE — PACK SPINE INSTRUMENT DRAPE 76091-ACM7820

## (undated) DEVICE — SPONGE LAP 18X18" X8435

## (undated) DEVICE — SU MONOCRYL 3-0 PS-2 18" UND Y497G

## (undated) DEVICE — GLOVE BIOGEL PI MICRO INDICATOR UNDERGLOVE SZ 7.5 48975

## (undated) RX ORDER — ONDANSETRON 2 MG/ML
INJECTION INTRAMUSCULAR; INTRAVENOUS
Status: DISPENSED
Start: 2023-05-30

## (undated) RX ORDER — ESMOLOL HYDROCHLORIDE 10 MG/ML
INJECTION INTRAVENOUS
Status: DISPENSED
Start: 2021-07-22

## (undated) RX ORDER — OXYMETAZOLINE HYDROCHLORIDE 0.05 G/100ML
SPRAY NASAL
Status: DISPENSED
Start: 2021-07-22

## (undated) RX ORDER — FENTANYL CITRATE-0.9 % NACL/PF 10 MCG/ML
PLASTIC BAG, INJECTION (ML) INTRAVENOUS
Status: DISPENSED
Start: 2023-05-30

## (undated) RX ORDER — OXYCODONE HYDROCHLORIDE 5 MG/1
TABLET ORAL
Status: DISPENSED
Start: 2023-05-30

## (undated) RX ORDER — FENTANYL CITRATE 50 UG/ML
INJECTION, SOLUTION INTRAMUSCULAR; INTRAVENOUS
Status: DISPENSED
Start: 2023-05-30

## (undated) RX ORDER — FENTANYL CITRATE-0.9 % NACL/PF 10 MCG/ML
PLASTIC BAG, INJECTION (ML) INTRAVENOUS
Status: DISPENSED
Start: 2021-07-22

## (undated) RX ORDER — PROPOFOL 10 MG/ML
INJECTION, EMULSION INTRAVENOUS
Status: DISPENSED
Start: 2021-07-22

## (undated) RX ORDER — DEXAMETHASONE SODIUM PHOSPHATE 4 MG/ML
INJECTION, SOLUTION INTRA-ARTICULAR; INTRALESIONAL; INTRAMUSCULAR; INTRAVENOUS; SOFT TISSUE
Status: DISPENSED
Start: 2023-05-30

## (undated) RX ORDER — HYDROMORPHONE HYDROCHLORIDE 1 MG/ML
INJECTION, SOLUTION INTRAMUSCULAR; INTRAVENOUS; SUBCUTANEOUS
Status: DISPENSED
Start: 2023-05-30

## (undated) RX ORDER — FENTANYL CITRATE 50 UG/ML
INJECTION, SOLUTION INTRAMUSCULAR; INTRAVENOUS
Status: DISPENSED
Start: 2021-07-22

## (undated) RX ORDER — FENTANYL CITRATE 50 UG/ML
INJECTION, SOLUTION INTRAMUSCULAR; INTRAVENOUS
Status: DISPENSED
Start: 2021-09-16

## (undated) RX ORDER — CELECOXIB 200 MG/1
CAPSULE ORAL
Status: DISPENSED
Start: 2023-05-30

## (undated) RX ORDER — FENTANYL CITRATE 50 UG/ML
INJECTION, SOLUTION INTRAMUSCULAR; INTRAVENOUS
Status: DISPENSED
Start: 2024-12-11

## (undated) RX ORDER — HYDROMORPHONE HYDROCHLORIDE 1 MG/ML
INJECTION, SOLUTION INTRAMUSCULAR; INTRAVENOUS; SUBCUTANEOUS
Status: DISPENSED
Start: 2021-07-22

## (undated) RX ORDER — LIDOCAINE HYDROCHLORIDE AND EPINEPHRINE 10; 10 MG/ML; UG/ML
INJECTION, SOLUTION INFILTRATION; PERINEURAL
Status: DISPENSED
Start: 2021-07-22

## (undated) RX ORDER — TRANEXAMIC ACID 650 MG/1
TABLET ORAL
Status: DISPENSED
Start: 2023-05-30

## (undated) RX ORDER — PROPOFOL 10 MG/ML
INJECTION, EMULSION INTRAVENOUS
Status: DISPENSED
Start: 2023-05-30

## (undated) RX ORDER — ACETAMINOPHEN 325 MG/1
TABLET ORAL
Status: DISPENSED
Start: 2023-05-30

## (undated) RX ORDER — CEFAZOLIN SODIUM/WATER 2 G/20 ML
SYRINGE (ML) INTRAVENOUS
Status: DISPENSED
Start: 2023-05-30

## (undated) RX ORDER — SODIUM CHLORIDE 9 MG/ML
INJECTION, SOLUTION INTRAVENOUS
Status: DISPENSED
Start: 2021-07-22